# Patient Record
Sex: FEMALE | Race: WHITE | NOT HISPANIC OR LATINO | Employment: OTHER | URBAN - METROPOLITAN AREA
[De-identification: names, ages, dates, MRNs, and addresses within clinical notes are randomized per-mention and may not be internally consistent; named-entity substitution may affect disease eponyms.]

---

## 2017-02-23 ENCOUNTER — ALLSCRIPTS OFFICE VISIT (OUTPATIENT)
Dept: OTHER | Facility: OTHER | Age: 47
End: 2017-02-23

## 2017-02-23 ENCOUNTER — OFFICE VISIT (OUTPATIENT)
Dept: URGENT CARE | Age: 47
End: 2017-02-23
Payer: COMMERCIAL

## 2017-02-23 PROCEDURE — 99283 EMERGENCY DEPT VISIT LOW MDM: CPT

## 2017-02-23 PROCEDURE — G0382 LEV 3 HOSP TYPE B ED VISIT: HCPCS

## 2017-05-17 ENCOUNTER — GENERIC CONVERSION - ENCOUNTER (OUTPATIENT)
Dept: OTHER | Facility: OTHER | Age: 47
End: 2017-05-17

## 2017-05-17 ENCOUNTER — ALLSCRIPTS OFFICE VISIT (OUTPATIENT)
Dept: OTHER | Facility: OTHER | Age: 47
End: 2017-05-17

## 2017-06-14 ENCOUNTER — ALLSCRIPTS OFFICE VISIT (OUTPATIENT)
Dept: OTHER | Facility: OTHER | Age: 47
End: 2017-06-14

## 2017-07-23 ENCOUNTER — HOSPITAL ENCOUNTER (EMERGENCY)
Facility: HOSPITAL | Age: 47
Discharge: HOME | End: 2017-07-23
Attending: EMERGENCY MEDICINE | Admitting: EMERGENCY MEDICINE
Payer: COMMERCIAL

## 2017-07-23 ENCOUNTER — OFFICE VISIT (OUTPATIENT)
Dept: URGENT CARE | Facility: MEDICAL CENTER | Age: 47
End: 2017-07-23
Payer: COMMERCIAL

## 2017-07-23 ENCOUNTER — APPOINTMENT (EMERGENCY)
Dept: CT IMAGING | Facility: HOSPITAL | Age: 47
End: 2017-07-23
Payer: COMMERCIAL

## 2017-07-23 VITALS
HEIGHT: 69 IN | WEIGHT: 175 LBS | HEART RATE: 82 BPM | DIASTOLIC BLOOD PRESSURE: 74 MMHG | BODY MASS INDEX: 25.92 KG/M2 | RESPIRATION RATE: 20 BRPM | TEMPERATURE: 98 F | SYSTOLIC BLOOD PRESSURE: 160 MMHG | OXYGEN SATURATION: 100 %

## 2017-07-23 DIAGNOSIS — R10.9 ABDOMINAL PAIN: Primary | ICD-10-CM

## 2017-07-23 DIAGNOSIS — R11.0 NAUSEA: ICD-10-CM

## 2017-07-23 LAB
ALBUMIN SERPL BCP-MCNC: 3.6 G/DL (ref 3.5–5)
ALP SERPL-CCNC: 49 U/L (ref 46–116)
ALT SERPL W P-5'-P-CCNC: 31 U/L (ref 12–78)
ANION GAP SERPL CALCULATED.3IONS-SCNC: 9 MMOL/L (ref 4–13)
AST SERPL W P-5'-P-CCNC: 32 U/L (ref 5–45)
BASOPHILS # BLD AUTO: 0.02 THOUSANDS/ΜL (ref 0–0.1)
BASOPHILS NFR BLD AUTO: 0 % (ref 0–1)
BILIRUB SERPL-MCNC: 0.5 MG/DL (ref 0.2–1)
BUN SERPL-MCNC: 10 MG/DL (ref 5–25)
CALCIUM SERPL-MCNC: 8.7 MG/DL (ref 8.3–10.1)
CHLORIDE SERPL-SCNC: 106 MMOL/L (ref 100–108)
CO2 SERPL-SCNC: 27 MMOL/L (ref 21–32)
CREAT SERPL-MCNC: 0.73 MG/DL (ref 0.6–1.3)
EOSINOPHIL # BLD AUTO: 0.13 THOUSAND/ΜL (ref 0–0.61)
EOSINOPHIL NFR BLD AUTO: 2 % (ref 0–6)
ERYTHROCYTE [DISTWIDTH] IN BLOOD BY AUTOMATED COUNT: 13.5 % (ref 11.6–15.1)
EXT BLOOD URINE: NEGATIVE
EXT GLUCOSE, UA: NEGATIVE
EXT KETONES: NORMAL
EXT NITRITE, UA: NEGATIVE
EXT PH, UA: 6
EXT PROTEIN, UA: NEGATIVE
EXT SPECIFIC GRAVITY, UA: 1
EXT UROBILINOGEN: NEGATIVE
GFR SERPL CREATININE-BSD FRML MDRD: >60 ML/MIN/1.73SQ M
GLUCOSE SERPL-MCNC: 79 MG/DL (ref 65–140)
HCG UR QL: NEGATIVE
HCT VFR BLD AUTO: 36.9 % (ref 34.8–46.1)
HGB BLD-MCNC: 12.1 G/DL (ref 11.5–15.4)
LYMPHOCYTES # BLD AUTO: 1.66 THOUSANDS/ΜL (ref 0.6–4.47)
LYMPHOCYTES NFR BLD AUTO: 21 % (ref 14–44)
MCH RBC QN AUTO: 30.2 PG (ref 26.8–34.3)
MCHC RBC AUTO-ENTMCNC: 32.8 G/DL (ref 31.4–37.4)
MCV RBC AUTO: 92 FL (ref 82–98)
MONOCYTES # BLD AUTO: 0.39 THOUSAND/ΜL (ref 0.17–1.22)
MONOCYTES NFR BLD AUTO: 5 % (ref 4–12)
NEUTROPHILS # BLD AUTO: 5.85 THOUSANDS/ΜL (ref 1.85–7.62)
NEUTS SEG NFR BLD AUTO: 72 % (ref 43–75)
PLATELET # BLD AUTO: 220 THOUSANDS/UL (ref 149–390)
PMV BLD AUTO: 9.4 FL (ref 8.9–12.7)
POTASSIUM SERPL-SCNC: 3.9 MMOL/L (ref 3.5–5.3)
PROT SERPL-MCNC: 6.9 G/DL (ref 6.4–8.2)
RBC # BLD AUTO: 4.01 MILLION/UL (ref 3.81–5.12)
SODIUM SERPL-SCNC: 142 MMOL/L (ref 136–145)
WBC # BLD AUTO: 8.05 THOUSAND/UL (ref 4.31–10.16)
WBC # BLD EST: NEGATIVE 10*3/UL

## 2017-07-23 PROCEDURE — 81002 URINALYSIS NONAUTO W/O SCOPE: CPT | Performed by: PHYSICIAN ASSISTANT

## 2017-07-23 PROCEDURE — 96374 THER/PROPH/DIAG INJ IV PUSH: CPT

## 2017-07-23 PROCEDURE — 96361 HYDRATE IV INFUSION ADD-ON: CPT

## 2017-07-23 PROCEDURE — 36415 COLL VENOUS BLD VENIPUNCTURE: CPT | Performed by: PHYSICIAN ASSISTANT

## 2017-07-23 PROCEDURE — G0382 LEV 3 HOSP TYPE B ED VISIT: HCPCS

## 2017-07-23 PROCEDURE — 85025 COMPLETE CBC W/AUTO DIFF WBC: CPT | Performed by: PHYSICIAN ASSISTANT

## 2017-07-23 PROCEDURE — 99284 EMERGENCY DEPT VISIT MOD MDM: CPT

## 2017-07-23 PROCEDURE — 99283 EMERGENCY DEPT VISIT LOW MDM: CPT

## 2017-07-23 PROCEDURE — 80053 COMPREHEN METABOLIC PANEL: CPT | Performed by: PHYSICIAN ASSISTANT

## 2017-07-23 PROCEDURE — 81025 URINE PREGNANCY TEST: CPT | Performed by: PHYSICIAN ASSISTANT

## 2017-07-23 PROCEDURE — 74177 CT ABD & PELVIS W/CONTRAST: CPT

## 2017-07-23 RX ORDER — KETOROLAC TROMETHAMINE 30 MG/ML
15 INJECTION, SOLUTION INTRAMUSCULAR; INTRAVENOUS ONCE
Status: COMPLETED | OUTPATIENT
Start: 2017-07-23 | End: 2017-07-23

## 2017-07-23 RX ORDER — ONDANSETRON 2 MG/ML
4 INJECTION INTRAMUSCULAR; INTRAVENOUS ONCE
Status: COMPLETED | OUTPATIENT
Start: 2017-07-23 | End: 2017-07-23

## 2017-07-23 RX ADMIN — IOHEXOL 100 ML: 350 INJECTION, SOLUTION INTRAVENOUS at 15:52

## 2017-07-23 RX ADMIN — SODIUM CHLORIDE 1000 ML: 0.9 INJECTION, SOLUTION INTRAVENOUS at 15:23

## 2017-07-23 RX ADMIN — ONDANSETRON 4 MG: 2 INJECTION INTRAMUSCULAR; INTRAVENOUS at 15:22

## 2017-07-23 RX ADMIN — KETOROLAC TROMETHAMINE 15 MG: 30 INJECTION, SOLUTION INTRAMUSCULAR at 15:23

## 2017-07-24 ENCOUNTER — ALLSCRIPTS OFFICE VISIT (OUTPATIENT)
Dept: OTHER | Facility: OTHER | Age: 47
End: 2017-07-24

## 2017-07-24 DIAGNOSIS — N83.202 CYST OF LEFT OVARY: ICD-10-CM

## 2017-08-16 ENCOUNTER — ALLSCRIPTS OFFICE VISIT (OUTPATIENT)
Dept: OTHER | Facility: OTHER | Age: 47
End: 2017-08-16

## 2017-08-17 ENCOUNTER — HOSPITAL ENCOUNTER (OUTPATIENT)
Dept: RADIOLOGY | Age: 47
Discharge: HOME | End: 2017-08-17
Payer: COMMERCIAL

## 2017-08-17 DIAGNOSIS — N83.202 CYST OF LEFT OVARY: ICD-10-CM

## 2017-08-17 PROCEDURE — 76830 TRANSVAGINAL US NON-OB: CPT

## 2017-08-17 PROCEDURE — 76856 US EXAM PELVIC COMPLETE: CPT

## 2017-08-21 ENCOUNTER — GENERIC CONVERSION - ENCOUNTER (OUTPATIENT)
Dept: OTHER | Facility: OTHER | Age: 47
End: 2017-08-21

## 2017-09-22 ENCOUNTER — GENERIC CONVERSION - ENCOUNTER (OUTPATIENT)
Dept: OTHER | Facility: OTHER | Age: 47
End: 2017-09-22

## 2017-11-07 NOTE — PROGRESS NOTES
Assessment  1  Use of tamoxifen (Nolvadex) (V07 51) (Z79 810)   2  Malignant neoplasm of upper-inner quadrant of right breast in female, estrogen receptor   positive (174 2,V86 0) (C50 211,Z17 0)    Plan  Unlinked    · Follow-up visit in 6 months Evaluation and Treatment  Follow-up  Status: Complete   Done: [id number]   Ordered;Ordered By: Jose Luis Gonzalez Performed:  Due: 68Ros7704; Last Updated By: Andrey Warren; 8/16/2017 9:28:59 AM    Discussion/Summary  57 yo female s/p bilateral mastectomy and reconstruction for a right sided IDC  She continues on tamoxifen  ARABELLA based on exam today  I will see her again in six months or sooner should the need arise  Chief Complaint  Chief Complaint Free Text Note Form: Pt is here for her 6 month breast follow-up  new complaints at this time  recent imaging  Emily Lua and Dr Debbie Childs  History of Present Illness  Diagnosis and Staging: IDC right breast ER/IL +, HER2 -stage I; pathologic stage [id number]   Treatment History: 6/30/15 bilateral mastectomy, right sentinel node biopsy; expanders Steve  Current Therapy: marcial   Disease Status: arabella   Interval History: 10/23/15 implant exchange      Review of Systems  Complete Female ROS SurgOnc:   Constitutional: night sweats  Eyes: eyesight problems  ENT: no complaints of ear pain, no hoarseness, no difficulty swallowing,-- no tinnitus-- and-- no new masses in head, oral cavity, or neck  Cardiovascular: No complaints of chest pain, no palpitations, no ankle edema  Respiratory: No complaints of shortness of breath, no cough  Gastrointestinal: No complaints of jaundice, no bloody stools, no pale stools  Genitourinary: No complaints of dysuria, no hematuria, no nocturia, no frequent urination, no urethral discharge  Musculoskeletal: No complaints of weakness, paralysis, joint stiffness or arthralgias,  Integumentary: No complaints of rash, no new lesions     Neurological: No complaints of convulsions, no seizures, no dizziness  Hematologic/Lymphatic: No complaints of easy bruising  Active Problems   1  Abdominal pain (789 00) (R10 9)   2  Abdominal pain, RLQ (right lower quadrant) (789 03) (R10 31)   3  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)   4  Cyst of left ovary (620 2) (N83 202)   5  Fall, initial encounter (E888 9) [facility])   6  Insomnia (780 52) (G47 00)   7  Irritable bowel syndrome (IBS) (564 1) (K58 9)   8  Psoriasis (696 1) (L40 9)   9  Screening for skin condition (V82 0) (Z13 89)   10  Seasonal allergies (477 9) (J30 2)   11  Urinary frequency (788 41) (R35 0)   12  Use of tamoxifen (Nolvadex) (V07 51) (Z79 810)    Well female exam with routine gynecological exam (V72 31) (Z01 419)          Past Medical History  1  History of Abnormal finding on breast imaging (793 89) (R92 8)   2  History of Acute upper respiratory infection (465 9) (J06 9)   3  History of BRCA negative (V82 71) (Z13 71)   4  History of acute cystitis (V13 02) (Z87 440)   5  History of acute sinusitis (V12 69) (Z87 09)   6  History of dysuria (V13 00) (Z87 898)   7  History of endometriosis (V13 29) (Z87 42)   8  History of fibrocystic disease of breast (V13 89) (Z87 898)   9  History of ovulatory pain (V13 29) (Z87 42)   10  History of pregnancy (V13 29)   11  History of Inconclusive mammography due to dense breasts (793 82) (R92 2)   12  History of Menarche (V21 8)   13  History of Night sweats (780 8) (R61)   14  History of Wears glasses (V49 89) (Z97 3)    Surgical History  1  History of Biopsy Breast Percutaneous Needle Core   · 05/14/15 right   2  History of Breast Reconstruction With Implant Prosthesis Bilateral   · 10/23/15 expanders removed, implaced placed   3  History of Breast Reconstruction With Tissue Expander Bilateral   · 06/30/15   4  History of Breast Surgery Mastectomy   · 06/30/15   5  History of  Section   ·    6   History of Exploratory Laparoscopy   · ENDOMETRIOSIS -  7  History of [place] Lymph Node Biopsy   · 06/30/15  Surgical History Reviewed: The surgical history was reviewed and updated today  Family History  Mother    1  Family history of malignant neoplasm of thyroid (V16 8) (Z80 8)  Father    2  Denied: Family history of malignant neoplasm   3  Family history of psoriasis (V19 4) (Z84 0)  Maternal Great Grandmother    4  Family history of malignant neoplasm of breast (V16 3) (Z80 3)  Paternal Grandfather    5  Family history of lung cancer (V16 1) (Z80 1)  Uncle    6  Family history of heart disease (V17 49) (Z82 49)   7  Family history of malignant neoplasm of prostate (V16 42) (Z80 45)  Family History Reviewed: The family history was reviewed and updated today  Social History   · Currently working   · Drinks coffee   · Has 1 child   ·    · Never a smoker   · Social drinker  Social History Reviewed: The social history was reviewed and updated today  The social history was reviewed and is unchanged  Current Meds   1  Calcium TABS; Therapy: (Recorded:14Jun2017) to Recorded   2  Clobetasol Propionate 0 05 % External Ointment; APPLY SPARINGLY TO AFFECTED   AREA(S) TWICE DAILY  Requested for: [id number]; Last Rx:17May2017 Ordered   3  Hydrocortisone 2 5 % External Cream; APPLY SPARINGLY TO AFFECTED AREA(S) 2 TO   3 TIMES DAILY  face; Therapy: 11FTN4344 to (Last Rx:17May2017)  Requested for: 20OVB0778 Ordered   4  Krill Oil 300 MG Oral Capsule; Therapy: (Recorded:14Jun2017) to Recorded   5  Multiple Vitamin CAPS; Therapy: (Recorded:14May2015) to Recorded   6  Probiotic CAPS; Therapy: (Recorded:16Nov2016) to Recorded   7  Spirulina Powder; Therapy: (Recorded:14May2015) to Recorded   8  Tamoxifen Citrate 20 MG Oral Tablet; take 1 tablet by mouth every day; Therapy: 56Agi7366 to (Evaluate:06Apr2018)  Requested for: 06Fwu9453; Last   Rx:75Khh9166 Ordered   9  Vitamin C 1000 MG Oral Tablet; Therapy: (Recorded:14Jun2017) to Recorded   10  Vitamin D3 5000 UNIT Oral Capsule; Therapy: (Recorded:52Djg3614) to Recorded  Medication List Reviewed: The medication list was reviewed and updated today  Allergies  1  Kait-[place] Plus Cold & Cough CAPS    Vitals  Vital Signs    Recorded: 92Hep1454 09:09AM   Temperature 98 5 F   Heart Rate 97   Respiration 15   Systolic 484   Diastolic 80   Height 5 ft 7 in   Weight 185 lb    BMI Calculated 28 97   BSA Calculated 1 96   O2 Saturation 99     Physical Exam    Constitutional: General appearance: The Patient is well-developed, well-nourished female who appears her stated age in no acute distress  She is pleasant and talkative  Chest: The lungs are clear to auscultation  Cardiac: Heart is regular rate  Abdomen: There is no evidence of hepatosplenomegaly  Extremities: There is no edema  Neuro: Grossly nonfocal  -- Orientation to person, place and time: Normal  -- Mood and affect: Normal     Lymphatic: no evidence of cervical adenopathy bilaterally  -- no evidence of axillary adenopathy bilaterally  Skin: Examination of Breast: Abnormal   Breast: Examination of the right breast revealed a total mastectomy,-- a mastectomy scar-- and-- breast reconstruction, but-- no erythema,-- no swelling-- and-- no tenderness  Examination of the left breast revealed a total mastectomy,-- a mastectomy scar-- and-- breast reconstruction, but-- no erythema,-- no swelling-- and-- no tenderness  No breast masses were palpable  Axillary nodes: no enlarged nodes  Future Appointments    Date/Time Provider Specialty Site   02/22/2018 11:30 AM JUANI Renteria  Surgical Oncology [facility]   12/19/2017 10:20 AM JUANI Call  Hematology Oncology [facility] [place]   11/17/2017 10:50 AM JUANI Montiel  Dermatology [facility]     End of Encounter Meds  1  Multiple Vitamin CAPS; Therapy: (Recorded:68Hqu8163) to Recorded   2  Probiotic CAPS; Therapy: (Recorded:16Nov2016) to Recorded   3  Spirulina Powder; Therapy: (Recorded:14May2015) to Recorded  4  Clobetasol Propionate 0 05 % External Ointment; APPLY SPARINGLY TO AFFECTED   AREA(S) TWICE DAILY  Requested for: 92BAP9724; Last Rx:17May2017 Ordered   5  Hydrocortisone 2 5 % External Cream; APPLY SPARINGLY TO AFFECTED AREA(S) 2 TO   3 TIMES DAILY  face; Therapy: 45LMB2621 to (Last Rx:17May2017)  Requested for: 09OOR1296 Ordered  6  Calcium TABS; Therapy: (Recorded:14Jun2017) to Recorded   7  Krill Oil 300 MG Oral Capsule; Therapy: (Recorded:14Jun2017) to Recorded   8  Tamoxifen Citrate 20 MG Oral Tablet; take 1 tablet by mouth every day; Therapy: 02Icv8560 to (Evaluate:06Apr2018)  Requested for: 42Wkp0869; Last   Rx:58Syi3921 Ordered   9  Vitamin C 1000 MG Oral Tablet; Therapy: (Recorded:14Jun2017) to Recorded   10  Vitamin D3 5000 UNIT Oral Capsule;     Therapy: (Recorded:14Jun2017) to Recorded    Signatures   Electronically signed by : JUANI Dumont ; Nov 6 2017  9:54AM EST                       (Author)

## 2017-11-17 ENCOUNTER — ALLSCRIPTS OFFICE VISIT (OUTPATIENT)
Dept: OTHER | Facility: OTHER | Age: 47
End: 2017-11-17

## 2017-11-18 NOTE — PROGRESS NOTES
Assessment  1  Psoriasis (696 1) (L40 9)   2  Screening for skin condition (V82 0) (Z07 89)    Plan     · Betamethasone Dipropionate Aug 0 05 % External Lotion; APPLY SPARINGLY TOAFFECTED AREA(S) ONCE DAILY To scalp lesions   · Clobetasol Propionate 0 05 % External Ointment; APPLY SPARINGLY TOAFFECTED AREA(S) TWICE DAILY   · Follow Up if Not Better Evaluation and Treatment  Follow-up  Status: Complete  Done:17Nov2017    Discussion/Summary  Discussion Summary:   We reiterated the chronic nature of psoriasis the importance of consistent treatment to get this under control and keep it under control even suggested using the clobetasol on the weekends as well also would encourage her to use the home UVB unit as well  Will plan follow-up again a yearly if necessary  Patient also advised that because of insurance we have very limited formulary  Chief Complaint  Chief Complaint Free Text Note Form: 6 month psoriasis follow-up      History of Present Illness  HPI: 50-year-old female presents for follow-up for psoriasis patient has been intermittently using clobetasol ointment which seems to improve but it recurs as soon as she stops this  Patient notably appears to get worse in the summer and better in the winter  She also has some calcipotriene which he had she does not use regularly  Patient also has a home UVB unit which she does not use      Review of Systems  Complete Female Dermatology [facility] Patient:  Constitutional: Denies constitutional symptoms  Eyes: Denies eye symptoms  ENT:  denies ear symptoms, nasal symptoms, mouth or throat symptoms  Cardiovascular: Denies cardiovascular symptoms  Respiratory: Denies respiratory symptoms  Gastrointestinal: Denies gastrointestinal symptoms  Musculoskeletal: Denies musculoskeletal symptoms  Integumentary: Denies skin, hair and nail symptoms  Neurological: Denies neurologic symptoms  Psychiatric: Denies psychiatric symptoms    Endocrine: Denies endocrine symptoms  Hematologic/Lymphatic: Denies hematologic symptoms  Active Problems    1  Abdominal pain (789 00) (R10 9)   2  Abdominal pain, RLQ (right lower quadrant) (789 03) (R10 31)   3  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)   6  Insomnia (780 52) (G47 00)   7  Irritable bowel syndrome (IBS) (564 1) (K58 9)   8  Malignant neoplasm of upper-inner quadrant of right breast in female, estrogen receptor positive (174 2,V86 0) (C50 211,Z17 0)   9  Psoriasis (696 1) (L40 9)   10  Screening for skin condition (V82 0) (Z13 89)   11  Seasonal allergies (477 9) (J30 2)   12  Urinary frequency (788 41) (R35 0)   13  Use of tamoxifen (Nolvadex) (V07 51) (Z79 810)   14  Well female exam with routine gynecological exam (V72 31) (Z01 419)    Past Medical History  1  History of Abnormal finding on breast imaging (793 89) (R92 8)   2  History of Acute upper respiratory infection (465 9) (J06 9)   3  History of BRCA negative (V82 71) (Z13 71)   4  History of acute cystitis (V13 02) (Z87 440)   5  History of acute sinusitis (V12 69) (Z87 09)   6  History of dysuria (V13 00) (Z87 898)   7  History of endometriosis (V13 29) (Z87 42)   8  History of fibrocystic disease of breast (V13 89) (Z87 898)   9  History of ovulatory pain (V13 29) (Z87 42)   10  History of pregnancy (V13 29)   11  History of Inconclusive mammography due to dense breasts (793 82) (R92 2)   12  History of Menarche (V21 8)   13  History of Night sweats (780 8) (R61)   14  History of Wears glasses (V49 89) (Z97 3)  Past Medical History Reviewed- Derm:   The past medical history was reviewed  Surgical History    1  History of Biopsy Breast Percutaneous Needle Core   2  History of Breast Reconstruction With Implant Prosthesis Bilateral   3  History of Breast Reconstruction With Tissue Expander Bilateral   4  History of Breast Surgery Mastectomy   5  History of  Section   6  History of Exploratory Laparoscopy   7  History of [place] Lymph Node Biopsy  Surgical History Reviewed Sushma Herrera- Derm:   Surgical History reviewed      Family History  Mother    1  Family history of malignant neoplasm of thyroid (V16 8) (Z80 8)  Father    2  Denied: Family history of malignant neoplasm   3  Family history of psoriasis (V19 4) (Z84 0)  Maternal Great Grandmother    4  Family history of malignant neoplasm of breast (V16 3) (Z80 3)  Paternal Grandfather    5  Family history of lung cancer (V16 1) (Z80 1)  Uncle    6  Family history of heart disease (V17 49) (Z82 49)   7  Family history of malignant neoplasm of prostate (V16 42) (Z80 45)  Family History Reviewed- Derm:   Family History was reviewed      Social History     · Currently working   · Drinks coffee   · Has 1 child   ·    · Never a smoker   · Social drinker  Social History Reviewed  [place]- Derm: The social history was reviewed      Current Meds   1  Calcium TABS; Therapy: (Recorded:2017) to Recorded   2  Clobetasol Propionate 0 05 % External Ointment; APPLY SPARINGLY TO AFFECTED AREA(S) TWICE DAILY  Requested for: 98QVJ4921; Last Rx:2017 Ordered   3  Hydrocortisone 2 5 % External Cream; APPLY SPARINGLY TO AFFECTED AREA(S) 2 TO 3 TIMES DAILY  face; Therapy: 14GAD0255 to (Last Rx:2017)  Requested for: 06FMZ8256 Ordered   4  Krill Oil 300 MG Oral Capsule; Therapy: (Recorded:2017) to Recorded   5  Multiple Vitamin CAPS; Therapy: (Recorded:2015) to Recorded   6  Probiotic CAPS; Therapy: (Recorded:2016) to Recorded   7  Spirulina Powder; Therapy: (Recorded:2015) to Recorded   8  Tamoxifen Citrate 20 MG Oral Tablet; take 1 tablet by mouth every day; Therapy: 57Clr2438 to (Evaluate:2018)  Requested for: 29Cvx4775; Last Rx:2017 Ordered   9  Vitamin C 1000 MG Oral Tablet; Therapy: (Recorded:2017) to Recorded   10  Vitamin D3 5000 UNIT Oral Capsule;   Therapy: (Recorded:39Sig6694) to Recorded  Medication List Reviewed: The medication list was reviewed and updated today  Allergies    1  Kait-[place] Plus Cold & Cough CAPS    Physical Exam   Constitutional  General appearance: Appears healthy and well developed  Lymphatic  No visible disturbance  Musculoskeletal  Digits and nails: No clubbing, cyanosis or edema  Cutaneous and nail exam normal    Skin  Scalp skin texture and hair distribution: Normal skin texture on scalp, normal hair distribution  Head: Normal turgor, no rashes, no lesions  Neck: Normal turgor, no rashes, no lesions  Chest: Normal turgor, no rashes, no lesions  Abdomen: Normal turgor, no rashes, no lesions  Right upper extremity: Abnormal    Left upper extremity: Abnormal    Right lower extremity: Abnormal    Left lower extremity: Abnormal    Neuro/Psych  Alert and oriented x 3  Displays comfort and cooperation during encounterl  Affect is normal    Finding Erythema scaling patches noted widespread areas of the arms and legs and scalp nothing else of concern noted on complete exam       Future Appointments    Date/Time Provider Specialty Site   12/19/2017 10:20 AM JUANI Mclaughlin  Hematology Oncology [facility] [place]   02/22/2018 11:30 AM JUANI Silva Ala  Surgical Oncology [facility]   11/15/2018 09:20 AM JUANI Encarnacion   Dermatology [facility]       Signatures   Electronically signed by : JUANI Wise ; Nov 17 2017 11:28AM EST                       (Author)

## 2017-12-19 ENCOUNTER — ALLSCRIPTS OFFICE VISIT (OUTPATIENT)
Dept: OTHER | Facility: OTHER | Age: 47
End: 2017-12-19

## 2017-12-20 NOTE — PROGRESS NOTES
Assessment  1  Malignant neoplasm of upper-inner quadrant of right breast in female, estrogen receptor positive (174 2,V86 0) (C50 211,Z17 0)    Plan  Malignant neoplasm of upper-inner quadrant of right breast in female, estrogen receptorpositive    · Follow-up visit in 1 year Evaluation and Treatment  Follow-up  Status: Complete  Done:88Yzl2983 08:20AM   Ordered; For: Malignant neoplasm of upper-inner quadrant of right breast in female, estrogen receptor positive; Ordered By: Anel Dunn Performed:  Due: 46WOA7578; Last Updated By: Stella Rouse; 12/19/2017 11:23:35 AM    Discussion/Summary  Discussion Summary:   A 56year old premenopausal woman with stage IIA right breast cancer, grade 1, ER/ME positive, HER-2 negative disease  She has no evidence of BRCA gene mutation  She underwent right mastectomy with sentinel lymph node biopsy as well as left prophylactic mastectomy, resulting in GINI  Her tumor has Oncotype DX recurrence score of 14  Since August 2015, she has been on adjuvant hormonal therapy with tamoxifen with minimal side effects  Based on her symptomatology and physical examinations, she has no evidence recurrent disease  I recommended her to continue with tamoxifen 20 mg daily  She is in agreement with my recommendation  I will see her again in a year for routine follow-up  Chief Complaint  Chief Complaint: Chief Complaint:  The patient presents to the office today with 1  Right breast cancer, stage IIA (pT2, pN0,M0)  Grade 1, ER/ME positive, HER-2 negative disease  Oncotype DX recurrence score 14  Diagnosed in June 2015 2  BRCA gene mutation negative  Chief Complaint Free Text Note Form: Right breast cancer, stage IIA (pT2, pN0,M0)   Grade 1, ER/ME positive, HER-2 negative disease  Oncotype DX recurrence score 14  Diagnosed in June 2015        History of Present Illness  HPI: A 41year old premenopausal woman, who is found to have abnormality, based on the screening mammography in her right breast  Subsequently, she underwent biopsy from the right breast lesion, which showed invasive ductal carcinoma  She was seen and evaluated by Dr Ricardo Bhatia  She was found to have no evidence of BRCA gene mutation  Despite this, she elected to have mastectomy as well as prophylactic left mastectomy, which was done in June 30, 2015  She has no evidence of carcinoma in the left mastectomy specimen  Right mastectomy specimen showed 2 1x1 8x1 0 cm of invasive ductal carcinoma, grade 1  There was no evidence of lymphovascular invasion  2 sentinel lymph node were negative for metastatic disease  This was % positive, SD 95% positive, HER-2 negative disease  Dr Ricardo Bhatia  That her tumor tissue for Oncotype DX testing which came back 14 as of recurrence score  She had immediate reconstruction with tissue expander  She presents today to discuss adjuvant treatment options  She has some chest tightness after the expansion  Otherwise, she feels well  She has no complaint of bone pain  She denies respiratory symptoms  Her weight has been stable  She has regular menstrual cycle  Her performance status is normal    Current Therapy: Adjuvant hormonal therapy with tamoxifen 20 mg once a day since August 2015  Disease Status: GINI status post mastectomy and contralateral prophylactic mastectomy  Interval History: A 43-year-old premenopausal woman with stage IIA right breast cancer, grade 1, ER/SD positive, HER-2 negative disease  She underwent mastectomy as well as prophylactic left mastectomy, resulting in GINI  Her tumor had Oncotype DX recurrence score of 14  Therefore, adjuvant chemotherapy was not indicated  Since August 2015, she has been on adjuvant hormonal therapy with tamoxifen  She presented today for follow-up  She continued to feel well  She has very minimal hot flashes  She has regular menstrual cycle  She denied any bone pain  Her recent pelvic ultrasound was negative  She has no respiratory symptoms   She denied swelling in the lower extremities  Her performance status is normal       Review of Systems  Complete-Female:  Constitutional: as noted in HPI  Eyes: No complaints of eye pain, no red eyes, no eyesight problems, no discharge, no dry eyes, no itching of eyes  ENT: no complaints of earache, no loss of hearing, no nose bleeds, no nasal discharge, no sore throat, no hoarseness  Cardiovascular: No complaints of slow heart rate, no fast heart rate, no chest pain, no palpitations, no leg claudication, no lower extremity edema  Respiratory: No complaints of shortness of breath, no wheezing, no cough, no SOB on exertion, no orthopnea, no PND  Gastrointestinal: No complaints of abdominal pain, no constipation, no nausea or vomiting, no diarrhea, no bloody stools  Genitourinary: No complaints of dysuria, no incontinence, no pelvic pain, no dysmenorrhea, no vaginal discharge or bleeding  Musculoskeletal: No complaints of arthralgias, no myalgias, no joint swelling or stiffness, no limb pain or swelling  Integumentary: No complaints of skin rash or lesions, no itching, no skin wounds, no breast pain or lump  Neurological: No complaints of headache, no confusion, no convulsions, no numbness, no dizziness or fainting, no tingling, no limb weakness, no difficulty walking  Psychiatric: Not suicidal, no sleep disturbance, no anxiety or depression, no change in personality, no emotional problems  Endocrine: No complaints of proptosis, no hot flashes, no muscle weakness, no deepening of the voice, no feelings of weakness  Hematologic/Lymphatic: No complaints of swollen glands, no swollen glands in the neck, does not bleed easily, does not bruise easily  ROS Reviewed:   ROS reviewed  Active Problems  1  Abdominal pain (789 00) (R10 9)   2  Abdominal pain, RLQ (right lower quadrant) (789 03) (R10 31)   3  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)   4  Cyst of left ovary (620 2) (N83 202)   5   Fall, initial encounter (E888 9) (W19 XXXA)   6  Insomnia (780 52) (G47 00)   7  Irritable bowel syndrome (IBS) (564 1) (K58 9)   8  Malignant neoplasm of upper-inner quadrant of right breast in female, estrogen receptor positive (174 2,V86 0) (C50 211,Z17 0)   9  Psoriasis (696 1) (L40 9)   10  Screening for skin condition (V82 0) (Z13 89)   11  Seasonal allergies (477 9) (J30 2)   12  Urinary frequency (788 41) (R35 0)   13  Use of tamoxifen (Nolvadex) (V07 51) (Z79 810)   14  Well female exam with routine gynecological exam (V72 31) (Z01 419)    Past Medical History  1  History of Abnormal finding on breast imaging (793 89) (R92 8)   2  History of Acute upper respiratory infection (465 9) (J06 9)   3  History of BRCA negative (V82 71) (Z13 71)   4  History of acute cystitis (V13 02) (Z87 440)   5  History of acute sinusitis (V12 69) (Z87 09)   6  History of dysuria (V13 00) (Z87 898)   7  History of endometriosis (V13 29) (Z87 42)   8  History of fibrocystic disease of breast (V13 89) (Z87 898)   9  History of ovulatory pain (V13 29) (Z87 42)   10  History of pregnancy (V13 29)   11  History of Inconclusive mammography due to dense breasts (793 82) (R92 2)   12  History of Menarche (V21 8)   13  History of Night sweats (780 8) (R61)   14  History of Wears glasses (V49 89) (Z97 3)  Active Problems And Past Medical History Reviewed: The active problems and past medical history were reviewed and updated today  Surgical History  1  History of Biopsy Breast Percutaneous Needle Core   2  History of Breast Reconstruction With Implant Prosthesis Bilateral   3  History of Breast Reconstruction With Tissue Expander Bilateral   4  History of Breast Surgery Mastectomy   5  History of  Section   6  History of Exploratory Laparoscopy   7  History of [place] Lymph Node Biopsy  Surgical History Reviewed: The surgical history was reviewed and updated today  Family History  Mother    1   Family history of malignant neoplasm of thyroid (V16 8) (Z80 8)  Father    2  Denied: Family history of malignant neoplasm   3  Family history of psoriasis (V19 4) (Z84 0)  Maternal Great Grandmother    4  Family history of malignant neoplasm of breast (V16 3) (Z80 3)  Paternal Grandfather    5  Family history of lung cancer (V16 1) (Z80 1)  Uncle    6  Family history of heart disease (V17 49) (Z82 49)   7  Family history of malignant neoplasm of prostate (V16 42) (Z80 45)  Family History Reviewed: The family history was reviewed and updated today  Social History   · Currently working   · Drinks coffee   · Has 1 child   ·    · Never a smoker   · Social drinker  Social History Reviewed: The social history was reviewed and updated today  The social history was reviewed and is unchanged  Current Meds   1  Ashwagandha CAPS; Therapy: (Recorded:45Oqv4551) to Recorded   2  Betamethasone Dipropionate Aug 0 05 % External Lotion; APPLY SPARINGLY TO AFFECTED AREA(S) ONCE DAILY To scalp lesions; Therapy: 97SZU4173 to (Last Rx:17Nov2017)  Requested for: 64LJZ9483 Ordered   3  Calcium TABS; Therapy: (Recorded:14Jun2017) to Recorded   4  Clobetasol Propionate 0 05 % External Ointment; APPLY SPARINGLY TO AFFECTED AREA(S) TWICE DAILY  Requested for: 25OHN8256; Last Rx:17Nov2017 Ordered   5  Hydrocortisone 2 5 % External Cream; APPLY SPARINGLY TO AFFECTED AREA(S) 2 TO 3 TIMES DAILY  face; Therapy: 66KVZ8426 to (Last Rx:17May2017)  Requested for: 10DSP1988 Ordered   6  Krill Oil 300 MG Oral Capsule; Therapy: (Recorded:14Jun2017) to Recorded   7  Multiple Vitamin CAPS; Therapy: (Recorded:45Fze5191) to Recorded   8  Probiotic CAPS; Therapy: (Recorded:16Nov2016) to Recorded   9  Spirulina Powder; Therapy: (Recorded:14May2015) to Recorded   10  Tamoxifen Citrate 20 MG Oral Tablet; take 1 tablet by mouth every day; Therapy: 23Aye9565 to (Evaluate:06Apr2018)  Requested for: 71Mzc0960; Last  Rx:09Aug2017 Ordered   11   Vitamin C 1000 MG Oral Tablet; Therapy: (Recorded:36Evn6698) to Recorded   12  Vitamin D3 5000 UNIT Oral Capsule; Therapy: (Recorded:72Fnx0803) to Recorded  Medication List Reviewed: The medication list was reviewed and updated today  Medication List Reviewed: The medication list was reviewed and updated today  Allergies  1  Kait-[place] Plus Cold & Cough CAPS    Vitals  Vital Signs    Recorded: 24IPG0887 10:58AM   Temperature 98 1 F   Heart Rate 78   Respiration 14   Systolic 056   Diastolic 80   Height 5 ft 7 in   Weight 187 lb    BMI Calculated 29 29   BSA Calculated 1 97   O2 Saturation 99     Physical Exam   Constitutional  General appearance: No acute distress, well appearing and well nourished  Eyes  Conjunctiva and lids: No swelling, erythema or discharge  Pupils and irises: Equal, round and reactive to light  Ears, Nose, Mouth, and Throat  External inspection of ears and nose: Normal    Otoscopic examination: Tympanic membranes translucent with normal light reflex  Canals patent without erythema  Nasal mucosa, septum, and turbinates: Normal without edema or erythema  Oropharynx: Normal with no erythema, edema, exudate or lesions  Pulmonary  Respiratory effort: No increased work of breathing or signs of respiratory distress  Auscultation of lungs: Clear to auscultation  Cardiovascular  Palpation of heart: Normal PMI, no thrills  Auscultation of heart: Normal rate and rhythm, normal S1 and S2, without murmurs  Examination of extremities for edema and/or varicosities: Normal    Carotid pulses: Normal    Abdomen  Abdomen: Non-tender, no masses  Liver and spleen: No hepatomegaly or splenomegaly  Lymphatic  Palpation of lymph nodes in neck: No lymphadenopathy  Musculoskeletal  Gait and station: Normal    Digits and nails: Normal without clubbing or cyanosis  Inspection/palpation of joints, bones, and muscles: Normal    Skin  Skin and subcutaneous tissue: Normal without rashes or lesions  Neurologic  Cranial nerves: Cranial nerves 2-12 intact  Reflexes: 2+ and symmetric  Sensation: No sensory loss  Psychiatric  Orientation to person, place, and time: Normal    Mood and affect: Normal    Additional Exam:  Breast exam; status post bilateral mastectomy with reconstruction  No palpable abnormality in either reconstructed breast        ECOG 0       Results/Data  Results Form:   Results  Pathology 2 1, x1 8x1 0 cm of invasive ductal carcinoma, grade 1  No evidence of lymphovascular invasion  2 sentinel lymph nodes were negative for metastatic disease  % positive, ND 95% positive, HER-2 negative disease  Stage IIA (pT2, pN0,M0)  Oncotype DX recurrence score 14  Radiology Chest x-ray was negative for pulmonary disease  Pelvic ultrasound was unremarkable in August 2017  Lab Results CBC and CMP were within normal limit  In October 2016  Future Appointments    Date/Time Provider Specialty Site   02/22/2018 11:30 AM JUANI Sanchez  Surgical [facility]   11/15/2018 09:20 AM JUANI Roth   Dermatology [facility]     Signatures   Electronically signed by : JUANI Zavala ; Dec 19 2017 11:24AM EST                       (Author)

## 2018-01-09 NOTE — MISCELLANEOUS
Message  Called pt to review Survivorship Care Plan, she has an appt 12/14 with Dr Kelvin Alan, will p/u packet then  Active Problems    1  Acute cystitis (595 0) (N30 00)   2  Acute upper respiratory infection (465 9) (J06 9)   3  Anxiety disorder due to medical condition (293 84) (F41 8)   4  Breast cancer (174 9) (C50 919)   5  Dysuria (788 1) (R30 0)   6  Fall, initial encounter (E888 9) [facility])   7  Insomnia (780 52) (G47 00)   8  Irritable bowel syndrome (IBS) (564 1) (K58 9)   9  Psoriasis (696 1) (L40 9)   10  Screening for skin condition (V82 0) (Z13 89)   11  Seasonal allergies (477 9) (J30 2)   12  Urinary frequency (788 41) (R35 0)   13  Use of tamoxifen (Nolvadex) (V07 51) (Z79 810)   14  Well female exam with routine gynecological exam (V72 31) (Z01 419)    Current Meds   1  Acetylcysteine 500 MG TABS; Therapy: (Recorded:34Dgi2348) to Recorded   2  Ashwagandha-Sensoril 125 MG Oral Capsule; Therapy: 52Mad7125 to Recorded   3  Chromium 200 MCG Oral Tablet; Therapy: 10Ael0824 to Recorded   4  Clobetasol Propionate 0 05 % External Ointment; APPLY SPARINGLY TO AFFECTED   AREA(S) TWICE DAILY; Last Rx:72Mur2121 Ordered   5  Clobetasol Propionate 0 05 % External Solution; APPLY AND GENTLY MASSAGE INTO   AFFECTED AREA(S) TWICE DAILY; Last Rx:16Dav1682 Ordered   6  Ketotifen Fumarate 0 025 % Ophthalmic Solution; Therapy: 74XSQ6173 to (Evaluate:55Cyt3768) Recorded   7  Multiple Vitamin CAPS; Therapy: (Recorded:61Kll4671) to Recorded   8  Probiotic CAPS; Therapy: (Recorded:16Nov2016) to Recorded   9  Spirulina Powder; Therapy: (Recorded:21Jkh6935) to Recorded   10  Tamoxifen Citrate 20 MG Oral Tablet; TAKE 1 TABLET DAILY; Therapy: 95Ups1891 to (Evaluate:16Kfn0355)  Requested for: 49Olw3921; Last    Rx:49Fiw1235 Ordered   11  Turmeric CAPS; Therapy: (Recorded:02Lwv7151) to Recorded   12  Vectical 3 MCG/GM External Ointment Recorded   13  Vitamin C 100 MG Oral Tablet Chewable;     Therapy: 68SHF0291 to Recorded   14  Vitamin C 1000 MG Oral Tablet; TAKE 1 TABLET DAILY; Therapy: (Recorded:05Jan2016) to Recorded   15  Vitamin D 400 UNIT/ML Oral Liquid; Therapy: 19Sep2016 to Recorded    Allergies    1   Kait-[place] Plus Cold & Cough CAPS    Signatures   Electronically signed by : Heath Iglesias, ; Dec 13 2016 12:42PM EST                       (Author)

## 2018-01-10 NOTE — RESULT NOTES
Verified Results  * [facility] COMPLETE [facility]) 95AOX5594 08:32AM Luz Elena Oliveros Order Number: [id number]    - Patient Instructions: To schedule this appointment, please contact Central Scheduling at [phone]  Test Name Result Flag Reference   US PELVIS COMPLETE (TRANSABDOMINAL AND TRANSVAGINAL) (Report)     PELVIC ULTRASOUND, COMPLETE     INDICATION: Left ovarian cyst          COMPARISON: CT 7/23/2017 and priors     TECHNIQUE:  Transabdominal pelvic ultrasound was performed in sagittal and transverse planes with a curvilinear transducer  Additional transvaginal imaging was performed to better evaluate the endometrium and ovaries  Imaging included volumetric    sweeps as well as traditional still imaging technique  FINDINGS:     UTERUS:   The uterus is anteverted in position, measuring 9 7 x 4 3 x 5 8 cm  Contour and echotexture appear normal      The cervix shows no suspicious abnormality  ENDOMETRIUM:    Normal caliber of 9 mm  Homogenous and normal in appearance  OVARIES/ADNEXA:   Right ovary: 2 6 x 1 5 x 1 6 cm  No suspicious right ovarian abnormality  Doppler flow within normal limits  Left ovary: 2 2 x 3 x 2 2 cm  No suspicious left ovarian abnormality  Doppler flow within normal limits  No suspicious adnexal mass or loculated collections  There is no free fluid  IMPRESSION:     1  Unremarkable exam  No suspicious adnexal mass         Workstation performed: [id number]     Signed by:   Rosario Abraham MD   8/19/17

## 2018-01-11 NOTE — RESULT NOTES
Verified Results  (1) THIN PREP PAP WITH IMAGING 33Bqj6870 12:00AM Mayra Garcia     Test Name Result Flag Reference   LAB AP CASE REPORT (Report)     Gynecologic Cytology Report            Case: [id number]                  Authorizing Provider: Feli Lynch MD    Collected:      09/19/2016           First Screen:     JANELL Cannon    Received:      09/21/2016 1412        Specimen:  LIQUID-BASED PAP, SCREENING, Endocervical   HPV HIGH RISK RESULT (Report)     HPV, High Risk: HPV NEG, HPV16 NEG, HPV18 NEG      Other High Risk HPV Negative, HPV 16 Negative, HPV 18 Negative  HPV types: 16,18,31,33,35,39,45,51,52,56,58,59,66 and 68 DNA are undetectable or below the pre-set threshold  [employer]?s FDA approved Anthony 4800 is utilized with strict adherence to the ?s instruction  manual to test for the presence of High-Risk HPV DNA, as well as HPV 16 and HPV 18  This instrument  has been validated by our laboratory and/or by the   A negative result does not preclude the presence of HPV infection because results depend on adequate  specimen collection, absence of inhibitors and sufficient DNA to be detected  Additionally, HPV negative  results are not intended to prevent women from proceeding to colposcopy if clinically warranted  Positive HPV test results indicate the presence of any one or more of the high risk types, but since patients  are often co-infected with low-risk types it does not rule out the presence of low-risk types in patients  with mixed infections  LAB AP GYN PRIMARY INTERPRETATION      Negative for intraepithelial lesion or malignancy  Electronically signed by JANELL Cannon on 9/26/2016 at 1:00 PM   LAB AP GYN SPECIMEN ADEQUACY      Satisfactory for evaluation  Absence of endocervical/transformation zone component     LAB AP GYN ADDITIONAL INFORMATION (Report)     [employer]'s FDA approved ,  and ThinPrep Imaging System are   utilized with strict adherence to the 's instruction manual to   prepare gynecologic and non-gynecologic cytology specimens for the   production of ThinPrep slides as well as for gynecologic ThinPrep imaging  These processes have been validated by our laboratory and/or by the     The Pap test is not a diagnostic procedure and should not be used as the   sole means to detect cervical cancer  It is only a screening procedure to   aid in the detection of cervical cancer and its precursors  Both   false-negative and false-positive results have been experienced  Your   patient's test result should be interpreted in this context together with   the history and clinical findings         Signatures   Electronically signed by : JUANI Tijerina ; Sep 27 2016  1:09PM EST                       (Author)

## 2018-01-13 VITALS
OXYGEN SATURATION: 99 % | WEIGHT: 185 LBS | RESPIRATION RATE: 15 BRPM | DIASTOLIC BLOOD PRESSURE: 80 MMHG | HEIGHT: 67 IN | BODY MASS INDEX: 29.03 KG/M2 | TEMPERATURE: 98.5 F | SYSTOLIC BLOOD PRESSURE: 124 MMHG | HEART RATE: 97 BPM

## 2018-01-13 VITALS
TEMPERATURE: 98 F | RESPIRATION RATE: 18 BRPM | SYSTOLIC BLOOD PRESSURE: 132 MMHG | OXYGEN SATURATION: 98 % | WEIGHT: 187 LBS | HEIGHT: 67 IN | BODY MASS INDEX: 29.35 KG/M2 | DIASTOLIC BLOOD PRESSURE: 76 MMHG | HEART RATE: 86 BPM

## 2018-01-14 VITALS
BODY MASS INDEX: 29.35 KG/M2 | SYSTOLIC BLOOD PRESSURE: 128 MMHG | RESPIRATION RATE: 16 BRPM | WEIGHT: 187 LBS | HEART RATE: 97 BPM | HEIGHT: 67 IN | DIASTOLIC BLOOD PRESSURE: 82 MMHG

## 2018-01-14 VITALS
DIASTOLIC BLOOD PRESSURE: 88 MMHG | HEART RATE: 87 BPM | HEIGHT: 67 IN | WEIGHT: 182 LBS | RESPIRATION RATE: 14 BRPM | TEMPERATURE: 98 F | BODY MASS INDEX: 28.56 KG/M2 | OXYGEN SATURATION: 96 % | SYSTOLIC BLOOD PRESSURE: 128 MMHG

## 2018-01-17 NOTE — MISCELLANEOUS
Message  Marquis Fontana is seen in our office for the diagnosis of Breast Cancer  She will need to continue with follow up appointments and continue to take Tamoxifen for the next 5+ years  Active Problems    1  Acute cystitis (595 0) (N30 00)   2  Acute sinus infection (461 9) (J01 90)   3  Acute upper respiratory infection (465 9) (J06 9)   4  Anxiety disorder due to medical condition (293 84) (F41 8)   5  Breast cancer (174 9) (C50 919)   6  Dysuria (788 1) (R30 0)   7  Fall, initial encounter (E888 9) [facility])   8  Insomnia (780 52) (G47 00)   9  Irritable bowel syndrome (IBS) (564 1) (K58 9)   10  Psoriasis (696 1) (L40 9)   11  Screening for skin condition (V82 0) (Z13 89)   12  Seasonal allergies (477 9) (J30 2)   13  Urinary frequency (788 41) (R35 0)   14  Use of tamoxifen (Nolvadex) (V07 51) (Z79 810)   15  Well female exam with routine gynecological exam (V72 31) (Z01 419)    Current Meds   1  Amoxicillin-Pot Clavulanate 875-125 MG Oral Tablet (Augmentin); TAKE 1 TABLET   EVERY 12 HOURS DAILY; Therapy: 01BEN6791 to (Jhonny Hooper)  Requested for: 62Bze7374; Last   Rx:22Ocq1756 Ordered   2  Clobetasol Propionate 0 05 % External Ointment; APPLY SPARINGLY TO AFFECTED   AREA(S) TWICE DAILY  Requested for: 50RRI5569; Last Rx:29Mar2017 Ordered   3  Multiple Vitamin CAPS; Therapy: (Recorded:96Puq7538) to Recorded   4  Probiotic CAPS; Therapy: (Recorded:16Nov2016) to Recorded   5  Spirulina Powder; Therapy: (Recorded:45Phs6420) to Recorded   6  Tamoxifen Citrate 20 MG Oral Tablet; TAKE 1 TABLET DAILY; Therapy: 49Fyn3799 to (Evaluate:11Aug2017)  Requested for: 48VUH2164; Last   Rx:34Wvy5120 Ordered   7  Vectical 3 MCG/GM External Ointment; APPLY TO AFFECTED AREA SPARINGLY BID    Requested for: 49OWR4617; Last Rx:29Mar2017 Ordered   8  Xanax 0 25 MG Oral Tablet (ALPRAZolam); Therapy: (Recorded:16Fsr9995) to Recorded    Allergies    1   Kait-[employer] Plus Cold & Cough CAPS    Signatures Electronically signed by : Luis Kerns, ; May 17 2017 10:05AM EST                       (Author)

## 2018-01-22 VITALS
HEIGHT: 67 IN | SYSTOLIC BLOOD PRESSURE: 138 MMHG | BODY MASS INDEX: 29.03 KG/M2 | WEIGHT: 185 LBS | DIASTOLIC BLOOD PRESSURE: 100 MMHG

## 2018-01-22 VITALS
TEMPERATURE: 98.1 F | SYSTOLIC BLOOD PRESSURE: 120 MMHG | OXYGEN SATURATION: 99 % | WEIGHT: 187 LBS | HEART RATE: 78 BPM | HEIGHT: 67 IN | DIASTOLIC BLOOD PRESSURE: 80 MMHG | RESPIRATION RATE: 14 BRPM | BODY MASS INDEX: 29.35 KG/M2

## 2018-02-02 ENCOUNTER — OFFICE VISIT (OUTPATIENT)
Dept: INTERNAL MEDICINE CLINIC | Facility: CLINIC | Age: 48
End: 2018-02-02
Payer: COMMERCIAL

## 2018-02-02 VITALS
SYSTOLIC BLOOD PRESSURE: 130 MMHG | OXYGEN SATURATION: 98 % | HEIGHT: 69 IN | BODY MASS INDEX: 27.88 KG/M2 | RESPIRATION RATE: 18 BRPM | WEIGHT: 188.2 LBS | TEMPERATURE: 98.1 F | DIASTOLIC BLOOD PRESSURE: 82 MMHG | HEART RATE: 88 BPM

## 2018-02-02 DIAGNOSIS — J01.90 ACUTE NON-RECURRENT SINUSITIS, UNSPECIFIED LOCATION: Primary | ICD-10-CM

## 2018-02-02 DIAGNOSIS — Z71.3 WEIGHT LOSS COUNSELING, ENCOUNTER FOR: ICD-10-CM

## 2018-02-02 DIAGNOSIS — E66.3 OVERWEIGHT (BMI 25.0-29.9): ICD-10-CM

## 2018-02-02 PROBLEM — C50.211 MALIGNANT NEOPLASM OF UPPER-INNER QUADRANT OF RIGHT BREAST IN FEMALE, ESTROGEN RECEPTOR POSITIVE (HCC): Status: ACTIVE | Noted: 2017-11-06

## 2018-02-02 PROBLEM — N83.202 CYST OF LEFT OVARY: Status: ACTIVE | Noted: 2017-07-24

## 2018-02-02 PROBLEM — Z17.0 MALIGNANT NEOPLASM OF UPPER-INNER QUADRANT OF RIGHT BREAST IN FEMALE, ESTROGEN RECEPTOR POSITIVE (HCC): Status: ACTIVE | Noted: 2017-11-06

## 2018-02-02 PROCEDURE — 99214 OFFICE O/P EST MOD 30 MIN: CPT | Performed by: INTERNAL MEDICINE

## 2018-02-02 RX ORDER — MULTIVIT-MIN/FOLIC/VIT K/LYCOP 400-300MCG
TABLET ORAL
COMMUNITY

## 2018-02-02 RX ORDER — CALCIUM CARBONATE 500(1250)
TABLET ORAL
COMMUNITY

## 2018-02-02 RX ORDER — BLUE-GREEN ALGAE 500 MG
TABLET ORAL
COMMUNITY
End: 2020-11-11 | Stop reason: ALTCHOICE

## 2018-02-02 RX ORDER — CLOBETASOL PROPIONATE 0.5 MG/G
OINTMENT TOPICAL 2 TIMES DAILY
COMMUNITY
End: 2019-02-14 | Stop reason: HOSPADM

## 2018-02-02 RX ORDER — AMOXICILLIN AND CLAVULANATE POTASSIUM 875; 125 MG/1; MG/1
1 TABLET, FILM COATED ORAL EVERY 12 HOURS SCHEDULED
Qty: 14 TABLET | Refills: 0 | Status: SHIPPED | OUTPATIENT
Start: 2018-02-02 | End: 2018-02-09

## 2018-02-02 NOTE — PATIENT INSTRUCTIONS
1  Take antibiotic as prescribed  2  Nasal saline  3  Stay well hydrated  4  Return in June for physical    Sinusitis   AMBULATORY CARE:   Sinusitis  is inflammation or infection of your sinuses  It is most often caused by a virus  Acute sinusitis may last up to 12 weeks  Chronic sinusitis lasts longer than 12 weeks  Recurrent sinusitis means you have 4 or more times in 1 year  Common symptoms include the following:   · Fever    · Pain, pressure, redness, or swelling around the forehead, cheeks, or eyes    · Thick yellow or green discharge from your nose    · Tenderness when you touch your face over your sinuses    · Dry cough that happens mostly at night or when you lie down    · Headache and face pain that is worse when you lean forward    · Tooth pain, or pain when you chew  Seek care immediately if:   · Your eye and eyelid are red, swollen, and painful  · You cannot open your eye  · You have vision changes, such as double vision  · Your eyeball bulges out or you cannot move your eye  · You are more sleepy than normal, or you notice changes in your ability to think, move, or talk  · You have a stiff neck, a fever, or a bad headache  · You have swelling of your forehead or scalp  Contact your healthcare provider if:   · Your symptoms do not improve after 3 days  · Your symptoms do not go away after 10 days  · You have nausea and are vomiting  · Your nose is bleeding  · You have questions or concerns about your condition or care  Treatment for sinusitis:  Your symptoms may go away on their own  Your healthcare provider may recommend watchful waiting for up to 10 days before starting antibiotics  You may  need any of the following:  · Acetaminophen  decreases pain and fever  It is available without a doctor's order  Ask how much to take and how often to take it  Follow directions   Read the labels of all other medicines you are using to see if they also contain acetaminophen, or ask your doctor or pharmacist  Acetaminophen can cause liver damage if not taken correctly  Do not use more than 4 grams (4,000 milligrams) total of acetaminophen in one day  · NSAIDs , such as ibuprofen, help decrease swelling, pain, and fever  This medicine is available with or without a doctor's order  NSAIDs can cause stomach bleeding or kidney problems in certain people  If you take blood thinner medicine, always ask your healthcare provider if NSAIDs are safe for you  Always read the medicine label and follow directions  · Nasal steroid sprays  may help decrease inflammation in your nose and sinuses  · Decongestants  help reduce swelling and drain mucus in the nose and sinuses  They may help you breathe easier  · Antihistamines  help dry mucus in the nose and relieve sneezing  · Antibiotics  help treat or prevent a bacterial infection  · Take your medicine as directed  Contact your healthcare provider if you think your medicine is not helping or if you have side effects  Tell him or her if you are allergic to any medicine  Keep a list of the medicines, vitamins, and herbs you take  Include the amounts, and when and why you take them  Bring the list or the pill bottles to follow-up visits  Carry your medicine list with you in case of an emergency  Self-care:   · Rinse your sinuses  Use a sinus rinse device to rinse your nasal passages with a saline (salt water) solution or distilled water  Do not use tap water  This will help thin the mucus in your nose and rinse away pollen and dirt  It will also help reduce swelling so you can breathe normally  Ask your healthcare provider how often to do this  · Breathe in steam   Heat a bowl of water until you see steam  Lean over the bowl and make a tent over your head with a large towel  Breathe deeply for about 20 minutes  Be careful not to get too close to the steam or burn yourself  Do this 3 times a day   You can also breathe deeply when you take a hot shower  · Sleep with your head elevated  Place an extra pillow under your head before you go to sleep to help your sinuses drain  · Drink liquids as directed  Ask your healthcare provider how much liquid to drink each day and which liquids are best for you  Liquids will thin the mucus in your nose and help it drain  Avoid drinks that contain alcohol or caffeine  · Do not smoke, and avoid secondhand smoke  Nicotine and other chemicals in cigarettes and cigars can make your symptoms worse  Ask your healthcare provider for information if you currently smoke and need help to quit  E-cigarettes or smokeless tobacco still contain nicotine  Talk to your healthcare provider before you use these products  Prevent the spread of germs that cause sinusitis:  Wash your hands often with soap and water  Wash your hands after you use the bathroom, change a child's diaper, or sneeze  Wash your hands before you prepare or eat food  Follow up with your healthcare provider as directed: You may be referred to an ear, nose, and throat specialist  Write down your questions so you remember to ask them during your visits  © 2017 [facility] [facility] Information is for End User's use only and may not be sold, redistributed or otherwise used for commercial purposes  All illustrations and images included in CareNotes® are the copyrighted property of A D A M , Inc  or Tyron Mohamud  The above information is an  only  It is not intended as medical advice for individual conditions or treatments  Talk to your doctor, nurse or pharmacist before following any medical regimen to see if it is safe and effective for you

## 2018-02-02 NOTE — PROGRESS NOTES
Assessment/Plan:       Diagnoses and all orders for this visit:    Acute non-recurrent sinusitis, unspecified location  Comments:  sx worsening, suspect bacterial sinus infection, continue OTC remedies and abx rx ordered  Orders:  -     amoxicillin-clavulanate (AUGMENTIN) 875-125 mg per tablet; Take 1 tablet by mouth every 12 (twelve) hours for 7 days    Overweight (BMI 25 0-29  9)  Comments:  weight loss counseling provided    Weight loss counseling, encounter for          Subjective:      Patient ID: Gisella Olivera is a 53 y o  female  Sinusitis   This is a new problem  The current episode started in the past 7 days  The problem has been gradually worsening since onset  There has been no fever  Pain severity now: sinus pain  Associated symptoms include chills, congestion, coughing, headaches, sinus pressure and a sore throat  Pertinent negatives include no ear pain, shortness of breath or swollen glands  Treatments tried: nsaids and hydration  The treatment provided no relief  No sick contacts or known flu exposure but having upper teeth pain with current sx  Having green/yellow mucoid discharge from nose and coughing  Declines flu vaccine due to concerns regarding thimerosal/preservatives  No SOB/CP  No hx of recurrent sinus infections in past     Also c/o recent weight gain, trying to exercise and eat healthy and cut down on daily etoh intake(down to zero)  Injured her knee & now has sinus infection which has hampered her lifestyle changes/exercising  We discussed diet/weight loss counseling today and offered medical weight loss clinic with [facility] but Jacky Velasquez would like to discuss with Dr Danna stephens     No other complaints      Social History     Social History    Marital status: /Civil Union     Spouse name: N/A    Number of children: N/A    Years of education: N/A     Occupational History    Not on file       Social History Main Topics    Smoking status: Never Smoker    Smokeless tobacco: Never Used    Alcohol use No    Drug use: No    Sexual activity: Not on file     Other Topics Concern    Not on file     Social History Narrative    No narrative on file     Past Medical History:   Diagnosis Date    Breast cancer ([facility] [address] )     Psoriasis      Vitals:    02/02/18 1005   BP: 130/82   Pulse: 88   Resp: 18   Temp: 98 1 °F (36 7 °C)   SpO2: 98%   Weight: 85 4 kg (188 lb 3 2 oz)   Height: 5' 9" (1 753 m)       Current Outpatient Prescriptions:     Acetylcysteine (N-A-C SUSTAIN PO), Take 500 mg by mouth, Disp: , Rfl:     Ashwagandha 125 MG CAPS, Take by mouth, Disp: , Rfl:     Calcium 500 MG tablet, Take by mouth, Disp: , Rfl:     Calcium Carbonate-Vit D-Min (CALCIUM 1200 PO), Take 1 tablet by mouth daily  , Disp: , Rfl:     Cholecalciferol (VITAMIN D PO), Take 5,000 mg by mouth daily  , Disp: , Rfl:     clobetasol (TEMOVATE) 0 05 % ointment, Apply topically 2 (two) times a day, Disp: , Rfl:     Digestive Enzymes (DIGESTIVE ENZYME PO), Take 1 tablet by mouth daily as needed  , Disp: , Rfl:     hydrocortisone 2 5 % cream, Apply topically, Disp: , Rfl:     Krill Oil 300 MG CAPS, Take by mouth, Disp: , Rfl:     Magnesium 400 MG CAPS, Take 1 tablet by mouth daily  , Disp: , Rfl:     MILK THISTLE PO, Take 600 mg by mouth daily  , Disp: , Rfl:     Multiple Vitamin (MULTIVITAMIN) tablet, Take 1 tablet by mouth daily  , Disp: , Rfl:     Multiple Vitamins-Minerals (ONE DAILY MULTIVITAMIN ADULT) TABS, Take by mouth, Disp: , Rfl:     Probiotic Product (PROBIOTIC DAILY PO), Take 2 tablets by mouth daily  , Disp: , Rfl:     Spirulina 500 MG TABS, by Does not apply route, Disp: , Rfl:     tamoxifen (NOLVADEX) 20 mg tablet, Take 20 mg by mouth daily  , Disp: , Rfl:     amoxicillin-clavulanate (AUGMENTIN) 875-125 mg per tablet, Take 1 tablet by mouth every 12 (twelve) hours for 7 days, Disp: 14 tablet, Rfl: 0    UNABLE TO FIND, Take 320 mg by mouth daily   Tumeric, Disp: , Rfl:     VITAMIN C, CALCIUM ASCORBATE, PO, Take 1,000 mg by mouth daily  , Disp: , Rfl:   Allergies   Allergen Reactions    Other Hives     Kait selzer    Kait-[place] Plus Cold & Cough  [[id number]] Hives       Review of Systems   Constitutional: Positive for chills  HENT: Positive for congestion, sinus pressure and sore throat  Negative for ear pain  Eyes: Negative for visual disturbance  Respiratory: Positive for cough  Negative for shortness of breath  Cardiovascular: Negative for chest pain  Gastrointestinal: Negative for abdominal pain  Genitourinary: Negative for dysuria  Musculoskeletal: Positive for arthralgias and myalgias  Skin: Negative for rash  Neurological: Positive for headaches  Hematological: Negative for adenopathy  Psychiatric/Behavioral: Negative for behavioral problems and dysphoric mood  Objective:     Physical Exam   Constitutional: She is oriented to person, place, and time  She appears well-developed and well-nourished  HENT:   Head: Normocephalic and atraumatic  Mouth/Throat: No oropharyngeal exudate (but erythema present)  Eyes: Conjunctivae are normal  Pupils are equal, round, and reactive to light  Cardiovascular: Normal rate, regular rhythm and normal heart sounds  No murmur heard  Pulmonary/Chest: Effort normal and breath sounds normal  She has no wheezes  She has no rales  Abdominal: Soft  Bowel sounds are normal  There is no tenderness  Musculoskeletal: She exhibits no edema  Lymphadenopathy:     She has no cervical adenopathy  Neurological: She is alert and oriented to person, place, and time  Skin: She is diaphoretic (& c/o feeling unwell but in no acute distress)  Psychiatric: She has a normal mood and affect  Her behavior is normal    Vitals reviewed

## 2018-02-22 ENCOUNTER — OFFICE VISIT (OUTPATIENT)
Dept: SURGICAL ONCOLOGY | Facility: CLINIC | Age: 48
End: 2018-02-22
Payer: COMMERCIAL

## 2018-02-22 VITALS
SYSTOLIC BLOOD PRESSURE: 126 MMHG | WEIGHT: 187 LBS | HEIGHT: 69 IN | RESPIRATION RATE: 18 BRPM | DIASTOLIC BLOOD PRESSURE: 72 MMHG | BODY MASS INDEX: 27.7 KG/M2 | HEART RATE: 72 BPM

## 2018-02-22 DIAGNOSIS — C50.211 MALIGNANT NEOPLASM OF UPPER-INNER QUADRANT OF RIGHT BREAST IN FEMALE, ESTROGEN RECEPTOR POSITIVE (HCC): Primary | ICD-10-CM

## 2018-02-22 DIAGNOSIS — Z79.810 USE OF TAMOXIFEN (NOLVADEX): ICD-10-CM

## 2018-02-22 DIAGNOSIS — Z17.0 MALIGNANT NEOPLASM OF UPPER-INNER QUADRANT OF RIGHT BREAST IN FEMALE, ESTROGEN RECEPTOR POSITIVE (HCC): Primary | ICD-10-CM

## 2018-02-22 PROBLEM — Z13.71 BRCA NEGATIVE: Status: RESOLVED | Noted: 2018-02-22 | Resolved: 2018-02-22

## 2018-02-22 PROCEDURE — 99214 OFFICE O/P EST MOD 30 MIN: CPT | Performed by: SURGERY

## 2018-02-22 NOTE — LETTER
February 22, 2018     Suze Dejesus MD  [address] [place] [address]    Patient: Gisella Olivera   YOB: 1970   Date of Visit: 2/22/2018       Dear Dr Jerica Nina: Thank you for referring Gisella Olivera to me for evaluation  Below are my notes for this consultation  If you have questions, please do not hesitate to call me  I look forward to following your patient along with you  Sincerely,        Everett Xiao MD        CC: No Recipients  Everett Xiao MD  2/22/2018  1:40 PM  Sign at close encounter     Surgical Oncology Follow Up       [address] [place] [id number]    Gisella Olivera  1970  [id number]  [address] [facility],[address]  [facility] [address] [address]    Chief Complaint   Patient presents with    Follow-up       Assessment/Plan     Advance Care Planning/Advance Directives:  Did not discuss  with the patient  Malignant neoplasm of upper-inner quadrant of right breast in female, estrogen receptor positive ([facility] [address] )    5/14/2015 Surgery     Right US guided breast biopsy         6/30/2015 Surgery     Bilateral; mastectomies, right SLNB  Bilateral reconstruction with expanders  Dr Shakira Lyman         8/12/2015 -  Hormone Therapy     Tamoxifen  Dr Samm Cade  10/23/2015 Surgery     Expanders removed, implants placed  11/6/2017 Initial Diagnosis     Malignant neoplasm of upper-inner quadrant of right breast in female, estrogen receptor positive (HCC)        Genetic Testing     BRCA negative          Genomic Testing     Oncotype DX  14            History of Present Illness: breast cancer follow up  -Interval History:n/a    Review of Systems:  Review of Systems   Constitutional: Positive for unexpected weight change (weight gain)  Negative for appetite change and fever  Eyes: Negative      Respiratory: Negative for shortness of breath  Cardiovascular: Negative  Gastrointestinal: Negative  Endocrine: Negative  Genitourinary: Negative  Musculoskeletal: Negative  Negative for arthralgias and myalgias  Skin: Negative  Allergic/Immunologic: Negative  Neurological: Negative  Hematological: Negative  Negative for adenopathy  Does not bruise/bleed easily  Psychiatric/Behavioral: Negative  Patient Active Problem List   Diagnosis    Cyst of left ovary    Anxiety disorder due to medical condition    Insomnia    Irritable bowel syndrome (IBS)    Malignant neoplasm of upper-inner quadrant of right breast in female, estrogen receptor positive (HCC)    Psoriasis    Seasonal allergies    Acute non-recurrent sinusitis    Use of tamoxifen (Nolvadex)     Past Medical History:   Diagnosis Date    BRCA negative     IBS (irritable bowel syndrome)     Insomnia     Night sweats     Psoriasis     Wears glasses      Past Surgical History:   Procedure Laterality Date    BREAST BIOPSY Right     IDC     SECTION      LAPAROSCOPIC OOPHORECTOMY      LYMPHADENECTOMY Right     x2    MASTECTOMY      bilateral mastectomy    CO NIPPLE/AREOLA RECONSTRUCTION Bilateral 10/13/2016    Procedure: NIPPLE RECONSTRUCTION ;  Surgeon: Jacqueline Leija MD;  Location: [facility];  Service: Plastics    CO REVISE BREAST RECONSTRUCTION Bilateral 3/9/2016    Procedure: RECONSTRUCTION REVISION  BREAST MOUND ,FAT GRAFTS ;  Surgeon: Jacqueline Leija MD;  Location: [facility];  Service: Plastics    SENTINEL LYMPH NODE BIOPSY Right      Family History   Problem Relation Age of Onset    Lung cancer Paternal Grandfather 48    Breast cancer Other      maternal great grandmother age at dx unk     Social History     Social History    Marital status: /Civil Union     Spouse name: N/A    Number of children: N/A    Years of education: N/A     Occupational History    Not on file       Social History Main Topics    Smoking status: Never Smoker    Smokeless tobacco: Never Used    Alcohol use No    Drug use: No    Sexual activity: Not on file     Other Topics Concern    Not on file     Social History Narrative    No narrative on file       Current Outpatient Prescriptions:     Ashwagandha 125 MG CAPS, Take by mouth, Disp: , Rfl:     Calcium 500 MG tablet, Take by mouth, Disp: , Rfl:     Calcium Carbonate-Vit D-Min (CALCIUM 1200 PO), Take 1 tablet by mouth daily  , Disp: , Rfl:     Cholecalciferol (VITAMIN D PO), Take 5,000 mg by mouth daily  , Disp: , Rfl:     clobetasol (TEMOVATE) 0 05 % ointment, Apply topically 2 (two) times a day, Disp: , Rfl:     Digestive Enzymes (DIGESTIVE ENZYME PO), Take 1 tablet by mouth daily as needed  , Disp: , Rfl:     hydrocortisone 2 5 % cream, Apply topically, Disp: , Rfl:     Krill Oil 300 MG CAPS, Take by mouth, Disp: , Rfl:     Magnesium 400 MG CAPS, Take 1 tablet by mouth daily  , Disp: , Rfl:     MILK THISTLE PO, Take 600 mg by mouth daily  , Disp: , Rfl:     Multiple Vitamin (MULTIVITAMIN) tablet, Take 1 tablet by mouth daily  , Disp: , Rfl:     Multiple Vitamins-Minerals (ONE DAILY MULTIVITAMIN ADULT) TABS, Take by mouth, Disp: , Rfl:     Probiotic Product (PROBIOTIC DAILY PO), Take 2 tablets by mouth daily  , Disp: , Rfl:     Spirulina 500 MG TABS, by Does not apply route, Disp: , Rfl:     tamoxifen (NOLVADEX) 20 mg tablet, Take 20 mg by mouth daily  , Disp: , Rfl:     UNABLE TO FIND, Take 320 mg by mouth daily   Tumeric, Disp: , Rfl:     VITAMIN C, CALCIUM ASCORBATE, PO, Take 1,000 mg by mouth daily  , Disp: , Rfl:   Allergies   Allergen Reactions    Other Hives     Kait selzer    Kait-[place] Plus Cold & Cough  [[id number]] Hives       The following portions of the patient's history were reviewed and updated as appropriate: allergies, current medications, past family history, past medical history, past social history, past surgical history and problem list         Vitals: 02/22/18 1126   BP: 126/72   Pulse: 72   Resp: 18       Physical Exam   Constitutional: She is oriented to person, place, and time  She appears well-developed and well-nourished  HENT:   Head: Normocephalic and atraumatic  Cardiovascular: Normal heart sounds  Pulmonary/Chest: Breath sounds normal  Right breast exhibits skin change (mastectomy scar)  Right breast exhibits no mass and no tenderness  Left breast exhibits skin change (mastectomy scar)  Left breast exhibits no mass and no tenderness  Bilateral reconstruction   Abdominal: Soft  Lymphadenopathy:        Right axillary: No pectoral and no lateral adenopathy present  Left axillary: No pectoral and no lateral adenopathy present  Right: No supraclavicular adenopathy present  Left: No supraclavicular adenopathy present  Neurological: She is alert and oriented to person, place, and time  Psychiatric: She has a normal mood and affect  Results:    Imaging  n/a        Discussion/Summary:  18-year-old female status post bilateral mastectomy and reconstruction for stage IIA carcinoma of the right breast   She continues on tamoxifen with no concerns other than weight gain  We did talk about exercise and diet  There is no evidence of disease based on examination today  I will see her again in six months or sooner should the need arise

## 2018-02-22 NOTE — PROGRESS NOTES
Surgical Oncology Follow Up       [address]  [facility] [address] [place] [address]    Kiesha Lie  1970  [id number]  [id number] [facility]  [facility] [id number]    Chief Complaint   Patient presents with    Follow-up       Assessment/Plan     Advance Care Planning/Advance Directives:  Did not discuss  with the patient  Malignant neoplasm of upper-inner quadrant of right breast in female, estrogen receptor positive ([facility] [address] )    5/14/2015 Surgery     Right US guided breast biopsy         6/30/2015 Surgery     Bilateral; mastectomies, right SLNB  Bilateral reconstruction with expanders  Dr Kimberli Amaya         8/12/2015 -  Hormone Therapy     Tamoxifen  Dr Ross Bernheim  10/23/2015 Surgery     Expanders removed, implants placed  11/6/2017 Initial Diagnosis     Malignant neoplasm of upper-inner quadrant of right breast in female, estrogen receptor positive (HCC)        Genetic Testing     BRCA negative          Genomic Testing     Oncotype DX  14            History of Present Illness: breast cancer follow up  -Interval History:n/a    Review of Systems:  Review of Systems   Constitutional: Positive for unexpected weight change (weight gain)  Negative for appetite change and fever  Eyes: Negative  Respiratory: Negative for shortness of breath  Cardiovascular: Negative  Gastrointestinal: Negative  Endocrine: Negative  Genitourinary: Negative  Musculoskeletal: Negative  Negative for arthralgias and myalgias  Skin: Negative  Allergic/Immunologic: Negative  Neurological: Negative  Hematological: Negative  Negative for adenopathy  Does not bruise/bleed easily  Psychiatric/Behavioral: Negative          Patient Active Problem List   Diagnosis    Cyst of left ovary    Anxiety disorder due to medical condition    Insomnia    Irritable bowel syndrome (IBS)    Malignant neoplasm of upper-inner quadrant of right breast in female, estrogen receptor positive (HCC)    Psoriasis    Seasonal allergies    Acute non-recurrent sinusitis    Use of tamoxifen (Nolvadex)     Past Medical History:   Diagnosis Date    BRCA negative     IBS (irritable bowel syndrome)     Insomnia     Night sweats     Psoriasis     Wears glasses      Past Surgical History:   Procedure Laterality Date    BREAST BIOPSY Right [id number]    IDC     SECTION      LAPAROSCOPIC OOPHORECTOMY      LYMPHADENECTOMY Right     x2    MASTECTOMY      bilateral mastectomy    SC NIPPLE/AREOLA RECONSTRUCTION Bilateral 10/13/2016    Procedure: NIPPLE RECONSTRUCTION ;  Surgeon: Maria Luz Fabian MD;  [facility];  Service: Plastics    SC REVISE BREAST RECONSTRUCTION Bilateral 3/9/2016    Procedure: RECONSTRUCTION REVISION  BREAST MOUND ,FAT GRAFTS ;  Surgeon: Maria Luz Fabian MD;  [facility];  Service: Plastics    SENTINEL LYMPH NODE BIOPSY Right      Family History   Problem Relation Age of Onset    Lung cancer Paternal Grandfather 48    Breast cancer Other      maternal great grandmother age at dx unk     Social History     Social History    Marital status: /Civil Union     Spouse name: N/A    Number of children: N/A    Years of education: N/A     Occupational History    Not on file  Social History Main Topics    Smoking status: Never Smoker    Smokeless tobacco: Never Used    Alcohol use No    Drug use: No    Sexual activity: Not on file     Other Topics Concern    Not on file     Social History Narrative    No narrative on file       Current Outpatient Prescriptions:     Ashwagandha 125 MG CAPS, Take by mouth, Disp: , Rfl:     Calcium 500 MG tablet, Take by mouth, Disp: , Rfl:     Calcium Carbonate-Vit D-Min (CALCIUM 1200 PO), Take 1 tablet by mouth daily  , Disp: , Rfl:     Cholecalciferol (VITAMIN D PO), Take 5,000 mg by mouth daily  , Disp: , Rfl:     clobetasol (TEMOVATE) 0 05 % ointment, Apply topically 2 (two) times a day, Disp: , Rfl:     Digestive Enzymes (DIGESTIVE ENZYME PO), Take 1 tablet by mouth daily as needed  , Disp: , Rfl:     hydrocortisone 2 5 % cream, Apply topically, Disp: , Rfl:     Krill Oil 300 MG CAPS, Take by mouth, Disp: , Rfl:     Magnesium 400 MG CAPS, Take 1 tablet by mouth daily  , Disp: , Rfl:     MILK THISTLE PO, Take 600 mg by mouth daily  , Disp: , Rfl:     Multiple Vitamin (MULTIVITAMIN) tablet, Take 1 tablet by mouth daily  , Disp: , Rfl:     Multiple Vitamins-Minerals (ONE DAILY MULTIVITAMIN ADULT) TABS, Take by mouth, Disp: , Rfl:     Probiotic Product (PROBIOTIC DAILY PO), Take 2 tablets by mouth daily  , Disp: , Rfl:     Spirulina 500 MG TABS, by Does not apply route, Disp: , Rfl:     tamoxifen (NOLVADEX) 20 mg tablet, Take 20 mg by mouth daily  , Disp: , Rfl:     UNABLE TO FIND, Take 320 mg by mouth daily  Tumeric, Disp: , Rfl:     VITAMIN C, CALCIUM ASCORBATE, PO, Take 1,000 mg by mouth daily  , Disp: , Rfl:   Allergies   Allergen Reactions    Other Hives     Kait selzer    Kait-Montgomery Plus Cold & Cough  [[id number]] Hives       The following portions of the patient's history were reviewed and updated as appropriate: allergies, current medications, past family history, past medical history, past social history, past surgical history and problem list         Vitals:    02/22/18 1126   BP: 126/72   Pulse: 72   Resp: 18       Physical Exam   Constitutional: She is oriented to person, place, and time  She appears well-developed and well-nourished  HENT:   Head: Normocephalic and atraumatic  Cardiovascular: Normal heart sounds  Pulmonary/Chest: Breath sounds normal  Right breast exhibits skin change (mastectomy scar)  Right breast exhibits no mass and no tenderness  Left breast exhibits skin change (mastectomy scar)  Left breast exhibits no mass and no tenderness         Bilateral reconstruction   Abdominal: Soft  Lymphadenopathy:        Right axillary: No pectoral and no lateral adenopathy present  Left axillary: No pectoral and no lateral adenopathy present  Right: No supraclavicular adenopathy present  Left: No supraclavicular adenopathy present  Neurological: She is alert and oriented to person, place, and time  Psychiatric: She has a normal mood and affect  Results:    Imaging  n/a        Discussion/Summary:  81-year-old female status post bilateral mastectomy and reconstruction for stage IIA carcinoma of the right breast   She continues on tamoxifen with no concerns other than weight gain  We did talk about exercise and diet  There is no evidence of disease based on examination today  I will see her again in six months or sooner should the need arise

## 2018-04-06 DIAGNOSIS — C50.919 MALIGNANT NEOPLASM OF BREAST IN FEMALE, ESTROGEN RECEPTOR POSITIVE, UNSPECIFIED LATERALITY, UNSPECIFIED SITE OF BREAST (HCC): Primary | ICD-10-CM

## 2018-04-06 DIAGNOSIS — Z17.0 MALIGNANT NEOPLASM OF BREAST IN FEMALE, ESTROGEN RECEPTOR POSITIVE, UNSPECIFIED LATERALITY, UNSPECIFIED SITE OF BREAST (HCC): Primary | ICD-10-CM

## 2018-04-06 RX ORDER — TAMOXIFEN CITRATE 20 MG/1
TABLET ORAL
Qty: 30 TABLET | Refills: 7 | Status: SHIPPED | OUTPATIENT
Start: 2018-04-06 | End: 2018-12-14 | Stop reason: SDUPTHER

## 2018-07-09 ENCOUNTER — OFFICE VISIT (OUTPATIENT)
Dept: DERMATOLOGY | Facility: CLINIC | Age: 48
End: 2018-07-09
Payer: COMMERCIAL

## 2018-07-09 DIAGNOSIS — L82.1 SEBORRHEIC KERATOSIS: ICD-10-CM

## 2018-07-09 DIAGNOSIS — L40.9 PSORIASIS: Primary | ICD-10-CM

## 2018-07-09 DIAGNOSIS — Z13.89 SCREENING FOR SKIN CONDITION: ICD-10-CM

## 2018-07-09 PROCEDURE — 99213 OFFICE O/P EST LOW 20 MIN: CPT | Performed by: DERMATOLOGY

## 2018-07-09 NOTE — PROGRESS NOTES
[address] [facility] [address] [facility] DERMATOLOGY  [address] [facility] [address]     MRN: [id number] : 1970  Encounter: [id number]  Patient Information: Georges Benites  Chief complaint:Follow-up for psoriasis    History of present illness:  50-year-old female with history of psoriasis presents earlier than her regular appointment because of a growth she noted on the left chest wall patient with history of breast cancer recently also concerned regarding her psoriasis which flared on a recent trip to [place]  Patient has been using intermittently both clobetasol and vectical ointment  She has at home  Patient also has a home UVB box and wishes to go back to starting treatment again at home    Past Medical History:   Diagnosis Date    Abnormal finding on breast imaging     Acute cystitis     BRCA negative     Endometriosis     Fibrocystic disease of breast     IBS (irritable bowel syndrome)     Inconclusive mammogram due to dense breasts     Insomnia     Night sweats     Psoriasis     Wears glasses      Past Surgical History:   Procedure Laterality Date    BREAST BIOPSY Right     ID[facility]     SECTION      LAPAROSCOPIC OOPHORECTOMY      LAPAROSCOPY      exploratory, endometriosis    LYMPHADENECTOMY Right     x2    MASTECTOMY      bilateral mastectomy    MN NIPPLE/AREOLA RECONSTRUCTION Bilateral 10/13/2016    Procedure: NIPPLE RECONSTRUCTION ;  Surgeon: Sreedhar Johnson MD;  [facility];  Service: Plastics    MN REVISE BREAST RECONSTRUCTION Bilateral 3/9/2016    Procedure: RECONSTRUCTION REVISION  BREAST MOUND ,FAT GRAFTS ;  Surgeon: Sreedhar Johnson MD;  [facility];  Service: Plastics    SENTINEL LYMPH NODE BIOPSY Right      Social History   History   Alcohol Use    Yes     Comment: social     History   Drug Use No     History   Smoking Status    Never Smoker   Smokeless Tobacco    Never Used     Family History   Problem Relation Age of Onset    Lung cancer Paternal Grandfather 48    Breast cancer Other     Thyroid cancer Mother 36    Psoriasis Father     Heart disease Other     Prostate cancer Other      Meds/Allergies   Allergies   Allergen Reactions    Other Hives     Kait Becker Plus Cold & Cough  [[id number]] Hives       Meds:  Prior to Admission medications    Medication Sig Start Date End Date Taking? Authorizing Provider   Ashwagandha 125 MG CAPS Take by mouth    Historical Provider, MD   Calcium 500 MG tablet Take by mouth    Historical Provider, MD   Calcium Carbonate-Vit D-Min (CALCIUM 1200 PO) Take 1 tablet by mouth daily  Historical Provider, MD   Cholecalciferol (VITAMIN D PO) Take 5,000 mg by mouth daily  Historical Provider, MD   clobetasol (TEMOVATE) 0 05 % ointment Apply topically 2 (two) times a day    Historical Provider, MD   Digestive Enzymes (DIGESTIVE ENZYME PO) Take 1 tablet by mouth daily as needed      Historical Provider, MD   hydrocortisone 2 5 % cream Apply topically 5/17/17   Historical ProviderMD Etienne Chalet Oil 300 MG CAPS Take by mouth    Historical Provider, MD   Magnesium 400 MG CAPS Take 1 tablet by mouth daily  Historical Provider, MD   MILK THISTLE PO Take 600 mg by mouth daily  Historical Provider, MD   Multiple Vitamin (MULTIVITAMIN) tablet Take 1 tablet by mouth daily  Historical Provider, MD   Multiple Vitamins-Minerals (ONE DAILY MULTIVITAMIN ADULT) TABS Take by mouth    Historical Provider, MD   Probiotic Product (PROBIOTIC DAILY PO) Take 2 tablets by mouth daily  Historical Provider, MD   Spirulina 500 MG TABS by Does not apply route    Historical Provider, MD   tamoxifen (NOLVADEX) 20 mg tablet TAKE 1 TABLET BY MOUTH EVERY DAY 4/6/18   Jami Voss MD   UNABLE TO FIND Take 320 mg by mouth daily   Tumeric    Historical Provider, MD   VITAMIN C, CALCIUM ASCORBATE, PO Take 1,000 mg by mouth daily      Historical Provider, MD Subjective:     Review of Systems:    General: negative for - chills, fatigue, fever,  weight gain or weight loss  Psychological: negative for - anxiety, behavioral disorder, concentration difficulties, decreased libido, depression, irritability, memory difficulties, mood swings, sleep disturbances or suicidal ideation  ENT: negative for - hearing difficulties , nasal congestion, nasal discharge, oral lesions, sinus pain, sneezing, sore throat  Allergy and Immunology: negative for - hives, insect bite sensitivity,  Hematological and Lymphatic: negative for - bleeding problems, blood clots,bruising, swollen lymph nodes  Endocrine: negative for - hair pattern changes, hot flashes, malaise/lethargy, mood swings, palpitations, polydipsia/polyuria, skin changes, temperature intolerance or unexpected weight change  Respiratory: negative for - cough, hemoptysis, orthopnea, shortness of breath, or wheezing  Cardiovascular: negative for - chest pain, dyspnea on exertion, edema,  Gastrointestinal: negative for - abdominal pain, nausea/vomiting  Genito-Urinary: negative for - dysuria, incontinence, irregular/heavy menses or urinary frequency/urgency  Musculoskeletal: negative for - gait disturbance, joint pain, joint stiffness, joint swelling, muscle pain, muscular weakness  Dermatological:  As in HPI  Neurological: negative for confusion, dizziness, headaches, impaired coordination/balance, memory loss, numbness/tingling, seizures, speech problems, tremors or weakness       Objective: There were no vitals taken for this visit      Physical Exam:    General Appearance:    Alert, cooperative, no distress   Head:    Normocephalic, without obvious abnormality, atraumatic           Skin:   A full skin exam was performed including scalp, head scalp, eyes, ears, nose, lips, neck, chest, axilla, abdomen, back, buttocks, bilateral upper extremities, bilateral lower extremities, hands, feet, fingers, toes, fingernails, and toenails   Scaling erythematous well-demarcated patches noted on the arms and legs very minimal on the torso no other evidence of concerns on the left breast the areas of brown papule with greasy stuck on appearance nothing else markedly on exam     Assessment:     1  Psoriasis     2  Screening for skin condition     3  Seborrheic keratosis           Plan:    psoriasis would continue topical therapy as well as restart her back on phototherapy will start at 1 minute and increasing 10 seconds weekly and treat 3 times a week  Seborrheic Keratosis  Patient reasurred these are normal growths we acquire with age no treatment needed  Screening for Dermatologic Disorders: Nothing else of concern noted on complete exam follow up in 1 year       Gianfranco Noel MD  7/9/2018,2:38 PM    Portions of the record may have been created with voice recognition software   Occasional wrong word or "sound a like" substitutions may have occurred due to the inherent limitations of voice recognition software   Read the chart carefully and recognize, using context, where substitutions have occurred

## 2018-07-09 NOTE — PATIENT INSTRUCTIONS
psoriasis would continue topical therapy as well as restart her back on phototherapy will start at 1 minute and increasing 10 seconds weekly and treat 3 times a week  Seborrheic Keratosis  Patient reasurred these are normal growths we acquire with age no treatment needed    Screening for Dermatologic Disorders: Nothing else of concern noted on complete exam follow up in 1 year

## 2018-08-03 ENCOUNTER — OFFICE VISIT (OUTPATIENT)
Dept: INTERNAL MEDICINE CLINIC | Facility: CLINIC | Age: 48
End: 2018-08-03
Payer: COMMERCIAL

## 2018-08-03 VITALS
HEART RATE: 73 BPM | OXYGEN SATURATION: 99 % | HEIGHT: 69 IN | TEMPERATURE: 98.2 F | RESPIRATION RATE: 18 BRPM | DIASTOLIC BLOOD PRESSURE: 90 MMHG | SYSTOLIC BLOOD PRESSURE: 142 MMHG | WEIGHT: 188.6 LBS | BODY MASS INDEX: 27.93 KG/M2

## 2018-08-03 DIAGNOSIS — M79.671 CHRONIC PAIN OF RIGHT HEEL: ICD-10-CM

## 2018-08-03 DIAGNOSIS — Z13.220 ENCOUNTER FOR LIPID SCREENING FOR CARDIOVASCULAR DISEASE: ICD-10-CM

## 2018-08-03 DIAGNOSIS — E66.3 OVERWEIGHT (BMI 25.0-29.9): ICD-10-CM

## 2018-08-03 DIAGNOSIS — Z13.6 ENCOUNTER FOR LIPID SCREENING FOR CARDIOVASCULAR DISEASE: ICD-10-CM

## 2018-08-03 DIAGNOSIS — R53.83 OTHER FATIGUE: ICD-10-CM

## 2018-08-03 DIAGNOSIS — R79.89 LOW VITAMIN D LEVEL: ICD-10-CM

## 2018-08-03 DIAGNOSIS — Z00.00 WELLNESS EXAMINATION: Primary | ICD-10-CM

## 2018-08-03 DIAGNOSIS — G89.29 CHRONIC PAIN OF RIGHT HEEL: ICD-10-CM

## 2018-08-03 PROBLEM — J01.90 ACUTE NON-RECURRENT SINUSITIS: Status: RESOLVED | Noted: 2018-02-02 | Resolved: 2018-08-03

## 2018-08-03 PROCEDURE — 99396 PREV VISIT EST AGE 40-64: CPT | Performed by: INTERNAL MEDICINE

## 2018-08-03 RX ORDER — TAMOXIFEN CITRATE 20 MG/1
1 TABLET ORAL DAILY
COMMUNITY
Start: 2015-08-12 | End: 2018-08-03 | Stop reason: SDUPTHER

## 2018-08-03 RX ORDER — BETAMETHASONE DIPROPIONATE 0.5 MG/ML
LOTION, AUGMENTED TOPICAL
COMMUNITY
Start: 2017-11-17 | End: 2020-11-11 | Stop reason: ALTCHOICE

## 2018-08-03 NOTE — PATIENT INSTRUCTIONS
1  Heel x-ray  2  DASH diet  3  Return for BP re-check in 2 weeks, nurse visit  4  Fasting blood work  5  Physical therapy  6  See information below    DASH Eating Plan   AMBULATORY CARE:   The DASH (Dietary Approaches to Stop Hypertension) Eating Plan  is designed to help prevent or lower high blood pressure  It can also help to lower LDL (bad) cholesterol and decrease your risk for heart disease  The plan is low in sodium, sugar, unhealthy fats, and total fat  It is high in potassium, calcium, magnesium, and fiber  These nutrients are added when you eat more fruits, vegetables, and whole grains  Your sodium limit each day: Your dietitian will tell you how much sodium is safe for you to have each day  People with high blood pressure should have no more than 1,500 to 2,300 mg of sodium in a day  A teaspoon (tsp) of salt has 2,300 mg of sodium  This may seem like a difficult goal, but small changes to the foods you eat can make a big difference  Your healthcare provider or dietitian can help you create a meal plan that follows your sodium limit  How to limit sodium:   · Read food labels  Food labels can help you choose foods that are low in sodium  The amount of sodium is listed in milligrams (mg)  The % Daily Value (DV) column tells you how much of your daily needs are met by 1 serving of the food for each nutrient listed  Choose foods that have less than 5% of the DV of sodium  These foods are considered low in sodium  Foods that have 20% or more of the DV of sodium are considered high in sodium  Avoid foods that have more than 300 mg of sodium in each serving  Choose foods that say low-sodium, reduced-sodium, or no salt added on the food label  · Avoid salt  Do not salt food at the table, and add very little salt to foods during cooking  Use herbs and spices, such as onions, garlic, and salt-free seasonings to add flavor to foods  Try lemon or lime juice or vinegar to give foods a tart flavor   Use hot peppers or a small amount of hot pepper sauce to add a spicy flavor to foods  · Ask about salt substitutes  Ask your healthcare provider if you may use salt substitutes  Some salt substitutes have ingredients that can be harmful if you have certain health conditions  · Choose foods carefully at restaurants  Meals from restaurants, especially fast food restaurants, are often high in sodium  Some restaurants have nutrition information that tells you the amount of sodium in their foods  Ask to have your food prepared with less, or no salt  What you need to know about fats:   · Include healthy fats  Examples are unsaturated fats and omega-3 fatty acids  Unsaturated fats are found in soybean, canola, olive, or sunflower oil, and liquid and soft tub margarines  Omega-3 fatty acids are found in fatty fish, such as salmon, tuna, mackerel, and sardines  It is also found in flaxseed oil and ground flaxseed  · Avoid unhealthy fats  Do not eat unhealthy fats, such as saturated fats and trans fats  Saturated fats are found in foods that contain fat from animals  Examples are fatty meats, whole milk, butter, cream, and other dairy foods  It is also found in shortening, stick margarine, palm oil, and coconut oil  Trans fats are found in fried foods, crackers, chips, and baked goods made with margarine or shortening  Foods to include: With the DASH eating plan, you need to eat a certain number of servings from each food group  This will help you get enough of certain nutrients and limit others  The amount of servings you should eat depends on how many calories you need  Your dietitian can tell you how many calories you need  The number of servings listed next to the food groups below are for people who need about 2,000 calories each day    · Grains:  6 to 8 servings (3 of these servings should be whole-grain foods)    ¨ 1 slice of whole-grain bread     ¨ 1 ounce of dry cereal    ¨ ½ cup of cooked cereal, pasta, or brown rice    · Vegetables and fruits:  4 to 5 servings of fruits and 4 to 5 servings of vegetables    ¨ 1 medium fruit    ¨ ½ cup of frozen, canned (no added salt), or chopped fresh vegetables     ¨ ½ cup of fresh, frozen, dried, or canned fruit (canned in light syrup or fruit juice)    ¨ ½ cup of vegetable or fruit juice    · Dairy:  2 to 3 servings    ¨ 1 cup of nonfat (skim) or 1% milk    ¨ 1½ ounces of fat-free or low-fat cheese    ¨ 6 ounces of nonfat or low-fat yogurt    · Lean meat, poultry, and fish:  6 ounces or less    [employer] (chicken, turkey) with no skin    ¨ Fish (especially fatty fish, such as salmon, fresh tuna, or mackerel)    ¨ Lean beef and pork (loin, round, extra lean hamburger)    ¨ Egg whites and egg substitutes    · Nuts, seeds, and legumes:  4 to 5 servings each week    ¨ ½ cup of cooked beans and peas    ¨ 1½ ounces of unsalted nuts    ¨ 2 tablespoons of peanut butter or seeds    · Sweets and added sugars:  5 or less each week    ¨ 1 tablespoon of sugar, jelly, or jam    ¨ ½ cup of sorbet or gelatin    ¨ 1 cup of lemonade    · Fats:  2 to 3 servings each week    ¨ 1 teaspoon of soft margarine or vegetable oil    ¨ 1 tablespoon of mayonnaise    ¨ 2 tablespoons of salad dressing  Foods to avoid:   · Grains:      [employer], such as doughnuts, pastries, cookies, and biscuits (high in fat and sugar)    ¨ Mixes for cornbread and biscuits, packaged foods, such as bread stuffing, rice and pasta mixes, macaroni and cheese, and instant cereals (high in sodium)    · Fruits and vegetables:      ¨ Regular, canned vegetables (high in sodium)    ¨ Sauerkraut, pickled vegetables, and other foods prepared in brine (high in sodium)    ¨ Fried vegetables or vegetables in butter or high-fat sauces    ¨ Fruit in cream or butter sauce (high in fat)    · Dairy:      ¨ Whole milk, 2% milk, and cream (high in fat)    ¨ Regular cheese and processed cheese (high in fat and sodium)    · Meats and protein foods:      ¨ Smoked or cured meat, such as corned beef, groves, ham, hot dogs, and sausage (high in fat and sodium)    ¨ Canned beans and canned meats or spreads, such as potted meats, sardines, anchovies, and imitation seafood (high in sodium)    ¨ Deli or lunch meats, such as bologna, ham, turkey, and roast beef (high in sodium)    ¨ High-fat meat (T-bone steak, regular hamburger, and ribs)    ¨ Whole eggs and egg yolks (high in fat)    · Other:      ¨ Seasonings made with salt, such as garlic salt, celery salt, onion salt, seasoned salt, meat tenderizers, and monosodium glutamate (MSG)    ¨ Miso soup and canned or dried soup mixes (high in sodium)    ¨ Regular soy sauce, barbecue sauce, teriyaki sauce, steak sauce, Worcestershire sauce, and most flavored vinegars (high in sodium)    ¨ Regular condiments, such as mustard, ketchup, and salad dressings (high in sodium)    ¨ Gravy and sauces, such as Elpidio or cheese sauces (high in sodium and fat)    ¨ Drinks high in sugar, such as soda or fruit drinks    [employer] foods, such as salted chips, popcorn, pretzels, pork rinds, salted crackers, and salted nuts    ¨ Frozen foods, such as dinners, entrees, vegetables with sauces, and breaded meats (high in sodium)  Other guidelines to follow:   · Maintain a healthy weight  Your risk for heart disease is higher if you are overweight  Your healthcare provider may suggest that you lose weight if you are overweight  You can lose weight by eating fewer calories and foods that have added sugars and fat  The DASH meal plan can help you do this  Decrease calories by eating smaller portions at each meal and fewer snacks  Ask your healthcare provider for more information about how to lose weight  · Exercise regularly  Regular exercise can help you reach or maintain a healthy weight  Regular exercise can also help decrease your blood pressure and improve your cholesterol levels   Get 30 minutes or more of moderate exercise each day of the week  To lose weight, get at least 60 minutes of exercise  Talk to your healthcare provider about the best exercise program for you  · Limit alcohol  Women should limit alcohol to 1 drink a day  Men should limit alcohol to 2 drinks a day  A drink of alcohol is 12 ounces of beer, 5 ounces of wine, or 1½ ounces of liquor  © 2017 [address] [employer] Information is for End User's use only and may not be sold, redistributed or otherwise used for commercial purposes  All illustrations and images included in CareNotes® are the copyrighted property of A D A M [employer]  or Tyron Mohamud  The above information is an  only  It is not intended as medical advice for individual conditions or treatments  Talk to your doctor, nurse or pharmacist before following any medical regimen to see if it is safe and effective for you  Plantar Fasciitis   AMBULATORY CARE:   Plantar fasciitis  is swelling of the plantar fascia  The plantar fascia is a band of fibers that connect your heel bone to the front of your foot  It helps support the arch of your foot and absorbs shock  Plantar fasciitis is caused by small tears in the plantar fascia  Over time, the tears cause swelling and irritation  Signs and symptoms:   · Pain near your heel, especially first thing in the morning    · Pain with prolonged standing, sitting, or walking    · Redness, swelling, or warmth over the injured part of your foot  Contact your healthcare provider or podiatrist if:   · Your pain and swelling increase  · You develop knee, hip, or back pain  · You have questions or concerns about your condition or care  Treatment  may include any of the following:  · Medicines  may be given to decrease swelling and pain  Steroids may be injected into your heel to decrease swelling and pain  · Shoe inserts, splints, or tape  help support your foot and decrease stress on your plantar fascia   A night splint may help stretch your plantar fascia while you sleep  · Stretches and exercises  can help decrease pain and swelling  They can also help strengthen the muscles that support your heel and foot  · Extracorporeal shockwave therapy (ESWT)  uses sound waves to decrease swelling of the plantar fascia  ESWT may be done if other treatments do not work  · Surgery  is rarely needed to separate the plantar fascia from your heel  Self-care:   · Wear your splint or shoe inserts as directed  You may need to wear a splint at night to keep your foot stretched while you sleep  This will help prevent sharp pain first thing in the morning  Shoe inserts will help decrease stress on your plantar fascia when you walk or exercise  · Rest as directed  Rest as much as possible to decrease swelling and prevent more damage  Ask your healthcare provider when you can return to your normal activities  · Apply ice on your plantar fascia  Ice helps prevent tissue damage and decreases swelling and pain  Fill a water bottle with water and freeze it  Wrap a towel around the bottle or cover it with a pillow case  Roll the water bottle under your foot for 10 minutes in the morning and after work  · Massage your plantar fascia as directed  This may help decrease swelling and pain  Roll a golf ball under your foot for 10 minutes  Repeat 3 times each day  · Go to physical therapy as directed  A physical therapist teaches you exercises to help improve movement and strength, and to decrease pain  Prevent plantar fasciitis:   · Maintain a healthy weight  This will help decrease stress on your feet  Ask your healthcare provider how much you should weigh  Ask him to help you create a weight loss plan if you are overweight  · Do low-impact exercises  Low-impact exercises decrease stress on your plantar fascia  Examples include swimming or bicycling  · Start new activities slowly  Increase the intensity and time gradually  · Wear shoes that fit well and support your arch  Replace your shoes before the padding or shock absorption wears out  Do not walk or  bare feet or sandals for long periods of time  · Stretch before you exercise  Ask your healthcare provider how to stretch your plantar fascia and calf muscles  Follow up with your healthcare provider or podiatrist as directed:  Write down your questions so you remember to ask them during your visits  © 2017 [address] [facility] Information is for End User's use only and may not be sold, redistributed or otherwise used for commercial purposes  All illustrations and images included in CareNotes® are the copyrighted property of A D A M [employer] Inc  or Tyron Mohamud  The above information is an  only  It is not intended as medical advice for individual conditions or treatments  Talk to your doctor, nurse or pharmacist before following any medical regimen to see if it is safe and effective for you

## 2018-08-03 NOTE — PROGRESS NOTES
Assessment/Plan:     Diagnoses and all orders for this visit:    Wellness examination  Comments:  declines Tdap today(last in 2008)    Chronic pain of right heel  Comments:  suspect plantar fasciitis, c/w analgesics and exercises and PT eval  Orders:  -     Ambulatory referral to Physical Therapy; Future  -     XR heel / calcaneus 2+ vw right; Future    Overweight (BMI 25 0-29  9)  Comments:  weight loss counseling/exercise discussed  Orders:  -     Lipid Panel with Direct LDL reflex; Future  -     Comprehensive metabolic panel; Future    Low vitamin D level  Comments:  takes daily supplement, re-check Vit D BW  Orders:  -     Vitamin D 25 hydroxy    Encounter for lipid screening for cardiovascular disease  Comments:  FLP ordered  Orders:  -     Lipid Panel with Direct LDL reflex; Future    Other fatigue  Comments:  no sleep problems & we discussed limited yield of checking for lyme disease without other sx or findings  stress related? BW ordered  Orders:  -     CBC and differential  -     TSH, 3rd generation with Free T4 reflex    Other orders  -     betamethasone, augmented, (DIPROLENE) 0 05 % lotion; Apply topically  -     Discontinue: tamoxifen (NOLVADEX) 20 mg tablet; Take 1 tablet by mouth daily      if BW WNL and pt still feeling fatigued, would like to have lyme infection testing  DASH diet discussed and f/u BP check in 2 weeks  Subjective:      Patient ID: Tatyana Francis is a 53 y o  female  HPI     Here for physical and with other concerns/questions  Reviewed meds with patient, takes several vitamins and supplements including milk thistle, ashwaganda, spirulina  No hx of HTN in past   [facility]Dr Stephon Jeter for history of breast cancer, taking tamoxifen for last 3 years  No BW in years, has some recent stress -> mother visiting from [facility] and had large cup of coffee prior to today's appt  Also takes care of all household care as her  has chronic back pain issues      Not exercising, aiming to start again soon  Occ adds salt to her foods(himalayan salt)  Sleeps well, 7 hours per night but c/o fatigue  occ takes nap with minimal relief, worried fatigue could be a sign of lyme disease  No rashes, known tick bites or flu-like sx  Has been having weeks of right heel pain, tried some home exercises & motrin/orthotics for suspected plantar fasciitis with limited benefit  Declines podiatry maribeth, requests PT for this  No other complaints  Past Medical History:   Diagnosis Date    Abnormal finding on breast imaging     BRCA negative     Endometriosis     Fibrocystic disease of breast     IBS (irritable bowel syndrome)     Inconclusive mammogram due to dense breasts     Insomnia     Night sweats     Psoriasis     Wears glasses      Vitals:    08/03/18 0847 08/03/18 0920   BP: 120/90 142/90   Pulse: 73    Resp: 18    Temp: 98 2 °F (36 8 °C)    TempSrc: Oral    SpO2: 99%    Weight: 85 5 kg (188 lb 9 6 oz)    Height: 5' 9" (1 753 m)      Body mass index is 27 85 kg/m²  Current Outpatient Prescriptions:     Ashwagandha 125 MG CAPS, Take by mouth, Disp: , Rfl:     betamethasone, augmented, (DIPROLENE) 0 05 % lotion, Apply topically, Disp: , Rfl:     Calcium 500 MG tablet, Take by mouth, Disp: , Rfl:     Calcium Carbonate-Vit D-Min (CALCIUM 1200 PO), Take 1 tablet by mouth daily  , Disp: , Rfl:     Cholecalciferol (VITAMIN D PO), Take 5,000 mg by mouth daily  , Disp: , Rfl:     clobetasol (TEMOVATE) 0 05 % ointment, Apply topically 2 (two) times a day, Disp: , Rfl:     Digestive Enzymes (DIGESTIVE ENZYME PO), Take 1 tablet by mouth daily as needed  , Disp: , Rfl:     hydrocortisone 2 5 % cream, Apply topically, Disp: , Rfl:     Krill Oil 300 MG CAPS, Take by mouth, Disp: , Rfl:     Magnesium 400 MG CAPS, Take 1 tablet by mouth daily  , Disp: , Rfl:     MILK THISTLE PO, Take 600 mg by mouth daily  , Disp: , Rfl:     Multiple Vitamin (MULTIVITAMIN) tablet, Take 1 tablet by mouth daily , Disp: , Rfl:     Multiple Vitamins-Minerals (ONE DAILY MULTIVITAMIN ADULT) TABS, Take by mouth, Disp: , Rfl:     Probiotic Product (PROBIOTIC DAILY PO), Take 2 tablets by mouth daily  , Disp: , Rfl:     Spirulina 500 MG TABS, by Does not apply route, Disp: , Rfl:     tamoxifen (NOLVADEX) 20 mg tablet, TAKE 1 TABLET BY MOUTH EVERY DAY, Disp: 30 tablet, Rfl: 7    UNABLE TO FIND, Take 320 mg by mouth daily  Tumeric, Disp: , Rfl:     VITAMIN C, CALCIUM ASCORBATE, PO, Take 1,000 mg by mouth daily  , Disp: , Rfl:   Allergies   Allergen Reactions    Other Hives     Kait selzer    Kait-[place] Plus Cold & Cough  [[id number]] Hives         Review of Systems   Constitutional: Negative for fever  HENT: Negative for congestion  Eyes: Negative for visual disturbance  Respiratory: Negative for shortness of breath  Cardiovascular: Negative for chest pain  Gastrointestinal: Negative for abdominal pain  Genitourinary: Negative for difficulty urinating  Musculoskeletal: Positive for arthralgias  Skin: Positive for rash (hx of psoriasis)  Neurological: Negative for headaches  Psychiatric/Behavioral: Negative for dysphoric mood  Objective:      /90   Pulse 73   Temp 98 2 °F (36 8 °C) (Oral)   Resp 18   Ht 5' 9" (1 753 m)   Wt 85 5 kg (188 lb 9 6 oz)   SpO2 99%   BMI 27 85 kg/m²          Physical Exam   Constitutional: She appears well-developed and well-nourished  HENT:   Head: Normocephalic and atraumatic  Mouth/Throat: Oropharynx is clear and moist    Eyes: Conjunctivae are normal  Pupils are equal, round, and reactive to light  Cardiovascular: Normal rate, regular rhythm and normal heart sounds  No murmur heard  Pulmonary/Chest: Effort normal and breath sounds normal  She has no wheezes  She has no rales  Abdominal: Soft  Bowel sounds are normal  There is no tenderness  Musculoskeletal: She exhibits no edema     Right heel tenderness but no achilles tendon tenderness  Good passive ROM of right foot/ankle and no pain -> with and without resistance   Neurological: She is alert  Psychiatric: She has a normal mood and affect  Her behavior is normal    Vitals reviewed

## 2018-08-23 ENCOUNTER — OFFICE VISIT (OUTPATIENT)
Dept: SURGICAL ONCOLOGY | Facility: CLINIC | Age: 48
End: 2018-08-23
Payer: COMMERCIAL

## 2018-08-23 VITALS
BODY MASS INDEX: 27.4 KG/M2 | DIASTOLIC BLOOD PRESSURE: 80 MMHG | SYSTOLIC BLOOD PRESSURE: 120 MMHG | TEMPERATURE: 98.8 F | HEIGHT: 69 IN | HEART RATE: 85 BPM | WEIGHT: 185 LBS | RESPIRATION RATE: 16 BRPM

## 2018-08-23 DIAGNOSIS — C50.211 MALIGNANT NEOPLASM OF UPPER-INNER QUADRANT OF RIGHT BREAST IN FEMALE, ESTROGEN RECEPTOR POSITIVE (HCC): Primary | ICD-10-CM

## 2018-08-23 DIAGNOSIS — Z79.810 USE OF TAMOXIFEN (NOLVADEX): ICD-10-CM

## 2018-08-23 DIAGNOSIS — Z17.0 MALIGNANT NEOPLASM OF UPPER-INNER QUADRANT OF RIGHT BREAST IN FEMALE, ESTROGEN RECEPTOR POSITIVE (HCC): Primary | ICD-10-CM

## 2018-08-23 PROCEDURE — 99214 OFFICE O/P EST MOD 30 MIN: CPT | Performed by: SURGERY

## 2018-08-23 NOTE — PROGRESS NOTES
Surgical Oncology Follow Up       [address] [facility] [facility]  [facility] [place]  [address] [address]    [place]  1970  [id number]  [address] [facility] [facility]  [facility] [place]  [address] [facility] [address] [address]    Chief Complaint   Patient presents with    Breast Cancer     Pt is here for 6 month follow up       Assessment/Plan   Diagnoses and all orders for this visit:    Malignant neoplasm of upper-inner quadrant of right breast in female, estrogen receptor positive ([facility] [address] )    Use of tamoxifen (Nolvadex)        Advance Care Planning/Advance Directives:  Did not discuss  with the patient  Oncology History:       Malignant neoplasm of upper-inner quadrant of right breast in female, estrogen receptor positive ([facility] [address] )    5/14/2015 Surgery     Right US guided breast biopsy         6/30/2015 Surgery     Bilateral; mastectomies, right SLNB  Bilateral reconstruction with expanders  Dr Margie Reed         8/12/2015 -  Hormone Therapy     Tamoxifen  Dr Linnea Lane  10/23/2015 Surgery     Expanders removed, implants placed  11/6/2017 Initial Diagnosis     Malignant neoplasm of upper-inner quadrant of right breast in female, estrogen receptor positive (HCC)        Genetic Testing     BRCA negative          Genomic Testing     Oncotype DX  14            History of Present Illness: breast cancer follow up   -Interval History: none    Review of Systems:  Review of Systems   Constitutional: Negative for appetite change and fever  Hot flashes   Eyes: Negative  Respiratory: Negative for shortness of breath  Cardiovascular: Negative  Gastrointestinal: Negative  Endocrine: Negative  Genitourinary:        Irregular menses   Musculoskeletal: Negative  Negative for arthralgias and myalgias  Skin: Negative  Allergic/Immunologic: Negative  Neurological: Negative  Hematological: Negative    Negative for adenopathy  Does not bruise/bleed easily     Psychiatric/Behavioral:        Irritability and depression       Patient Active Problem List   Diagnosis    Cyst of left ovary    Anxiety disorder due to medical condition    Insomnia    Irritable bowel syndrome (IBS)    Malignant neoplasm of upper-inner quadrant of right breast in female, estrogen receptor positive (HCC)    Psoriasis    Seasonal allergies    Use of tamoxifen (Nolvadex)    Seborrheic keratosis    Screening for skin condition     Past Medical History:   Diagnosis Date    BRCA negative     Endometriosis     IBS (irritable bowel syndrome)     Insomnia     Night sweats     Psoriasis     Wears glasses      Past Surgical History:   Procedure Laterality Date    BREAST BIOPSY Right     IDC     SECTION      LAPAROSCOPIC OOPHORECTOMY      LAPAROSCOPY      exploratory, endometriosis    LYMPHADENECTOMY Right     x2    MASTECTOMY      bilateral mastectomy    [place] NIPPLE/AREOLA RECONSTRUCTION Bilateral 10/13/2016    Procedure: NIPPLE RECONSTRUCTION ;  Surgeon: Geraldo Cochran MD;  [facility];  Service: Plastics    [place] REVISE BREAST RECONSTRUCTION Bilateral 3/9/2016    Procedure: RECONSTRUCTION REVISION  BREAST MOUND ,FAT GRAFTS ;  Surgeon: Geraldo Cochran MD;  [facility];  Service: Plastics    SENTINEL LYMPH NODE BIOPSY Right      Family History   Problem Relation Age of Onset    Lung cancer Paternal Grandfather 48    Breast cancer Other     Thyroid cancer Mother 36    Psoriasis Father     Heart disease Other     Prostate cancer Other      Social History     Social History    Marital status: /Civil Union     Spouse name: N/A    Number of children: 1    Years of education: N/A     Occupational History    employed      Social History Main Topics    Smoking status: Never Smoker    Smokeless tobacco: Never Used    Alcohol use Yes      Comment: social    Drug use: No    Sexual activity: Not on file Other Topics Concern    Not on file     Social History Narrative    Caffeine use- coffee           Current Outpatient Prescriptions:     Ashwagandha 125 MG CAPS, Take by mouth, Disp: , Rfl:     betamethasone, augmented, (DIPROLENE) 0 05 % lotion, Apply topically, Disp: , Rfl:     Calcium 500 MG tablet, Take by mouth, Disp: , Rfl:     Calcium Carbonate-Vit D-Min (CALCIUM 1200 PO), Take 1 tablet by mouth daily  , Disp: , Rfl:     Cholecalciferol (VITAMIN D PO), Take 5,000 mg by mouth daily  , Disp: , Rfl:     clobetasol (TEMOVATE) 0 05 % ointment, Apply topically 2 (two) times a day, Disp: , Rfl:     Digestive Enzymes (DIGESTIVE ENZYME PO), Take 1 tablet by mouth daily as needed  , Disp: , Rfl:     hydrocortisone 2 5 % cream, Apply topically, Disp: , Rfl:     Krill Oil 300 MG CAPS, Take by mouth, Disp: , Rfl:     Magnesium 400 MG CAPS, Take 1 tablet by mouth daily  , Disp: , Rfl:     MILK THISTLE PO, Take 600 mg by mouth daily  , Disp: , Rfl:     Multiple Vitamin (MULTIVITAMIN) tablet, Take 1 tablet by mouth daily  , Disp: , Rfl:     Probiotic Product (PROBIOTIC DAILY PO), Take 2 tablets by mouth daily  , Disp: , Rfl:     Spirulina 500 MG TABS, by Does not apply route, Disp: , Rfl:     tamoxifen (NOLVADEX) 20 mg tablet, TAKE 1 TABLET BY MOUTH EVERY DAY, Disp: 30 tablet, Rfl: 7    VITAMIN C, CALCIUM ASCORBATE, PO, Take 1,000 mg by mouth daily  , Disp: , Rfl:     Multiple Vitamins-Minerals (ONE DAILY MULTIVITAMIN ADULT) TABS, Take by mouth, Disp: , Rfl:     UNABLE TO FIND, Take 320 mg by mouth daily   Tumeric, Disp: , Rfl:   Allergies   Allergen Reactions    Other Hives     Kait selzer    Kait-[place] Plus Cold & Cough  [Ilkabjpve-[id number]-Apap] Hives       The following portions of the patient's history were reviewed and updated as appropriate: allergies, current medications, past family history, past medical history, past social history, past surgical history and problem list         Vitals: 08/23/18 1108   BP: 120/80   Pulse: 85   Resp: 16   Temp: 98 8 °F (37 1 °C)       Physical Exam   Constitutional: She is oriented to person, place, and time  She appears well-developed and well-nourished  HENT:   Head: Normocephalic and atraumatic  Cardiovascular: Normal heart sounds  Pulmonary/Chest: Breath sounds normal  Right breast exhibits skin change (mastectomy scar and implant)  Right breast exhibits no mass and no tenderness  Left breast exhibits skin change (mastectomy scar and implant)  Left breast exhibits no mass and no tenderness  Abdominal: Soft  Lymphadenopathy:        Right axillary: No pectoral and no lateral adenopathy present  Left axillary: No pectoral and no lateral adenopathy present  Right: No supraclavicular adenopathy present  Left: No supraclavicular adenopathy present  Neurological: She is alert and oriented to person, place, and time  Psychiatric: She has a normal mood and affect  Discussion/Summary:  81-year-old female status post bilateral mastectomy and reconstruction for a stage IIA carcinoma of the right breast   She continues on tamoxifen  She has noticed hot flashes, irritability, depression and abnormal cycles recently  She has an upcoming appointment with her gynecologist next month  She is scheduled to see Dr Shirin Leavitt in December  I advised her to for see her gynecologist and then perhaps move her med/onc visit up  Some of this may be secondary to the tamoxifen  There are no concerns on my examination today  I will see her again in six months or sooner should the need arise

## 2018-09-28 ENCOUNTER — ANNUAL EXAM (OUTPATIENT)
Dept: OBGYN CLINIC | Facility: CLINIC | Age: 48
End: 2018-09-28
Payer: COMMERCIAL

## 2018-09-28 VITALS
HEIGHT: 67 IN | SYSTOLIC BLOOD PRESSURE: 118 MMHG | WEIGHT: 182.6 LBS | BODY MASS INDEX: 28.66 KG/M2 | DIASTOLIC BLOOD PRESSURE: 74 MMHG

## 2018-09-28 DIAGNOSIS — Z01.419 WELL FEMALE EXAM WITH ROUTINE GYNECOLOGICAL EXAM: Primary | ICD-10-CM

## 2018-09-28 PROBLEM — N83.202 CYST OF LEFT OVARY: Status: RESOLVED | Noted: 2017-07-24 | Resolved: 2018-09-28

## 2018-09-28 PROCEDURE — 99396 PREV VISIT EST AGE 40-64: CPT | Performed by: OBSTETRICS & GYNECOLOGY

## 2018-09-28 NOTE — PROGRESS NOTES
ASSESSMENT & PLAN: Marquis Fontana is a 53 y o  Sudeepie Jolly with normal gynecologic exam     1   Routine well woman exam done today  2    Pap and HPV:Pap with HPV was not done today  Current ASCCP Guidelines reviewed  Last Pap  2016 :  no abnormalities  3   Hx of ER/TN receptor pos breast Ca - s/p b/l mastectomy, now on tamoxifen - Continues to have menstrual cycles that at approx 45 days in length, 1 day of heavy bleeding and 4 days of light bleeding  No intermenstrual spotting  Reviewed with patient that if her cycle length becomes unpredictable, the bleeding becomes heavier, last longer or is intermenstrual spotting she would need a endometrial biopsy  She expressed understanding  Explained hot flashes and irritability are likely related to her decreased estrogen availability secondary to tamoxifen  Discussed potential use of low-dose Paxil to help with hot flashes and irritability  She will further discuss with Dr Chelsy Haro  4  Colonoscopy recommended  5  The patient is sexually active  6  The following were reviewed in today's visit: breast self exam, menopause, osteoporosis, adequate intake of calcium and vitamin D, exercise, healthy diet and colonoscopy discussed and ordered  7  Patient to return to office in 12 months for annual      All questions have been answered to her satisfaction  CC:  Annual Gynecologic Examination    HPI: Marquis Fontana is a 53 y o  Sudeepie Gabey who presents for annual gynecologic examination  She has the following concerns:  Hot flashes and irritability    Health Maintenance:    She exercises 2 days per week  She wears her seatbelt routinely  She does perform regular monthly self breast exams  She feels safe at home  Patients does follow a healthy diet          Past Medical History:   Diagnosis Date    BRCA negative     Endometriosis     IBS (irritable bowel syndrome)     Insomnia     Night sweats     Psoriasis     Wears glasses        Past Surgical History:   Procedure Laterality Date    BREAST BIOPSY Right [id number]    IDC     SECTION      LAPAROSCOPIC OOPHORECTOMY      LAPAROSCOPY      exploratory, endometriosis    LYMPHADENECTOMY Right     x2    MASTECTOMY      bilateral mastectomy    [place] NIPPLE/AREOLA RECONSTRUCTION Bilateral 10/13/2016    Procedure: NIPPLE RECONSTRUCTION ;  Surgeon: Tesfaye Luna MD;  [facility];  Service: Plastics    [place] REVISE BREAST RECONSTRUCTION Bilateral 3/9/2016    Procedure: RECONSTRUCTION REVISION  BREAST MOUND ,FAT GRAFTS ;  Surgeon: Tesfaye Luna MD;  [facility];  Service: Plastics    SENTINEL LYMPH NODE BIOPSY Right        Past OB/Gyn History:  Period Cycle (Days): 32  Period Duration (Days): 4  Period Pattern: (!) Irregular  Menstrual Flow: Light, Heavy  Menstrual Control: Maxi pad  Dysmenorrhea: (!) Mild  Dysmenorrhea Symptoms: Cramping, Nausea, Diarrhea, HeadacheNo LMP recorded  Menstrual History:  OB History      Para Term  AB Living    1 1 1          SAB TAB Ectopic Multiple Live Births                     Obstetric Comments    Age at menarche 16  First pregnancy age 46  Used birth control pills for 10 years  Menstrual history: Patient is not post menopausal    History of sexually transmitted infection No  Patient is currently sexually active  heterosexual   Last Pap 2016:  no abnormalities  Family History   Problem Relation Age of Onset    Lung cancer Paternal Grandfather 48    Breast cancer Other     Thyroid cancer Mother 36    Psoriasis Father     Heart disease Other     Prostate cancer Other        Social History:  Social History     Social History    Marital status: /Civil Union     Spouse name: N/A    Number of children: 1    Years of education: N/A     Occupational History    employed      Social History Main Topics    Smoking status: Never Smoker    Smokeless tobacco: Never Used    Alcohol use Yes      Comment: social    Drug use:  No  Sexual activity: Yes     Partners: Male     Other Topics Concern    Not on file     Social History Narrative    Caffeine use- coffee         Presently lives with her   Patient is   Patient is currently employed -   Allergies   Allergen Reactions    Other Hives     Kait [place] Plus Cold & Cough  [Kibsualub-[id number]-Apap] Hives       Current Outpatient Prescriptions:     Ashwagandha 125 MG CAPS, Take by mouth, Disp: , Rfl:     betamethasone, augmented, (DIPROLENE) 0 05 % lotion, Apply topically, Disp: , Rfl:     Calcium 500 MG tablet, Take by mouth, Disp: , Rfl:     Calcium Carbonate-Vit D-Min (CALCIUM 1200 PO), Take 1 tablet by mouth daily  , Disp: , Rfl:     Cholecalciferol (VITAMIN D PO), Take 5,000 mg by mouth daily  , Disp: , Rfl:     clobetasol (TEMOVATE) 0 05 % ointment, Apply topically 2 (two) times a day, Disp: , Rfl:     Digestive Enzymes (DIGESTIVE ENZYME PO), Take 1 tablet by mouth daily as needed  , Disp: , Rfl:     hydrocortisone 2 5 % cream, Apply topically, Disp: , Rfl:     Krill Oil 300 MG CAPS, Take by mouth, Disp: , Rfl:     Magnesium 400 MG CAPS, Take 1 tablet by mouth daily  , Disp: , Rfl:     MILK THISTLE PO, Take 600 mg by mouth daily  , Disp: , Rfl:     Multiple Vitamin (MULTIVITAMIN) tablet, Take 1 tablet by mouth daily  , Disp: , Rfl:     Multiple Vitamins-Minerals (ONE DAILY MULTIVITAMIN ADULT) TABS, Take by mouth, Disp: , Rfl:     Probiotic Product (PROBIOTIC DAILY PO), Take 2 tablets by mouth daily  , Disp: , Rfl:     Spirulina 500 MG TABS, by Does not apply route, Disp: , Rfl:     tamoxifen (NOLVADEX) 20 mg tablet, TAKE 1 TABLET BY MOUTH EVERY DAY, Disp: 30 tablet, Rfl: 7    UNABLE TO FIND, Take 320 mg by mouth daily  Tumeric, Disp: , Rfl:     VITAMIN C, CALCIUM ASCORBATE, PO, Take 1,000 mg by mouth daily  , Disp: , Rfl:     Review of Systems:  Review of Systems   Constitutional: Negative  HENT: Negative      Respiratory: Negative  Cardiovascular: Negative  Gastrointestinal: Negative  Genitourinary: Negative  Skin: Negative  Psychiatric/Behavioral: Negative  Physical Exam:  /74 (BP Location: Left arm, Patient Position: Sitting, Cuff Size: Standard)   Ht 5' 7 25" (1 708 m)   Wt 82 8 kg (182 lb 9 6 oz)   BMI 28 39 kg/m²    Physical Exam   Constitutional: She is oriented to person, place, and time  She appears well-developed and well-nourished  Genitourinary: Uterus normal  There is no tenderness, lesion or injury on the right labia  There is no tenderness, lesion or injury on the left labia  No tenderness or bleeding in the vagina  No vaginal discharge found  Right adnexum does not display mass, does not display tenderness and does not display fullness  Left adnexum does not display mass, does not display tenderness and does not display fullness  Cervix does not exhibit motion tenderness or discharge  HENT:   Head: Normocephalic and atraumatic  Cardiovascular: Normal rate, regular rhythm and normal heart sounds  Pulmonary/Chest: Effort normal and breath sounds normal  No respiratory distress  S/p b/l mastectomy and reconstruction   Abdominal: Soft  She exhibits no distension  There is no tenderness  Neurological: She is alert and oriented to person, place, and time  Nursing note and vitals reviewed

## 2018-10-10 ENCOUNTER — OFFICE VISIT (OUTPATIENT)
Dept: FAMILY MEDICINE CLINIC | Facility: CLINIC | Age: 48
End: 2018-10-10
Payer: COMMERCIAL

## 2018-10-10 VITALS
BODY MASS INDEX: 28.79 KG/M2 | HEIGHT: 68 IN | DIASTOLIC BLOOD PRESSURE: 80 MMHG | WEIGHT: 190 LBS | SYSTOLIC BLOOD PRESSURE: 122 MMHG | HEART RATE: 80 BPM | TEMPERATURE: 98.3 F

## 2018-10-10 DIAGNOSIS — E78.00 HYPERCHOLESTEROLEMIA: ICD-10-CM

## 2018-10-10 DIAGNOSIS — B35.1 PAIN DUE TO ONYCHOMYCOSIS OF TOENAIL: ICD-10-CM

## 2018-10-10 DIAGNOSIS — M79.676 PAIN DUE TO ONYCHOMYCOSIS OF TOENAIL: ICD-10-CM

## 2018-10-10 DIAGNOSIS — R53.83 OTHER FATIGUE: ICD-10-CM

## 2018-10-10 DIAGNOSIS — M79.671 HEEL PAIN, CHRONIC, RIGHT: Primary | ICD-10-CM

## 2018-10-10 DIAGNOSIS — G89.29 HEEL PAIN, CHRONIC, RIGHT: Primary | ICD-10-CM

## 2018-10-10 DIAGNOSIS — F32.A DEPRESSION, UNSPECIFIED DEPRESSION TYPE: Primary | ICD-10-CM

## 2018-10-10 DIAGNOSIS — Z23 NEED FOR VACCINATION: ICD-10-CM

## 2018-10-10 PROCEDURE — 3725F SCREEN DEPRESSION PERFORMED: CPT | Performed by: FAMILY MEDICINE

## 2018-10-10 PROCEDURE — 1036F TOBACCO NON-USER: CPT | Performed by: FAMILY MEDICINE

## 2018-10-10 PROCEDURE — 90471 IMMUNIZATION ADMIN: CPT

## 2018-10-10 PROCEDURE — 90715 TDAP VACCINE 7 YRS/> IM: CPT

## 2018-10-10 PROCEDURE — 99203 OFFICE O/P NEW LOW 30 MIN: CPT | Performed by: FAMILY MEDICINE

## 2018-10-10 RX ORDER — VENLAFAXINE HYDROCHLORIDE 75 MG/1
75 CAPSULE, EXTENDED RELEASE ORAL DAILY
Qty: 30 CAPSULE | Refills: 3 | Status: SHIPPED | OUTPATIENT
Start: 2018-10-10 | End: 2019-02-14

## 2018-10-10 RX ORDER — PREDNISONE 10 MG/1
TABLET ORAL
Qty: 26 TABLET | Refills: 0 | Status: SHIPPED | OUTPATIENT
Start: 2018-10-10 | End: 2018-12-18

## 2018-10-10 NOTE — PROGRESS NOTES
Patient ID: Soraida Camargo is a 53 y o  female  HPI: 53 y  o female presents to get established into the practice  She complains of right heel pain for the past 3 mos  She also has a toenail fungus on toes of her right foot as well  She complains of fatigue, hot flashes and mood swings due to medical menopause from treatment for breast cancer    WE discussed going on an SSRI and she will run this by her Oncologist      SUBJECTIVE    Family History   Problem Relation Age of Onset    Lung cancer Paternal Grandfather 48    Breast cancer Other     Thyroid cancer Mother 36    Psoriasis Father     Heart disease Other     Prostate cancer Other      Social History     Social History    Marital status: /Civil Union     Spouse name: N/A    Number of children: 1    Years of education: N/A     Occupational History    employed      Social History Main Topics    Smoking status: Never Smoker    Smokeless tobacco: Never Used    Alcohol use Yes      Comment: social    Drug use: No    Sexual activity: Yes     Partners: Male     Other Topics Concern    Not on file     Social History Narrative    Caffeine use- coffee         Past Medical History:   Diagnosis Date    BRCA negative     Endometriosis     IBS (irritable bowel syndrome)     Insomnia     Night sweats     Psoriasis     Wears glasses      Past Surgical History:   Procedure Laterality Date    BREAST BIOPSY Right     IDC     SECTION      LAPAROSCOPIC OOPHORECTOMY      LAPAROSCOPY      exploratory, endometriosis    LYMPHADENECTOMY Right     x2    MASTECTOMY      bilateral mastectomy    [place] NIPPLE/AREOLA RECONSTRUCTION Bilateral 10/13/2016    Procedure: NIPPLE RECONSTRUCTION ;  Surgeon: Ksenia Pruett MD;  [facility];  Service: Plastics    [place] REVISE BREAST RECONSTRUCTION Bilateral 3/9/2016    Procedure: RECONSTRUCTION REVISION  BREAST MOUND ,FAT GRAFTS ;  Surgeon: Ksenia Pruett MD;  [facility];  Service: Plastics    SENTINEL LYMPH NODE BIOPSY Right      Allergies   Allergen Reactions    Other Hives     Kait selzer    Kait-[place] Plus Cold & Cough  [Pkfwcuzzi-Xxn-Re-Apap] Hives       Current Outpatient Prescriptions:     Ashwagandha 125 MG CAPS, Take by mouth, Disp: , Rfl:     betamethasone, augmented, (DIPROLENE) 0 05 % lotion, Apply topically, Disp: , Rfl:     Calcium 500 MG tablet, Take by mouth, Disp: , Rfl:     Calcium Carbonate-Vit D-Min (CALCIUM 1200 PO), Take 1 tablet by mouth daily  , Disp: , Rfl:     Cholecalciferol (VITAMIN D PO), Take 5,000 mg by mouth daily  , Disp: , Rfl:     clobetasol (TEMOVATE) 0 05 % ointment, Apply topically 2 (two) times a day, Disp: , Rfl:     Digestive Enzymes (DIGESTIVE ENZYME PO), Take 1 tablet by mouth daily as needed  , Disp: , Rfl:     hydrocortisone 2 5 % cream, Apply topically, Disp: , Rfl:     Krill Oil 300 MG CAPS, Take by mouth, Disp: , Rfl:     Magnesium 400 MG CAPS, Take 1 tablet by mouth daily  , Disp: , Rfl:     MILK THISTLE PO, Take 600 mg by mouth daily  , Disp: , Rfl:     Multiple Vitamin (MULTIVITAMIN) tablet, Take 1 tablet by mouth daily  , Disp: , Rfl:     Multiple Vitamins-Minerals (ONE DAILY MULTIVITAMIN ADULT) TABS, Take by mouth, Disp: , Rfl:     Probiotic Product (PROBIOTIC DAILY PO), Take 2 tablets by mouth daily  , Disp: , Rfl:     Spirulina 500 MG TABS, by Does not apply route, Disp: , Rfl:     tamoxifen (NOLVADEX) 20 mg tablet, TAKE 1 TABLET BY MOUTH EVERY DAY, Disp: 30 tablet, Rfl: 7    UNABLE TO FIND, Take 320 mg by mouth daily   Tumeric, Disp: , Rfl:     VITAMIN C, CALCIUM ASCORBATE, PO, Take 1,000 mg by mouth daily  , Disp: , Rfl:     Review of Systems  Constitutional:     Denies fever, chills ,,weakness ,weight loss, weight gain  +fatigue   ENT: Denies earache ,loss of hearing ,nosebleed, nasal discharge,nasal congestion ,sore throat ,hoarseness  Pulmonary: Denies shortness of breath ,cough  ,dyspnea on exertion, orthopnea  ,PND Cardiovascular:  Denies bradycardia , tachycardia  ,palpations, lower extremity edema leg, claudication  Breast:  Denies new or changing breast lumps ,nipple discharge ,nipple changes  Abdomen:  Denies abdominal pain , anorexia , indigestion, nausea, vomiting, constipation, diarrhea  Musculoskeletal: Denies myalgias, arthralgias, joint swelling, joint stiffness , limb pain, limb swelling; right heel pain with weight bearing  Gu: denies dysuria, polyuria + hot flashes  Skin: Denies skin rash, skin lesion, skin wound, itching, dry skin; thickened toenails of toes of right foot with yellow discoloration  Neuro: Denies headache, numbness, tingling, confusion, loss of consciousness, dizziness, vertigo  Psychiatric: Denies feelings of depression, suicidal ideation, anxiety, sleep disturbances + mood swings     OBJECTIVE  /80   Pulse 80   Temp 98 3 °F (36 8 °C)   Ht 5' 7 5" (1 715 m)   Wt 86 2 kg (190 lb)   BMI 29 32 kg/m²   Constitutional:   NAD, well appearing and well nourished      ENT:   Conjunctiva and lids: no injection, edema, or discharge     Pupils and iris: JULIET bilaterally    External inspection of ears and nose: normal without deformities or discharge  Otoscopic exam: Canals patent without erythema  Nasal mucosa, septum and turbinates: Normal or edema or discharge         Oropharynx:  Moist mucosa, normal tongue and tonsils without lesions  No erythema        Pulmonary:Respiratory effort normal rate and rhythm, no increased work of breathing   Auscultation of lungs:  Clear bilaterally with no adventitious breath sounds       Cardiovascular: regular rate and rhythm, S1 and S2, no murmur, no edema and/or varicosities of LE      Abdomen: Soft and non-distended     Positive bowel sounds      No heptomegaly or splenomegaly      Gu: no suprapubic tenderness or CVA tenderness, no urethral discharge  Lymphatic:  No anterior or posterior cervical lymphadenopathy         Musculoskeletal:  Gait and station: Normal gait      Digits and nails normal without clubbing or cyanosis       Inspection/palpation of joints, bones, and muscles:  No joint tenderness, swelling, full active and passive range of motion ; + tendernss on palpation of right heel; no plantar fascia tenderness on right  Skin: Normal skin turgor and no rashes ; right toenails hyperkeratotic and discolored yellow     Neuro:     Normal reflexes      Psych:   alert and oriented to person, place and time     normal mood and affect       Assessment/Plan:Diagnoses and all orders for this visit:    Heel pain, chronic, right  -     XR foot 3+ vw right; Future  -     predniSONE 10 mg tablet; 3 tabs po bid x2 days, then 2 tabs po bid x2 days, then 1 tab bid x2 days, then 1 daily until done  Pain due to onychomycosis of toenail  -     ciclopirox (PENLAC) 8 % solution; Paint daily; remove day#7 and use x6 weeks    Other fatigue  -     TSH, 3rd generation; Future  -     CBC and differential; Future  -     Thyroglobulin Panel; Future  -     Lyme Ab/Western Blot Reflex; Future  -     Comprehensive metabolic panel; Future    Hypercholesterolemia  -     Lipid Panel with Direct LDL reflex;  Future    Need for vaccination  -     TDAP VACCINE GREATER THAN OR EQUAL TO 8YO IM        Reviewed with patient plan to treat with plan as above  Patient instructed to call in 72 hours if not feeling better or if symptoms worsen

## 2018-10-31 ENCOUNTER — APPOINTMENT (OUTPATIENT)
Dept: LAB | Age: 48
End: 2018-10-31
Payer: COMMERCIAL

## 2018-10-31 DIAGNOSIS — R53.83 OTHER FATIGUE: ICD-10-CM

## 2018-10-31 DIAGNOSIS — E78.00 HYPERCHOLESTEROLEMIA: ICD-10-CM

## 2018-10-31 LAB
ALBUMIN SERPL BCP-MCNC: 3.5 G/DL (ref 3.5–5)
ALP SERPL-CCNC: 48 U/L (ref 46–116)
ALT SERPL W P-5'-P-CCNC: 34 U/L (ref 12–78)
ANION GAP SERPL CALCULATED.3IONS-SCNC: 8 MMOL/L (ref 4–13)
AST SERPL W P-5'-P-CCNC: 28 U/L (ref 5–45)
BASOPHILS # BLD AUTO: 0.01 THOUSANDS/ΜL (ref 0–0.1)
BASOPHILS NFR BLD AUTO: 0 % (ref 0–1)
BILIRUB SERPL-MCNC: 1.05 MG/DL (ref 0.2–1)
BUN SERPL-MCNC: 11 MG/DL (ref 5–25)
CALCIUM SERPL-MCNC: 8.8 MG/DL (ref 8.3–10.1)
CHLORIDE SERPL-SCNC: 103 MMOL/L (ref 100–108)
CHOLEST SERPL-MCNC: 155 MG/DL (ref 50–200)
CO2 SERPL-SCNC: 23 MMOL/L (ref 21–32)
CREAT SERPL-MCNC: 0.86 MG/DL (ref 0.6–1.3)
EOSINOPHIL # BLD AUTO: 0.08 THOUSAND/ΜL (ref 0–0.61)
EOSINOPHIL NFR BLD AUTO: 1 % (ref 0–6)
ERYTHROCYTE [DISTWIDTH] IN BLOOD BY AUTOMATED COUNT: 13.2 % (ref 11.6–15.1)
GFR SERPL CREATININE-BSD FRML MDRD: 81 ML/MIN/1.73SQ M
GLUCOSE P FAST SERPL-MCNC: 68 MG/DL (ref 65–99)
HCT VFR BLD AUTO: 38.1 % (ref 34.8–46.1)
HDLC SERPL-MCNC: 51 MG/DL (ref 40–60)
HGB BLD-MCNC: 12.2 G/DL (ref 11.5–15.4)
IMM GRANULOCYTES # BLD AUTO: 0.01 THOUSAND/UL (ref 0–0.2)
IMM GRANULOCYTES NFR BLD AUTO: 0 % (ref 0–2)
LDLC SERPL CALC-MCNC: 86 MG/DL (ref 0–100)
LYMPHOCYTES # BLD AUTO: 1.03 THOUSANDS/ΜL (ref 0.6–4.47)
LYMPHOCYTES NFR BLD AUTO: 14 % (ref 14–44)
MCH RBC QN AUTO: 30.7 PG (ref 26.8–34.3)
MCHC RBC AUTO-ENTMCNC: 32 G/DL (ref 31.4–37.4)
MCV RBC AUTO: 96 FL (ref 82–98)
MONOCYTES # BLD AUTO: 0.29 THOUSAND/ΜL (ref 0.17–1.22)
MONOCYTES NFR BLD AUTO: 4 % (ref 4–12)
NEUTROPHILS # BLD AUTO: 5.87 THOUSANDS/ΜL (ref 1.85–7.62)
NEUTS SEG NFR BLD AUTO: 81 % (ref 43–75)
NRBC BLD AUTO-RTO: 0 /100 WBCS
PLATELET # BLD AUTO: 227 THOUSANDS/UL (ref 149–390)
PMV BLD AUTO: 10.2 FL (ref 8.9–12.7)
POTASSIUM SERPL-SCNC: 4.2 MMOL/L (ref 3.5–5.3)
PROT SERPL-MCNC: 7 G/DL (ref 6.4–8.2)
RBC # BLD AUTO: 3.98 MILLION/UL (ref 3.81–5.12)
SODIUM SERPL-SCNC: 134 MMOL/L (ref 136–145)
TRIGL SERPL-MCNC: 91 MG/DL
TSH SERPL DL<=0.05 MIU/L-ACNC: 1.75 UIU/ML (ref 0.36–3.74)
WBC # BLD AUTO: 7.29 THOUSAND/UL (ref 4.31–10.16)

## 2018-10-31 PROCEDURE — 85025 COMPLETE CBC W/AUTO DIFF WBC: CPT

## 2018-10-31 PROCEDURE — 36415 COLL VENOUS BLD VENIPUNCTURE: CPT

## 2018-10-31 PROCEDURE — 86800 THYROGLOBULIN ANTIBODY: CPT

## 2018-10-31 PROCEDURE — 86618 LYME DISEASE ANTIBODY: CPT

## 2018-10-31 PROCEDURE — 84432 ASSAY OF THYROGLOBULIN: CPT

## 2018-10-31 PROCEDURE — 80061 LIPID PANEL: CPT

## 2018-10-31 PROCEDURE — 80053 COMPREHEN METABOLIC PANEL: CPT

## 2018-10-31 PROCEDURE — 84443 ASSAY THYROID STIM HORMONE: CPT

## 2018-11-01 LAB
B BURGDOR IGG SER IA-ACNC: 0.13
B BURGDOR IGM SER IA-ACNC: 0.42
THYROGLOB AB SERPL-ACNC: <1 IU/ML (ref 0–0.9)
THYROGLOB SERPL-MCNC: 9.3 NG/ML (ref 1.5–38.5)

## 2018-11-02 ENCOUNTER — TELEPHONE (OUTPATIENT)
Dept: FAMILY MEDICINE CLINIC | Facility: CLINIC | Age: 48
End: 2018-11-02

## 2018-11-02 NOTE — TELEPHONE ENCOUNTER
----- Message from Nhan Handy sent at 11/2/2018 12:27 PM EDT -----  Regarding: FW: Test Results Question  Contact: [phone]      ----- Message -----  From: Renard Goldman  Sent: 11/2/2018  11:18 AM  To: [facility]  Subject: Test Results Question                            Hi Dr Annika Layton, i saw that the last blood test has   Neutrophils Relative, 81%, H    Could that be from me having a cold? I had one the morning I went for bloodwork and still have the cold  Just want to be sure it's not something bad    Thanks,  [facility]

## 2018-12-14 DIAGNOSIS — Z17.0 MALIGNANT NEOPLASM OF BREAST IN FEMALE, ESTROGEN RECEPTOR POSITIVE, UNSPECIFIED LATERALITY, UNSPECIFIED SITE OF BREAST (HCC): ICD-10-CM

## 2018-12-14 DIAGNOSIS — C50.919 MALIGNANT NEOPLASM OF BREAST IN FEMALE, ESTROGEN RECEPTOR POSITIVE, UNSPECIFIED LATERALITY, UNSPECIFIED SITE OF BREAST (HCC): ICD-10-CM

## 2018-12-14 RX ORDER — TAMOXIFEN CITRATE 20 MG/1
TABLET ORAL
Qty: 30 TABLET | Refills: 7 | Status: SHIPPED | OUTPATIENT
Start: 2018-12-14 | End: 2019-07-24 | Stop reason: SDUPTHER

## 2018-12-18 ENCOUNTER — OFFICE VISIT (OUTPATIENT)
Dept: HEMATOLOGY ONCOLOGY | Facility: CLINIC | Age: 48
End: 2018-12-18
Payer: COMMERCIAL

## 2018-12-18 VITALS
RESPIRATION RATE: 16 BRPM | OXYGEN SATURATION: 96 % | HEIGHT: 68 IN | TEMPERATURE: 97.7 F | WEIGHT: 179 LBS | DIASTOLIC BLOOD PRESSURE: 76 MMHG | SYSTOLIC BLOOD PRESSURE: 116 MMHG | BODY MASS INDEX: 27.13 KG/M2 | HEART RATE: 86 BPM

## 2018-12-18 DIAGNOSIS — C50.211 MALIGNANT NEOPLASM OF UPPER-INNER QUADRANT OF RIGHT BREAST IN FEMALE, ESTROGEN RECEPTOR POSITIVE (HCC): Primary | ICD-10-CM

## 2018-12-18 DIAGNOSIS — Z17.0 MALIGNANT NEOPLASM OF UPPER-INNER QUADRANT OF RIGHT BREAST IN FEMALE, ESTROGEN RECEPTOR POSITIVE (HCC): Primary | ICD-10-CM

## 2018-12-18 PROCEDURE — 99214 OFFICE O/P EST MOD 30 MIN: CPT | Performed by: INTERNAL MEDICINE

## 2018-12-18 NOTE — PROGRESS NOTES
Hematology / Oncology Outpatient Follow Up Note    Pritesh Romero 53 y o  female :1970 TYT:[id number]         Date:  2018    Assessment / Plan:  A 53year old premenopausal woman with stage IIA right breast cancer, grade 1, ER/CO positive, HER-2 negative disease  She has no evidence of BRCA gene mutation  She underwent right mastectomy with sentinel lymph node biopsy as well as left prophylactic mastectomy, resulting in GINI  Her tumor has Oncotype DX recurrence score of 14  Since 2015, she has been on adjuvant hormonal therapy with tamoxifen with minimal side effects  Clinically, she has no evidence recurrent disease  I recommended her to continue with tamoxifen 20 mg daily  She is in agreement with my recommendation  I will see her again in a year for routine follow-up  Subjective:     HPI:  A 41year old premenopausal woman, who is found to have abnormality, based on the screening mammography in her right breast  Subsequently, she underwent biopsy from the right breast lesion, which showed invasive ductal carcinoma  She was seen and evaluated by Dr Hima Krishnan  She was found to have no evidence of BRCA gene mutation  Despite this, she elected to have mastectomy as well as prophylactic left mastectomy, which was done in 2015  She has no evidence of carcinoma in the left mastectomy specimen  Right mastectomy specimen showed 2 1x1 8x1 0 cm of invasive ductal carcinoma, grade 1  There was no evidence of lymphovascular invasion  2 sentinel lymph node were negative for metastatic disease  This was % positive, CO 95% positive, HER-2 negative disease  Dr Rabago Gone  That her tumor tissue for Oncotype DX testing which came back 14 as of recurrence score  She had immediate reconstruction with tissue expander  She presents today to discuss adjuvant treatment options  She has some chest tightness after the expansion  Otherwise, she feels well  She has no complaint of bone pain   She denies respiratory symptoms  Her weight has been stable  She has regular menstrual cycle  Her performance status is normal          Interval History:  A 53year-old premenopausal woman with stage IIA right breast cancer, grade 1, ER/DE positive, HER-2 negative disease  She underwent mastectomy as well as prophylactic left mastectomy, resulting in GINI  Her tumor had Oncotype DX recurrence score of 14  Therefore, adjuvant chemotherapy was not indicated  Since August 2015, she has been on adjuvant hormonal therapy with tamoxifen  She presented today for follow-up  In summer of 2018, she felt more than usual hot flashes as well as mood swing  By switching the generic brand of tamoxifen, her symptom has subsided  Currently, she feels well  She has no complaint of pain  She denied any respiratory symptoms  She has no swelling in the lower extremities  Her weight is stable  She has normal performance status  She has somewhat longer interval of menstrual cycle  Objective:     Primary Diagnosis:    1  Right breast cancer, stage IIA (pT2, pN0,M0)  Grade 1, ER/DE positive, HER-2 negative disease  Oncotype DX recurrence score 14  Diagnosed in June 2015   2  BRCA gene mutation negative  Cancer Staging:  Cancer Staging  No matching staging information was found for the patient  Previous Hematologic/ Oncologic Treatment:         Current Hematologic/ Oncologic Treatment:      Adjuvant hormonal therapy with tamoxifen 20 mg once a day since August 2015  Disease Status:     GINI status post mastectomy and contralateral prophylactic mastectomy  Test Results:    Pathology:    2 1, x1 8x1 0 cm of invasive ductal carcinoma, grade 1  No evidence of lymphovascular invasion  2 sentinel lymph nodes were negative for metastatic disease  % positive, DE 95% positive, HER-2 negative disease  Stage IIA (pT2, pN0,M0)  Oncotype DX recurrence score 14        Radiology:        Laboratory:        Physical Exam:      General Appearance:    Alert, oriented        Eyes:    PERRL   Ears:    Normal external ear canals, both ears   Nose:   Nares normal, septum midline   Throat:   Mucosa moist  Pharynx without injection  Neck:   Supple       Lungs:     Clear to auscultation bilaterally   Chest Wall:    No tenderness or deformity    Heart:    Regular rate and rhythm       Abdomen:     Soft, non-tender, bowel sounds +, no organomegaly           Extremities:   Extremities no cyanosis or edema       Skin:   no rash or icterus  Lymph nodes:   Cervical, supraclavicular, and axillary nodes normal   Neurologic:   CNII-XII intact, normal strength, sensation and reflexes     Throughout          Breast exam:   status post bilateral mastectomy with reconstruction  No palpable abnormality in either reconstructed breast           ROS: Review of Systems   All other systems reviewed and are negative  Imaging: No results found  Labs:   Lab Results   Component Value Date    WBC 7 29 10/31/2018    HGB 12 2 10/31/2018    HCT 38 1 10/31/2018    MCV 96 10/31/2018     10/31/2018     Lab Results   Component Value Date     10/06/2015    K 4 2 10/31/2018     10/31/2018    CO2 23 10/31/2018    ANIONGAP 9 10/06/2015    BUN 11 10/31/2018    CREATININE 0 86 10/31/2018    GLUCOSE 81 10/06/2015    GLUF 68 10/31/2018    CALCIUM 8 8 10/31/2018    AST 28 10/31/2018    ALT 34 10/31/2018    ALKPHOS 48 10/31/2018    PROT 7 4 10/06/2015    BILITOT 0 51 10/06/2015    EGFR 81 10/31/2018           Current Medications: Reviewed  Allergies: Reviewed  PMH/FH/SH:  Reviewed      Vital Sign:    Body surface area is 1 94 meters squared      Wt Readings from Last 3 Encounters:   12/18/18 81 2 kg (179 lb)   10/10/18 86 2 kg (190 lb)   09/28/18 82 8 kg (182 lb 9 6 oz)        Temp Readings from Last 3 Encounters:   12/18/18 97 7 °F (36 5 °C) (Tympanic)   10/10/18 98 3 °F (36 8 °C)   08/23/18 98 8 °F (37 1 °C)        BP Readings from Last 3 Encounters:   12/18/18 116/76   10/10/18 122/80   09/28/18 118/74         Pulse Readings from Last 3 Encounters:   12/18/18 86   10/10/18 80   08/23/18 85     @LASTSAO2(3)@

## 2019-02-14 ENCOUNTER — OFFICE VISIT (OUTPATIENT)
Dept: SURGICAL ONCOLOGY | Facility: CLINIC | Age: 49
End: 2019-02-14
Payer: COMMERCIAL

## 2019-02-14 VITALS
SYSTOLIC BLOOD PRESSURE: 130 MMHG | HEART RATE: 74 BPM | HEIGHT: 68 IN | DIASTOLIC BLOOD PRESSURE: 80 MMHG | BODY MASS INDEX: 27.89 KG/M2 | RESPIRATION RATE: 14 BRPM | WEIGHT: 184 LBS | TEMPERATURE: 98.5 F

## 2019-02-14 DIAGNOSIS — C50.211 MALIGNANT NEOPLASM OF UPPER-INNER QUADRANT OF RIGHT BREAST IN FEMALE, ESTROGEN RECEPTOR POSITIVE (HCC): Primary | ICD-10-CM

## 2019-02-14 DIAGNOSIS — Z13.71 BRCA NEGATIVE: ICD-10-CM

## 2019-02-14 DIAGNOSIS — Z79.810 USE OF TAMOXIFEN (NOLVADEX): ICD-10-CM

## 2019-02-14 DIAGNOSIS — Z17.0 MALIGNANT NEOPLASM OF UPPER-INNER QUADRANT OF RIGHT BREAST IN FEMALE, ESTROGEN RECEPTOR POSITIVE (HCC): Primary | ICD-10-CM

## 2019-02-14 PROCEDURE — 99213 OFFICE O/P EST LOW 20 MIN: CPT | Performed by: SURGERY

## 2019-02-14 NOTE — PROGRESS NOTES
Surgical Oncology Follow Up       [address] [facility],[address]  [facility] [address] [address]    Velda Heimlich  1970  [id number]  [id number] [facility]  [facility] [address] [address]    Chief Complaint   Patient presents with    Follow-up     6 month        Assessment/Plan   Diagnoses and all orders for this visit:    Malignant neoplasm of upper-inner quadrant of right breast in female, estrogen receptor positive ([address] )    BRCA negative    Use of tamoxifen (Nolvadex)        Advance Care Planning/Advance Directives:  Discussed disease status, cancer treatment plans and/or cancer treatment goals with the patient  Oncology History:       Malignant neoplasm of upper-inner quadrant of right breast in female, estrogen receptor positive ([facility] [address] )    5/14/2015 Surgery     Right US guided breast biopsy         6/30/2015 Surgery     Bilateral; mastectomies, right SLNB  Bilateral reconstruction with expanders  Dr Joycie Nyhan         8/12/2015 -  Hormone Therapy     Tamoxifen  Dr Lora Charlton  10/23/2015 Surgery     Expanders removed, implants placed  11/6/2017 Initial Diagnosis     Malignant neoplasm of upper-inner quadrant of right breast in female, estrogen receptor positive (HCC)        Genetic Testing     BRCA negative          Genomic Testing     Oncotype DX  14            History of Present Illness:  Breast cancer follow-up  -Interval History:  None    Review of Systems:  Review of Systems   Constitutional: Negative  Negative for appetite change and fever  Eyes: Negative  Respiratory: Negative for shortness of breath  Cardiovascular: Negative  Gastrointestinal: Negative  Endocrine: Negative  Genitourinary: Negative  Musculoskeletal: Negative  Negative for arthralgias and myalgias  Skin: Negative  Allergic/Immunologic: Negative  Neurological: Negative  Hematological: Negative  Negative for adenopathy  Does not bruise/bleed easily  Psychiatric/Behavioral: Negative          Patient Active Problem List   Diagnosis    Anxiety disorder due to medical condition    Insomnia    Irritable bowel syndrome (IBS)    Malignant neoplasm of upper-inner quadrant of right breast in female, estrogen receptor positive (HCC)    Psoriasis    Seasonal allergies    Use of tamoxifen (Nolvadex)    Seborrheic keratosis    Screening for skin condition     Past Medical History:   Diagnosis Date    BRCA negative     Endometriosis     IBS (irritable bowel syndrome)     Insomnia     Night sweats     Psoriasis     Wears glasses      Past Surgical History:   Procedure Laterality Date    BREAST BIOPSY Right [id number]    IDC     SECTION      LAPAROSCOPY      exploratory, endometriosis    LYMPHADENECTOMY Right     x2    MASTECTOMY      bilateral mastectomy    FL NIPPLE/AREOLA RECONSTRUCTION Bilateral 10/13/2016    Procedure: NIPPLE RECONSTRUCTION ;  Surgeon: Denia Peña MD;  Location: [facility];  Service: Plastics    FL REVISE BREAST RECONSTRUCTION Bilateral 3/9/2016    Procedure: RECONSTRUCTION REVISION  BREAST MOUND ,FAT GRAFTS ;  Surgeon: Denia ePña MD;  Location: [facility];  Service: Plastics    SENTINEL LYMPH NODE BIOPSY Right      Family History   Problem Relation Age of Onset    Lung cancer Paternal Grandfather 48    Breast cancer Other     Thyroid cancer Mother 36    Psoriasis Father     Heart disease Other     Prostate cancer Other      Social History     Socioeconomic History    Marital status: /Civil Union     Spouse name: Not on file    Number of children: 1    Years of education: Not on file    Highest education level: Not on file   Occupational History    Occupation: employed   Social Needs    Financial resource strain: Not on file    Food insecurity:     Worry: Not on file     Inability: Not on file   [email or web address] needs: Medical: Not on file     Non-medical: Not on file   Tobacco Use    Smoking status: Never Smoker    Smokeless tobacco: Never Used   Substance and Sexual Activity    Alcohol use: Yes     Comment: social    Drug use: No    Sexual activity: Yes     Partners: Male   Lifestyle    Physical activity:     Days per week: Not on file     Minutes per session: Not on file    Stress: Not on file   Relationships    Social connections:     Talks on phone: Not on file     Gets together: Not on file     Attends Evangelical service: Not on file     Active member of club or organization: Not on file     Attends meetings of clubs or organizations: Not on file     Relationship status: Not on file    Intimate partner violence:     Fear of current or ex partner: Not on file     Emotionally abused: Not on file     Physically abused: Not on file     Forced sexual activity: Not on file   Other Topics Concern    Not on file   Social History Narrative    Caffeine use- coffee       Current Outpatient Medications:     Ashwagandha 125 MG CAPS, Take by mouth, Disp: , Rfl:     betamethasone, augmented, (DIPROLENE) 0 05 % lotion, Apply topically, Disp: , Rfl:     Calcium 500 MG tablet, Take by mouth, Disp: , Rfl:     Calcium Carbonate-Vit D-Min (CALCIUM 1200 PO), Take 1 tablet by mouth daily  , Disp: , Rfl:     Cholecalciferol (VITAMIN D PO), Take 5,000 mg by mouth daily  , Disp: , Rfl:     ciclopirox (PENLAC) 8 % solution, Paint daily; remove day#7 and use x6 weeks, Disp: 6 6 mL, Rfl: 1    Digestive Enzymes (DIGESTIVE ENZYME PO), Take 1 tablet by mouth daily as needed  , Disp: , Rfl:     hydrocortisone 2 5 % cream, Apply topically, Disp: , Rfl:     Krill Oil 300 MG CAPS, Take by mouth, Disp: , Rfl:     Magnesium 400 MG CAPS, Take 1 tablet by mouth daily  , Disp: , Rfl:     MILK THISTLE PO, Take 600 mg by mouth daily  , Disp: , Rfl:     Multiple Vitamins-Minerals (ONE DAILY MULTIVITAMIN ADULT) TABS, Take by mouth, Disp: , Rfl:    Probiotic Product (PROBIOTIC DAILY PO), Take 2 tablets by mouth daily  , Disp: , Rfl:     Spirulina 500 MG TABS, by Does not apply route, Disp: , Rfl:     tamoxifen (NOLVADEX) 20 mg tablet, TAKE 1 TABLET BY MOUTH EVERY DAY, Disp: 30 tablet, Rfl: 7    UNABLE TO FIND, Take 320 mg by mouth daily  Tumeric, Disp: , Rfl:   Allergies   Allergen Reactions    Other Hives     Kait selzer    Kait-Drake Plus Cold & Cough  [[id number]] Hives       The following portions of the patient's history were reviewed and updated as appropriate: allergies, current medications, past family history, past medical history, past social history, past surgical history and problem list         Vitals:    02/14/19 1021   BP: 130/80   Pulse: 74   Resp: 14   Temp: 98 5 °F (36 9 °C)       Physical Exam   Constitutional: She is oriented to person, place, and time  She appears well-developed and well-nourished  HENT:   Head: Normocephalic and atraumatic  Cardiovascular: Normal heart sounds  Pulmonary/Chest: Breath sounds normal  Right breast exhibits skin change (Mastectomy scar and implant)  Right breast exhibits no mass and no tenderness  Left breast exhibits skin change ( mastectomy scar and implant)  Left breast exhibits no mass and no tenderness  Abdominal: Soft  Lymphadenopathy:        Right axillary: No pectoral and no lateral adenopathy present  Left axillary: No pectoral and no lateral adenopathy present  Right: No supraclavicular adenopathy present  Left: No supraclavicular adenopathy present  Neurological: She is alert and oriented to person, place, and time  Psychiatric: She has a normal mood and affect  Discussion/Summary:  72-year-old female who had bilateral mastectomy and reconstruction for a right-sided invasive ductal carcinoma, stage IIA  She had genetic testing which was negative  At her last visit she was having multiple motion all concerns    She attributes this to a generic version of her Effexor  She is feeling much better and reports no issues related to the tamoxifen  There is no evidence of disease based on examination today  I will see her again in six months or sooner should the need arise

## 2019-04-19 ENCOUNTER — TELEPHONE (OUTPATIENT)
Dept: SURGICAL ONCOLOGY | Facility: CLINIC | Age: 49
End: 2019-04-19

## 2019-05-15 ENCOUNTER — APPOINTMENT (OUTPATIENT)
Dept: RADIOLOGY | Age: 49
End: 2019-05-15
Payer: COMMERCIAL

## 2019-05-15 ENCOUNTER — TELEPHONE (OUTPATIENT)
Dept: FAMILY MEDICINE CLINIC | Facility: CLINIC | Age: 49
End: 2019-05-15

## 2019-05-15 DIAGNOSIS — M79.89 PAIN AND SWELLING OF TOE OF RIGHT FOOT: ICD-10-CM

## 2019-05-15 DIAGNOSIS — M79.89 PAIN AND SWELLING OF TOE OF RIGHT FOOT: Primary | ICD-10-CM

## 2019-05-15 DIAGNOSIS — M79.674 PAIN AND SWELLING OF TOE OF RIGHT FOOT: Primary | ICD-10-CM

## 2019-05-15 DIAGNOSIS — S92.911S: Primary | ICD-10-CM

## 2019-05-15 DIAGNOSIS — M79.674 PAIN AND SWELLING OF TOE OF RIGHT FOOT: ICD-10-CM

## 2019-05-15 PROCEDURE — 73630 X-RAY EXAM OF FOOT: CPT

## 2019-05-20 ENCOUNTER — OFFICE VISIT (OUTPATIENT)
Dept: OBGYN CLINIC | Facility: HOSPITAL | Age: 49
End: 2019-05-20
Payer: COMMERCIAL

## 2019-05-20 VITALS
DIASTOLIC BLOOD PRESSURE: 86 MMHG | HEIGHT: 68 IN | BODY MASS INDEX: 25.76 KG/M2 | HEART RATE: 81 BPM | SYSTOLIC BLOOD PRESSURE: 136 MMHG | WEIGHT: 170 LBS

## 2019-05-20 DIAGNOSIS — S92.911S: ICD-10-CM

## 2019-05-20 PROCEDURE — 99203 OFFICE O/P NEW LOW 30 MIN: CPT | Performed by: PHYSICIAN ASSISTANT

## 2019-05-20 RX ORDER — ASPIRIN 325 MG
325 TABLET ORAL DAILY
Qty: 30 TABLET | Refills: 0 | Status: SHIPPED | OUTPATIENT
Start: 2019-05-20 | End: 2019-09-30

## 2019-06-11 ENCOUNTER — OFFICE VISIT (OUTPATIENT)
Dept: OBGYN CLINIC | Facility: CLINIC | Age: 49
End: 2019-06-11
Payer: COMMERCIAL

## 2019-06-11 DIAGNOSIS — S92.524A CLOSED NONDISPLACED FRACTURE OF MIDDLE PHALANX OF LESSER TOE OF RIGHT FOOT, INITIAL ENCOUNTER: ICD-10-CM

## 2019-06-11 DIAGNOSIS — S92.514A CLOSED NONDISPLACED FRACTURE OF PROXIMAL PHALANX OF LESSER TOE OF RIGHT FOOT, INITIAL ENCOUNTER: Primary | ICD-10-CM

## 2019-06-11 DIAGNOSIS — M72.2 PLANTAR FASCIITIS, BILATERAL: ICD-10-CM

## 2019-06-11 PROCEDURE — 99203 OFFICE O/P NEW LOW 30 MIN: CPT | Performed by: ORTHOPAEDIC SURGERY

## 2019-07-01 ENCOUNTER — EVALUATION (OUTPATIENT)
Dept: PHYSICAL THERAPY | Age: 49
End: 2019-07-01
Payer: COMMERCIAL

## 2019-07-01 DIAGNOSIS — M72.2 PLANTAR FASCIITIS, BILATERAL: Primary | ICD-10-CM

## 2019-07-01 PROCEDURE — 97161 PT EVAL LOW COMPLEX 20 MIN: CPT | Performed by: PHYSICAL THERAPIST

## 2019-07-01 NOTE — PROGRESS NOTES
PT Evaluation     Today's date: 2019  Patient name: Lane Walton  : 1970  MRN: [id number]  Referring provider: Martha Falcon PA-C  Dx:   Encounter Diagnosis     ICD-10-CM    1  Plantar fasciitis, bilateral M72 2 Ambulatory referral to Physical Therapy       Start Time: 7839  Stop Time: 8869  Total time in clinic (min): 55 minutes    Assessment  Assessment details: Lane Walton is a 51 y o  female who presents with complaints of Plantar fasciitis, bilateral   She is presenting with joint hypermobility with poor foot intrinsic, hip, and ankle strength leading to symptom reporting  Patient reported improvement in symptoms following taping intervention today  Educated on using insoles to help with symptoms  She will benefit from treatment to address functional limitations related to ambulation at this time  Prognosis is good given HEP compliance and PT 2-3x/wk  Positive prognostic indicators include positive attitude toward recovery  Please contact me if you have any questions or recommendations  Thank you for the opportunity to share in  [facility] care       Impairments: abnormal gait, abnormal muscle firing, abnormal muscle tone, abnormal or restricted ROM, abnormal movement, impaired balance, impaired physical strength, lacks appropriate home exercise program, pain with function and poor posture   Understanding of Dx/Px/POC: good   Prognosis: good    Plan  Patient would benefit from: skilled physical therapy  Planned therapy interventions: manual therapy, patient education, postural training, strengthening, stretching, therapeutic activities, therapeutic exercise, home exercise program, graded motor, graded activity, graded exercise, neuromuscular re-education, balance, behavior modification and Ruiz taping  Frequency: 2x week  Plan of Care beginning date: 2019  Plan of Care expiration date: 2019  Treatment plan discussed with: patient        Subjective Evaluation    History of Present Illness  Mechanism of injury: Patient reports to therapy with diagnosis of bilateral heel pain  She reports that it started about 1 year ago and went to PCP for it  Was given prednisone as she was diagnosed with bone spur by PCP  Felt great on medicine but pain reduced after going off medication  She reports that she was in walking boot for right foot for 4th and 5th toe fractures  She was given heel cups which has helped and also was given a night splint which she reports cannot sleep in but does wear it once in a while  She believes symptoms started while wearing flats  She reports tenderness and soreness in gastroc/achilles  Sharp/burning/aching pain reported in heel extending to 1st ray  Patient would like to return to walking for health  She reports slight improvement with Advil and symptoms can be persistent even when sitting  Only really improves when feet are elevated  Symptoms will worsen the longer she is on her feet  Occasional stiffness with stair navigation  She reports pain worse with first few steps and then eases slightly  Not a recurrent problem   Quality of life: good    Pain  Current pain ratin  At best pain ratin  At worst pain ratin  Quality: burning and dull ache  Progression: no change    Treatments  No previous or current treatments  Patient Goals  Patient goals for therapy: decreased pain, improved balance, increased motion, increased strength, independence with ADLs/IADLs and return to sport/leisure activities  Patient goal: walk for exercise/health        Objective  Flowsheet Rows      Most Recent Value   PT/OT G-Codes   Current Score  49   Projected Score  65         GAIT: bilateral pes planus with excessive great toe extension with ambulation  Squat assess: WNL   SLS: RLE: pain, pes planus, LLE: pain, pes planus           MMT    Hip       L       R   Flex  5 5   Extn  4 4   Abd  4- 4-   IR  4 4   ER  4- 4-        G  Max 4- 4-   G  Med  3- 3-   Iliop   4 4 MMT    Knee         L        R   Flex  5 5   Extn  5 5                     MMT          AROM    Ankle       L        R         L        R   PF 4 3 WNL WNL   DF  4- 4- WNL WNL   DF bent knee   WNL WNL   EHL 5 5     Inv  4 4     Ever  4+ deferred     Soleus 4 4     Gastro  4- 4-     Post Tib  4- 4-     Foot Int  2+ 2+       Unable to curl toes  Palpation: TTP medial calcaneal tuberosity B/L    Slump test: L= -    R=  -     Straight leg raise:   L= -   R= -       Transverse abdominis: Bent knee fall out= poor       Ankle:   anterior draw =  -     talar tilt=  -     Posterior draw=  -   inversion stress=  -   eversion stress=  -     joint findings= excessive ankle/foot mobility, + navicular drop B/L    Precautions: IBS, right 4th/5th toe fracture, hx of breast cancer (4 years)  Manual 7 1       B/L navicular sling tape FB                                                         Exercise Diary 7 1       B/L clam 2x10, 5"       B/L arch lift 2x10, 5"       B/L DF with toes curled 2x10, 5"       B/L PF 3x15, RTB       Recumbent bike        rockerboard        Stand hip abd         bridge                  Patient provided verbal consent to treatment plan and recommended interventions  Modalities                          Short Term:  1  Pt will report decreased levels of pain by at least 2 subjective ratings in 4 weeks  2  Pt will demonstrate improved strength by 1/2 grade in 4 weeks  3  Pt will be able to walk > 10 minutes without pain  in 4 weeks  Long Term:   1  Pt will be independent in their HEP in 8 weeks  2  Pt will be pain free with IADL's in 8 weeks  3  Pt will demonstrate improved FOTO, > 16 in 8 weeks    4  Pt will be able to walk > 20 minutes without pain in 8 weeks

## 2019-07-03 ENCOUNTER — OFFICE VISIT (OUTPATIENT)
Dept: PHYSICAL THERAPY | Age: 49
End: 2019-07-03
Payer: COMMERCIAL

## 2019-07-03 DIAGNOSIS — M72.2 PLANTAR FASCIITIS, BILATERAL: Primary | ICD-10-CM

## 2019-07-03 PROCEDURE — 97110 THERAPEUTIC EXERCISES: CPT | Performed by: PHYSICAL THERAPIST

## 2019-07-03 PROCEDURE — 97112 NEUROMUSCULAR REEDUCATION: CPT | Performed by: PHYSICAL THERAPIST

## 2019-07-03 PROCEDURE — 97140 MANUAL THERAPY 1/> REGIONS: CPT | Performed by: PHYSICAL THERAPIST

## 2019-07-03 NOTE — PROGRESS NOTES
Daily Note     Today's date: 7/3/2019  Patient name: Louis Cohen  : 1970  MRN: [id number]  Referring provider: Huey Montalvo PA-C  Dx:   Encounter Diagnosis     ICD-10-CM    1  Plantar fasciitis, bilateral M72 2        Start Time: 6136  Stop Time: 1100  Total time in clinic (min): 45 minutes    Subjective: Patient reports that she got new insoles that have been helping her  Felt great with taping intervention  She reports that her hips are really fatigued following working outside at home yesterday  Objective: See treatment diary below      Assessment: Tolerated treatment well  Patient continues to demonstrate decreased strength with plantarflexion  Plan: Continue per plan of care       Precautions: IBS, right 4th/5th toe fracture, hx of breast cancer (4 years)  Manual 7 1  7 3         B/L navicular sling tape FB  np          rhythmic stab   FB                                                                                  Exercise Diary 7 1  7 3         B/L clam 2x10, 5"  2x10, 5" HEP         B/L arch lift 2x10, 5"  2x10, 5"         B/L DF with toes curled 2x10, 5"  1x10, 5" HEP         B/L PF 3x15, RTB np         Recumbent bike    5'         rockerboard             Stand hip abd              bridge    2x10, 5"          seated calf raise   2x10, 5"

## 2019-07-08 ENCOUNTER — OFFICE VISIT (OUTPATIENT)
Dept: PHYSICAL THERAPY | Age: 49
End: 2019-07-08
Payer: COMMERCIAL

## 2019-07-08 DIAGNOSIS — M72.2 PLANTAR FASCIITIS, BILATERAL: Primary | ICD-10-CM

## 2019-07-08 PROCEDURE — 97110 THERAPEUTIC EXERCISES: CPT | Performed by: PHYSICAL THERAPIST

## 2019-07-08 PROCEDURE — 97112 NEUROMUSCULAR REEDUCATION: CPT | Performed by: PHYSICAL THERAPIST

## 2019-07-08 NOTE — PROGRESS NOTES
Daily Note     Today's date: 2019  Patient name: Wiley Riedel  : 1970  MRN: [id number]  Referring provider: Carroll Garcia PA-C  Dx:   Encounter Diagnosis     ICD-10-CM    1  Plantar fasciitis, bilateral M72 2        Start Time: 1100  Stop Time: 1145  Total time in clinic (min): 45 minutes    Subjective: Patient reports that her feet are hurting slightly after wearing terrible shoes  Reports big improvement overall  Objective: See treatment diary below      Assessment: Patient reported fatigue with treatment today particularly with DF strengthening  Hip fatigue reported as well with simple exercise progression  Plan: Continue per plan of care  Precautions: IBS, right 4th/5th toe fracture, hx of breast cancer (4 years)  Manual 7 1  7 3  7 8       B/L navicular sling tape FB  np          rhythmic stab   FB                                                                                  Exercise Diary 7 1  7 3  7 8       B/L clam 2x10, 5"  2x10, 5" HEP  1x10, 5" ea         B/L arch lift 2x10, 5"  2x10, 5"  HEP       B/L DF with toes curled 2x10, 5"  1x10, 5" HEP         Recumbent bike    5' np       rockerboard     Fwd/back 2x15       Stand hip abd      3x10 YTB ea        bridge    2x10, 5"  2x10, 5"        seated calf raise   2x10, 5"  np       PF knee bent/straight   3x15 GTB ea  DF with Toe Curl    3x10 GTB ea       Leg press   3x10, 45#

## 2019-07-11 ENCOUNTER — OFFICE VISIT (OUTPATIENT)
Dept: PHYSICAL THERAPY | Age: 49
End: 2019-07-11
Payer: COMMERCIAL

## 2019-07-11 DIAGNOSIS — M72.2 PLANTAR FASCIITIS, BILATERAL: Primary | ICD-10-CM

## 2019-07-11 PROCEDURE — 97110 THERAPEUTIC EXERCISES: CPT

## 2019-07-11 PROCEDURE — 97112 NEUROMUSCULAR REEDUCATION: CPT

## 2019-07-11 NOTE — PROGRESS NOTES
Daily Note     Today's date: 2019  Patient name: Sita Noel  : 1970  MRN: [id number]  Referring provider: Rogelio Donahue PA-C  Dx:   Encounter Diagnosis     ICD-10-CM    1  Plantar fasciitis, bilateral M72 2                   Subjective:  Pt reports she bought new inserts for shoes and has had relief since wearing them all the time now(no weaning schedule at this time)      Objective: See treatment diary below      Assessment:  Ex progressions linda well, no increased sx noted, pt able to perform standing ex with good control      Plan: Continue per plan of care  Progress treatment as tolerated             Precautions: IBS, right 4th/5th toe fracture, hx of breast cancer (4 years)  Manual 7 1  7 3  7 8      B/L navicular sling tape FB  np          rhythmic stab   FB                                                                                  Exercise Diary 7 1  7 3  7 8  19     B/L clam 2x10, 5"  2x10, 5" HEP  1x10, 5" ea    2x10x3"     B/L arch lift 2x10, 5"  2x10, 5"  HEP       B/L DF with toes curled 2x10, 5"  1x10, 5" HEP         Recumbent bike    5' np       rockerboard     Fwd/back 2x15  pf/df 3x10     Stand hip abd      3x10 YTB ea   ytb 3x10     bridge    2x10, 5"  2x10, 5"  3x10x5"      seated calf raise   2x10, 5"  np  np     PF knee bent/straight   3x15 GTB ea  gtb 3x10 ea    DF with Toe Curl    3x10 GTB ea  gtb 3x10    Leg press   3x10, 45# 45# 3x10    Standing slight HR    3x10

## 2019-07-16 ENCOUNTER — OFFICE VISIT (OUTPATIENT)
Dept: PHYSICAL THERAPY | Age: 49
End: 2019-07-16
Payer: COMMERCIAL

## 2019-07-16 DIAGNOSIS — M72.2 PLANTAR FASCIITIS, BILATERAL: Primary | ICD-10-CM

## 2019-07-16 PROCEDURE — 97140 MANUAL THERAPY 1/> REGIONS: CPT

## 2019-07-16 PROCEDURE — 97112 NEUROMUSCULAR REEDUCATION: CPT

## 2019-07-16 PROCEDURE — 97110 THERAPEUTIC EXERCISES: CPT

## 2019-07-16 NOTE — PROGRESS NOTES
Daily Note     Today's date: 2019  Patient name: Kamala Glynn  : 1970  MRN: [id number]  Referring provider: Kaushal Amin PA-C  Dx:   Encounter Diagnosis     ICD-10-CM    1  Plantar fasciitis, bilateral M72 2                   Subjective:  Pt reports she would like to get back to walking, pt reports her sx are decreasing since beginning therapy      Objective: See treatment diary below      Assessment:  Ex and gait progressions linda well with no increased sx  Gait on TM was good no significant deficits        Plan: Continue per plan of care    cont to progress gait/ex if no increased sx next visit         Precautions: IBS, right 4th/5th toe fracture, hx of breast cancer (4 years)  Manual 7 1  7 3  7 8 19   B/L navicular sling tape FB  np        rhythmic stab   FB    VK                                                                  Exercise Diary 7 1  7 3  7 8  19   B/L clam 2x10, 5"  2x10, 5" HEP  1x10, 5" ea    2x10x3"  3x10x3"   B/L arch lift 2x10, 5"  2x10, 5"  HEP       B/L DF with toes curled 2x10, 5"  1x10, 5" HEP         Recumbent bike    5' np       rockerboard     Fwd/back 2x15  pf/df 3x10  2x15 pf/df   Stand hip abd      3x10 YTB ea   ytb 3x10 ytb 3x10   bridge    2x10, 5"  2x10, 5"  3x10x5"      seated calf raise   2x10, 5"  np  np     PF knee bent/straight   3x15 GTB ea  gtb 3x10 ea gtb 3x15   DF with Toe Curl    3x10 GTB ea  gtb 3x10 3x10   Leg press   3x10, 45# 45# 3x10 55# 2x15   Standing slight HR    3x10 3x10   TM     5'

## 2019-07-18 ENCOUNTER — TELEPHONE (OUTPATIENT)
Dept: FAMILY MEDICINE CLINIC | Facility: CLINIC | Age: 49
End: 2019-07-18

## 2019-07-18 ENCOUNTER — OFFICE VISIT (OUTPATIENT)
Dept: PHYSICAL THERAPY | Age: 49
End: 2019-07-18
Payer: COMMERCIAL

## 2019-07-18 DIAGNOSIS — M72.2 PLANTAR FASCIITIS, BILATERAL: Primary | ICD-10-CM

## 2019-07-18 PROCEDURE — 97112 NEUROMUSCULAR REEDUCATION: CPT

## 2019-07-18 PROCEDURE — 97110 THERAPEUTIC EXERCISES: CPT

## 2019-07-18 PROCEDURE — 97140 MANUAL THERAPY 1/> REGIONS: CPT

## 2019-07-18 NOTE — TELEPHONE ENCOUNTER
----- Message from Serenity Alex [place] sent at 7/18/2019  3:02 PM EDT -----  Regarding: FW: Referral Request  Contact: [phone]      ----- Message -----  From: Isamar Garcia  Sent: 7/18/2019   2:52 PM EDT  To: [facility]  Subject: Referral Request                                 Hi Dr Manish De La Garza - I'm having a bad flare up of psoriasis this summer; I called Dr Hernan Lui' office at the Clinic and they have to have a referral to even schedule me  Would you be able to put in a referral to the [address] [facility] clinic for Dermatology for me? Thanks!

## 2019-07-18 NOTE — PROGRESS NOTES
Daily Note     Today's date: 2019  Patient name: Lane Walton  : 1970  MRN: [id number]  Referring provider: Martha Falcon PA-C  Dx:   Encounter Diagnosis     ICD-10-CM    1  Plantar fasciitis, bilateral M72 2                   Subjective: pt reports stiff and sore pre treatment and felt looser and less sore after treatment    Objective: See treatment diary below      Assessment:  B ankle strength gradually progressing, but pt had difficulty with rhythmic stab but able to resist gently through ROM      Plan: Continue per plan of care  Progress treatment as tolerated             Precautions: IBS, right 4th/5th toe fracture, hx of breast cancer (4 years)  Manual 7 1  7 3  7 8 19   B/L navicular sling tape FB  np         rhythmic stab   FB    VK  VK (gentle MRE)                                                                       Exercise Diary 7 1  7 3  7 8  19   B/L clam 2x10, 5"  2x10, 5" HEP  1x10, 5" ea    2x10x3"  3x10x3" 3x10x5"   B/L arch lift 2x10, 5"  2x10, 5"  HEP        B/L DF with toes curled 2x10, 5"  1x10, 5" HEP          Recumbent bike    5' np        rockerboard     Fwd/back 2x15  pf/df 3x10  2x15 pf/df Pf/df 2x15   Stand hip abd      3x10 YTB ea   ytb 3x10 ytb 3x10 ytb 3x10   bridge    2x10, 5"  2x10, 5"  3x10x5"       seated calf raise   2x10, 5"  np  np      PF knee bent/straight   3x15 GTB ea  gtb 3x10 ea gtb 3x15 gtb 3x15 ea   DF with Toe Curl    3x10 GTB ea  gtb 3x10 3x10 gtb 3x10   Leg press   3x10, 45# 45# 3x10 55# 2x15 55# 3x10   Standing slight HR    3x10 3x10 3x10   TM     5' 5'

## 2019-07-19 DIAGNOSIS — L40.9 PSORIASIS: Primary | ICD-10-CM

## 2019-07-22 ENCOUNTER — OFFICE VISIT (OUTPATIENT)
Dept: PHYSICAL THERAPY | Age: 49
End: 2019-07-22
Payer: COMMERCIAL

## 2019-07-22 DIAGNOSIS — M72.2 PLANTAR FASCIITIS, BILATERAL: Primary | ICD-10-CM

## 2019-07-22 PROCEDURE — 97110 THERAPEUTIC EXERCISES: CPT | Performed by: PHYSICAL THERAPIST

## 2019-07-22 PROCEDURE — 97140 MANUAL THERAPY 1/> REGIONS: CPT | Performed by: PHYSICAL THERAPIST

## 2019-07-22 PROCEDURE — 97112 NEUROMUSCULAR REEDUCATION: CPT | Performed by: PHYSICAL THERAPIST

## 2019-07-22 NOTE — PROGRESS NOTES
Daily Note     Today's date: 2019  Patient name: Derrell Burgess  : 1970  MRN: [id number]  Referring provider: Alfredo Malone PA-C  Dx:   Encounter Diagnosis     ICD-10-CM    1  Plantar fasciitis, bilateral M72 2      Start Time: 930  Stop Time: 1020  Total time in clinic (min): 50 minutes    Subjective: patient reports that her feet have been feeling better overall and less ache with walking for durations  Objective: See treatment diary below      Assessment:  Good tolerance to progression in therapeutic intervention  Improved foot intrinsic control noted with standing exercises  Plan: Continue per plan of care  Progress treatment as tolerated          Precautions: IBS, right 4th/5th toe fracture, hx of breast cancer (4 years)  Manual 7 1  7 3  7 8 19   B/L navicular sling tape FB  np          rhythmic stab   FB    VK  VK (gentle MRE) FB    s/L hip abd           2x8, 3"                                                              Exercise Diary 7 1  7 3  7 8  19   B/L clam, B/L arch lift, B/F DF with toes curled with GTB, PF 2 way GTB 2x10, 5"  2x10, 5" HEP  1x10, 5" ea    2x10x3"  3x10x3" 3x10x5" HEP   rockerboard     Fwd/back 2x15  pf/df 3x10  2x15 pf/df Pf/df 2x15 PF/DF 2x15   Stand hip abd      3x10 YTB ea   ytb 3x10 ytb 3x10 ytb 3x10 2x10 YTB   bridge    2x10, 5"  2x10, 5"  3x10x5"    np   Leg press   3x10, 45# 45# 3x10 55# 2x15 55# 3x10 55#, 3x10   Standing slight HR    3x10 3x10 3x10 3x10, 3"    TM     5' 5' 10'   Sneaky lunge       2x10, 3" ea

## 2019-07-24 DIAGNOSIS — Z17.0 MALIGNANT NEOPLASM OF BREAST IN FEMALE, ESTROGEN RECEPTOR POSITIVE, UNSPECIFIED LATERALITY, UNSPECIFIED SITE OF BREAST (HCC): ICD-10-CM

## 2019-07-24 DIAGNOSIS — C50.919 MALIGNANT NEOPLASM OF BREAST IN FEMALE, ESTROGEN RECEPTOR POSITIVE, UNSPECIFIED LATERALITY, UNSPECIFIED SITE OF BREAST (HCC): ICD-10-CM

## 2019-07-24 RX ORDER — TAMOXIFEN CITRATE 20 MG/1
TABLET ORAL
Qty: 30 TABLET | Refills: 7 | Status: SHIPPED | OUTPATIENT
Start: 2019-07-24 | End: 2020-03-19

## 2019-07-25 ENCOUNTER — OFFICE VISIT (OUTPATIENT)
Dept: PHYSICAL THERAPY | Age: 49
End: 2019-07-25
Payer: COMMERCIAL

## 2019-07-25 DIAGNOSIS — M72.2 PLANTAR FASCIITIS, BILATERAL: Primary | ICD-10-CM

## 2019-07-25 DIAGNOSIS — L40.9 PSORIASIS: Primary | ICD-10-CM

## 2019-07-25 PROCEDURE — 97140 MANUAL THERAPY 1/> REGIONS: CPT

## 2019-07-25 PROCEDURE — 97110 THERAPEUTIC EXERCISES: CPT

## 2019-07-25 PROCEDURE — 97112 NEUROMUSCULAR REEDUCATION: CPT

## 2019-07-25 RX ORDER — CLOBETASOL PROPIONATE 0.5 MG/G
OINTMENT TOPICAL
Qty: 30 G | Refills: 0 | Status: SHIPPED | OUTPATIENT
Start: 2019-07-25 | End: 2020-02-07 | Stop reason: ALTCHOICE

## 2019-07-25 NOTE — PROGRESS NOTES
Daily Note     Today's date: 2019  Patient name: Louis Cohen  : 1970  MRN: [id number]  Referring provider: Huey Montalvo PA-C  Dx:   Encounter Diagnosis     ICD-10-CM    1  Plantar fasciitis, bilateral M72 2                   Subjective:   Pt reports she's had  Pain relief B feet with fx activities since beginning therapy, pt reports HEP compliance      Objective: See treatment diary below      Assessment: pt doing well with present program, progressing with strengthening ex as linda, improving neuromuscular control during heel raises    Plan: Continue per plan of care  Progress treatment as tolerated             Precautions: IBS, right 4th/5th toe fracture, hx of breast cancer (4 years)  Manual 7 1  7 3  7 8 19   B/L navicular sling tape FB  np           rhythmic stab   FB    VK  VK (gentle MRE) FB VK    s/L hip abd           2x8, 3"                                                                   Exercise Diary 7 1  7 3  7 8  19   B/L clam, B/L arch lift, B/F DF with toes curled with GTB, PF 2 way GTB 2x10, 5"  2x10, 5" HEP  1x10, 5" ea    2x10x3"  3x10x3" 3x10x5" HEP    rockerboard     Fwd/back 2x15  pf/df 3x10  2x15 pf/df Pf/df 2x15 PF/DF 2x15 PF/DF 2x15   Stand hip abd      3x10 YTB ea   ytb 3x10 ytb 3x10 ytb 3x10 2x10 YTB ytb 3x10   bridge    2x10, 5"  2x10, 5"  3x10x5"    np    Leg press   3x10, 45# 45# 3x10 55# 2x15 55# 3x10 55#, 3x10 55# 3x10   Standing slight HR    3x10 3x10 3x10 3x10, 3"  3x10x3"   TM     5' 5' 10' 10'   Sneaky lunge       2x10, 3" ea 2x12x3" ea

## 2019-07-30 ENCOUNTER — OFFICE VISIT (OUTPATIENT)
Dept: PHYSICAL THERAPY | Age: 49
End: 2019-07-30
Payer: COMMERCIAL

## 2019-07-30 DIAGNOSIS — M72.2 PLANTAR FASCIITIS, BILATERAL: Primary | ICD-10-CM

## 2019-07-30 PROCEDURE — 97110 THERAPEUTIC EXERCISES: CPT

## 2019-07-30 PROCEDURE — 97112 NEUROMUSCULAR REEDUCATION: CPT

## 2019-07-30 NOTE — PROGRESS NOTES
Daily Note     Today's date: 2019  Patient name: Vernon Campuzano  : 1970  MRN: [id number]  Referring provider: Horace Yuan PA-C  Dx:   Encounter Diagnosis     ICD-10-CM    1  Plantar fasciitis, bilateral M72 2                   Subjective:  Pt reports about 40% improvement since beginning therapy, "I think I still need to work on my strength and decrease my  pain more " pt reports point tender spot L  gastroc medial origin which resolved with rest, a little bothersome today after HR    Objective: See treatment diary below  No increased pain with passive DF, slight discomfort with deep palpation at L gastroc medial origin, no increased warmth pain compared to R leg      Assessment:  Ex progressions of balance/propriocption challenging but linda well with min fatigue      Plan: Continue per plan of care  Progress treatment as tolerated  Pt to contact Dr if sx change/worsen         Precautions: IBS, right 4th/5th toe fracture, hx of breast cancer (4 years)  Manual 7 1  7 3  7 8 19   B/L navicular sling tape FB  np           rhythmic stab   FB    VK  VK (gentle MRE) FB VK    s/L hip abd           2x8, 3"                                                                   Exercise Diary  7 8  19   B/L clam, B/L arch lift, B/F DF with toes curled with GTB, PF 2 way GTB  1x10, 5" ea    2x10x3"  3x10x3" 3x10x5" HEP     rockerboard Fwd/back 2x15  pf/df 3x10  2x15 pf/df Pf/df 2x15 PF/DF 2x15 PF/DF 2x15 PF/DF 2x15   Stand hip abd   3x10 YTB ea   ytb 3x10 ytb 3x10 ytb 3x10 2x10 YTB ytb 3x10 ytb 2x15    bridge  2x10, 5"  3x10x5"    np     Leg press 3x10, 45# 45# 3x10 55# 2x15 55# 3x10 55#, 3x10 55# 3x10 65# BL 2x10; 25# SLS 10 ea   Standing slight HR  3x10 3x10 3x10 3x10, 3"  3x10x3"    TM   5' 5' 10' 10' 10'   Sneaky lunge     2x10, 3" ea 2x12x3" ea 2x12x3"   SLS                 B/l 4x20" vijaya   BAPS cw/ccw       B/l 20x ea (PWB)   Surya toss rebounder             Chest throw standing on foam 2x20      kickers       nv

## 2019-08-01 ENCOUNTER — OFFICE VISIT (OUTPATIENT)
Dept: PHYSICAL THERAPY | Age: 49
End: 2019-08-01
Payer: COMMERCIAL

## 2019-08-01 DIAGNOSIS — M72.2 PLANTAR FASCIITIS, BILATERAL: Primary | ICD-10-CM

## 2019-08-01 PROCEDURE — 97112 NEUROMUSCULAR REEDUCATION: CPT | Performed by: PHYSICAL THERAPIST

## 2019-08-01 PROCEDURE — 97110 THERAPEUTIC EXERCISES: CPT | Performed by: PHYSICAL THERAPIST

## 2019-08-01 NOTE — PROGRESS NOTES
Daily Note     Today's date: 2019  Patient name: Marilee Morton  : 1970  MRN: [id number]  Referring provider: Loly Renee PA-C  Dx:   Encounter Diagnosis     ICD-10-CM    1  Plantar fasciitis, bilateral M72 2        Start Time: 930  Stop Time:   Total time in clinic (min): 45 minutes    Subjective:  Pt reports that she is doing better but notices that she has good and bad days with her foot  She states that in the morning she may have an increase in pain but will get better as she moves it more  She is noticing pain symptoms into her left knee that requires resting if she is doing too much  Objective: See treatment diary below  No increased pain with passive DF, slight discomfort with deep palpation at L gastroc medial origin, no increased warmth pain compared to R leg      Assessment:  Pt tolerated additional balance/strengthening intervention well  Moderate fatigue at the end of treatment session particularly with gluteal strengthening  Plan: Continue progressing balance/strengthening intervention within tolerance            Precautions: IBS, right 4th/5th toe fracture, hx of breast cancer (4 years)  Manual  7 8 19 8   B/L navicular sling tape            rhythmic stab    VK  VK (gentle MRE) FB VK     s/L hip abd       2x8, 3"                                                        Exercise Diary  19 8   B/L clam, B/L arch lift, B/F DF with toes curled with GTB, PF 2 way GTB  3x10x3" 3x10x5" HEP      rockerboard  2x15 pf/df Pf/df 2x15 PF/DF 2x15 PF/DF 2x15 PF/DF 2x15 np   Stand hip abd  ytb 3x10 ytb 3x10 2x10 YTB ytb 3x10 ytb 2x15  RTB 2x15    bridge    np      Leg press 55# 2x15 55# 3x10 55#, 3x10 55# 3x10 65# BL 2x10; 25# SLS 10 ea 65# BL 2x10; 25# SLS 10 ea   Standing slight HR 3x10 3x10 3x10, 3"  3x10x3"     TM 5' 5' 10' 10' 10' 10'   Sneaky lunge   2x10, 3" ea 2x12x3" ea 2x12x3" 2x12x3"   SLS               B/l 4x20" ea B/l 4x20" ea EC   BAPS cw/ccw     B/l 20x ea (PWB) B/l 20x ea (PWB)   Ball toss rebounder           Chest throw standing on foam 2x20    Chest throw standing on foam 2x20 (RB)   kickers     nv nv   Lat heel taps      2x10  RLE

## 2019-08-06 ENCOUNTER — OFFICE VISIT (OUTPATIENT)
Dept: PHYSICAL THERAPY | Age: 49
End: 2019-08-06
Payer: COMMERCIAL

## 2019-08-06 DIAGNOSIS — M72.2 PLANTAR FASCIITIS, BILATERAL: Primary | ICD-10-CM

## 2019-08-06 PROCEDURE — 97110 THERAPEUTIC EXERCISES: CPT | Performed by: PHYSICAL THERAPIST

## 2019-08-06 PROCEDURE — 97112 NEUROMUSCULAR REEDUCATION: CPT | Performed by: PHYSICAL THERAPIST

## 2019-08-06 PROCEDURE — 97530 THERAPEUTIC ACTIVITIES: CPT | Performed by: PHYSICAL THERAPIST

## 2019-08-06 NOTE — PROGRESS NOTES
Daily Note     Today's date: 2019  Patient name: Maria Del Carmen Pitts  : 1970  MRN: [id number]  Referring provider: Maria Dolores Whalen PA-C  Dx:   Encounter Diagnosis     ICD-10-CM    1  Plantar fasciitis, bilateral M72 2        Start Time: 930  Stop Time: 1020  Total time in clinic (min): 50 minutes    Subjective: Pt states that she is feeling much better over the past few weeks due to being able to pace herself  She states that over the weekend she was able to do more due to not overdoing it while she is performing ADLs  Objective: See treatment diary below      Assessment: Pt tolerated treatment well with continued progression of bilateral proximal hip strengthening  Will continue to focus on strength intervention as range of motion has significantly improved  Patient educated to continue progressing independent walking program       Plan: Continue per plan of care       Precautions: IBS, right 4th/5th toe fracture, hx of breast cancer (4 years)  Manual  7 8 19 8   B/L navicular sling tape                rhythmic stab    VK  VK (gentle MRE) FB VK      s/L hip abd        2x8, 3"                                                                          Exercise Diary 19 8 8/6   B/L clam, B/L arch lift, B/F DF with toes curled with GTB, PF 2 way GTB 3x10x5" HEP          rockerboard Pf/df 2x15 PF/DF 2x15 PF/DF 2x15 PF/DF 2x15 np D/C   Stand hip abd  ytb 3x10 2x10 YTB ytb 3x10 ytb 2x15  RTB 2x15  HEP   Leg press 55# 3x10 55#, 3x10 55# 3x10 65# BL 2x10; 25# SLS 10 ea 65# BL 2x10; 25# SLS 10 ea 65# BL 2x10; 25# SLS 10 ea   Standing slight HR 3x10 3x10, 3"  3x10x3"     nv   TM 5' 10' 10' 10' 10' 10'   Sneaky lunge   2x10, 3" ea 2x12x3" ea 2x12x3" 2x12x3" 2x10, 3"   SLS                   B/l 4x20" ea B/l 4x20" ea EC B/l 4x20" ea EC   BAPS cw/ccw       B/l 20x ea (PWB) B/l 20x ea (PWB) np   Ball toss rebounder               Chest throw standing on foam 2x20 Chest throw standing on foam 2x20 (RB) Chest throwing on foam 2x20 RB   kickers       nv nv 2x10, each   Lat heel taps         2x10  RLE 2x10   B/l 4x20" ea EC      2x10, 4" ea     bosu squats      2x10   Modified SLDL      2x10, ea

## 2019-08-08 ENCOUNTER — OFFICE VISIT (OUTPATIENT)
Dept: PHYSICAL THERAPY | Age: 49
End: 2019-08-08
Payer: COMMERCIAL

## 2019-08-08 DIAGNOSIS — M72.2 PLANTAR FASCIITIS, BILATERAL: Primary | ICD-10-CM

## 2019-08-08 PROCEDURE — 97530 THERAPEUTIC ACTIVITIES: CPT

## 2019-08-08 PROCEDURE — 97110 THERAPEUTIC EXERCISES: CPT

## 2019-08-08 PROCEDURE — 97112 NEUROMUSCULAR REEDUCATION: CPT

## 2019-08-08 NOTE — PROGRESS NOTES
Daily Note     Today's date: 2019  Patient name: Louis Cohen  : 1970  MRN: [id number]  Referring provider: Huey Montalvo PA-C  Dx:   Encounter Diagnosis     ICD-10-CM    1  Plantar fasciitis, bilateral M72 2                   Subjective:  Pt reports feeling good, sore back from mowing lawn,but feet  ankles doing ok    Objective: See treatment diary below      Assessment:  fx improvements noted, HEP compliance noted, reviewed HEP for pt while she's out of town, gradually improving neuromuscular control noted with balance ex      Plan: Continue per plan of care  Progress treatment as tolerated             Precautions: IBS, right 4th/5th toe fracture, hx of breast cancer (4 years)  Manual  19   B/L navicular sling tape                rhythmic stab    VK  VK (gentle MRE) FB VK      s/L hip abd        2x8, 3"                                                                          Exercise Diary 19   B/L clam, B/L arch lift, B/F DF with toes curled with GTB, PF 2 way GTB 3x10x5" HEP           rockerboard Pf/df 2x15 PF/DF 2x15 PF/DF 2x15 PF/DF 2x15 np D/C    Stand hip abd  ytb 3x10 2x10 YTB ytb 3x10 ytb 2x15  RTB 2x15  HEP    Leg press 55# 3x10 55#, 3x10 55# 3x10 65# BL 2x10; 25# SLS 10 ea 65# BL 2x10; 25# SLS 10 ea 65# BL 2x10; 25# SLS 10 ea SLS 25# 2x10 ea   Standing slight HR 3x10 3x10, 3"  3x10x3"     nv    TM 5' 10' 10' 10' 10' 10' 10'   Sneaky lunge   2x10, 3" ea 2x12x3" ea 2x12x3" 2x12x3" 2x10, 3" 2x10x3"   SLS                   B/l 4x20" ea B/l 4x20" ea EC B/l 4x20" ea EC 3x20" b/l EC   BAPS cw/ccw       B/l 20x ea (PWB) B/l 20x ea (PWB) np    Ball toss rebounder               Chest throw standing on foam 2x20    Chest throw standing on foam 2x20 (RB) Chest throwing on foam 2x20 RB 2x20 red ball chest throw on foam   kickers       nv nv 2x10, each 2x10 ea   Lat heel taps         2x10  RLE 2x10 2x10   B/l 4x20" ea EC 2x10, 4" ea  above   bosu squats      2x10 2x10   Modified SLDL      2x10, ea   2x10 ea   Sidestepping with TB       ytb 4x15'

## 2019-08-22 ENCOUNTER — OFFICE VISIT (OUTPATIENT)
Dept: SURGICAL ONCOLOGY | Facility: CLINIC | Age: 49
End: 2019-08-22
Payer: COMMERCIAL

## 2019-08-22 ENCOUNTER — APPOINTMENT (OUTPATIENT)
Dept: PHYSICAL THERAPY | Age: 49
End: 2019-08-22
Payer: COMMERCIAL

## 2019-08-22 VITALS
SYSTOLIC BLOOD PRESSURE: 120 MMHG | TEMPERATURE: 99.1 F | BODY MASS INDEX: 28.42 KG/M2 | DIASTOLIC BLOOD PRESSURE: 80 MMHG | WEIGHT: 187.5 LBS | HEIGHT: 68 IN | RESPIRATION RATE: 16 BRPM | HEART RATE: 90 BPM

## 2019-08-22 DIAGNOSIS — Z79.810 USE OF TAMOXIFEN (NOLVADEX): ICD-10-CM

## 2019-08-22 DIAGNOSIS — Z17.0 MALIGNANT NEOPLASM OF UPPER-INNER QUADRANT OF RIGHT BREAST IN FEMALE, ESTROGEN RECEPTOR POSITIVE (HCC): Primary | ICD-10-CM

## 2019-08-22 DIAGNOSIS — C50.211 MALIGNANT NEOPLASM OF UPPER-INNER QUADRANT OF RIGHT BREAST IN FEMALE, ESTROGEN RECEPTOR POSITIVE (HCC): Primary | ICD-10-CM

## 2019-08-22 PROCEDURE — 99214 OFFICE O/P EST MOD 30 MIN: CPT | Performed by: SURGERY

## 2019-08-22 NOTE — PROGRESS NOTES
Surgical Oncology Follow Up       [address] [facility] [place]  [address] [facility] [address]  [place] [address]    Vinclupillo Hem  1970  [id number]  [address] [facility] [place]  [address] [place] [address]    Chief Complaint   Patient presents with    Follow-up       Assessment/Plan   Diagnoses and all orders for this visit:    Malignant neoplasm of upper-inner quadrant of right breast in female, estrogen receptor positive ([address] )  -     MRI breast bilateral w and wo contrast w cad; Future    Use of tamoxifen (Nolvadex)    Other orders  -     NON FORMULARY        Advance Care Planning/Advance Directives:  Discussed disease status, cancer treatment plans and/or cancer treatment goals with the patient  Oncology History:       Malignant neoplasm of upper-inner quadrant of right breast in female, estrogen receptor positive ([address] )    5/14/2015 Surgery     Right US guided breast biopsy      6/30/2015 Surgery     Bilateral; mastectomies, right SLNB  Bilateral reconstruction with expanders  Dr Jonatan Rees      8/12/2015 -  Hormone Therapy     Tamoxifen  Dr Dave Galvan  10/23/2015 Surgery     Expanders removed, implants placed  11/6/2017 Initial Diagnosis     Malignant neoplasm of upper-inner quadrant of right breast in female, estrogen receptor positive (HCC)       Genetic Testing     BRCA negative       Genomic Testing     Oncotype DX  14         History of Present Illness:  Breast cancer follow-up  -Interval History:  None    Review of Systems:  Review of Systems   Constitutional: Negative  Negative for appetite change and fever  Eyes: Negative  Respiratory: Negative for shortness of breath  Cardiovascular: Negative  Gastrointestinal: Negative  Endocrine: Negative  Genitourinary: Negative  Musculoskeletal: Negative  Negative for arthralgias and myalgias  Skin: Negative      Allergic/Immunologic: Negative  Neurological: Negative  Hematological: Negative  Negative for adenopathy  Does not bruise/bleed easily     Psychiatric/Behavioral:        Anxiety       Patient Active Problem List   Diagnosis    Anxiety disorder due to medical condition    Insomnia    Irritable bowel syndrome (IBS)    Malignant neoplasm of upper-inner quadrant of right breast in female, estrogen receptor positive (HCC)    Psoriasis    Seasonal allergies    Use of tamoxifen (Nolvadex)    Seborrheic keratosis    Screening for skin condition    Bilateral plantar fasciitis     Past Medical History:   Diagnosis Date    BRCA negative     Endometriosis     IBS (irritable bowel syndrome)     Insomnia     Night sweats     Psoriasis     Wears glasses      Past Surgical History:   Procedure Laterality Date    BREAST BIOPSY Right     IDC     SECTION      LAPAROSCOPY      exploratory, endometriosis    LYMPHADENECTOMY Right     x2    MASTECTOMY      bilateral mastectomy    IL NIPPLE/AREOLA RECONSTRUCTION Bilateral 10/13/2016    Procedure: NIPPLE RECONSTRUCTION ;  Surgeon: Lilo Ojeda MD;  Location: [facility];  Service: Plastics    IL REVISE BREAST RECONSTRUCTION Bilateral 3/9/2016    Procedure: RECONSTRUCTION REVISION  BREAST MOUND ,FAT GRAFTS ;  Surgeon: Lilo Ojeda MD;  Location: [facility];  Service: Plastics    SENTINEL LYMPH NODE BIOPSY Right      Family History   Problem Relation Age of Onset    Lung cancer Paternal Grandfather 48    Breast cancer Other     Thyroid cancer Mother 36    Psoriasis Father     Heart disease Other     Prostate cancer Other      Social History     Socioeconomic History    Marital status: /Civil Union     Spouse name: Not on file    Number of children: 1    Years of education: Not on file    Highest education level: Not on file   Occupational History    Occupation: employed   Social Needs    Financial resource strain: Not on file    Food insecurity: Worry: Not on file     Inability: Not on file    Transportation needs:     Medical: Not on file     Non-medical: Not on file   Tobacco Use    Smoking status: Never Smoker    Smokeless tobacco: Never Used   Substance and Sexual Activity    Alcohol use: Yes     Comment: social    Drug use: No    Sexual activity: Yes     Partners: Male   Lifestyle    Physical activity:     Days per week: Not on file     Minutes per session: Not on file    Stress: Not on file   Relationships    Social connections:     Talks on phone: Not on file     Gets together: Not on file     Attends Confucianist service: Not on file     Active member of club or organization: Not on file     Attends meetings of clubs or organizations: Not on file     Relationship status: Not on file    Intimate partner violence:     Fear of current or ex partner: Not on file     Emotionally abused: Not on file     Physically abused: Not on file     Forced sexual activity: Not on file   Other Topics Concern    Not on file   Social History Narrative    Caffeine use- coffee       Current Outpatient Medications:     Ashwagandha 125 MG CAPS, Take by mouth, Disp: , Rfl:     Calcium 500 MG tablet, Take by mouth, Disp: , Rfl:     Cholecalciferol (VITAMIN D PO), Take 5,000 mg by mouth daily  , Disp: , Rfl:     ciclopirox (PENLAC) 8 % solution, Paint daily; remove day#7 and use x6 weeks, Disp: 6 6 mL, Rfl: 1    clobetasol (TEMOVATE) 0 05 % ointment, Apply sparingly to affected areas twice a day, Disp: 30 g, Rfl: 0    Digestive Enzymes (DIGESTIVE ENZYME PO), Take 1 tablet by mouth daily as needed  , Disp: , Rfl:     hydrocortisone 2 5 % cream, Apply topically, Disp: , Rfl:     Krill Oil 300 MG CAPS, Take by mouth, Disp: , Rfl:     Magnesium 400 MG CAPS, Take 1 tablet by mouth daily  , Disp: , Rfl:     MILK THISTLE PO, Take 600 mg by mouth daily  , Disp: , Rfl:     Multiple Vitamins-Minerals (ONE DAILY MULTIVITAMIN ADULT) TABS, Take by mouth, Disp: , Rfl:    NON FORMULARY, , Disp: , Rfl:     Probiotic Product (PROBIOTIC DAILY PO), Take 2 tablets by mouth daily  , Disp: , Rfl:     Spirulina 500 MG TABS, by Does not apply route, Disp: , Rfl:     tamoxifen (NOLVADEX) 20 mg tablet, TAKE 1 TABLET BY MOUTH EVERY DAY, Disp: 30 tablet, Rfl: 7    UNABLE TO FIND, Take 320 mg by mouth daily  Tumeric, Disp: , Rfl:     aspirin 325 mg tablet, Take 1 tablet (325 mg total) by mouth daily (Patient not taking: Reported on 8/22/2019), Disp: 30 tablet, Rfl: 0    betamethasone, augmented, (DIPROLENE) 0 05 % lotion, Apply topically, Disp: , Rfl:     Calcium Carbonate-Vit D-Min (CALCIUM 1200 PO), Take 1 tablet by mouth daily  , Disp: , Rfl:   Allergies   Allergen Reactions    Other Hives     Kait darien Becker Plus Cold & Cough  [[id number]] Hives       The following portions of the patient's history were reviewed and updated as appropriate: allergies, current medications, past family history, past medical history, past social history, past surgical history and problem list         Vitals:    08/22/19 1111   BP: 120/80   Pulse: 90   Resp: 16   Temp: 99 1 °F (37 3 °C)       Physical Exam   Constitutional: She is oriented to person, place, and time  She appears well-developed and well-nourished  HENT:   Head: Normocephalic and atraumatic  Cardiovascular: Normal heart sounds  Pulmonary/Chest: Breath sounds normal  Right breast exhibits skin change (Mastectomy scar and reconstruction)  Right breast exhibits no mass and no tenderness  Left breast exhibits skin change ( mastectomy scar and reconstruction)  Left breast exhibits no mass and no tenderness  Abdominal: Soft  Lymphadenopathy:        Right axillary: No pectoral and no lateral adenopathy present  Left axillary: No pectoral and no lateral adenopathy present  Right: No supraclavicular adenopathy present  Left: No supraclavicular adenopathy present     Neurological: She is alert and oriented to person, place, and time  Psychiatric: She has a normal mood and affect  Results:  Labs:      Imaging  None        Discussion/Summary:  71-year-old female status post bilateral mastectomy and reconstruction for a stage IIA invasive ductal carcinoma of the right breast   She continues on tamoxifen and reports no concerns  There is no evidence of disease based on examination today  She has not had an MRI since pre surgery  I will therefore make these arrangements for her  I will call her with the results  Provided there are no concerns, I will see her again in six months or sooner should the need arise

## 2019-08-27 ENCOUNTER — OFFICE VISIT (OUTPATIENT)
Dept: PHYSICAL THERAPY | Age: 49
End: 2019-08-27
Payer: COMMERCIAL

## 2019-08-27 DIAGNOSIS — M72.2 PLANTAR FASCIITIS, BILATERAL: Primary | ICD-10-CM

## 2019-08-27 PROCEDURE — 97112 NEUROMUSCULAR REEDUCATION: CPT

## 2019-08-27 PROCEDURE — 97110 THERAPEUTIC EXERCISES: CPT

## 2019-08-27 PROCEDURE — 97530 THERAPEUTIC ACTIVITIES: CPT

## 2019-08-27 NOTE — PROGRESS NOTES
Re-evaluation/ Plan of Care    Today's date: 2019  Patient name: Louis Cohen  : 1970  MRN: [id number]  Referring provider: Huey Montalvo PA-C  Dx:   Encounter Diagnosis     ICD-10-CM    1  Plantar fasciitis, bilateral M72 2                 Assessment:  Patient has made great progress at this time  She reports 85% overall improvement in foot symptoms and is very pleased with her progress  Occasional cueing needed for correct exercise performance and patient will benefit from 1-2 addition weeks of therapy to improve function and progress to HEP  Plan: 1-2 weeks, 2 visits per week  Short Term:  1  Pt will report decreased levels of pain by at least 2 subjective ratings in 4 weeks  met  2  Pt will demonstrate improved strength by 1/2 grade in 4 weeks  3  Pt will be able to walk > 10 minutes without pain  in 4 weeks  met  Long Term:   1  Pt will be independent in their HEP in 8 weeks  2  Pt will be pain free with IADL's in 8 weeks  3  Pt will demonstrate improved FOTO, > 16 in 8 weeks  4  Pt will be able to walk > 20 minutes without pain in 8 weeks  met     Subjective:  Patient reports that she was away on vacation for 2 weeks and irritated her back when sitting for a period of time without support  She reports that she was able to walk about [id number] when at an amusement park and only had slight ache once requiring her to sit down  Once she stood up she had no pain nor pain the next few days  She reports only being able to perform HEP 3 times total before irritating her back  She reports being able to walk about 40 minutes comfortably and has returned to riding a bicycle without symptoms       Objective: See treatment diary below    Pain  Current pain ratin  At best pain ratin  At worst pain rating: 3  Quality: burning and dull ache  Progression: improved     Treatments  No previous or current treatments  Patient Goals  Patient goals for therapy: decreased pain, improved balance, increased motion, increased strength, independence with ADLs/IADLs and return to sport/leisure activities  Patient goal: walk for exercise/health  85% met      Objective    GAIT: bilateral pes planus with excessive great toe extension with ambulation  Squat assess: WNL      SLS: WNL LLE: WNL               MMT     Hip       L       R   Flex  5 5   Extn  5 5   Abd  4 4   IR  4 4   ER  4 4           G  Max 4- 4-   G  Med  4- 4-   Iliop  4+ 4+                  MMT     Knee         L        R   Flex  5 5   Extn  5 5                           MMT           AROM     Ankle       L        R         L        R   PF 4+ 16 reps 5 (20) WNL WNL   DF  4+ 4+ WNL WNL   DF bent knee     WNL WNL   EHL 5 5       Inv  4+ 4+       Ever  4+ 4+       Soleus 5 5       Gastro  4 4       Post Tib   4+ 4+       Foot Int  4- 4-          Unable to curl toes  Palpation: TTP medial calcaneal tuberosity B/L  Slump test: L= -    R=  -     Straight leg raise:   L= -   R= -    Ankle:   anterior draw =  -     talar tilt=  -     Posterior draw=  -   inversion stress=  -   eversion stress=  -     joint findings= excessive ankle/foot mobility, + navicular drop B/L    Precautions: IBS, right 4th/5th toe fracture, hx of breast cancer (4 years)  Manual 7/16/19 7/18/19 7/22 7/25/19 8/1   B/L navicular sling tape              rhythmic stab  VK  VK (gentle MRE) FB VK      s/L hip abd      2x8, 3"                                      Exercise Diary 7/25/19 7/30/19 8/1 8/6 8/8/19 8/27   B/L clam, B/L arch lift, B/F DF with toes curled with GTB, PF 2 way GTB            Leg press 55# 3x10 65# BL 2x10; 25# SLS 10 ea 65# BL 2x10; 25# SLS 10 ea 65# BL 2x10; 25# SLS 10 ea SLS 25# 2x10 ea hold   TM 10' 10' 10' 10' 10' np   Sneaky lunge 2x12x3" ea 2x12x3" 2x12x3" 2x10, 3" 2x10x3" 2x10x3"   SLS               B/l 4x20" ea B/l 4x20" ea EC B/l 4x20" ea EC 3x20" b/l EC 3x20" b/l EC   Ball toss rebounder           Chest throw standing on foam 2x20    Chest throw standing on foam 2x20 (RB) Chest throwing on foam 2x20 RB 2x20 red ball chest throw on foam 2x20 red ball chest throw on red dynadisc   kickers   nv nv 2x10, each 2x10 ea 2x10 ea  3-way   Lat heel taps     2x10  RLE 2x10 2x10 2x10 ea  fwd   bosu squats    2x10 2x10 2x10   Modified SLDL    2x10, ea   2x10 ea np   Sidestepping with TB     ytb 4x15' ytb    frwd heel taps      2x10 BL

## 2019-08-29 ENCOUNTER — OFFICE VISIT (OUTPATIENT)
Dept: PHYSICAL THERAPY | Age: 49
End: 2019-08-29
Payer: COMMERCIAL

## 2019-08-29 DIAGNOSIS — M72.2 PLANTAR FASCIITIS, BILATERAL: Primary | ICD-10-CM

## 2019-08-29 PROCEDURE — 97530 THERAPEUTIC ACTIVITIES: CPT

## 2019-08-29 PROCEDURE — 97110 THERAPEUTIC EXERCISES: CPT

## 2019-08-29 PROCEDURE — 97112 NEUROMUSCULAR REEDUCATION: CPT

## 2019-08-29 NOTE — PROGRESS NOTES
Daily Note     Today's date: 2019  Patient name: Andre Ni  : 1970  MRN: [id number]  Referring provider: Shakira Ferrer PA-C  Dx:   Encounter Diagnosis     ICD-10-CM    1  Plantar fasciitis, bilateral M72 2                   Subjective:  Pt reports feeling better today vs last appt, pt reports walking 2 miles 3-4x/wk now      Objective: See treatment diary below  Pt performed some ex without shoes to work intrinsic    Assessment: ex progressions challenging but linda well, improving ex technique noted    Plan: Continue per plan of care  Progress treatment as tolerated  Precautions: IBS, right 4th/5th toe fracture, hx of breast cancer (4 years)  Manual 19   B/L navicular sling tape              rhythmic stab  VK  VK (gentle MRE) FB VK      s/L hip abd      2x8, 3"                                      Exercise Diary 19   B/L clam, B/L arch lift, B/F DF with toes curled with GTB, PF 2 way GTB           Leg press 65# BL 2x10; 25# SLS 10 ea 65# BL 2x10; 25# SLS 10 ea 65# BL 2x10; 25# SLS 10 ea SLS 25# 2x10 ea hold    TM 10' 10' 10' 10' np 10' 1  6mph   Sneaky lunge 2x12x3" 2x12x3" 2x10, 3" 2x10x3" 2x10x3" 2x10x3"   SLS  B/l 4x20" ea B/l 4x20" ea EC B/l 4x20" ea EC 3x20" b/l EC 3x20" b/l EC Foam EO 3x20" BL   Ball toss rebounder         Chest throw standing on foam 2x20    Chest throw standing on foam 2x20 (RB) Chest throwing on foam 2x20 RB 2x20 red ball chest throw on foam 2x20 red ball chest throw on red dynadisc 3x20 red ball chest throw on red disc   kickers nv nv 2x10, each 2x10 ea 2x10 ea  3-way 3x10 ea way   Lat heel taps   2x10  RLE 2x10 2x10 2x10 ea  fwd 4" 2x10   bosu squats   2x10 2x10 2x10 3x10   Modified SLDL   2x10, ea   2x10 ea np Modified 2x10 BL   Sidestepping with TB    ytb 4x15' ytb  rtb 2x20ft   frwd heel taps     2x10 BL BL 2x10

## 2019-09-03 ENCOUNTER — OFFICE VISIT (OUTPATIENT)
Dept: PHYSICAL THERAPY | Age: 49
End: 2019-09-03
Payer: COMMERCIAL

## 2019-09-03 DIAGNOSIS — M72.2 PLANTAR FASCIITIS, BILATERAL: Primary | ICD-10-CM

## 2019-09-03 PROCEDURE — 97112 NEUROMUSCULAR REEDUCATION: CPT | Performed by: PHYSICAL THERAPIST

## 2019-09-03 PROCEDURE — 97110 THERAPEUTIC EXERCISES: CPT | Performed by: PHYSICAL THERAPIST

## 2019-09-03 PROCEDURE — 97530 THERAPEUTIC ACTIVITIES: CPT | Performed by: PHYSICAL THERAPIST

## 2019-09-03 NOTE — PROGRESS NOTES
Daily Note     Today's date: 9/3/2019  Patient name: Marilee Morton  : 1970  MRN: [id number]  Referring provider: Loly Renee PA-C  Dx:   Encounter Diagnosis     ICD-10-CM    1  Plantar fasciitis, bilateral M72 2      Start Time: 46  Stop Time: 1230  Total time in clinic (min): 45 minutes    Subjective: Patient reports stiffness that's is worst in the morning but gets better as the day progresses  No major complains since last therapy session  Patient co-treated by Physical Therapy Student, Myrtle Carver, under my direct supervision  Also overseen by PT    Objective: See treatment diary below  Pt performed some ex without shoes to work intrinsic    Assessment: Patient reported feeling good with exercise progression  Overall patient is doing very well with planned D/C next visit  Plan: Discharge to HEP next visit  Precautions: IBS, right 4th/5th toe fracture, hx of breast cancer (4 years)  Manual 19   B/L navicular sling tape            rhythmic stab  VK  VK (gentle MRE) FB VK    s/L hip abd      2x8, 3"                                Exercise Diary 8/6 8/8/19 8/27 8/29/19 9/3/19   B/L clam, B/L arch lift, B/F DF with toes curled with GTB, PF 2 way GTB- HEP        TM 10' 10' np 10' 1  6mph 10'    Sneaky lunge 2x10, 3" 2x10x3" 2x10x3" 2x10x3" HEP   SLS  B/l 4x20" ea EC 3x20" b/l EC 3x20" b/l EC Foam EO 3x20" BL Foam EO  3x30' BL   Ball toss rebounder Chest throwing on foam 2x20 RB 2x20 red ball chest throw on foam 2x20 red ball chest throw on red dynadisc 3x20 red ball chest throw on red disc 3x20 red ball chest throw on red disc   kickers 2x10, each 2x10 ea 2x10 ea  3-way 3x10 ea way 3x10 ea  YTB   Lat heel taps 2x10 2x10 2x10 ea  fwd 4" 2x10 4" 2x10   bosu squats 2x10 2x10 2x10 3x10 3x10   Modified SLDL 2x10, ea   2x10 ea np Modified 2x10 BL Modified 2x10 B/L   Sidestepping with TB  ytb 4x15' ytb  rtb 2x20ft 4 laps 20', RTB   frwd heel taps   2x10 BL BL 2x10 np Calf raise     2x10, 3" shoes off

## 2019-09-05 ENCOUNTER — HOSPITAL ENCOUNTER (OUTPATIENT)
Dept: RADIOLOGY | Facility: HOSPITAL | Age: 49
Discharge: HOME | End: 2019-09-05
Attending: SURGERY
Payer: COMMERCIAL

## 2019-09-05 ENCOUNTER — OFFICE VISIT (OUTPATIENT)
Dept: PHYSICAL THERAPY | Age: 49
End: 2019-09-05
Payer: COMMERCIAL

## 2019-09-05 DIAGNOSIS — C50.211 MALIGNANT NEOPLASM OF UPPER-INNER QUADRANT OF RIGHT BREAST IN FEMALE, ESTROGEN RECEPTOR POSITIVE (HCC): ICD-10-CM

## 2019-09-05 DIAGNOSIS — Z17.0 MALIGNANT NEOPLASM OF UPPER-INNER QUADRANT OF RIGHT BREAST IN FEMALE, ESTROGEN RECEPTOR POSITIVE (HCC): ICD-10-CM

## 2019-09-05 DIAGNOSIS — M72.2 PLANTAR FASCIITIS, BILATERAL: Primary | ICD-10-CM

## 2019-09-05 PROCEDURE — A9585 GADOBUTROL INJECTION: HCPCS | Performed by: SURGERY

## 2019-09-05 PROCEDURE — C8908 MRI W/O FOL W/CONT, BREAST,: HCPCS

## 2019-09-05 PROCEDURE — 97112 NEUROMUSCULAR REEDUCATION: CPT | Performed by: PHYSICAL THERAPIST

## 2019-09-05 PROCEDURE — 97110 THERAPEUTIC EXERCISES: CPT | Performed by: PHYSICAL THERAPIST

## 2019-09-05 RX ADMIN — GADOBUTROL 9 ML: 604.72 INJECTION INTRAVENOUS at 19:06

## 2019-09-05 NOTE — PROGRESS NOTES
Discharge Summary    Today's date: 2019  Patient name: Derrell Burgess  : 1970  MRN: [id number]  Referring provider: Alfredo Malone PA-C  Dx:   Encounter Diagnosis     ICD-10-CM    1  Plantar fasciitis, bilateral M72 2      Start Time:   Stop Time:   Total time in clinic (min): 45 minutes    Subjective: patient reports she is feeling good today and woke up with out pain or stiffness  Reports doing much better overall and pleased with her progress  Good compliance with HEP reported  Patient co-treated by Physical Therapy Student, Shellee Leventhal, under my direct supervision  Objective: See treatment diary below  Pt performed some ex without shoes to work intrinsic    Assessment: Patient has met all goals at this time  She is compliant with HEP and reports no functional limitations at this time  She is being discharged from therapy  Plan: D/C to HEP only  Patient to contact clinic via phone PRN  Precautions: IBS, right 4th/5th toe fracture, hx of breast cancer (4 years)  Manual 19   B/L navicular sling tape            rhythmic stab  VK  VK (gentle MRE) FB VK    s/L hip abd      2x8, 3"                                Exercise Diary 8/6 8/8/19 8/27 8/29/19 9/3/19 9/5/19   B/L clam, B/L arch lift, B/F DF with toes curled with GTB, PF 2 way GTB- HEP         TM 10' 10' np 10' 1  6mph 10'  10'   Sneaky lunge 2x10, 3" 2x10x3" 2x10x3" 2x10x3" HEP    SLS  B/l 4x20" ea EC 3x20" b/l EC 3x20" b/l EC Foam EO 3x20" BL Foam EO  3x30' BL Foam EO  3x30"   Ball toss rebounder Chest throwing on foam 2x20 RB 2x20 red ball chest throw on foam 2x20 red ball chest throw on red dynadisc 3x20 red ball chest throw on red disc 3x20 red ball chest throw on red disc 3x20 red ball chest throw on red disc   kickers 2x10, each 2x10 ea 2x10 ea  3-way 3x10 ea way 3x10 ea  YTB 3x10 ea  YTB   Lat heel taps 2x10 2x10 2x10 ea   fwd 4" 2x10 4" 2x10 4" 2x10   bosu squats 2x10 2x10 2x10 3x10 3x10 2x10 Modified SLDL 2x10, ea   2x10 ea np Modified 2x10 BL Modified 2x10 B/L    Sidestepping with TB  ytb 4x15' ytb  rtb 2x20ft 4 laps 20', RTB 4 laps 20', RTB   frwd heel taps   2x10 BL BL 2x10 np    Calf raise     2x10, 3" shoes off 2x10, 3" shoes off

## 2019-09-06 ENCOUNTER — TELEPHONE (OUTPATIENT)
Dept: SURGICAL ONCOLOGY | Facility: CLINIC | Age: 49
End: 2019-09-06

## 2019-09-06 NOTE — TELEPHONE ENCOUNTER
Pt had called and requested results of her Breast MRI  She shared the exam was difficult for her  Dr Perez Man reviewed report and pt was informed of benign results  She verbalized understanding and was reassured

## 2019-09-13 ENCOUNTER — TRANSCRIBE ORDERS (OUTPATIENT)
Dept: LAB | Age: 49
End: 2019-09-13

## 2019-09-13 ENCOUNTER — APPOINTMENT (OUTPATIENT)
Dept: LAB | Age: 49
End: 2019-09-13
Payer: COMMERCIAL

## 2019-09-13 DIAGNOSIS — L40.9 PSORIASIS: ICD-10-CM

## 2019-09-13 DIAGNOSIS — L40.9 PSORIASIS: Primary | ICD-10-CM

## 2019-09-13 LAB
ALBUMIN SERPL BCP-MCNC: 3.6 G/DL (ref 3.5–5)
ALP SERPL-CCNC: 66 U/L (ref 46–116)
ALT SERPL W P-5'-P-CCNC: 32 U/L (ref 12–78)
ANION GAP SERPL CALCULATED.3IONS-SCNC: 3 MMOL/L (ref 4–13)
AST SERPL W P-5'-P-CCNC: 22 U/L (ref 5–45)
BASOPHILS # BLD AUTO: 0.03 THOUSANDS/ΜL (ref 0–0.1)
BASOPHILS NFR BLD AUTO: 1 % (ref 0–1)
BILIRUB SERPL-MCNC: 0.53 MG/DL (ref 0.2–1)
BUN SERPL-MCNC: 13 MG/DL (ref 5–25)
CALCIUM SERPL-MCNC: 9.2 MG/DL (ref 8.3–10.1)
CHLORIDE SERPL-SCNC: 104 MMOL/L (ref 100–108)
CO2 SERPL-SCNC: 29 MMOL/L (ref 21–32)
CREAT SERPL-MCNC: 0.86 MG/DL (ref 0.6–1.3)
EOSINOPHIL # BLD AUTO: 0.09 THOUSAND/ΜL (ref 0–0.61)
EOSINOPHIL NFR BLD AUTO: 2 % (ref 0–6)
ERYTHROCYTE [DISTWIDTH] IN BLOOD BY AUTOMATED COUNT: 13 % (ref 11.6–15.1)
GFR SERPL CREATININE-BSD FRML MDRD: 80 ML/MIN/1.73SQ M
GLUCOSE SERPL-MCNC: 79 MG/DL (ref 65–140)
HAV IGM SER QL: NORMAL
HBV CORE IGM SER QL: NORMAL
HBV SURFACE AG SER QL: NORMAL
HCT VFR BLD AUTO: 38 % (ref 34.8–46.1)
HCV AB SER QL: NORMAL
HGB BLD-MCNC: 12.3 G/DL (ref 11.5–15.4)
IMM GRANULOCYTES # BLD AUTO: 0.02 THOUSAND/UL (ref 0–0.2)
IMM GRANULOCYTES NFR BLD AUTO: 0 % (ref 0–2)
LYMPHOCYTES # BLD AUTO: 1.52 THOUSANDS/ΜL (ref 0.6–4.47)
LYMPHOCYTES NFR BLD AUTO: 26 % (ref 14–44)
MCH RBC QN AUTO: 30.8 PG (ref 26.8–34.3)
MCHC RBC AUTO-ENTMCNC: 32.4 G/DL (ref 31.4–37.4)
MCV RBC AUTO: 95 FL (ref 82–98)
MONOCYTES # BLD AUTO: 0.29 THOUSAND/ΜL (ref 0.17–1.22)
MONOCYTES NFR BLD AUTO: 5 % (ref 4–12)
NEUTROPHILS # BLD AUTO: 3.82 THOUSANDS/ΜL (ref 1.85–7.62)
NEUTS SEG NFR BLD AUTO: 66 % (ref 43–75)
NRBC BLD AUTO-RTO: 0 /100 WBCS
PLATELET # BLD AUTO: 229 THOUSANDS/UL (ref 149–390)
PMV BLD AUTO: 10 FL (ref 8.9–12.7)
POTASSIUM SERPL-SCNC: 3.6 MMOL/L (ref 3.5–5.3)
PROT SERPL-MCNC: 7.1 G/DL (ref 6.4–8.2)
RBC # BLD AUTO: 4 MILLION/UL (ref 3.81–5.12)
SODIUM SERPL-SCNC: 136 MMOL/L (ref 136–145)
WBC # BLD AUTO: 5.77 THOUSAND/UL (ref 4.31–10.16)

## 2019-09-13 PROCEDURE — 80074 ACUTE HEPATITIS PANEL: CPT

## 2019-09-13 PROCEDURE — 36415 COLL VENOUS BLD VENIPUNCTURE: CPT

## 2019-09-13 PROCEDURE — 85025 COMPLETE CBC W/AUTO DIFF WBC: CPT

## 2019-09-13 PROCEDURE — 80053 COMPREHEN METABOLIC PANEL: CPT

## 2019-09-13 PROCEDURE — 86480 TB TEST CELL IMMUN MEASURE: CPT

## 2019-09-16 ENCOUNTER — TELEPHONE (OUTPATIENT)
Dept: FAMILY MEDICINE CLINIC | Facility: CLINIC | Age: 49
End: 2019-09-16

## 2019-09-16 NOTE — TELEPHONE ENCOUNTER
Patient called she is concerned because she was exposed to someone with  the whooping cough on Thurs     Yesterday she started coughing a lot  She did have the vaccine as a child    Does she need to come in to see you or is there test you can do to see if she has it?      Please advise

## 2019-09-16 NOTE — TELEPHONE ENCOUNTER
Wants to add that she got a letter home from school, and her daughter was exposed to the children that were exposed  She has runny nose, achy, coughing and tired   Anxious about what she should do, she has a big job due and doesn't want to miss it, will take antibiotics  She will come in also

## 2019-09-17 ENCOUNTER — OFFICE VISIT (OUTPATIENT)
Dept: FAMILY MEDICINE CLINIC | Facility: CLINIC | Age: 49
End: 2019-09-17
Payer: COMMERCIAL

## 2019-09-17 VITALS — BODY MASS INDEX: 29.7 KG/M2 | HEIGHT: 67 IN | WEIGHT: 189.2 LBS | TEMPERATURE: 97.3 F

## 2019-09-17 DIAGNOSIS — A37.90 PERTUSSIS: Primary | ICD-10-CM

## 2019-09-17 LAB
GAMMA INTERFERON BACKGROUND BLD IA-ACNC: 0.06 IU/ML
M TB IFN-G BLD-IMP: NEGATIVE
M TB IFN-G CD4+ BCKGRND COR BLD-ACNC: -0.02 IU/ML
M TB IFN-G CD4+ BCKGRND COR BLD-ACNC: -0.03 IU/ML
MITOGEN IGNF BCKGRD COR BLD-ACNC: >10 IU/ML

## 2019-09-17 PROCEDURE — 87070 CULTURE OTHR SPECIMN AEROBIC: CPT | Performed by: NURSE PRACTITIONER

## 2019-09-17 PROCEDURE — 99213 OFFICE O/P EST LOW 20 MIN: CPT | Performed by: NURSE PRACTITIONER

## 2019-09-17 RX ORDER — AZITHROMYCIN 250 MG/1
TABLET, FILM COATED ORAL
Qty: 6 TABLET | Refills: 0 | Status: SHIPPED | OUTPATIENT
Start: 2019-09-17 | End: 2019-09-21

## 2019-09-17 NOTE — PROGRESS NOTES
Assessment/Plan:     Diagnoses and all orders for this visit:    Pertussis  -     azithromycin (ZITHROMAX) 250 mg tablet; Take 2 tabs on Day 1 than 1 tab Days 2-5  -     Throat culture; Future  -     Throat culture    Discussed with patient plan to perform the throat swab to evaluate for potential pertussis  Discussed with patient plan to treat her current upper respiratory infection symptoms with same treatment that would be used for pertussis infection as prophylaxis  Patient instructed to call if no improvement in 72 hours or symptoms worsen    Subjective:      Patient ID: Chris Jean-Baptiste is a 51 y o  female  51 y  o female presenting the concerns regarding health due to recent exposure to whooping cough over the past weekend  Patient's daughter was at a friend's house last Saturday to play and patient was informed that the five children in the house were all diagnosed with whooping cough two days later  The child that was exposed had just recently received her tdap booster, so the patient was not concerned about her daughter's exposure  The patient started to develop nasal drainage, watery eyes, sneezing and a cough, so is concerned that she may have contract the whooping cough  She reports that she as a big craft show coming up and does not want to be sick for that or expose others to the disease  She also mentions that her dermatologist wants to start her on Cosentyx in the near future for treatment of her psoriasis, so wants to be at her optimal health when starting the medication  Patient denies having a fever, chills, generalized body aches or forcful cough associated with her other symptoms but due to her future plan will treat in a prophylaxis manner  Patient did receive a tdap booster vaccine in 2018        Family History   Problem Relation Age of Onset    Lung cancer Paternal Grandfather 48    Breast cancer Other     Thyroid cancer Mother 36    Psoriasis Father     Heart disease Other     Prostate cancer Other      Social History     Socioeconomic History    Marital status: /Civil Union     Spouse name: Not on file    Number of children: 1    Years of education: Not on file    Highest education level: Not on file   Occupational History    Occupation: employed   Social Needs    Financial resource strain: Not on file    Food insecurity:     Worry: Not on file     Inability: Not on file   [employer] needs:     Medical: Not on file     Non-medical: Not on file   Tobacco Use    Smoking status: Never Smoker    Smokeless tobacco: Never Used   Substance and Sexual Activity    Alcohol use: Yes     Comment: social    Drug use: No    Sexual activity: Yes     Partners: Male   Lifestyle    Physical activity:     Days per week: Not on file     Minutes per session: Not on file    Stress: Not on file   Relationships    Social connections:     Talks on phone: Not on file     Gets together: Not on file     Attends Islam service: Not on file     Active member of club or organization: Not on file     Attends meetings of clubs or organizations: Not on file     Relationship status: Not on file    Intimate partner violence:     Fear of current or ex partner: Not on file     Emotionally abused: Not on file     Physically abused: Not on file     Forced sexual activity: Not on file   Other Topics Concern    Not on file   Social History Narrative    Caffeine use- coffee     Past Medical History:   Diagnosis Date    BRCA negative     Endometriosis     IBS (irritable bowel syndrome)     Insomnia     Night sweats     Psoriasis     Wears glasses      Past Surgical History:   Procedure Laterality Date    BREAST BIOPSY Right     IDC     SECTION      LAPAROSCOPY      exploratory, endometriosis    LYMPHADENECTOMY Right     x2    MASTECTOMY      bilateral mastectomy    VA NIPPLE/AREOLA RECONSTRUCTION Bilateral 10/13/2016    Procedure: NIPPLE RECONSTRUCTION ;  Surgeon: Shanique Hernandez MD;  Location: [facility];  Service: Plastics    CO REVISE BREAST RECONSTRUCTION Bilateral 3/9/2016    Procedure: RECONSTRUCTION REVISION  BREAST MOUND ,FAT GRAFTS ;  Surgeon: Shanique Hernandez MD;  Location: [facility];  Service: Plastics    SENTINEL LYMPH NODE BIOPSY Right      Allergies   Allergen Reactions    Other Hives     Kait selzer    Kait-Basile Plus Cold & Cough  [[id number]] Hives       Current Outpatient Medications:     Ashwagandha 125 MG CAPS, Take by mouth, Disp: , Rfl:     aspirin 325 mg tablet, Take 1 tablet (325 mg total) by mouth daily, Disp: 30 tablet, Rfl: 0    betamethasone, augmented, (DIPROLENE) 0 05 % lotion, Apply topically, Disp: , Rfl:     Calcium 500 MG tablet, Take by mouth, Disp: , Rfl:     Calcium Carbonate-Vit D-Min (CALCIUM 1200 PO), Take 1 tablet by mouth daily  , Disp: , Rfl:     Cholecalciferol (VITAMIN D PO), Take 5,000 mg by mouth daily  , Disp: , Rfl:     ciclopirox (PENLAC) 8 % solution, Paint daily; remove day#7 and use x6 weeks, Disp: 6 6 mL, Rfl: 1    clobetasol (TEMOVATE) 0 05 % ointment, Apply sparingly to affected areas twice a day, Disp: 30 g, Rfl: 0    Digestive Enzymes (DIGESTIVE ENZYME PO), Take 1 tablet by mouth daily as needed  , Disp: , Rfl:     hydrocortisone 2 5 % cream, Apply topically, Disp: , Rfl:     Krill Oil 300 MG CAPS, Take by mouth, Disp: , Rfl:     Magnesium 400 MG CAPS, Take 1 tablet by mouth daily  , Disp: , Rfl:     MILK THISTLE PO, Take 600 mg by mouth daily  , Disp: , Rfl:     Multiple Vitamins-Minerals (ONE DAILY MULTIVITAMIN ADULT) TABS, Take by mouth, Disp: , Rfl:     NON FORMULARY, , Disp: , Rfl:     Probiotic Product (PROBIOTIC DAILY PO), Take 2 tablets by mouth daily  , Disp: , Rfl:     Spirulina 500 MG TABS, by Does not apply route, Disp: , Rfl:     tamoxifen (NOLVADEX) 20 mg tablet, TAKE 1 TABLET BY MOUTH EVERY DAY, Disp: 30 tablet, Rfl: 7    UNABLE TO FIND, Take 320 mg by mouth daily   Tumeric, Disp: , Rfl:     azithromycin (ZITHROMAX) 250 mg tablet, Take 2 tabs on Day 1 than 1 tab Days 2-5, Disp: 6 tablet, Rfl: 0      Review of Systems   Constitutional: Negative  HENT: Positive for congestion, postnasal drip, rhinorrhea and sneezing  Eyes:        Watery eyes   Respiratory: Positive for cough  Negative for chest tightness and shortness of breath  Cardiovascular: Negative  Musculoskeletal: Negative  Neurological: Negative  Psychiatric/Behavioral: Negative  Objective:    Temp (!) 97 3 °F (36 3 °C)   Ht 5' 7" (1 702 m)   Wt 85 8 kg (189 lb 3 2 oz)   BMI 29 63 kg/m² (Reviewed)     Physical Exam   Constitutional: She is oriented to person, place, and time  Vital signs are normal  She appears well-developed and well-nourished  HENT:   Head: Normocephalic and atraumatic  Right Ear: Tympanic membrane, external ear and ear canal normal    Left Ear: Tympanic membrane, external ear and ear canal normal    Nose: Mucosal edema and rhinorrhea present  Mouth/Throat: Uvula is midline, oropharynx is clear and moist and mucous membranes are normal    Eyes: Pupils are equal, round, and reactive to light  Conjunctivae, EOM and lids are normal    Neck: Trachea normal and full passive range of motion without pain  Neck supple  Cardiovascular: Normal rate, regular rhythm, normal heart sounds and intact distal pulses  Pulmonary/Chest: Effort normal  She has no decreased breath sounds  She has no wheezes  She has rhonchi in the right upper field and the left upper field  She has no rales  Lymphadenopathy:     She has no cervical adenopathy  Neurological: She is alert and oriented to person, place, and time  Skin: Skin is warm and dry  Capillary refill takes less than 2 seconds  Psychiatric: She has a normal mood and affect  Her behavior is normal        BMI Counseling: Body mass index is 29 63 kg/m²   The BMI is above normal  Nutrition recommendations include reducing portion sizes, decreasing overall calorie intake, consuming healthier snacks, moderation in carbohydrate intake and increasing intake of lean protein  Exercise recommendations include exercising 3-5 times per week

## 2019-09-20 LAB — BACTERIA THROAT CULT: NORMAL

## 2019-09-26 ENCOUNTER — IMMUNIZATIONS (OUTPATIENT)
Dept: FAMILY MEDICINE CLINIC | Facility: CLINIC | Age: 49
End: 2019-09-26
Payer: COMMERCIAL

## 2019-09-26 ENCOUNTER — TELEPHONE (OUTPATIENT)
Dept: INTERNAL MEDICINE CLINIC | Facility: CLINIC | Age: 49
End: 2019-09-26

## 2019-09-26 DIAGNOSIS — Z23 ENCOUNTER FOR IMMUNIZATION: ICD-10-CM

## 2019-09-26 PROCEDURE — 90686 IIV4 VACC NO PRSV 0.5 ML IM: CPT | Performed by: FAMILY MEDICINE

## 2019-09-26 PROCEDURE — 90471 IMMUNIZATION ADMIN: CPT | Performed by: FAMILY MEDICINE

## 2019-09-27 NOTE — TELEPHONE ENCOUNTER
Cosentyx Autoinjector was Approved from 9/27/19-10/27/19  QTY of 2 Pens for 35 Days  Detailed message left for patient

## 2019-09-30 ENCOUNTER — ANNUAL EXAM (OUTPATIENT)
Dept: OBGYN CLINIC | Facility: CLINIC | Age: 49
End: 2019-09-30
Payer: COMMERCIAL

## 2019-09-30 VITALS
BODY MASS INDEX: 29.23 KG/M2 | WEIGHT: 186.6 LBS | DIASTOLIC BLOOD PRESSURE: 100 MMHG | SYSTOLIC BLOOD PRESSURE: 138 MMHG

## 2019-09-30 DIAGNOSIS — Z01.419 WOMEN'S ANNUAL ROUTINE GYNECOLOGICAL EXAMINATION: ICD-10-CM

## 2019-09-30 DIAGNOSIS — Z01.419 WELL WOMAN EXAM WITH ROUTINE GYNECOLOGICAL EXAM: Primary | ICD-10-CM

## 2019-09-30 DIAGNOSIS — Z12.11 ENCOUNTER FOR SCREENING COLONOSCOPY: ICD-10-CM

## 2019-09-30 PROCEDURE — 99396 PREV VISIT EST AGE 40-64: CPT | Performed by: OBSTETRICS & GYNECOLOGY

## 2019-09-30 PROCEDURE — 87624 HPV HI-RISK TYP POOLED RSLT: CPT | Performed by: OBSTETRICS & GYNECOLOGY

## 2019-09-30 PROCEDURE — G0145 SCR C/V CYTO,THINLAYER,RESCR: HCPCS | Performed by: OBSTETRICS & GYNECOLOGY

## 2019-09-30 NOTE — PROGRESS NOTES
ASSESSMENT & PLAN: Heike Pacheco is a 51 y o  Mahogany Pale with normal gynecologic exam     1   Routine well woman exam done today  2    Pap and HPV:Pap with HPV was done today  Current ASCCP Guidelines reviewed  Starting a biological medication for psoriasis - will do yearly paps    3  Tamoxifen use - menstrual cycles are extending in length, but bleeding is decreasing  No intermenstrual spotting  Reviewed precautions of irreg, heavy bleeding or intermenstrual spotting as a need for EMB   4  Colonoscopy recommended  5  The patient is sexually active  6  The following were reviewed in today's visit: breast self exam, menopause, osteoporosis, adequate intake of calcium and vitamin D, exercise, healthy diet and colonoscopy discussed and ordered  7  Patient to return to office in 12 months for annual      All questions have been answered to her satisfaction  CC:  Annual Gynecologic Examination    HPI: Heike Pacheco is a 51 y o  Mah[place] Pale who presents for annual gynecologic examination  She has the following concerns:  none    Health Maintenance:    She exercises 2 days per week  She wears her seatbelt routinely  She does perform regular monthly self breast exams  She feels safe at home  Patients does follow a regular diet          Past Medical History:   Diagnosis Date    BRCA negative     Endometriosis     IBS (irritable bowel syndrome)     Insomnia     Night sweats     Psoriasis     Wears glasses        Past Surgical History:   Procedure Laterality Date    BREAST BIOPSY Right [id number]    [facility]     SECTION      LAPAROSCOPY      exploratory, endometriosis    LYMPHADENECTOMY Right     x2    MASTECTOMY      bilateral mastectomy    SC NIPPLE/AREOLA RECONSTRUCTION Bilateral 10/13/2016    Procedure: NIPPLE RECONSTRUCTION ;  Surgeon: Hayden Linares MD;  [facility];  Service: Plastics    SC REVISE BREAST RECONSTRUCTION Bilateral 3/9/2016    Procedure: RECONSTRUCTION REVISION BREAST MOUND ,FAT GRAFTS ;  Surgeon: Duncan Thorpe MD;  [facility];  Service: Plastics    SENTINEL LYMPH NODE BIOPSY Right        Past OB/Gyn History:  Period Duration (Days): 4-5  Period Pattern: (!) Irregular  Menstrual Flow: Moderate  Dysmenorrhea: (!) MildPatient's last menstrual period was 2019 (exact date)  Menstrual History:  OB History        1    Para   1    Term   1            AB        Living           SAB        TAB        Ectopic        Multiple        Live Births               Obstetric Comments   Age at menarche 16  First pregnancy age 46  Used birth control pills for 10 years  Menstrual history: Patient is not post menopausal    History of sexually transmitted infection No  Patient is currently sexually active  heterosexual   Last Pap  2016 :  no abnormalities      Family History   Problem Relation Age of Onset    Lung cancer Paternal Grandfather 48    Breast cancer Other     Thyroid cancer Mother 36    Psoriasis Father     Heart disease Other     Prostate cancer Other        Social History:  Social History     Socioeconomic History    Marital status: /Civil Union     Spouse name: Not on file    Number of children: 1    Years of education: Not on file    Highest education level: Not on file   Occupational History    Occupation: employed   Social Needs    Financial resource strain: Not on file    Food insecurity:     Worry: Not on file     Inability: Not on file   P[employer] needs:     Medical: Not on file     Non-medical: Not on file   Tobacco Use    Smoking status: Never Smoker    Smokeless tobacco: Never Used   Substance and Sexual Activity    Alcohol use: Yes     Comment: social    Drug use: Never    Sexual activity: Not Currently     Partners: Male     Birth control/protection: None   Lifestyle    Physical activity:     Days per week: Not on file     Minutes per session: Not on file    Stress: Not on file   Relationships  Social connections:     Talks on phone: Not on file     Gets together: Not on file     Attends Sikh service: Not on file     Active member of club or organization: Not on file     Attends meetings of clubs or organizations: Not on file     Relationship status: Not on file    Intimate partner violence:     Fear of current or ex partner: Not on file     Emotionally abused: Not on file     Physically abused: Not on file     Forced sexual activity: Not on file   Other Topics Concern    Not on file   Social History Narrative    Caffeine use- coffee     Presently lives with her family  Patient is   Patient is currently employed   Allergies   Allergen Reactions    Other Hives     Kait Becker Plus Cold & Cough  [[id number]-[id number]-Apap] Hives       Current Outpatient Medications:     Ashwagandha 125 MG CAPS, Take by mouth, Disp: , Rfl:     betamethasone, augmented, (DIPROLENE) 0 05 % lotion, Apply topically, Disp: , Rfl:     Calcium 500 MG tablet, Take by mouth, Disp: , Rfl:     Cholecalciferol (VITAMIN D PO), Take 5,000 mg by mouth daily  , Disp: , Rfl:     ciclopirox (PENLAC) 8 % solution, Paint daily; remove day#7 and use x6 weeks, Disp: 6 6 mL, Rfl: 1    clobetasol (TEMOVATE) 0 05 % ointment, Apply sparingly to affected areas twice a day, Disp: 30 g, Rfl: 0    Digestive Enzymes (DIGESTIVE ENZYME PO), Take 1 tablet by mouth daily as needed  , Disp: , Rfl:     hydrocortisone 2 5 % cream, Apply topically, Disp: , Rfl:     Krill Oil 300 MG CAPS, Take by mouth, Disp: , Rfl:     Magnesium 400 MG CAPS, Take 1 tablet by mouth daily  , Disp: , Rfl:     MILK THISTLE PO, Take 600 mg by mouth daily  , Disp: , Rfl:     Multiple Vitamins-Minerals (ONE DAILY MULTIVITAMIN ADULT) TABS, Take by mouth, Disp: , Rfl:     NON FORMULARY, , Disp: , Rfl:     Probiotic Product (PROBIOTIC DAILY PO), Take 2 tablets by mouth daily  , Disp: , Rfl:     Spirulina 500 MG TABS, by Does not apply route, Disp: , Rfl:     tamoxifen (NOLVADEX) 20 mg tablet, TAKE 1 TABLET BY MOUTH EVERY DAY, Disp: 30 tablet, Rfl: 7    TURMERIC PO, Take by mouth, Disp: , Rfl:     Review of Systems:  Review of Systems   Constitutional: Negative  Respiratory: Negative  Cardiovascular: Negative  Gastrointestinal: Negative  Genitourinary: Negative  Neurological: Negative  Physical Exam:  /100 (BP Location: Left arm)   Wt 84 6 kg (186 lb 9 6 oz)   LMP 08/24/2019 (Exact Date)   BMI 29 23 kg/m²    Physical Exam   Constitutional: She is oriented to person, place, and time  She appears well-developed and well-nourished  No distress  Genitourinary: Vagina normal and uterus normal  There is no rash, tenderness, lesion, injury or Bartholin's cyst on the right labia  There is no rash, tenderness, lesion, injury or Bartholin's cyst on the left labia  Vagina exhibits rugosity  No tenderness or bleeding in the vagina  No vaginal discharge found  Right adnexum does not display mass, does not display tenderness and does not display fullness  Left adnexum does not display mass, does not display tenderness and does not display fullness  Cervix is parous  Cervix exhibits pinkness  Cervix does not exhibit motion tenderness, discharge or polyp  Uterus is mobile  Uterus is not enlarged or tender  Genitourinary Comments: Non tender bladder   HENT:   Head: Normocephalic and atraumatic  Cardiovascular: Normal rate, regular rhythm and normal heart sounds  No murmur heard  Pulmonary/Chest: Effort normal and breath sounds normal  No stridor  No respiratory distress  She has no wheezes  S/p b/l mastectomy and reconstruction - no palpable masses   Abdominal: She exhibits no distension  There is no tenderness  There is no guarding  Neurological: She is alert and oriented to person, place, and time  Skin: Skin is warm and dry  She is not diaphoretic  No erythema  No pallor  Nursing note and vitals reviewed

## 2019-10-01 NOTE — TELEPHONE ENCOUNTER
Patient called back because she didn't understand the message that was left on her voicemail  I read the message to the patient and she said that the person that left the message said something else but she didn't understand  Patient ask if this medication is something that need to be use it as soon she get it or not because she's going on a trip on 10/10/2019 and she would be back on 10/15/2019  Patient said she's only asking because she know that this medication can cause her weakness in her immune system and she already have the Influenza vaccine that is good but she wants to make sure if this is something that need to be use it right away  I ask Micheal Salomon and she advise to call Dr Frank Navarro office to make sure if is ok to wait until she's back of her trip or not

## 2019-10-02 ENCOUNTER — TELEPHONE (OUTPATIENT)
Dept: INTERNAL MEDICINE CLINIC | Facility: CLINIC | Age: 49
End: 2019-10-02

## 2019-10-02 LAB
HPV HR 12 DNA CVX QL NAA+PROBE: NEGATIVE
HPV16 DNA CVX QL NAA+PROBE: NEGATIVE
HPV18 DNA CVX QL NAA+PROBE: NEGATIVE

## 2019-10-07 LAB
LAB AP GYN PRIMARY INTERPRETATION: NORMAL
Lab: NORMAL

## 2019-10-10 NOTE — TELEPHONE ENCOUNTER
Per Dr Orion Lerner, It is ultimately up to the patient if she wants to start Cosentyx or not  We won't know how her body will react to the medication until she starts the medication  Patient was made aware and stated she will start the medication when she gets back

## 2019-10-17 ENCOUNTER — OFFICE VISIT (OUTPATIENT)
Dept: FAMILY MEDICINE CLINIC | Facility: CLINIC | Age: 49
End: 2019-10-17
Payer: COMMERCIAL

## 2019-10-17 VITALS
TEMPERATURE: 97.3 F | WEIGHT: 187.6 LBS | SYSTOLIC BLOOD PRESSURE: 128 MMHG | HEIGHT: 67 IN | HEART RATE: 84 BPM | DIASTOLIC BLOOD PRESSURE: 80 MMHG | BODY MASS INDEX: 29.44 KG/M2

## 2019-10-17 DIAGNOSIS — Z01.84 IMMUNITY STATUS TESTING: ICD-10-CM

## 2019-10-17 DIAGNOSIS — Z00.00 ANNUAL PHYSICAL EXAM: Primary | ICD-10-CM

## 2019-10-17 PROCEDURE — 99396 PREV VISIT EST AGE 40-64: CPT | Performed by: NURSE PRACTITIONER

## 2019-10-17 RX ORDER — CLOBETASOL PROPIONATE 0.46 MG/ML
SOLUTION TOPICAL
Refills: 2 | COMMUNITY
Start: 2019-09-27 | End: 2019-10-17 | Stop reason: SDUPTHER

## 2019-10-17 NOTE — PROGRESS NOTES
ADULT ANNUAL [address]    NAME: Hemalatha Raymundo  AGE: 51 y o  SEX: female  : 1970     DATE: 10/17/2019     Assessment and Plan:     Problem List Items Addressed This Visit     None      Visit Diagnoses     Annual physical exam    -  Primary    Immunity status testing        Relevant Orders    Mumps antibody, IgG    Rubella antibody, IgG    Rubeola antibody IgG        - Patient instructed to make a nurse visit with the office's LPN Pierre Jordan) when prepared for injection instructions  - Discussed with patient plan to check immunity status  - Discussed with patient the knee effusion is to small to drain so instructed patient to use ice application when she is on 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 to decrease inflammation   - Immunizations and preventive care screenings were discussed with patient today  Appropriate education was printed on patient's after visit summary  Counseling:  Alcohol/drug use: discussed moderation in alcohol intake, the recommendations for healthy alcohol use, and avoidance of illicit drug use  Dental Health: discussed importance of regular tooth brushing, flossing, and dental visits  Injury prevention: discussed safety/seat belts, safety helmets, smoke detectors, carbon dioxide detectors, and smoking near bedding or upholstery  Sexual health: discussed sexually transmitted diseases, partner selection, use of condoms, avoidance of unintended pregnancy, and contraceptive alternatives  · Exercise: the importance of regular exercise/physical activity was discussed  Recommend exercise 3-5 times per week for at least 30 minutes  Return in about 1 year (around 10/17/2020)       Chief Complaint:     Chief Complaint   Patient presents with    Physical Exam      History of Present Illness:     Adult Annual Physical   Patient here for a comprehensive physical exam  The patient reports problems - she is going to start on secukinumab (Cosentyx) and inquiring if she can come into the office to be taught how to inject herself with the medication  She is also concerned about her immunity against MMR because of her involvement in craft show and being around potentially unvaccinated individuals  She is also concerned with possible left knee fluid build-up       Diet and Physical Activity  · Diet/Nutrition: well balanced diet, limited junk food, low calorie diet, low fat diet, low carb diet and consuming 3-5 servings of fruits/vegetables daily  · Exercise: walking, 3-4 times a week on average and 30-60 minutes on average  Depression Screening  PHQ-9 Depression Screening    PHQ-9:    Frequency of the following problems over the past two weeks:            General Health  · Sleep: sleeps well and gets 7-8 hours of sleep on average  · Hearing: normal - bilateral   · Vision: no vision problems, goes for regular eye exams, most recent eye exam <1 year ago and wears glasses  · Dental: regular dental visits, brushes teeth twice daily and flosses teeth occasionally  /GYN Health  · Patient is: perimenopausal  · Last menstrual period: 08/24/19  · Contraceptive method: None  Review of Systems:     Review of Systems   Constitutional: Negative  HENT: Negative  Eyes: Negative  Respiratory: Negative  Cardiovascular: Negative  Endocrine: Negative  Genitourinary: Negative  Musculoskeletal: Positive for myalgias  Skin: Positive for rash  Allergic/Immunologic: Negative  Neurological: Negative  Hematological: Negative  Psychiatric/Behavioral: Negative         Past Medical History:     Past Medical History:   Diagnosis Date    BRCA negative     Endometriosis     IBS (irritable bowel syndrome)     Insomnia     Night sweats     Psoriasis     Wears glasses       Past Surgical History:     Past Surgical History:   Procedure Laterality Date    [facility] [id number]    [facility]     SECTION      LAPAROSCOPY      exploratory, endometriosis    LYMPHADENECTOMY Right     x2    MASTECTOMY      bilateral mastectomy    SD NIPPLE/AREOLA RECONSTRUCTION Bilateral 10/13/2016    Procedure: NIPPLE RECONSTRUCTION ;  Surgeon: Tommie Evans MD;  [facility];  Service: Plastics    SD REVISE BREAST RECONSTRUCTION Bilateral 3/9/2016    Procedure: RECONSTRUCTION REVISION  BREAST MOUND ,FAT GRAFTS ;  Surgeon: Tommie Evans MD;  [facility];  Service: Plastics    SENTINEL LYMPH NODE BIOPSY Right       Social History:     Social History     Socioeconomic History    Marital status: /Civil Union     Spouse name: None    Number of children: 1    Years of education: None    Highest education level: None   Occupational History    Occupation: employed   Social Needs    Financial resource strain: None    Food insecurity:     Worry: None     Inability: None    Transportation needs:     Medical: None     Non-medical: None   Tobacco Use    Smoking status: Never Smoker    Smokeless tobacco: Never Used   Substance and Sexual Activity    Alcohol use: Yes     Comment: social    Drug use: Never    Sexual activity: Not Currently     Partners: Male     Birth control/protection: None   Lifestyle    Physical activity:     Days per week: None     Minutes per session: None    Stress: None   Relationships    Social connections:     Talks on phone: None     Gets together: None     Attends Baptism service: None     Active member of club or organization: None     Attends meetings of clubs or organizations: None     Relationship status: None    Intimate partner violence:     Fear of current or ex partner: None     Emotionally abused: None     Physically abused: None     Forced sexual activity: None   Other Topics Concern    None   Social History Narrative    Caffeine use- coffee      Family History:     Family History   Problem Relation Age of Onset    Lung cancer Paternal Grandfather 48    Breast cancer Other     Thyroid cancer Mother 36    Psoriasis Father     Heart disease Other     Prostate cancer Other       Current Medications:     Current Outpatient Medications   Medication Sig Dispense Refill    Ashwagandha 125 MG CAPS Take by mouth      betamethasone, augmented, (DIPROLENE) 0 05 % lotion Apply topically      Calcium 500 MG tablet Take by mouth      Cholecalciferol (VITAMIN D PO) Take 5,000 mg by mouth daily   ciclopirox (PENLAC) 8 % solution Paint daily; remove day#7 and use x6 weeks 6 6 mL 1    clobetasol (TEMOVATE) 0 05 % ointment Apply sparingly to affected areas twice a day 30 g 0    Digestive Enzymes (DIGESTIVE ENZYME PO) Take 1 tablet by mouth daily as needed        hydrocortisone 2 5 % cream Apply topically      Krill Oil 300 MG CAPS Take by mouth      Magnesium 400 MG CAPS Take 1 tablet by mouth daily   MILK THISTLE PO Take 600 mg by mouth daily   Multiple Vitamins-Minerals (ONE DAILY MULTIVITAMIN ADULT) TABS Take by mouth      NON FORMULARY       Probiotic Product (PROBIOTIC DAILY PO) Take 2 tablets by mouth daily   Spirulina 500 MG TABS by Does not apply route      tamoxifen (NOLVADEX) 20 mg tablet TAKE 1 TABLET BY MOUTH EVERY DAY 30 tablet 7    TURMERIC PO Take by mouth       No current facility-administered medications for this visit  Allergies: Allergies   Allergen Reactions    Other Hives     Kait darien Carballo-Rajat Plus Cold & Cough  [[id number]] Hives      Physical Exam:     /80   Pulse 84   Temp (!) 97 3 °F (36 3 °C)   Ht 5' 7" (1 702 m)   Wt 85 1 kg (187 lb 9 6 oz)   BMI 29 38 kg/m²     Physical Exam   Constitutional: She is oriented to person, place, and time  Vital signs are normal  She appears well-developed and well-nourished  HENT:   Head: Normocephalic and atraumatic     Right Ear: Hearing, tympanic membrane, external ear and ear canal normal    Left Ear: Hearing, tympanic membrane, external ear and ear canal normal    Nose: Nose normal    Mouth/Throat: Uvula is midline, oropharynx is clear and moist and mucous membranes are normal    Eyes: Pupils are equal, round, and reactive to light  Conjunctivae, EOM and lids are normal    Neck: Trachea normal and full passive range of motion without pain  Neck supple  No thyroid mass and no thyromegaly present  Cardiovascular: Normal rate, regular rhythm, normal heart sounds and intact distal pulses  No peripheral edema noted in lower extermities   Pulmonary/Chest: Effort normal and breath sounds normal    Abdominal: Soft  Bowel sounds are normal  There is no hepatosplenomegaly  There is no tenderness  Musculoskeletal:        Left knee: She exhibits effusion  She exhibits normal range of motion, no erythema, no LCL laxity, normal patellar mobility and no MCL laxity  No tenderness found  Small effusion palpated above prepatellar bursa   Lymphadenopathy:     She has no cervical adenopathy  Neurological: She is alert and oriented to person, place, and time  She has normal strength and normal reflexes  No cranial nerve deficit or sensory deficit  Skin: Skin is warm and dry  Capillary refill takes less than 2 seconds  Psychiatric: She has a normal mood and affect   Her speech is normal and behavior is normal

## 2019-10-17 NOTE — PATIENT INSTRUCTIONS

## 2019-10-18 ENCOUNTER — APPOINTMENT (OUTPATIENT)
Dept: LAB | Age: 49
End: 2019-10-18
Payer: COMMERCIAL

## 2019-10-18 DIAGNOSIS — Z01.84 IMMUNITY STATUS TESTING: ICD-10-CM

## 2019-10-18 LAB — RUBV IGG SERPL IA-ACNC: 46.8 IU/ML

## 2019-10-18 PROCEDURE — 36415 COLL VENOUS BLD VENIPUNCTURE: CPT

## 2019-10-18 PROCEDURE — 86762 RUBELLA ANTIBODY: CPT

## 2019-10-18 PROCEDURE — 86735 MUMPS ANTIBODY: CPT

## 2019-10-18 PROCEDURE — 86765 RUBEOLA ANTIBODY: CPT

## 2019-10-22 LAB
MEV IGG SER QL: ABNORMAL
MUV IGG SER QL: NORMAL

## 2019-10-24 ENCOUNTER — TELEPHONE (OUTPATIENT)
Dept: INTERNAL MEDICINE CLINIC | Facility: CLINIC | Age: 49
End: 2019-10-24

## 2019-10-25 ENCOUNTER — CLINICAL SUPPORT (OUTPATIENT)
Dept: FAMILY MEDICINE CLINIC | Facility: CLINIC | Age: 49
End: 2019-10-25
Payer: COMMERCIAL

## 2019-10-25 DIAGNOSIS — Z23 NEED FOR VACCINATION: Primary | ICD-10-CM

## 2019-10-25 PROCEDURE — 90471 IMMUNIZATION ADMIN: CPT | Performed by: FAMILY MEDICINE

## 2019-10-25 PROCEDURE — 90707 MMR VACCINE SC: CPT | Performed by: FAMILY MEDICINE

## 2019-12-11 ENCOUNTER — OFFICE VISIT (OUTPATIENT)
Dept: HEMATOLOGY ONCOLOGY | Facility: CLINIC | Age: 49
End: 2019-12-11
Payer: COMMERCIAL

## 2019-12-11 VITALS
SYSTOLIC BLOOD PRESSURE: 128 MMHG | OXYGEN SATURATION: 98 % | HEIGHT: 67 IN | BODY MASS INDEX: 28.88 KG/M2 | WEIGHT: 184 LBS | HEART RATE: 83 BPM | DIASTOLIC BLOOD PRESSURE: 80 MMHG | RESPIRATION RATE: 18 BRPM | TEMPERATURE: 98.8 F

## 2019-12-11 DIAGNOSIS — Z17.0 MALIGNANT NEOPLASM OF UPPER-INNER QUADRANT OF RIGHT BREAST IN FEMALE, ESTROGEN RECEPTOR POSITIVE (HCC): Primary | ICD-10-CM

## 2019-12-11 DIAGNOSIS — C50.211 MALIGNANT NEOPLASM OF UPPER-INNER QUADRANT OF RIGHT BREAST IN FEMALE, ESTROGEN RECEPTOR POSITIVE (HCC): Primary | ICD-10-CM

## 2019-12-11 PROCEDURE — 99214 OFFICE O/P EST MOD 30 MIN: CPT | Performed by: INTERNAL MEDICINE

## 2019-12-11 NOTE — PROGRESS NOTES
Hematology / Oncology Outpatient Follow Up Note    Gautam Robbins 51 y o  female :1970 BEVERLY:[id number]         Date:  2019    Assessment / Plan:  A 51year old premenopausal woman with stage IIA right breast cancer, grade 1, ER/FL positive, HER-2 negative disease  She has no evidence of BRCA gene mutation  She underwent right mastectomy with sentinel lymph node biopsy as well as left prophylactic mastectomy, resulting in GINI  Her tumor has Oncotype DX recurrence score of 14  Since 2015, she has been on adjuvant hormonal therapy with tamoxifen with minimal side effects  She has no evidence recurrent disease, based on her symptomatology and physical examinations  I recommended her to continue with tamoxifen 20 mg daily  She is aware that she may stay on tamoxifen 10 years  I will see her again in a year for routine follow-up           Subjective:      HPI:  A 41year old premenopausal woman, who is found to have abnormality, based on the screening mammography in her right breast  Subsequently, she underwent biopsy from the right breast lesion, which showed invasive ductal carcinoma  She was seen and evaluated by Dr Gwen Rai  She was found to have no evidence of BRCA gene mutation  Despite this, she elected to have mastectomy as well as prophylactic left mastectomy, which was done in 2015  She has no evidence of carcinoma in the left mastectomy specimen  Right mastectomy specimen showed 2 1x1 8x1 0 cm of invasive ductal carcinoma, grade 1  There was no evidence of lymphovascular invasion  2 sentinel lymph node were negative for metastatic disease  This was % positive, FL 95% positive, HER-2 negative disease  Dr Gwen Rai  That her tumor tissue for Oncotype DX testing which came back 14 as of recurrence score  She had immediate reconstruction with tissue expander  She presents today to discuss adjuvant treatment options  She has some chest tightness after the expansion   Otherwise, she feels well  She has no complaint of bone pain  She denies respiratory symptoms  Her weight has been stable  She has regular menstrual cycle  Her performance status is normal             Interval History:  A 51year-old premenopausal woman with stage IIA right breast cancer, grade 1, ER/PA positive, HER-2 negative disease  She underwent mastectomy as well as prophylactic left mastectomy, resulting in GINI  Her tumor had Oncotype DX recurrence score of 14  Therefore, adjuvant chemotherapy was not indicated  Since August 2015, she has been on adjuvant hormonal therapy with tamoxifen  She presented today for follow-up  She continued to do well with tamoxifen with only minimal hot flashes  She denied any pain  She has no respiratory symptoms  Her psoriasis flared up  Therefore, she is starting biologic treatment next month  Her performance status is normal        Objective:      Primary Diagnosis:     1  Right breast cancer, stage IIA (pT2, pN0,M0)  Grade 1, ER/PA positive, HER-2 negative disease  Oncotype DX recurrence score 14  Diagnosed in June 2015   2  BRCA gene mutation negative       Cancer Staging:  Cancer Staging  No matching staging information was found for the patient         Previous Hematologic/ Oncologic Treatment:            Current Hematologic/ Oncologic Treatment:       Adjuvant hormonal therapy with tamoxifen 20 mg once a day since August 2015       Disease Status:      GINI status post mastectomy and contralateral prophylactic mastectomy       Test Results:     Pathology:     2 1, x1 8x1 0 cm of invasive ductal carcinoma, grade 1  No evidence of lymphovascular invasion  2 sentinel lymph nodes were negative for metastatic disease  % positive, PA 95% positive, HER-2 negative disease  Stage IIA (pT2, pN0,M0)   Oncotype DX recurrence score 14        Radiology:           Laboratory:           Physical Exam:        General Appearance:    Alert, oriented          Eyes:    PERRL   Ears:    Normal external ear canals, both ears   Nose:   Nares normal, septum midline   Throat:   Mucosa moist  Pharynx without injection  Neck:   Supple         Lungs:     Clear to auscultation bilaterally   Chest Wall:    No tenderness or deformity    Heart:    Regular rate and rhythm         Abdomen:     Soft, non-tender, bowel sounds +, no organomegaly               Extremities:   Extremities no cyanosis or edema         Skin:   no rash or icterus  Lymph nodes:   Cervical, supraclavicular, and axillary nodes normal   Neurologic:   CNII-XII intact, normal strength, sensation and reflexes     Throughout             Breast exam:   status post bilateral mastectomy with reconstruction  No palpable abnormality in either reconstructed breast             ROS: Review of Systems   Skin:        Psoriasis   All other systems reviewed and are negative  Imaging: No results found  Labs:   Lab Results   Component Value Date    WBC 5 77 09/13/2019    HGB 12 3 09/13/2019    HCT 38 0 09/13/2019    MCV 95 09/13/2019     09/13/2019     Lab Results   Component Value Date     10/06/2015    K 3 6 09/13/2019     09/13/2019    CO2 29 09/13/2019    ANIONGAP 9 10/06/2015    BUN 13 09/13/2019    CREATININE 0 86 09/13/2019    GLUCOSE 81 10/06/2015    GLUF 68 10/31/2018    CALCIUM 9 2 09/13/2019    AST 22 09/13/2019    ALT 32 09/13/2019    ALKPHOS 66 09/13/2019    PROT 7 4 10/06/2015    BILITOT 0 51 10/06/2015    EGFR 80 09/13/2019         Current Medications: Reviewed  Allergies: Reviewed  PMH/FH/SH:  Reviewed      Vital Sign:    Body surface area is 1 95 meters squared      Wt Readings from Last 3 Encounters:   12/11/19 83 5 kg (184 lb)   10/17/19 85 1 kg (187 lb 9 6 oz)   09/30/19 84 6 kg (186 lb 9 6 oz)        Temp Readings from Last 3 Encounters:   12/11/19 98 8 °F (37 1 °C) (Oral)   10/17/19 (!) 97 3 °F (36 3 °C)   09/17/19 (!) 97 3 °F (36 3 °C)        BP Readings from Last 3 Encounters:   12/11/19 128/80 10/17/19 128/80   09/30/19 138/100         Pulse Readings from Last 3 Encounters:   12/11/19 83   10/17/19 84   08/22/19 90     @LASTSAO2(3)@

## 2020-02-06 ENCOUNTER — OFFICE VISIT (OUTPATIENT)
Dept: SURGICAL ONCOLOGY | Facility: CLINIC | Age: 50
End: 2020-02-06
Payer: COMMERCIAL

## 2020-02-06 VITALS
BODY MASS INDEX: 29.11 KG/M2 | DIASTOLIC BLOOD PRESSURE: 80 MMHG | HEIGHT: 67 IN | HEART RATE: 96 BPM | RESPIRATION RATE: 18 BRPM | WEIGHT: 185.5 LBS | TEMPERATURE: 99.3 F | SYSTOLIC BLOOD PRESSURE: 122 MMHG

## 2020-02-06 DIAGNOSIS — Z79.810 USE OF TAMOXIFEN (NOLVADEX): ICD-10-CM

## 2020-02-06 DIAGNOSIS — Z17.0 MALIGNANT NEOPLASM OF UPPER-INNER QUADRANT OF RIGHT BREAST IN FEMALE, ESTROGEN RECEPTOR POSITIVE (HCC): Primary | ICD-10-CM

## 2020-02-06 DIAGNOSIS — C50.211 MALIGNANT NEOPLASM OF UPPER-INNER QUADRANT OF RIGHT BREAST IN FEMALE, ESTROGEN RECEPTOR POSITIVE (HCC): Primary | ICD-10-CM

## 2020-02-06 PROCEDURE — 1036F TOBACCO NON-USER: CPT | Performed by: SURGERY

## 2020-02-06 PROCEDURE — 3008F BODY MASS INDEX DOCD: CPT | Performed by: SURGERY

## 2020-02-06 PROCEDURE — 99214 OFFICE O/P EST MOD 30 MIN: CPT | Performed by: SURGERY

## 2020-02-06 NOTE — PROGRESS NOTES
Surgical Oncology Follow Up       [address] [facility]  [facility] SURGICAL ONCOLOGY [place]  [address] [facility] [place] [address]    Jaja Clark  1970  [id number]  [address] [facility]  CANCER [facility] SURGICAL ONCOLOGY [place]  [address] [facility] [place] [address]    Chief Complaint   Patient presents with    Follow-up       Assessment/Plan   Diagnoses and all orders for this visit:    Malignant neoplasm of upper-inner quadrant of right breast in female, estrogen receptor positive ([address] )    Use of tamoxifen (Nolvadex)        Advance Care Planning/Advance Directives:  Discussed disease status, cancer treatment plans and/or cancer treatment goals with the patient  Oncology History:       Malignant neoplasm of upper-inner quadrant of right breast in female, estrogen receptor positive ([address] )    5/14/2015 Surgery     Right US guided breast biopsy      6/30/2015 Surgery     Bilateral; mastectomies, right SLNB  Bilateral reconstruction with expanders  Dr Amber Hooks      8/12/2015 -  Hormone Therapy     Tamoxifen  Dr Karen Morales  10/23/2015 Surgery     Expanders removed, implants placed  11/6/2017 Initial Diagnosis     Malignant neoplasm of upper-inner quadrant of right breast in female, estrogen receptor positive (HCC)       Genetic Testing     BRCA negative       Genomic Testing     Oncotype DX  14         History of Present Illness:  Breast cancer follow-up, no concerns  -Interval History:  Recent MRI    Review of Systems:  Review of Systems   Constitutional: Negative  Negative for appetite change and fever  Eyes: Negative  Respiratory: Negative for shortness of breath  Cardiovascular: Negative  Gastrointestinal: Negative  Endocrine: Negative  Genitourinary: Negative  Musculoskeletal: Negative  Negative for arthralgias and myalgias  Skin: Negative  Allergic/Immunologic: Negative  Neurological: Negative  Hematological: Negative    Negative for adenopathy  Does not bruise/bleed easily  Psychiatric/Behavioral: Negative          Patient Active Problem List   Diagnosis    Irritable bowel syndrome (IBS)    Malignant neoplasm of upper-inner quadrant of right breast in female, estrogen receptor positive ([facility] )    Psoriasis    Seasonal allergies    Use of tamoxifen (Nolvadex)    Seborrheic keratosis    Screening for skin condition    Bilateral plantar fasciitis     Past Medical History:   Diagnosis Date    BRCA negative     Endometriosis     IBS (irritable bowel syndrome)     Insomnia     Night sweats     Psoriasis     Wears glasses      Past Surgical History:   Procedure Laterality Date    BREAST BIOPSY Right [id number]    IDC     SECTION      LAPAROSCOPY      exploratory, endometriosis    LYMPHADENECTOMY Right     x2    MASTECTOMY      bilateral mastectomy    CO NIPPLE/AREOLA RECONSTRUCTION Bilateral 10/13/2016    Procedure: NIPPLE RECONSTRUCTION ;  Surgeon: Caity Martinez MD;  [facility];  Service: Plastics    CO REVISE BREAST RECONSTRUCTION Bilateral 3/9/2016    Procedure: RECONSTRUCTION REVISION  BREAST MOUND ,FAT GRAFTS ;  Surgeon: Caity Martinez MD;  [facility];  Service: Plastics    SENTINEL LYMPH NODE BIOPSY Right      Family History   Problem Relation Age of Onset    Lung cancer Paternal Grandfather 48    Breast cancer Other     Thyroid cancer Mother 36    Psoriasis Father     Heart disease Other     Prostate cancer Other      Social History     Socioeconomic History    Marital status: /Civil Union     Spouse name: Not on file    Number of children: 1    Years of education: Not on file    Highest education level: Not on file   Occupational History    Occupation: employed   Social Needs    Financial resource strain: Not on file    Food insecurity:     Worry: Not on file     Inability: Not on file   J.G. ink needs:     Medical: Not on file     Non-medical: Not on file   Tobacco Use  Smoking status: Never Smoker    Smokeless tobacco: Never Used   Substance and Sexual Activity    Alcohol use: Yes     Comment: social    Drug use: Never    Sexual activity: Not Currently     Partners: Male     Birth control/protection: None   Lifestyle    Physical activity:     Days per week: Not on file     Minutes per session: Not on file    Stress: Not on file   Relationships    Social connections:     Talks on phone: Not on file     Gets together: Not on file     Attends Episcopal service: Not on file     Active member of club or organization: Not on file     Attends meetings of clubs or organizations: Not on file     Relationship status: Not on file    Intimate partner violence:     Fear of current or ex partner: Not on file     Emotionally abused: Not on file     Physically abused: Not on file     Forced sexual activity: Not on file   Other Topics Concern    Not on file   Social History Narrative    Caffeine use- coffee       Current Outpatient Medications:     Ashwagandha 125 MG CAPS, Take by mouth, Disp: , Rfl:     Calcium 500 MG tablet, Take by mouth, Disp: , Rfl:     Cholecalciferol (VITAMIN D PO), Take 5,000 mg by mouth daily  , Disp: , Rfl:     ciclopirox (PENLAC) 8 % solution, Paint daily; remove day#7 and use x6 weeks, Disp: 6 6 mL, Rfl: 1    Digestive Enzymes (DIGESTIVE ENZYME PO), Take 1 tablet by mouth daily as needed  , Disp: , Rfl:     Krill Oil 300 MG CAPS, Take by mouth, Disp: , Rfl:     Magnesium 400 MG CAPS, Take 1 tablet by mouth daily  , Disp: , Rfl:     MILK THISTLE PO, Take 600 mg by mouth daily  , Disp: , Rfl:     Multiple Vitamins-Minerals (ONE DAILY MULTIVITAMIN ADULT) TABS, Take by mouth, Disp: , Rfl:     NON FORMULARY, , Disp: , Rfl:     Probiotic Product (PROBIOTIC DAILY PO), Take 2 tablets by mouth daily  , Disp: , Rfl:     Spirulina 500 MG TABS, by Does not apply route, Disp: , Rfl:     tamoxifen (NOLVADEX) 20 mg tablet, TAKE 1 TABLET BY MOUTH EVERY DAY, Disp: 30 tablet, Rfl: 7    betamethasone, augmented, (DIPROLENE) 0 05 % lotion, Apply topically, Disp: , Rfl:     clobetasol (TEMOVATE) 0 05 % ointment, Apply sparingly to affected areas twice a day (Patient not taking: Reported on 2/6/2020), Disp: 30 g, Rfl: 0    hydrocortisone 2 5 % cream, Apply topically, Disp: , Rfl:     TURMERIC PO, Take by mouth, Disp: , Rfl:   Allergies   Allergen Reactions    Other Hives     Kait selzer    Kait-[place] Plus Cold & Cough  [[id number]-[id number]-Apap] Hives       The following portions of the patient's history were reviewed and updated as appropriate: allergies, current medications, past family history, past medical history, past social history, past surgical history and problem list         Vitals:    02/06/20 1023   BP: 122/80   Pulse: 96   Resp: 18   Temp: 99 3 °F (37 4 °C)       Physical Exam   Constitutional: She is oriented to person, place, and time  She appears well-developed and well-nourished  HENT:   Head: Normocephalic and atraumatic  Cardiovascular: Normal heart sounds  Pulmonary/Chest: Breath sounds normal  Right breast exhibits skin change (Mastectomy scar and implant)  Right breast exhibits no mass and no tenderness  Left breast exhibits skin change ( mastectomy scar and implant)  Left breast exhibits no mass and no tenderness  Abdominal: Soft  Lymphadenopathy:        Right axillary: No pectoral and no lateral adenopathy present  Left axillary: No pectoral and no lateral adenopathy present  Right: No supraclavicular adenopathy present  Left: No supraclavicular adenopathy present  Neurological: She is alert and oriented to person, place, and time  Psychiatric: She has a normal mood and affect  Results:  Labs:      Imaging  09/05/2019 breast MRI is benign    I reviewed the above imaging data      Discussion/Summary:  45-year-old female status post bilateral mastectomy and reconstruction for stage IIA invasive ductal carcinoma of the right breast   She is BRCA negative  She continues on tamoxifen with no concerns  There is no evidence of disease based on examination today or recent MRI  I will therefore see her again in six months or sooner should the need arise

## 2020-02-07 ENCOUNTER — CLINICAL SUPPORT (OUTPATIENT)
Dept: FAMILY MEDICINE CLINIC | Facility: CLINIC | Age: 50
End: 2020-02-07

## 2020-02-07 DIAGNOSIS — L40.9 PSORIASIS: Primary | ICD-10-CM

## 2020-02-07 PROCEDURE — RECHECK

## 2020-02-07 NOTE — PROGRESS NOTES
Pt is here today for teaching of cosentyx injection  Was prescribed by Dr Tiffany Flores but was told she could come to our office to show her how to use  Pt understand instructions and got both injections in office today, one in each arm  Pt tolerated shot well and will call Dr Tiffany Flores with any questions or concerns

## 2020-03-19 DIAGNOSIS — C50.919 MALIGNANT NEOPLASM OF BREAST IN FEMALE, ESTROGEN RECEPTOR POSITIVE, UNSPECIFIED LATERALITY, UNSPECIFIED SITE OF BREAST (HCC): ICD-10-CM

## 2020-03-19 DIAGNOSIS — Z17.0 MALIGNANT NEOPLASM OF BREAST IN FEMALE, ESTROGEN RECEPTOR POSITIVE, UNSPECIFIED LATERALITY, UNSPECIFIED SITE OF BREAST (HCC): ICD-10-CM

## 2020-03-19 RX ORDER — TAMOXIFEN CITRATE 20 MG/1
TABLET ORAL
Qty: 30 TABLET | Refills: 7 | Status: SHIPPED | OUTPATIENT
Start: 2020-03-19 | End: 2020-11-05

## 2020-04-15 ENCOUNTER — TELEPHONE (OUTPATIENT)
Dept: SURGICAL ONCOLOGY | Facility: CLINIC | Age: 50
End: 2020-04-15

## 2020-05-04 ENCOUNTER — TELEPHONE (OUTPATIENT)
Dept: INTERNAL MEDICINE CLINIC | Facility: CLINIC | Age: 50
End: 2020-05-04

## 2020-05-08 ENCOUNTER — TELEPHONE (OUTPATIENT)
Dept: GASTROENTEROLOGY | Facility: AMBULARY SURGERY CENTER | Age: 50
End: 2020-05-08

## 2020-05-20 ENCOUNTER — TELEMEDICINE (OUTPATIENT)
Dept: GASTROENTEROLOGY | Facility: AMBULARY SURGERY CENTER | Age: 50
End: 2020-05-20
Attending: OBSTETRICS & GYNECOLOGY
Payer: COMMERCIAL

## 2020-05-20 DIAGNOSIS — Z12.11 ENCOUNTER FOR SCREENING COLONOSCOPY: Primary | ICD-10-CM

## 2020-05-20 DIAGNOSIS — R10.13 DYSPEPSIA: ICD-10-CM

## 2020-05-20 PROCEDURE — 99204 OFFICE O/P NEW MOD 45 MIN: CPT | Performed by: INTERNAL MEDICINE

## 2020-06-22 ENCOUNTER — PREP FOR PROCEDURE (OUTPATIENT)
Dept: GASTROENTEROLOGY | Facility: AMBULARY SURGERY CENTER | Age: 50
End: 2020-06-22

## 2020-06-22 ENCOUNTER — TELEPHONE (OUTPATIENT)
Dept: GASTROENTEROLOGY | Facility: AMBULARY SURGERY CENTER | Age: 50
End: 2020-06-22

## 2020-06-22 DIAGNOSIS — R10.13 DYSPEPSIA: Primary | ICD-10-CM

## 2020-06-22 DIAGNOSIS — Z20.822 ENCOUNTER FOR LABORATORY TESTING FOR COVID-19 VIRUS: ICD-10-CM

## 2020-06-22 DIAGNOSIS — Z12.11 ENCOUNTER FOR SCREENING COLONOSCOPY: Primary | ICD-10-CM

## 2020-06-22 DIAGNOSIS — Z12.11 ENCOUNTER FOR SCREENING COLONOSCOPY: ICD-10-CM

## 2020-07-18 DIAGNOSIS — Z20.822 ENCOUNTER FOR LABORATORY TESTING FOR COVID-19 VIRUS: ICD-10-CM

## 2020-07-18 PROCEDURE — U0003 INFECTIOUS AGENT DETECTION BY NUCLEIC ACID (DNA OR RNA); SEVERE ACUTE RESPIRATORY SYNDROME CORONAVIRUS 2 (SARS-COV-2) (CORONAVIRUS DISEASE [COVID-19]), AMPLIFIED PROBE TECHNIQUE, MAKING USE OF HIGH THROUGHPUT TECHNOLOGIES AS DESCRIBED BY CMS-2020-01-R: HCPCS

## 2020-07-20 LAB — SARS-COV-2 RNA SPEC QL NAA+PROBE: NOT DETECTED

## 2020-07-22 ENCOUNTER — ANESTHESIA EVENT (OUTPATIENT)
Dept: GASTROENTEROLOGY | Facility: AMBULARY SURGERY CENTER | Age: 50
End: 2020-07-22

## 2020-07-23 ENCOUNTER — HOSPITAL ENCOUNTER (OUTPATIENT)
Dept: GASTROENTEROLOGY | Facility: AMBULARY SURGERY CENTER | Age: 50
Setting detail: OUTPATIENT SURGERY
Discharge: HOME | End: 2020-07-23
Attending: INTERNAL MEDICINE | Admitting: INTERNAL MEDICINE
Payer: COMMERCIAL

## 2020-07-23 ENCOUNTER — ANESTHESIA (OUTPATIENT)
Dept: GASTROENTEROLOGY | Facility: AMBULARY SURGERY CENTER | Age: 50
End: 2020-07-23

## 2020-07-23 VITALS
OXYGEN SATURATION: 98 % | RESPIRATION RATE: 26 BRPM | HEIGHT: 68 IN | SYSTOLIC BLOOD PRESSURE: 131 MMHG | TEMPERATURE: 97 F | DIASTOLIC BLOOD PRESSURE: 93 MMHG | BODY MASS INDEX: 28.79 KG/M2 | WEIGHT: 190 LBS | HEART RATE: 56 BPM

## 2020-07-23 DIAGNOSIS — R10.13 DYSPEPSIA: ICD-10-CM

## 2020-07-23 DIAGNOSIS — Z12.11 ENCOUNTER FOR SCREENING COLONOSCOPY: ICD-10-CM

## 2020-07-23 PROCEDURE — 88305 TISSUE EXAM BY PATHOLOGIST: CPT | Performed by: PATHOLOGY

## 2020-07-23 PROCEDURE — 45380 COLONOSCOPY AND BIOPSY: CPT | Performed by: INTERNAL MEDICINE

## 2020-07-23 PROCEDURE — NC001 PR NO CHARGE: Performed by: INTERNAL MEDICINE

## 2020-07-23 PROCEDURE — 43239 EGD BIOPSY SINGLE/MULTIPLE: CPT | Performed by: INTERNAL MEDICINE

## 2020-07-23 RX ORDER — SODIUM CHLORIDE 9 MG/ML
INJECTION, SOLUTION INTRAVENOUS CONTINUOUS PRN
Status: DISCONTINUED | OUTPATIENT
Start: 2020-07-23 | End: 2020-07-23 | Stop reason: SURG

## 2020-07-23 RX ORDER — PROPOFOL 10 MG/ML
INJECTION, EMULSION INTRAVENOUS AS NEEDED
Status: DISCONTINUED | OUTPATIENT
Start: 2020-07-23 | End: 2020-07-23 | Stop reason: SURG

## 2020-07-23 RX ORDER — GLYCOPYRROLATE 0.2 MG/ML
INJECTION INTRAMUSCULAR; INTRAVENOUS AS NEEDED
Status: DISCONTINUED | OUTPATIENT
Start: 2020-07-23 | End: 2020-07-23 | Stop reason: SURG

## 2020-07-23 RX ORDER — PANTOPRAZOLE SODIUM 40 MG/1
40 TABLET, DELAYED RELEASE ORAL DAILY
Qty: 30 TABLET | Refills: 2 | Status: SHIPPED | OUTPATIENT
Start: 2020-07-23 | End: 2020-10-06

## 2020-07-23 RX ORDER — SODIUM CHLORIDE, SODIUM LACTATE, POTASSIUM CHLORIDE, CALCIUM CHLORIDE 600; 310; 30; 20 MG/100ML; MG/100ML; MG/100ML; MG/100ML
125 INJECTION, SOLUTION INTRAVENOUS CONTINUOUS
Status: DISCONTINUED | OUTPATIENT
Start: 2020-07-23 | End: 2020-07-27 | Stop reason: HOSPADM

## 2020-07-23 RX ADMIN — PROPOFOL 20 MG: 10 INJECTION, EMULSION INTRAVENOUS at 09:03

## 2020-07-23 RX ADMIN — PROPOFOL 30 MG: 10 INJECTION, EMULSION INTRAVENOUS at 09:02

## 2020-07-23 RX ADMIN — GLYCOPYRROLATE 0.1 MG: 0.2 INJECTION, SOLUTION INTRAMUSCULAR; INTRAVENOUS at 08:52

## 2020-07-23 RX ADMIN — SODIUM CHLORIDE: 0.9 INJECTION, SOLUTION INTRAVENOUS at 08:48

## 2020-07-23 RX ADMIN — PROPOFOL 20 MG: 10 INJECTION, EMULSION INTRAVENOUS at 09:07

## 2020-07-23 RX ADMIN — PROPOFOL 20 MG: 10 INJECTION, EMULSION INTRAVENOUS at 09:04

## 2020-07-23 RX ADMIN — PROPOFOL 30 MG: 10 INJECTION, EMULSION INTRAVENOUS at 08:56

## 2020-07-23 RX ADMIN — PROPOFOL 20 MG: 10 INJECTION, EMULSION INTRAVENOUS at 09:16

## 2020-07-23 RX ADMIN — PROPOFOL 30 MG: 10 INJECTION, EMULSION INTRAVENOUS at 08:58

## 2020-07-23 RX ADMIN — LIDOCAINE HYDROCHLORIDE 50 MG: 20 INJECTION, SOLUTION INTRAVENOUS at 08:52

## 2020-07-23 RX ADMIN — PROPOFOL 100 MG: 10 INJECTION, EMULSION INTRAVENOUS at 08:55

## 2020-07-23 RX ADMIN — LIDOCAINE HYDROCHLORIDE 50 MG: 20 INJECTION, SOLUTION INTRAVENOUS at 08:55

## 2020-07-23 RX ADMIN — PROPOFOL 30 MG: 10 INJECTION, EMULSION INTRAVENOUS at 09:01

## 2020-07-23 RX ADMIN — PROPOFOL 20 MG: 10 INJECTION, EMULSION INTRAVENOUS at 09:13

## 2020-07-23 RX ADMIN — PROPOFOL 20 MG: 10 INJECTION, EMULSION INTRAVENOUS at 09:10

## 2020-07-23 RX ADMIN — PROPOFOL 30 MG: 10 INJECTION, EMULSION INTRAVENOUS at 09:00

## 2020-07-23 NOTE — ANESTHESIA PREPROCEDURE EVALUATION
Review of Systems/Medical History  Patient summary reviewed  Chart reviewed  No history of anesthetic complications     Cardiovascular  Exercise tolerance (METS): >4,     Pulmonary  Not a smoker ,        GI/Hepatic       Negative  ROS        Endo/Other     GYN    Breast cancer Right mastectomy       Hematology  Negative hematology ROS      Musculoskeletal  Negative musculoskeletal ROS        Neurology  Negative neurology ROS      Psychology           Physical Exam    Airway    Mallampati score: I  TM Distance: >3 FB  Neck ROM: full     Dental       Cardiovascular      Pulmonary      Other Findings        Anesthesia Plan  ASA Score- 2     Anesthesia Type- IV sedation with anesthesia with ASA Monitors  Additional Monitors:   Airway Plan:         Plan Factors-    Induction- intravenous  Postoperative Plan-     Informed Consent- Anesthetic plan and risks discussed with patient  I personally reviewed this patient with the CRNA  Discussed and agreed on the Anesthesia Plan with the CRNA  Sven Ag

## 2020-07-23 NOTE — H&P
History and Physical - [facility]  Dougiemarleen Ricky 53 y o  female MRN: [id number]        HPI:  26-year-old female with history of breast carcinoma reports having some dyspeptic symptoms especially when she eats wheat products  Regular bowel movements and denies any blood or mucus in the stool      Historical Information   Past Medical History:   Diagnosis Date    BRCA negative     Cancer ([facility] [address] )     breast     Endometriosis     IBS (irritable bowel syndrome)     Insomnia     Night sweats     Psoriasis     Wears glasses      Past Surgical History:   Procedure Laterality Date    BREAST BIOPSY Right [id number]    IDC     SECTION      LAPAROSCOPY      exploratory, endometriosis    LYMPHADENECTOMY Right     x2    MASTECTOMY      bilateral mastectomy    DE NIPPLE/AREOLA RECONSTRUCTION Bilateral 10/13/2016    Procedure: NIPPLE RECONSTRUCTION ;  Surgeon: Jacqueline Leija MD;  [facility];  Service: Plastics    DE REVISE BREAST RECONSTRUCTION Bilateral 3/9/2016    Procedure: RECONSTRUCTION REVISION  BREAST MOUND ,FAT GRAFTS ;  Surgeon: Jacqueline Leija MD;  [facility];  Service: Plastics    SENTINEL LYMPH NODE BIOPSY Right      Social History   Social History     Substance and Sexual Activity   Alcohol Use Yes    Frequency: Monthly or less    Comment: social     Social History     Substance and Sexual Activity   Drug Use Never     Social History     Tobacco Use   Smoking Status Never Smoker   Smokeless Tobacco Never Used     Family History   Problem Relation Age of Onset    Lung cancer Paternal Grandfather 48    Breast cancer Other     Thyroid cancer Mother 36    Psoriasis Father     Heart disease Other     Prostate cancer Other        Meds/Allergies       (Not in a hospital admission)    Allergies   Allergen Reactions    Other Hives     Kait selzer    Kait-Rajat Plus Cold & Cough  [Hgbxgdlnf-Lpq-Hp-Apap] Hives       Objective     Blood pressure 167/93, pulse 78, temperature 97 6 °F (36 4 °C), temperature source Temporal, resp  rate 18, height 5' 8" (1 727 m), weight 86 2 kg (190 lb), SpO2 100 %, not currently breastfeeding      PHYSICAL EXAM:    Gen: NAD  CV: S1 & S2 normal, RRR  CHEST: Clear to auscultate  ABD: soft, NT/ND, good bowel sounds  EXT: no edema    ASSESSMENT:     Dyspepsia, screening for colon cancer    PLAN:    EGD and colonoscopy

## 2020-07-29 ENCOUNTER — TELEPHONE (OUTPATIENT)
Dept: GASTROENTEROLOGY | Facility: AMBULARY SURGERY CENTER | Age: 50
End: 2020-07-29

## 2020-07-29 NOTE — TELEPHONE ENCOUNTER
----- Message from Guerita Barcenas MD sent at 7/28/2020  1:37 PM EDT -----  Gastric biopsies are benign and negative for H pylori   Colon polypoid lesion removed came back as benign      Repeat colonoscopy in 2 years because of polypoid lesion that was removed

## 2020-07-29 NOTE — TELEPHONE ENCOUNTER
Spoke with patient went over results , recall set,   Patient still has questioning regarding results, she understood getting labs done for celiac and food allergy panel , but thought it was getting checked during her EGD  She would like to speak with someone in regards to results and diet with lab questioning    Thanks

## 2020-07-30 NOTE — TELEPHONE ENCOUNTER
Spoke with patient  Discussed getting celiac and food allergy panel performed as she may not have changes to her mucosal lining on endoscopy if the celiac disease is not severe enough for has not been present for a long enough  Also reviewed anti-reflux diet and advised that she try the PPI for a few weeks in the future may be able to taper off on to Pepcid as needed  Answered all questions    Patient had no further questions

## 2020-07-31 ENCOUNTER — TELEPHONE (OUTPATIENT)
Dept: INTERNAL MEDICINE CLINIC | Facility: CLINIC | Age: 50
End: 2020-07-31

## 2020-07-31 NOTE — TELEPHONE ENCOUNTER
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

## 2020-08-10 ENCOUNTER — OFFICE VISIT (OUTPATIENT)
Dept: SURGICAL ONCOLOGY | Facility: CLINIC | Age: 50
End: 2020-08-10
Payer: COMMERCIAL

## 2020-08-10 VITALS
DIASTOLIC BLOOD PRESSURE: 88 MMHG | HEART RATE: 68 BPM | SYSTOLIC BLOOD PRESSURE: 160 MMHG | TEMPERATURE: 97.9 F | BODY MASS INDEX: 28.79 KG/M2 | HEIGHT: 68 IN | WEIGHT: 190 LBS

## 2020-08-10 DIAGNOSIS — Z17.0 MALIGNANT NEOPLASM OF UPPER-INNER QUADRANT OF RIGHT BREAST IN FEMALE, ESTROGEN RECEPTOR POSITIVE (HCC): Primary | ICD-10-CM

## 2020-08-10 DIAGNOSIS — C50.211 MALIGNANT NEOPLASM OF UPPER-INNER QUADRANT OF RIGHT BREAST IN FEMALE, ESTROGEN RECEPTOR POSITIVE (HCC): Primary | ICD-10-CM

## 2020-08-10 DIAGNOSIS — Z79.810 USE OF TAMOXIFEN (NOLVADEX): ICD-10-CM

## 2020-08-10 PROCEDURE — 3008F BODY MASS INDEX DOCD: CPT | Performed by: SPECIALIST

## 2020-08-10 PROCEDURE — 99214 OFFICE O/P EST MOD 30 MIN: CPT | Performed by: SURGERY

## 2020-08-10 PROCEDURE — 1036F TOBACCO NON-USER: CPT | Performed by: SURGERY

## 2020-08-10 PROCEDURE — 3008F BODY MASS INDEX DOCD: CPT | Performed by: SURGERY

## 2020-08-10 NOTE — PROGRESS NOTES
Surgical Oncology Follow Up       [address] [facility] SURGICAL ONCOLOGY [place]  [address]  [facility] [address]    Suella Mean  1970  [id number]  [address] [facility]  CANCER [facility] SURGICAL ONCOLOGY [place]  [place] [address]    Chief Complaint   Patient presents with    Follow-up       Assessment/Plan   Diagnoses and all orders for this visit:    Malignant neoplasm of upper-inner quadrant of right breast in female, estrogen receptor positive ([facility] [address] [facility] )    Use of tamoxifen (Nolvadex)        Advance Care Planning/Advance Directives:  Discussed disease status, cancer treatment plans and/or cancer treatment goals with the patient  Oncology History:    Oncology History   Malignant neoplasm of upper-inner quadrant of right breast in female, estrogen receptor positive ([facility] [address] )   5/14/2015 Surgery    Right US guided breast biopsy     6/30/2015 Surgery    Bilateral; mastectomies, right SLNB  Bilateral reconstruction with expanders  Dr Naveen Diaz     8/12/2015 -  Hormone Therapy    Tamoxifen  Dr Lambert Olmstead  10/23/2015 Surgery    Expanders removed, implants placed  11/6/2017 Initial Diagnosis    Malignant neoplasm of upper-inner quadrant of right breast in female, estrogen receptor positive (HCC)      Genetic Testing    BRCA negative      Genomic Testing    Oncotype DX  14         History of Present Illness:  Breast cancer follow-up, no concerns  -Interval History:  None    Review of Systems:  Review of Systems   Constitutional: Negative  Negative for appetite change and fever  Eyes: Negative  Respiratory: Negative for shortness of breath  Cardiovascular: Negative  Gastrointestinal: Negative  Endocrine: Negative  Genitourinary: Negative  Musculoskeletal: Negative  Negative for arthralgias and myalgias  Skin: Negative  Allergic/Immunologic: Negative  Neurological: Negative  Hematological: Negative    Negative for adenopathy  Does not bruise/bleed easily  Psychiatric/Behavioral: Negative          Patient Active Problem List   Diagnosis    Irritable bowel syndrome (IBS)    Malignant neoplasm of upper-inner quadrant of right breast in female, estrogen receptor positive (HCC)    Psoriasis    Seasonal allergies    Use of tamoxifen (Nolvadex)    Seborrheic keratosis    Screening for skin condition    Bilateral plantar fasciitis    Encounter for screening colonoscopy    Dyspepsia     Past Medical History:   Diagnosis Date    BRCA negative     Endometriosis     IBS (irritable bowel syndrome)     Insomnia     Night sweats     Psoriasis     Wears glasses      Past Surgical History:   Procedure Laterality Date    BREAST BIOPSY Right [id number]    IDC     SECTION      LAPAROSCOPY      exploratory, endometriosis    LYMPHADENECTOMY Right     x2    MASTECTOMY      bilateral mastectomy    AK NIPPLE/AREOLA RECONSTRUCTION Bilateral 10/13/2016    Procedure: NIPPLE RECONSTRUCTION ;  Surgeon: Ksenia Pruett MD;  [facility];  Service: Plastics    AK REVISE BREAST RECONSTRUCTION Bilateral 3/9/2016    Procedure: RECONSTRUCTION REVISION  BREAST MOUND ,FAT GRAFTS ;  Surgeon: Ksenia Pruett MD;  [facility];  Service: Plastics    SENTINEL LYMPH NODE BIOPSY Right      Family History   Problem Relation Age of Onset    Lung cancer Paternal Grandfather 48    Breast cancer Other     Thyroid cancer Mother 36    Psoriasis Father     Heart disease Other     Prostate cancer Other      Social History     Socioeconomic History    Marital status: /Civil Union     Spouse name: Not on file    Number of children: 1    Years of education: Not on file    Highest education level: Not on file   Occupational History    Occupation: employed   Social Needs    Financial resource strain: Not on file    Food insecurity     Worry: Not on file     Inability: Not on file   Serbian [facility] needs     Medical: Not on file     Non-medical: Not on file   Tobacco Use    Smoking status: Never Smoker    Smokeless tobacco: Never Used   Substance and Sexual Activity    Alcohol use: Yes     Frequency: Monthly or less     Comment: social    Drug use: Never    Sexual activity: Not Currently     Partners: Male     Birth control/protection: None   Lifestyle    Physical activity     Days per week: Not on file     Minutes per session: Not on file    Stress: Not on file   Relationships    Social connections     Talks on phone: Not on file     Gets together: Not on file     Attends Restorationism service: Not on file     Active member of club or organization: Not on file     Attends meetings of clubs or organizations: Not on file     Relationship status: Not on file    Intimate partner violence     Fear of current or ex partner: Not on file     Emotionally abused: Not on file     Physically abused: Not on file     Forced sexual activity: Not on file   Other Topics Concern    Not on file   Social History Narrative    Caffeine use- coffee       Current Outpatient Medications:     Ashwagandha 125 MG CAPS, Take by mouth, Disp: , Rfl:     Calcium 500 MG tablet, Take by mouth, Disp: , Rfl:     Cholecalciferol (VITAMIN D PO), Take 5,000 Units by mouth daily , Disp: , Rfl:     ciclopirox (PENLAC) 8 % solution, Paint daily; remove day#7 and use x6 weeks, Disp: 6 6 mL, Rfl: 1    Digestive Enzymes (DIGESTIVE ENZYME PO), Take 1 tablet by mouth daily as needed  , Disp: , Rfl:     Magnesium 400 MG CAPS, Take 1 tablet by mouth daily  , Disp: , Rfl:     MILK THISTLE PO, Take 600 mg by mouth daily  , Disp: , Rfl:     Multiple Vitamins-Minerals (ONE DAILY MULTIVITAMIN ADULT) TABS, Take by mouth, Disp: , Rfl:     NON FORMULARY, , Disp: , Rfl:     pantoprazole (PROTONIX) 40 mg tablet, Take 1 tablet (40 mg total) by mouth daily, Disp: 30 tablet, Rfl: 2    Probiotic Product (PROBIOTIC DAILY PO), Take 2 tablets by mouth daily  , Disp: , Rfl:    secukinumab (COSENTYX) 150 mg/mL SOAJ injection, Inject 150 mg under the skin every 30 (thirty) days, Disp: , Rfl:     tamoxifen (NOLVADEX) 20 mg tablet, TAKE 1 TABLET BY MOUTH EVERY DAY, Disp: 30 tablet, Rfl: 7    betamethasone, augmented, (DIPROLENE) 0 05 % lotion, Apply topically, Disp: , Rfl:     hydrocortisone 2 5 % cream, Apply topically, Disp: , Rfl:     Krill Oil 300 MG CAPS, Take by mouth, Disp: , Rfl:     Spirulina 500 MG TABS, by Does not apply route, Disp: , Rfl:   Allergies   Allergen Reactions    Other Hives     Kait selzer    Kait-[place] Plus Cold & Cough  [Nauhcqhdy-Hlj-Mv-Apap] Hives       The following portions of the patient's history were reviewed and updated as appropriate: allergies, current medications, past family history, past medical history, past social history, past surgical history and problem list         Vitals:    08/10/20 1356   BP: 160/88   Pulse: 68   Temp: 97 9 °F (36 6 °C)       Physical Exam  Constitutional:       Appearance: She is well-developed  HENT:      Head: Normocephalic and atraumatic  Cardiovascular:      Heart sounds: Normal heart sounds  Pulmonary:      Breath sounds: Normal breath sounds  Chest:      Breasts:         Right: Skin change (Mastectomy scar with implant) present  No mass or tenderness  Left: Skin change ( mastectomy scar with implant) present  No mass or tenderness  Abdominal:      Palpations: Abdomen is soft  Lymphadenopathy:      Upper Body:      Right upper body: No supraclavicular, axillary or pectoral adenopathy  Left upper body: No supraclavicular, axillary or pectoral adenopathy  Neurological:      Mental Status: She is alert and oriented to person, place, and time  Results:  Labs:      Imaging  09/05/2019 bilateral breast MRI is benign    I reviewed the above imaging data      Discussion/Summary:  52-year-old female status post bilateral mastectomy and reconstruction secondary to a stage IIA invasive ductal carcinoma of the right breast   Her genetic testing was negative  The left side was prophylactic in nature  She continues on tamoxifen  There is no evidence of disease based on examination today or her last MRI  I will continue to see her every six months; however, she does not need to have an annual MRI  I advised her to return sooner should the need arise

## 2020-08-12 NOTE — TELEPHONE ENCOUNTER
Dr Cherise Velasquez reviewed and stated she can have blood work completed early  Also, wrote a generalized letter on script paper stating patient is under his care for psoriasis/ psoriatic arthritis  I called and left patient a voicemail  Placed letter in brown Tylerian folder in front, informed patient on voicemail if she would like me to mail it to give us a call back and I will mail out for her

## 2020-08-17 ENCOUNTER — TRANSCRIBE ORDERS (OUTPATIENT)
Dept: LAB | Facility: CLINIC | Age: 50
End: 2020-08-17

## 2020-08-17 ENCOUNTER — APPOINTMENT (OUTPATIENT)
Dept: LAB | Facility: CLINIC | Age: 50
End: 2020-08-17
Payer: COMMERCIAL

## 2020-08-17 DIAGNOSIS — R10.13 DYSPEPSIA: ICD-10-CM

## 2020-08-17 DIAGNOSIS — R10.13 ABDOMINAL PAIN, EPIGASTRIC: Primary | ICD-10-CM

## 2020-08-17 DIAGNOSIS — R10.13 ABDOMINAL PAIN, EPIGASTRIC: ICD-10-CM

## 2020-08-17 LAB
ALBUMIN SERPL BCP-MCNC: 3.8 G/DL (ref 3.5–5)
ALP SERPL-CCNC: 55 U/L (ref 46–116)
ALT SERPL W P-5'-P-CCNC: 31 U/L (ref 12–78)
ANION GAP SERPL CALCULATED.3IONS-SCNC: 8 MMOL/L (ref 4–13)
AST SERPL W P-5'-P-CCNC: 24 U/L (ref 5–45)
BASOPHILS # BLD AUTO: 0.03 THOUSANDS/ΜL (ref 0–0.1)
BASOPHILS NFR BLD AUTO: 1 % (ref 0–1)
BILIRUB SERPL-MCNC: 0.53 MG/DL (ref 0.2–1)
BUN SERPL-MCNC: 11 MG/DL (ref 5–25)
CALCIUM SERPL-MCNC: 8.8 MG/DL (ref 8.3–10.1)
CHLORIDE SERPL-SCNC: 110 MMOL/L (ref 100–108)
CO2 SERPL-SCNC: 22 MMOL/L (ref 21–32)
CREAT SERPL-MCNC: 0.86 MG/DL (ref 0.6–1.3)
EOSINOPHIL # BLD AUTO: 0.09 THOUSAND/ΜL (ref 0–0.61)
EOSINOPHIL NFR BLD AUTO: 2 % (ref 0–6)
ERYTHROCYTE [DISTWIDTH] IN BLOOD BY AUTOMATED COUNT: 13.4 % (ref 11.6–15.1)
GFR SERPL CREATININE-BSD FRML MDRD: 80 ML/MIN/1.73SQ M
GLUCOSE SERPL-MCNC: 82 MG/DL (ref 65–140)
HCT VFR BLD AUTO: 38.4 % (ref 34.8–46.1)
HGB BLD-MCNC: 12.6 G/DL (ref 11.5–15.4)
IMM GRANULOCYTES # BLD AUTO: 0.02 THOUSAND/UL (ref 0–0.2)
IMM GRANULOCYTES NFR BLD AUTO: 0 % (ref 0–2)
LYMPHOCYTES # BLD AUTO: 1.53 THOUSANDS/ΜL (ref 0.6–4.47)
LYMPHOCYTES NFR BLD AUTO: 26 % (ref 14–44)
MCH RBC QN AUTO: 30.7 PG (ref 26.8–34.3)
MCHC RBC AUTO-ENTMCNC: 32.8 G/DL (ref 31.4–37.4)
MCV RBC AUTO: 94 FL (ref 82–98)
MONOCYTES # BLD AUTO: 0.34 THOUSAND/ΜL (ref 0.17–1.22)
MONOCYTES NFR BLD AUTO: 6 % (ref 4–12)
NEUTROPHILS # BLD AUTO: 3.86 THOUSANDS/ΜL (ref 1.85–7.62)
NEUTS SEG NFR BLD AUTO: 65 % (ref 43–75)
NRBC BLD AUTO-RTO: 0 /100 WBCS
PLATELET # BLD AUTO: 229 THOUSANDS/UL (ref 149–390)
PMV BLD AUTO: 10 FL (ref 8.9–12.7)
POTASSIUM SERPL-SCNC: 3.8 MMOL/L (ref 3.5–5.3)
PROT SERPL-MCNC: 7.2 G/DL (ref 6.4–8.2)
RBC # BLD AUTO: 4.1 MILLION/UL (ref 3.81–5.12)
SODIUM SERPL-SCNC: 140 MMOL/L (ref 136–145)
WBC # BLD AUTO: 5.87 THOUSAND/UL (ref 4.31–10.16)

## 2020-08-17 PROCEDURE — 85025 COMPLETE CBC W/AUTO DIFF WBC: CPT

## 2020-08-17 PROCEDURE — 80053 COMPREHEN METABOLIC PANEL: CPT

## 2020-08-17 PROCEDURE — 86003 ALLG SPEC IGE CRUDE XTRC EA: CPT

## 2020-08-17 PROCEDURE — 36415 COLL VENOUS BLD VENIPUNCTURE: CPT

## 2020-08-17 PROCEDURE — 82784 ASSAY IGA/IGD/IGG/IGM EACH: CPT

## 2020-08-17 PROCEDURE — 83516 IMMUNOASSAY NONANTIBODY: CPT

## 2020-08-17 PROCEDURE — 82785 ASSAY OF IGE: CPT

## 2020-08-17 PROCEDURE — 86480 TB TEST CELL IMMUN MEASURE: CPT

## 2020-08-17 PROCEDURE — 86255 FLUORESCENT ANTIBODY SCREEN: CPT

## 2020-08-18 LAB
ALLERGEN COMMENT: NORMAL
ALMOND IGE QN: <0.1 KUA/I
CASHEW NUT IGE QN: <0.1 KUA/I
CODFISH IGE QN: <0.1 KUA/I
EGG WHITE IGE QN: <0.1 KUA/I
GLUTEN IGE QN: <0.1 KUA/I
HAZELNUT IGE QN: <0.1 KUA/L
MILK IGE QN: <0.1 KUA/I
PEANUT IGE QN: <0.1 KUA/I
SALMON IGE QN: <0.1 KUA/I
SCALLOP IGE QN: <0.1 KUA/L
SESAME SEED IGE QN: <0.1 KUA/I
SHRIMP IGE QN: <0.1 KUA/L
SOYBEAN IGE QN: <0.1 KUA/I
TOTAL IGE SMQN RAST: 17.9 KU/L (ref 0–113)
TUNA IGE QN: <0.1 KUA/I
WALNUT IGE QN: <0.1 KUA/I
WHEAT IGE QN: <0.1 KUA/I

## 2020-08-19 LAB
ENDOMYSIUM IGA SER QL: NEGATIVE
GAMMA INTERFERON BACKGROUND BLD IA-ACNC: 0.06 IU/ML
GLIADIN PEPTIDE IGA SER-ACNC: 3 UNITS (ref 0–19)
GLIADIN PEPTIDE IGG SER-ACNC: 2 UNITS (ref 0–19)
IGA SERPL-MCNC: 181 MG/DL (ref 87–352)
M TB IFN-G BLD-IMP: NEGATIVE
M TB IFN-G CD4+ BCKGRND COR BLD-ACNC: -0.04 IU/ML
M TB IFN-G CD4+ BCKGRND COR BLD-ACNC: -0.06 IU/ML
MITOGEN IGNF BCKGRD COR BLD-ACNC: >10 IU/ML
TTG IGA SER-ACNC: <2 U/ML (ref 0–3)
TTG IGG SER-ACNC: <2 U/ML (ref 0–5)

## 2020-10-06 DIAGNOSIS — R10.13 DYSPEPSIA: ICD-10-CM

## 2020-10-06 RX ORDER — PANTOPRAZOLE SODIUM 40 MG/1
TABLET, DELAYED RELEASE ORAL
Qty: 30 TABLET | Refills: 2 | Status: SHIPPED | OUTPATIENT
Start: 2020-10-06 | End: 2020-10-08 | Stop reason: SDUPTHER

## 2020-10-07 ENCOUNTER — TELEPHONE (OUTPATIENT)
Dept: GASTROENTEROLOGY | Facility: AMBULARY SURGERY CENTER | Age: 50
End: 2020-10-07

## 2020-10-08 DIAGNOSIS — R10.13 DYSPEPSIA: ICD-10-CM

## 2020-10-08 RX ORDER — PANTOPRAZOLE SODIUM 40 MG/1
40 TABLET, DELAYED RELEASE ORAL DAILY
Qty: 30 TABLET | Refills: 0 | Status: SHIPPED | OUTPATIENT
Start: 2020-10-08 | End: 2021-11-17 | Stop reason: ALTCHOICE

## 2020-10-14 ENCOUNTER — TELEPHONE (OUTPATIENT)
Dept: INTERNAL MEDICINE CLINIC | Facility: CLINIC | Age: 50
End: 2020-10-14

## 2020-10-27 ENCOUNTER — IMMUNIZATIONS (OUTPATIENT)
Dept: FAMILY MEDICINE CLINIC | Facility: CLINIC | Age: 50
End: 2020-10-27
Payer: COMMERCIAL

## 2020-10-27 DIAGNOSIS — Z23 ENCOUNTER FOR IMMUNIZATION: ICD-10-CM

## 2020-10-27 PROCEDURE — 90682 RIV4 VACC RECOMBINANT DNA IM: CPT | Performed by: FAMILY MEDICINE

## 2020-10-27 PROCEDURE — 90471 IMMUNIZATION ADMIN: CPT | Performed by: FAMILY MEDICINE

## 2020-11-05 DIAGNOSIS — C50.919 MALIGNANT NEOPLASM OF BREAST IN FEMALE, ESTROGEN RECEPTOR POSITIVE, UNSPECIFIED LATERALITY, UNSPECIFIED SITE OF BREAST (HCC): ICD-10-CM

## 2020-11-05 DIAGNOSIS — Z17.0 MALIGNANT NEOPLASM OF BREAST IN FEMALE, ESTROGEN RECEPTOR POSITIVE, UNSPECIFIED LATERALITY, UNSPECIFIED SITE OF BREAST (HCC): ICD-10-CM

## 2020-11-05 RX ORDER — TAMOXIFEN CITRATE 20 MG/1
TABLET ORAL
Qty: 30 TABLET | Refills: 7 | Status: SHIPPED | OUTPATIENT
Start: 2020-11-05 | End: 2021-06-28

## 2020-11-11 ENCOUNTER — OFFICE VISIT (OUTPATIENT)
Dept: FAMILY MEDICINE CLINIC | Facility: CLINIC | Age: 50
End: 2020-11-11
Payer: COMMERCIAL

## 2020-11-11 VITALS
SYSTOLIC BLOOD PRESSURE: 142 MMHG | TEMPERATURE: 99.4 F | BODY MASS INDEX: 28.19 KG/M2 | HEIGHT: 68 IN | RESPIRATION RATE: 16 BRPM | WEIGHT: 186 LBS | HEART RATE: 74 BPM | DIASTOLIC BLOOD PRESSURE: 80 MMHG

## 2020-11-11 DIAGNOSIS — M25.552 LEFT HIP PAIN: ICD-10-CM

## 2020-11-11 DIAGNOSIS — Z00.00 ANNUAL PHYSICAL EXAM: Primary | ICD-10-CM

## 2020-11-11 DIAGNOSIS — B35.1 ONYCHOMYCOSIS OF TOENAIL: ICD-10-CM

## 2020-11-11 PROCEDURE — 99396 PREV VISIT EST AGE 40-64: CPT | Performed by: NURSE PRACTITIONER

## 2020-11-11 PROCEDURE — 3725F SCREEN DEPRESSION PERFORMED: CPT | Performed by: NURSE PRACTITIONER

## 2020-11-11 RX ORDER — EFINACONAZOLE 100 MG/ML
1 SOLUTION TOPICAL DAILY
Qty: 8 ML | Refills: 2 | Status: SHIPPED | OUTPATIENT
Start: 2020-11-11 | End: 2021-11-17 | Stop reason: ALTCHOICE

## 2020-11-17 ENCOUNTER — EVALUATION (OUTPATIENT)
Dept: PHYSICAL THERAPY | Age: 50
End: 2020-11-17
Payer: COMMERCIAL

## 2020-11-17 DIAGNOSIS — M25.552 LEFT HIP PAIN: Primary | ICD-10-CM

## 2020-11-17 DIAGNOSIS — M76.892 TENDINITIS OF LEFT HIP FLEXOR: ICD-10-CM

## 2020-11-17 PROCEDURE — 97161 PT EVAL LOW COMPLEX 20 MIN: CPT | Performed by: PHYSICAL THERAPIST

## 2020-11-17 PROCEDURE — 97110 THERAPEUTIC EXERCISES: CPT | Performed by: PHYSICAL THERAPIST

## 2020-11-18 ENCOUNTER — APPOINTMENT (OUTPATIENT)
Dept: PHYSICAL THERAPY | Age: 50
End: 2020-11-18
Payer: COMMERCIAL

## 2020-11-19 ENCOUNTER — OFFICE VISIT (OUTPATIENT)
Dept: PHYSICAL THERAPY | Age: 50
End: 2020-11-19
Payer: COMMERCIAL

## 2020-11-19 DIAGNOSIS — M76.892 TENDINITIS OF LEFT HIP FLEXOR: ICD-10-CM

## 2020-11-19 DIAGNOSIS — M25.552 LEFT HIP PAIN: Primary | ICD-10-CM

## 2020-11-19 PROCEDURE — 97140 MANUAL THERAPY 1/> REGIONS: CPT | Performed by: PHYSICAL THERAPIST

## 2020-11-19 PROCEDURE — 97112 NEUROMUSCULAR REEDUCATION: CPT | Performed by: PHYSICAL THERAPIST

## 2020-11-19 PROCEDURE — 97110 THERAPEUTIC EXERCISES: CPT | Performed by: PHYSICAL THERAPIST

## 2020-11-23 ENCOUNTER — OFFICE VISIT (OUTPATIENT)
Dept: PHYSICAL THERAPY | Age: 50
End: 2020-11-23
Payer: COMMERCIAL

## 2020-11-23 DIAGNOSIS — M25.552 LEFT HIP PAIN: Primary | ICD-10-CM

## 2020-11-23 DIAGNOSIS — M76.892 TENDINITIS OF LEFT HIP FLEXOR: ICD-10-CM

## 2020-11-23 PROCEDURE — 97112 NEUROMUSCULAR REEDUCATION: CPT | Performed by: PHYSICAL THERAPIST

## 2020-11-23 PROCEDURE — 97110 THERAPEUTIC EXERCISES: CPT | Performed by: PHYSICAL THERAPIST

## 2020-11-23 PROCEDURE — 97140 MANUAL THERAPY 1/> REGIONS: CPT | Performed by: PHYSICAL THERAPIST

## 2020-11-25 ENCOUNTER — OFFICE VISIT (OUTPATIENT)
Dept: PHYSICAL THERAPY | Age: 50
End: 2020-11-25
Payer: COMMERCIAL

## 2020-11-25 ENCOUNTER — ANNUAL EXAM (OUTPATIENT)
Dept: OBGYN CLINIC | Facility: CLINIC | Age: 50
End: 2020-11-25
Payer: COMMERCIAL

## 2020-11-25 VITALS
DIASTOLIC BLOOD PRESSURE: 82 MMHG | HEIGHT: 68 IN | WEIGHT: 190 LBS | BODY MASS INDEX: 28.79 KG/M2 | SYSTOLIC BLOOD PRESSURE: 140 MMHG

## 2020-11-25 DIAGNOSIS — Z01.419 WELL FEMALE EXAM WITH ROUTINE GYNECOLOGICAL EXAM: Primary | ICD-10-CM

## 2020-11-25 DIAGNOSIS — M76.892 TENDINITIS OF LEFT HIP FLEXOR: ICD-10-CM

## 2020-11-25 DIAGNOSIS — B37.3 VAGINA, CANDIDIASIS: ICD-10-CM

## 2020-11-25 DIAGNOSIS — Z12.31 ENCOUNTER FOR SCREENING MAMMOGRAM FOR MALIGNANT NEOPLASM OF BREAST: ICD-10-CM

## 2020-11-25 DIAGNOSIS — M25.552 LEFT HIP PAIN: Primary | ICD-10-CM

## 2020-11-25 DIAGNOSIS — Z12.4 ENCOUNTER FOR SCREENING FOR MALIGNANT NEOPLASM OF CERVIX: ICD-10-CM

## 2020-11-25 PROCEDURE — G0145 SCR C/V CYTO,THINLAYER,RESCR: HCPCS | Performed by: OBSTETRICS & GYNECOLOGY

## 2020-11-25 PROCEDURE — 87624 HPV HI-RISK TYP POOLED RSLT: CPT | Performed by: OBSTETRICS & GYNECOLOGY

## 2020-11-25 PROCEDURE — 1036F TOBACCO NON-USER: CPT | Performed by: OBSTETRICS & GYNECOLOGY

## 2020-11-25 PROCEDURE — 99396 PREV VISIT EST AGE 40-64: CPT | Performed by: OBSTETRICS & GYNECOLOGY

## 2020-11-25 PROCEDURE — 97112 NEUROMUSCULAR REEDUCATION: CPT | Performed by: PHYSICAL THERAPIST

## 2020-11-25 PROCEDURE — 3008F BODY MASS INDEX DOCD: CPT | Performed by: OBSTETRICS & GYNECOLOGY

## 2020-11-25 PROCEDURE — 97110 THERAPEUTIC EXERCISES: CPT | Performed by: PHYSICAL THERAPIST

## 2020-11-25 PROCEDURE — 97140 MANUAL THERAPY 1/> REGIONS: CPT | Performed by: PHYSICAL THERAPIST

## 2020-11-25 RX ORDER — FLUCONAZOLE 150 MG/1
150 TABLET ORAL ONCE
Qty: 15 TABLET | Refills: 0 | Status: SHIPPED | OUTPATIENT
Start: 2020-11-25 | End: 2020-11-25

## 2020-11-30 ENCOUNTER — OFFICE VISIT (OUTPATIENT)
Dept: PHYSICAL THERAPY | Age: 50
End: 2020-11-30
Payer: COMMERCIAL

## 2020-11-30 DIAGNOSIS — M25.552 LEFT HIP PAIN: Primary | ICD-10-CM

## 2020-11-30 DIAGNOSIS — M76.892 TENDINITIS OF LEFT HIP FLEXOR: ICD-10-CM

## 2020-11-30 PROCEDURE — 97140 MANUAL THERAPY 1/> REGIONS: CPT | Performed by: PHYSICAL THERAPIST

## 2020-11-30 PROCEDURE — 97112 NEUROMUSCULAR REEDUCATION: CPT | Performed by: PHYSICAL THERAPIST

## 2020-11-30 PROCEDURE — 97110 THERAPEUTIC EXERCISES: CPT | Performed by: PHYSICAL THERAPIST

## 2020-12-02 ENCOUNTER — OFFICE VISIT (OUTPATIENT)
Dept: PHYSICAL THERAPY | Age: 50
End: 2020-12-02
Payer: COMMERCIAL

## 2020-12-02 DIAGNOSIS — M76.892 TENDINITIS OF LEFT HIP FLEXOR: ICD-10-CM

## 2020-12-02 DIAGNOSIS — M25.552 LEFT HIP PAIN: Primary | ICD-10-CM

## 2020-12-02 PROCEDURE — 97112 NEUROMUSCULAR REEDUCATION: CPT | Performed by: PHYSICAL THERAPIST

## 2020-12-02 PROCEDURE — 97140 MANUAL THERAPY 1/> REGIONS: CPT | Performed by: PHYSICAL THERAPIST

## 2020-12-02 PROCEDURE — 97110 THERAPEUTIC EXERCISES: CPT | Performed by: PHYSICAL THERAPIST

## 2020-12-03 LAB
LAB AP GYN PRIMARY INTERPRETATION: NORMAL
Lab: NORMAL

## 2020-12-07 ENCOUNTER — OFFICE VISIT (OUTPATIENT)
Dept: PHYSICAL THERAPY | Age: 50
End: 2020-12-07
Payer: COMMERCIAL

## 2020-12-07 DIAGNOSIS — M25.552 LEFT HIP PAIN: Primary | ICD-10-CM

## 2020-12-07 DIAGNOSIS — M76.892 TENDINITIS OF LEFT HIP FLEXOR: ICD-10-CM

## 2020-12-07 PROCEDURE — 97140 MANUAL THERAPY 1/> REGIONS: CPT | Performed by: PHYSICAL THERAPIST

## 2020-12-07 PROCEDURE — 97112 NEUROMUSCULAR REEDUCATION: CPT | Performed by: PHYSICAL THERAPIST

## 2020-12-07 PROCEDURE — 97110 THERAPEUTIC EXERCISES: CPT | Performed by: PHYSICAL THERAPIST

## 2020-12-10 ENCOUNTER — OFFICE VISIT (OUTPATIENT)
Dept: PHYSICAL THERAPY | Age: 50
End: 2020-12-10
Payer: COMMERCIAL

## 2020-12-10 DIAGNOSIS — M76.892 TENDINITIS OF LEFT HIP FLEXOR: ICD-10-CM

## 2020-12-10 DIAGNOSIS — M25.552 LEFT HIP PAIN: Primary | ICD-10-CM

## 2020-12-10 PROCEDURE — 97110 THERAPEUTIC EXERCISES: CPT

## 2020-12-10 PROCEDURE — 97112 NEUROMUSCULAR REEDUCATION: CPT

## 2020-12-14 ENCOUNTER — OFFICE VISIT (OUTPATIENT)
Dept: PHYSICAL THERAPY | Age: 50
End: 2020-12-14
Payer: COMMERCIAL

## 2020-12-14 DIAGNOSIS — M76.892 TENDINITIS OF LEFT HIP FLEXOR: ICD-10-CM

## 2020-12-14 DIAGNOSIS — M25.552 LEFT HIP PAIN: Primary | ICD-10-CM

## 2020-12-14 PROCEDURE — 97110 THERAPEUTIC EXERCISES: CPT | Performed by: PHYSICAL THERAPIST

## 2020-12-14 PROCEDURE — 97140 MANUAL THERAPY 1/> REGIONS: CPT | Performed by: PHYSICAL THERAPIST

## 2020-12-14 PROCEDURE — 97112 NEUROMUSCULAR REEDUCATION: CPT | Performed by: PHYSICAL THERAPIST

## 2020-12-15 ENCOUNTER — OFFICE VISIT (OUTPATIENT)
Dept: HEMATOLOGY ONCOLOGY | Facility: CLINIC | Age: 50
End: 2020-12-15
Payer: COMMERCIAL

## 2020-12-15 VITALS
HEART RATE: 78 BPM | WEIGHT: 192 LBS | BODY MASS INDEX: 29.1 KG/M2 | OXYGEN SATURATION: 99 % | HEIGHT: 68 IN | RESPIRATION RATE: 18 BRPM | SYSTOLIC BLOOD PRESSURE: 124 MMHG | TEMPERATURE: 98 F | DIASTOLIC BLOOD PRESSURE: 78 MMHG

## 2020-12-15 DIAGNOSIS — Z17.0 MALIGNANT NEOPLASM OF UPPER-INNER QUADRANT OF RIGHT BREAST IN FEMALE, ESTROGEN RECEPTOR POSITIVE (HCC): Primary | ICD-10-CM

## 2020-12-15 DIAGNOSIS — C50.211 MALIGNANT NEOPLASM OF UPPER-INNER QUADRANT OF RIGHT BREAST IN FEMALE, ESTROGEN RECEPTOR POSITIVE (HCC): Primary | ICD-10-CM

## 2020-12-15 PROCEDURE — 99214 OFFICE O/P EST MOD 30 MIN: CPT | Performed by: INTERNAL MEDICINE

## 2020-12-15 PROCEDURE — 3008F BODY MASS INDEX DOCD: CPT | Performed by: INTERNAL MEDICINE

## 2020-12-16 ENCOUNTER — APPOINTMENT (OUTPATIENT)
Dept: PHYSICAL THERAPY | Age: 50
End: 2020-12-16
Payer: COMMERCIAL

## 2020-12-17 ENCOUNTER — OFFICE VISIT (OUTPATIENT)
Dept: PHYSICAL THERAPY | Age: 50
End: 2020-12-17
Payer: COMMERCIAL

## 2020-12-17 DIAGNOSIS — M76.892 TENDINITIS OF LEFT HIP FLEXOR: ICD-10-CM

## 2020-12-17 DIAGNOSIS — M25.552 LEFT HIP PAIN: Primary | ICD-10-CM

## 2020-12-17 PROCEDURE — 97110 THERAPEUTIC EXERCISES: CPT | Performed by: PHYSICAL THERAPIST

## 2020-12-17 PROCEDURE — 97112 NEUROMUSCULAR REEDUCATION: CPT | Performed by: PHYSICAL THERAPIST

## 2020-12-17 PROCEDURE — 97140 MANUAL THERAPY 1/> REGIONS: CPT | Performed by: PHYSICAL THERAPIST

## 2020-12-18 ENCOUNTER — TELEPHONE (OUTPATIENT)
Dept: INTERNAL MEDICINE CLINIC | Facility: CLINIC | Age: 50
End: 2020-12-18

## 2020-12-21 ENCOUNTER — OFFICE VISIT (OUTPATIENT)
Dept: PHYSICAL THERAPY | Age: 50
End: 2020-12-21
Payer: COMMERCIAL

## 2020-12-21 DIAGNOSIS — M25.552 LEFT HIP PAIN: Primary | ICD-10-CM

## 2020-12-21 DIAGNOSIS — M76.892 TENDINITIS OF LEFT HIP FLEXOR: ICD-10-CM

## 2020-12-21 PROCEDURE — 97112 NEUROMUSCULAR REEDUCATION: CPT | Performed by: PHYSICAL THERAPIST

## 2020-12-21 PROCEDURE — 97110 THERAPEUTIC EXERCISES: CPT | Performed by: PHYSICAL THERAPIST

## 2020-12-21 PROCEDURE — 97140 MANUAL THERAPY 1/> REGIONS: CPT | Performed by: PHYSICAL THERAPIST

## 2020-12-29 ENCOUNTER — OFFICE VISIT (OUTPATIENT)
Dept: PHYSICAL THERAPY | Age: 50
End: 2020-12-29
Payer: COMMERCIAL

## 2020-12-29 DIAGNOSIS — M25.552 LEFT HIP PAIN: Primary | ICD-10-CM

## 2020-12-29 DIAGNOSIS — M76.892 TENDINITIS OF LEFT HIP FLEXOR: ICD-10-CM

## 2020-12-29 PROCEDURE — 97140 MANUAL THERAPY 1/> REGIONS: CPT

## 2020-12-29 PROCEDURE — 97112 NEUROMUSCULAR REEDUCATION: CPT

## 2020-12-29 PROCEDURE — 97110 THERAPEUTIC EXERCISES: CPT

## 2020-12-31 ENCOUNTER — OFFICE VISIT (OUTPATIENT)
Dept: PHYSICAL THERAPY | Age: 50
End: 2020-12-31
Payer: COMMERCIAL

## 2020-12-31 DIAGNOSIS — M76.892 TENDINITIS OF LEFT HIP FLEXOR: ICD-10-CM

## 2020-12-31 DIAGNOSIS — M25.552 LEFT HIP PAIN: Primary | ICD-10-CM

## 2020-12-31 PROCEDURE — 97140 MANUAL THERAPY 1/> REGIONS: CPT | Performed by: PHYSICAL THERAPIST

## 2020-12-31 PROCEDURE — 97110 THERAPEUTIC EXERCISES: CPT | Performed by: PHYSICAL THERAPIST

## 2020-12-31 PROCEDURE — 97112 NEUROMUSCULAR REEDUCATION: CPT | Performed by: PHYSICAL THERAPIST

## 2021-01-04 ENCOUNTER — OFFICE VISIT (OUTPATIENT)
Dept: PHYSICAL THERAPY | Age: 51
End: 2021-01-04
Payer: COMMERCIAL

## 2021-01-04 ENCOUNTER — TELEPHONE (OUTPATIENT)
Dept: INTERNAL MEDICINE CLINIC | Facility: CLINIC | Age: 51
End: 2021-01-04

## 2021-01-04 DIAGNOSIS — M25.552 LEFT HIP PAIN: Primary | ICD-10-CM

## 2021-01-04 DIAGNOSIS — M76.892 TENDINITIS OF LEFT HIP FLEXOR: ICD-10-CM

## 2021-01-04 PROCEDURE — 97140 MANUAL THERAPY 1/> REGIONS: CPT

## 2021-01-04 PROCEDURE — 97110 THERAPEUTIC EXERCISES: CPT

## 2021-01-04 PROCEDURE — 97112 NEUROMUSCULAR REEDUCATION: CPT

## 2021-01-04 NOTE — PROGRESS NOTES
Daily Note     Today's date: 2021  Patient name: Tino Cornelius  : 1970  MRN: [id number]  Referring provider: AGUSTINA Diana  Dx:   Encounter Diagnosis     ICD-10-CM    1  Left hip pain  M25 552    2  Tendinitis of left hip flexor  M76 [id number]                   Subjective: pt reports quad soreness after squat progressions last visit      Objective: See treatment diary below      Assessment: Tolerated treatment well  Patient would benefit from continued PT      Plan: Continue per plan of care  Progress treatment as tolerated  Precautions: Precautions: IBS,  hx of breast cancer (4 years)    Current HEP: Quad rocks, piriformis stretch, Mini squats       Manuals 20   Caudal hip distraction supine     LAD LLE JF VK   S/L Quad stretch     HAKAN stretch JF VK   Neuro Re-Ed 20   STS 2*10 2x10   Standing hip abd tb 2*10 b/l gtb 2x10 ea   Lateral walks and monster walks  3 laps 3 laps   bridges 3x10x5" 3x10x5"   Mini squats  3x10 3x10    Squats at bar and ed on lifitng          Ther Ex 20   Hip flexor kneeling stretch          Upright bike 5' 5'   Vigor gym lvl 10 5' lvl 10 5'   Prone hip extension  3x10x5" 3x10x5"   Stand hip abd       Kneeling quad rock np    [facility]     Hip ER stretch 4x30" 4x30"   Ther Activity               Gait Training 20    TM 6' 1 2mph         Modalities

## 2021-01-05 NOTE — TELEPHONE ENCOUNTER
Patient called, [facility] never rec'd the ppwk  I spoke to the staff at Dr Bravo iBggs office  Patient's refill request was faxed on 12/29/20 and they rec'd confirmation of the fax  Dr Bravo Biggs staff agreed to have one of the Medical Assistants call in a verbal auth for this script  I called the patient back and made her aware

## 2021-01-05 NOTE — TELEPHONE ENCOUNTER
They called back from Dr Brandie Arriola office  They are unsure why the script is not going through  They tried to call it in verbally but it would be a 30 minute wait  They are asking we take care of it since it's our patient  She is faxing the script back to us  Received script and spoke to verbally to [facility] regarding this  She will verbally call in the script

## 2021-01-07 ENCOUNTER — OFFICE VISIT (OUTPATIENT)
Dept: PHYSICAL THERAPY | Age: 51
End: 2021-01-07
Payer: COMMERCIAL

## 2021-01-07 DIAGNOSIS — M25.552 LEFT HIP PAIN: Primary | ICD-10-CM

## 2021-01-07 DIAGNOSIS — M76.892 TENDINITIS OF LEFT HIP FLEXOR: ICD-10-CM

## 2021-01-07 PROCEDURE — 97140 MANUAL THERAPY 1/> REGIONS: CPT | Performed by: PHYSICAL THERAPIST

## 2021-01-07 PROCEDURE — 97110 THERAPEUTIC EXERCISES: CPT | Performed by: PHYSICAL THERAPIST

## 2021-01-07 PROCEDURE — 97112 NEUROMUSCULAR REEDUCATION: CPT | Performed by: PHYSICAL THERAPIST

## 2021-01-07 NOTE — PROGRESS NOTES
Daily Note     Today's date: 2021  Patient name: Kenn Salter  : 1970  MRN: [id number]  Referring provider: AGUSTINA Lopez  Dx:   Encounter Diagnosis     ICD-10-CM    1  Left hip pain  M25 552    2  Tendinitis of left hip flexor  M76 892                   Subjective: Pt continues to report improvements  Objective: See treatment diary below      Assessment: Tolerated treatment well  Pt will be discharged next visit if symptoms do not worsen  Patient demonstrated fatigue post treatment and would benefit from continued PT      Plan: Continue per plan of care  Precautions: Precautions: IBS,  hx of breast cancer (4 years)    Current HEP: Quad rocks, piriformis stretch, Mini squats       Manuals 20   Caudal hip distraction supine  JF   LAD LLE JF JF   S/L Quad stretch     HAKAN stretch JF    Neuro Re-Ed 20   STS 2*10 2x10   Standing hip abd tb 2*10 b/l gtb 2x10 ea   Lateral walks and monster walks  3 laps 3 laps   bridges 3x10x5" 3x10x5"   Mini squats  3x10 3x10    Squats at bar and ed on lifitng          Ther Ex 20   Hip flexor kneeling stretch          Upright bike 5' 5'   Vigor gym lvl 10 5' lvl 10 5'   Prone hip extension  3x10x5" 3x10x5"   Stand hip abd       Kneeling quad rock np    [facility]     Hip ER stretch 4x30" 4x30"   Ther Activity               Gait Training 20    TM 6' 1 2mph         Modalities

## 2021-01-08 ENCOUNTER — TELEMEDICINE (OUTPATIENT)
Dept: FAMILY MEDICINE CLINIC | Facility: CLINIC | Age: 51
End: 2021-01-08
Payer: COMMERCIAL

## 2021-01-08 DIAGNOSIS — Z20.822 SUSPECTED COVID-19 VIRUS INFECTION: Primary | ICD-10-CM

## 2021-01-08 DIAGNOSIS — Z20.822 SUSPECTED COVID-19 VIRUS INFECTION: ICD-10-CM

## 2021-01-08 PROCEDURE — 99213 OFFICE O/P EST LOW 20 MIN: CPT | Performed by: FAMILY MEDICINE

## 2021-01-08 PROCEDURE — U0003 INFECTIOUS AGENT DETECTION BY NUCLEIC ACID (DNA OR RNA); SEVERE ACUTE RESPIRATORY SYNDROME CORONAVIRUS 2 (SARS-COV-2) (CORONAVIRUS DISEASE [COVID-19]), AMPLIFIED PROBE TECHNIQUE, MAKING USE OF HIGH THROUGHPUT TECHNOLOGIES AS DESCRIBED BY CMS-2020-01-R: HCPCS | Performed by: FAMILY MEDICINE

## 2021-01-08 PROCEDURE — 1036F TOBACCO NON-USER: CPT | Performed by: FAMILY MEDICINE

## 2021-01-08 PROCEDURE — U0005 INFEC AGEN DETEC AMPLI PROBE: HCPCS | Performed by: FAMILY MEDICINE

## 2021-01-09 LAB — SARS-COV-2 RNA SPEC QL NAA+PROBE: NOT DETECTED

## 2021-01-11 ENCOUNTER — TELEPHONE (OUTPATIENT)
Dept: INTERNAL MEDICINE CLINIC | Facility: CLINIC | Age: 51
End: 2021-01-11

## 2021-01-11 ENCOUNTER — EVALUATION (OUTPATIENT)
Dept: PHYSICAL THERAPY | Age: 51
End: 2021-01-11
Payer: COMMERCIAL

## 2021-01-11 ENCOUNTER — TELEPHONE (OUTPATIENT)
Dept: FAMILY MEDICINE CLINIC | Facility: CLINIC | Age: 51
End: 2021-01-11

## 2021-01-11 DIAGNOSIS — M25.552 LEFT HIP PAIN: Primary | ICD-10-CM

## 2021-01-11 DIAGNOSIS — M76.892 TENDINITIS OF LEFT HIP FLEXOR: ICD-10-CM

## 2021-01-11 DIAGNOSIS — J01.90 ACUTE SINUSITIS, RECURRENCE NOT SPECIFIED, UNSPECIFIED LOCATION: Primary | ICD-10-CM

## 2021-01-11 PROCEDURE — 97140 MANUAL THERAPY 1/> REGIONS: CPT | Performed by: PHYSICAL THERAPIST

## 2021-01-11 PROCEDURE — 97112 NEUROMUSCULAR REEDUCATION: CPT | Performed by: PHYSICAL THERAPIST

## 2021-01-11 PROCEDURE — 97110 THERAPEUTIC EXERCISES: CPT | Performed by: PHYSICAL THERAPIST

## 2021-01-11 RX ORDER — AZITHROMYCIN 250 MG/1
TABLET, FILM COATED ORAL
Qty: 6 TABLET | Refills: 0 | Status: SHIPPED | OUTPATIENT
Start: 2021-01-11 | End: 2021-01-15

## 2021-01-11 RX ORDER — METHYLPREDNISOLONE 4 MG/1
TABLET ORAL
Qty: 21 EACH | Refills: 0 | Status: SHIPPED | OUTPATIENT
Start: 2021-01-11 | End: 2021-11-17 | Stop reason: ALTCHOICE

## 2021-01-11 NOTE — TELEPHONE ENCOUNTER
Patient was due for her Cosentyx on 1/8/21  She did not take it because she started not feeling well  Her COVID test was negative  Her PCP started her on Methylprednisolone 4 mg and Azithromycin 250 mg for 5 days  She just didn't want to mix to many medications  She was just wondering when she should take the Cosentyx? Message taken for when Dr Jorge Cherry is here on 1/13/21  Patient is aware and will wait for a call back with his response

## 2021-01-11 NOTE — TELEPHONE ENCOUNTER
Patient called  She received her negative covid results  She is feeling crappy again today and thinks she may have a sinus infection   Do you thinks she should have an antibiotic

## 2021-01-11 NOTE — PROGRESS NOTES
PT D/C NOTE    Today's date: 2021  Patient name: Guera Lopez  : 1970  MRN: [id number]  Referring provider: AGUSTINA Haley  Dx:   Encounter Diagnosis     ICD-10-CM    1  Left hip pain  M25 552 Ambulatory referral to Physical Therapy   2  Tendinitis of left hip flexor  M76 892        Start Time: 0930  Stop Time: 1015  Total time in clinic (min): 45 minutes    Assessment  Assessment details: Pt is a 52 y/o female who presents with left hip pain  Pt has met all functional goals  Impairments: abnormal or restricted ROM, impaired balance, impaired physical strength, lacks appropriate home exercise program, pain with function, poor posture  and poor body mechanics  Understanding of Dx/Px/POC: good   Prognosis: good    Goals  Short Term Goals: to be achieved by 4 weeks MET   1) Patient to be independent with basic HEP  2) Decrease pain to 2/10 at its worst   3) Increase LE strength by 1/2 MMT grade in all deficient planes  Long Term Goals: to be achieved by discharge MET  1) FOTO equal to or greater than 65   2) Ambulation to improve to maximal level of function  3) Stair negotiation will improve to reciprocal   4) Sit to stand transfers will improve to maximal level of function     Plan  Patient would benefit from: D/C PT with HEP        Subjective Evaluation    History of Present Illness  Mechanism of injury: Pt reports being diagnosed with psoratic arthritis 10 months ago  Pt was properly medicated and numerous aches and pains resolved but had no resolution of left hip pain  Pt has been to PT for 2 previous episodes and has done wonderfully  Pt has PMH of Breast CA     Quality of life: good    Pain  Current pain ratin  At best pain ratin  At worst pain ratin  Quality: sharp and dull ache  Aggravating factors: lifting, stair climbing, walking, standing and running    Hand dominance: right    Patient Goals  Patient goals for therapy: decreased pain, independence with ADLs/IADLs, return to sport/leisure activities, increased strength and increased motion          Objective     General Comments:      Hip Comments     GAIT:  Squat assess: min difficulty   SLS: RLE: 30 secs, LLE: 30 secs             MMT         AROM          PROM   Hip 5/5      Iliop  MMT         AROM         PROM   Knee      R          L         R          L           R         L  Flex  5 5     Extn  5 5                 Hip:  scour= -    nicci = -    Fadir= +   joint mobility= min restriction     long axis distraction=  +            hamstring dominance test=  +      Palpation: Pain at proximal insertion of hip flexors       Flowsheet Rows      Most Recent Value   PT/OT G-Codes   Current Score  77   Projected Score  67             Precautions: Precautions: IBS,  hx of breast cancer (4 years)    Current HEP: IE flowsheet      Manuals 12/31/20 1/11/21   Caudal hip distraction supine  JF   LAD LLE JF JF   S/L Quad stretch     NICCI stretch JF    Neuro Re-Ed 12/31/20 1/11/21   STS 2*10 2x10   Standing hip abd tb 2*10 b/l gtb 2x10 ea   Lateral walks and monster walks  3 laps 3 laps   bridges 3x10x5" 3x10x5"   Mini squats  3x10 3x10    Squats at bar and ed on lifitng          Ther Ex 12/31/20 1/4/21   Hip flexor kneeling stretch          Upright bike 5' 5'   Vigor gym lvl 10 5' lvl 10 5'   Prone hip extension  3x10x5" 3x10x5"   Stand hip abd       Kneeling quad rock np    [facility]     Hip ER stretch 4x30" 4x30"   Ther Activity               Gait Training 12/31/20    TM 6' 1 2mph         Modalities

## 2021-01-13 NOTE — PROGRESS NOTES
Virtual Regular Visit      Assessment/Plan:    Problem List Items Addressed This Visit     None      Visit Diagnoses     Suspected COVID-19 virus infection    -  Primary    Relevant Orders    Novel Coronavirus (COVID-19), PCR [facility] - Collected at   [facility] or [facility] (Completed)               Reason for visit is for symptoms of fever, body aches, nasal congestion and headache  She denies any cough or dyspnea, loss of smell or taste  Encounter provider Avelina Moeller DO    Provider located at [address] [place] [id number]      Recent Visits  Date Type Provider Dept   21 Telephone [facility] recent visits within past 7 days and meeting all other requirements     Future Appointments  No visits were found meeting these conditions  Showing future appointments within next 150 days and meeting all other requirements        The patient was identified by name and date of birth  Joanna Chawla was informed that this is a telemedicine visit and that the visit is being conducted through archify and patient was informed that this is a secure, HIPAA-compliant platform  She agrees to proceed     My office door was closed  No one else was in the room  She acknowledged consent and understanding of privacy and security of the video platform  The patient has agreed to participate and understands they can discontinue the visit at any time  Patient is aware this is a billable service  Subjective  Joanna Chawla is a 49 y o  female as above         HPI     Past Medical History:   Diagnosis Date    BRCA negative     Endometriosis     IBS (irritable bowel syndrome)     Insomnia     Night sweats     Psoriasis     Wears glasses        Past Surgical History:   Procedure Laterality Date    BREAST BIOPSY Right [id number]    IDC     SECTION      LAPAROSCOPY      exploratory, endometriosis    LYMPHADENECTOMY Right     x2    MASTECTOMY      bilateral mastectomy    NC NIPPLE/AREOLA RECONSTRUCTION Bilateral 10/13/2016    Procedure: NIPPLE RECONSTRUCTION ;  Surgeon: Melissa Ferrari MD;  [facility];  Service: Plastics    NC REVISE BREAST RECONSTRUCTION Bilateral 3/9/2016    Procedure: RECONSTRUCTION REVISION  BREAST MOUND ,FAT GRAFTS ;  Surgeon: Melissa Ferrari MD;  [facility];  Service: Plastics    SENTINEL LYMPH NODE BIOPSY Right        Current Outpatient Medications   Medication Sig Dispense Refill    Ashwagandha 125 MG CAPS Take by mouth      azithromycin (ZITHROMAX) 250 mg tablet Take 2 tablets today then 1 tablet daily x 4 days 6 tablet 0    Calcium 500 MG tablet Take by mouth      Cholecalciferol (VITAMIN D PO) Take 5,000 Units by mouth daily       Digestive Enzymes (DIGESTIVE ENZYME PO) Take 1 tablet by mouth daily as needed        Efinaconazole (Jublia) 10 % SOLN Apply 1 application topically daily 8 mL 2    Magnesium 400 MG CAPS Take 1 tablet by mouth daily   methylPREDNISolone 4 MG tablet therapy pack Use as directed on package 21 each 0    MILK THISTLE PO Take 600 mg by mouth daily   Multiple Vitamins-Minerals (ONE DAILY MULTIVITAMIN ADULT) TABS Take by mouth      NON FORMULARY       pantoprazole (PROTONIX) 40 mg tablet Take 1 tablet (40 mg total) by mouth daily 30 tablet 0    Probiotic Product (PROBIOTIC DAILY PO) Take 2 tablets by mouth daily   secukinumab (COSENTYX) 150 mg/mL SOAJ injection Inject 150 mg under the skin every 30 (thirty) days      tamoxifen (NOLVADEX) 20 mg tablet TAKE 1 TABLET BY MOUTH EVERY DAY 30 tablet 7     No current facility-administered medications for this visit  Allergies   Allergen Reactions    Other Hives     Kait darien Becker Plus Cold & Cough  [Teogtzyfc-Pvy-Cn-Apap] Hives       Review of Systems   Constitutional: Positive for fever  HENT: Positive for congestion and sinus pressure  Respiratory: Negative for cough, shortness of breath, wheezing and stridor  Gastrointestinal: Negative for nausea and vomiting  Musculoskeletal: Negative for arthralgias and myalgias  Neurological: Positive for headaches  All other systems reviewed and are negative  Video Exam    There were no vitals filed for this visit  Physical Exam  Vitals signs reviewed  Constitutional:       General: She is not in acute distress  Appearance: She is not ill-appearing  Eyes:      General:         Right eye: No discharge  Left eye: No discharge  Pulmonary:      Effort: No respiratory distress  Neurological:      Mental Status: She is oriented to person, place, and time  Mental status is at baseline  Psychiatric:         Mood and Affect: Mood normal          Behavior: Behavior normal          Thought Content: Thought content normal          Judgment: Judgment normal           I spent 15 minutes directly with the patient during this visit      VIRTUAL VISIT DISCLAIMER    Darrin Aguilar acknowledges that she has consented to an online visit or consultation  She understands that the online visit is based solely on information provided by her, and that, in the absence of a face-to-face physical evaluation by the physician, the diagnosis she receives is both limited and provisional in terms of accuracy and completeness  This is not intended to replace a full medical face-to-face evaluation by the physician  Darrin Aguilar understands and accepts these terms

## 2021-01-14 NOTE — TELEPHONE ENCOUNTER
Spoke to Dr Gregorio Yang and he stated patient is ok to restart medication as long as patient does not have any fevers  Called and spoke to patient and as per patient she does not have a fever  I informed her she is ok to restart the medication then  No further questions at this time

## 2021-03-04 ENCOUNTER — OFFICE VISIT (OUTPATIENT)
Dept: SURGICAL ONCOLOGY | Facility: CLINIC | Age: 51
End: 2021-03-04
Payer: COMMERCIAL

## 2021-03-04 VITALS
DIASTOLIC BLOOD PRESSURE: 80 MMHG | TEMPERATURE: 98.3 F | SYSTOLIC BLOOD PRESSURE: 122 MMHG | HEART RATE: 74 BPM | WEIGHT: 188 LBS | BODY MASS INDEX: 28.49 KG/M2 | HEIGHT: 68 IN

## 2021-03-04 DIAGNOSIS — Z17.0 MALIGNANT NEOPLASM OF UPPER-INNER QUADRANT OF RIGHT BREAST IN FEMALE, ESTROGEN RECEPTOR POSITIVE (HCC): Primary | ICD-10-CM

## 2021-03-04 DIAGNOSIS — Z13.71 BRCA NEGATIVE: ICD-10-CM

## 2021-03-04 DIAGNOSIS — Z79.810 USE OF TAMOXIFEN (NOLVADEX): ICD-10-CM

## 2021-03-04 DIAGNOSIS — C50.211 MALIGNANT NEOPLASM OF UPPER-INNER QUADRANT OF RIGHT BREAST IN FEMALE, ESTROGEN RECEPTOR POSITIVE (HCC): Primary | ICD-10-CM

## 2021-03-04 PROCEDURE — 99215 OFFICE O/P EST HI 40 MIN: CPT | Performed by: SURGERY

## 2021-03-04 NOTE — PROGRESS NOTES
Surgical Oncology Follow Up       [address] [facility]  [facility]  [address] [place] [address]  Yelena LYNCH [id number]    Cyndi Jcarlos  1970  [id number]  [id number] [facility]  [facility]  [address] [address]    Chief Complaint   Patient presents with    Follow-up       Assessment/Plan   Diagnoses and all orders for this visit:    Malignant neoplasm of upper-inner quadrant of right breast in female, estrogen receptor positive ([facility] [address] )  -     Ambulatory Referral to Oncology Genetics; Future  -     MRI breast bilateral w and wo contrast w cad; Future    BRCA negative    Use of tamoxifen (Nolvadex)        Advance Care Planning/Advance Directives:  Discussed disease status, cancer treatment plans and/or cancer treatment goals with the patient  Oncology History:    Oncology History   Malignant neoplasm of upper-inner quadrant of right breast in female, estrogen receptor positive ([facility] [address] )   5/14/2015 Surgery    Right US guided breast biopsy     6/30/2015 Surgery    Bilateral; mastectomies, right SLNB  Bilateral reconstruction with expanders  Dr Roe Leung     8/12/2015 -  Hormone Therapy    Tamoxifen  Dr Kayla Zepeda  10/23/2015 Surgery    Expanders removed, implants placed  11/6/2017 Initial Diagnosis    Malignant neoplasm of upper-inner quadrant of right breast in female, estrogen receptor positive (HCC)      Genetic Testing    BRCA negative      Genomic Testing    Oncotype DX  14         History of Present Illness:   Breast cancer follow-up, no breast referable concerns, continues on tamoxifen, thinks she has chayo menopausal  -Interval History: mother was diagnosed with HER2 positive breast cancer    Review of Systems:  Review of Systems   Constitutional: Negative for appetite change and fever  Hot flashes   Eyes: Negative  Respiratory: Negative for shortness of breath  Cardiovascular: Negative  Gastrointestinal: Negative  Endocrine: Negative  Genitourinary: Negative  Musculoskeletal: Negative  Negative for arthralgias and myalgias  Skin: Negative  Allergic/Immunologic: Negative  Neurological: Negative  Hematological: Negative  Negative for adenopathy  Does not bruise/bleed easily  Psychiatric/Behavioral: Negative          Patient Active Problem List   Diagnosis    Irritable bowel syndrome (IBS)    Malignant neoplasm of upper-inner quadrant of right breast in female, estrogen receptor positive (HCC)    Psoriasis    Seasonal allergies    BRCA negative    Use of tamoxifen (Nolvadex)    Seborrheic keratosis    Bilateral plantar fasciitis    Dyspepsia     Past Medical History:   Diagnosis Date    BRCA negative     Endometriosis     IBS (irritable bowel syndrome)     Insomnia     Night sweats     Psoriasis     Wears glasses      Past Surgical History:   Procedure Laterality Date    BREAST BIOPSY Right [id number]    IDC     SECTION      LAPAROSCOPY      exploratory, endometriosis    LYMPHADENECTOMY Right     x2    MASTECTOMY      bilateral mastectomy    MA NIPPLE/AREOLA RECONSTRUCTION Bilateral 10/13/2016    Procedure: NIPPLE RECONSTRUCTION ;  Surgeon: Reji Godinez MD;  [facility];  Service: Plastics    MA REVISION OF RECONSTRUCTED BREAST Bilateral 3/9/2016    Procedure: RECONSTRUCTION REVISION  BREAST MOUND ,FAT GRAFTS ;  Surgeon: Reji Godinez MD;  [facility];  Service: Plastics    SENTINEL LYMPH NODE BIOPSY Right      Family History   Problem Relation Age of Onset    Lung cancer Paternal Grandfather 48    Breast cancer Other     Thyroid cancer Mother 36    Breast cancer Mother     Psoriasis Father     Heart disease Other     Prostate cancer Other      Social History     Socioeconomic History    Marital status: /Civil Union     Spouse name: Not on file    Number of children: 1    Years of education: Not on file   Juan Miguel Isaac Highest education level: Not on file   Occupational History    Occupation: employed   Social Needs    Financial resource strain: Not on file    Food insecurity     Worry: Not on file     Inability: Not on file   [employer] needs     Medical: Not on file     Non-medical: Not on file   Tobacco Use    Smoking status: Never Smoker    Smokeless tobacco: Never Used   Substance and Sexual Activity    Alcohol use: Yes     Comment: social    Drug use: Never    Sexual activity: Not Currently     Partners: Male     Birth control/protection: None   Lifestyle    Physical activity     Days per week: Not on file     Minutes per session: Not on file    Stress: Not on file   Relationships    Social connections     Talks on phone: Not on file     Gets together: Not on file     Attends Yarsanism service: Not on file     Active member of club or organization: Not on file     Attends meetings of clubs or organizations: Not on file     Relationship status: Not on file    Intimate partner violence     Fear of current or ex partner: Not on file     Emotionally abused: Not on file     Physically abused: Not on file     Forced sexual activity: Not on file   Other Topics Concern    Not on file   Social History Narrative    Caffeine use- coffee       Current Outpatient Medications:     Ashwagandha 125 MG CAPS, Take by mouth, Disp: , Rfl:     Calcium 500 MG tablet, Take by mouth, Disp: , Rfl:     Cholecalciferol (VITAMIN D PO), Take 5,000 Units by mouth daily , Disp: , Rfl:     Digestive Enzymes (DIGESTIVE ENZYME PO), Take 1 tablet by mouth daily as needed  , Disp: , Rfl:     Magnesium 400 MG CAPS, Take 1 tablet by mouth daily  , Disp: , Rfl:     Multiple Vitamins-Minerals (ONE DAILY MULTIVITAMIN ADULT) TABS, Take by mouth, Disp: , Rfl:     NON FORMULARY, , Disp: , Rfl:     Probiotic Product (PROBIOTIC DAILY PO), Take 2 tablets by mouth daily  , Disp: , Rfl:     secukinumab (COSENTYX) 150 mg/mL SOAJ injection, Inject 150 mg under the skin every 30 (thirty) days, Disp: , Rfl:     tamoxifen (NOLVADEX) 20 mg tablet, TAKE 1 TABLET BY MOUTH EVERY DAY, Disp: 30 tablet, Rfl: 7    Efinaconazole (Jublia) 10 % SOLN, Apply 1 application topically daily (Patient not taking: Reported on 3/4/2021), Disp: 8 mL, Rfl: 2    methylPREDNISolone 4 MG tablet therapy pack, Use as directed on package (Patient not taking: Reported on 3/4/2021), Disp: 21 each, Rfl: 0    MILK THISTLE PO, Take 600 mg by mouth daily  , Disp: , Rfl:     pantoprazole (PROTONIX) 40 mg tablet, Take 1 tablet (40 mg total) by mouth daily (Patient not taking: Reported on 3/4/2021), Disp: 30 tablet, Rfl: 0  Allergies   Allergen Reactions    Other Hives     Kait selzer    Kait-Bernie Plus Cold & Cough  [Ztjiasute-Gkg-Qw-Apap] Hives       The following portions of the patient's history were reviewed and updated as appropriate: allergies, current medications, past family history, past medical history, past social history, past surgical history and problem list         Vitals:    03/04/21 1528   BP: 122/80   Pulse: 74   Temp: 98 3 °F (36 8 °C)       Physical Exam  Constitutional:       General: She is not in acute distress  Appearance: She is well-developed  HENT:      Head: Normocephalic and atraumatic  Cardiovascular:      Heart sounds: Normal heart sounds  Pulmonary:      Breath sounds: Normal breath sounds  Chest:      Breasts:         Right: Skin change (  Mastectomy scar with implant) present  No mass or tenderness  Left: Skin change ( mastectomy scar with implant) present  No mass or tenderness  Abdominal:      Palpations: Abdomen is soft  Lymphadenopathy:      Upper Body:      Right upper body: No supraclavicular, axillary or pectoral adenopathy  Left upper body: No supraclavicular, axillary or pectoral adenopathy  Neurological:      Mental Status: She is alert and oriented to person, place, and time     Psychiatric:         Mood and Affect: Mood normal            Results:  Labs:   prior genetic testing was reviewed the [facility] BRCA plus only was negative    Imaging   last MRI September of 2019 was benign    I reviewed the above laboratory and imaging data  Discussion/Summary: 52-year-old female status post bilateral mastectomy and reconstruction for stage IIA invasive ductal carcinoma of the right breast   She continues on tamoxifen  She thinks she may be chayo menopausal   She states that her mother was recently diagnosed with HER2 positive breast cancer  Sunday Velez had prior genetic testing with the brca plus only through [employer]  I am referring her back to our genetic counselor  I will make arrangements for her breast MRI for this coming September  I will see her again at that time or sooner should the need arise

## 2021-03-08 ENCOUNTER — OFFICE VISIT (OUTPATIENT)
Dept: DERMATOLOGY | Facility: CLINIC | Age: 51
End: 2021-03-08
Payer: COMMERCIAL

## 2021-03-08 VITALS — WEIGHT: 188 LBS | HEIGHT: 68 IN | BODY MASS INDEX: 28.49 KG/M2 | TEMPERATURE: 97.8 F

## 2021-03-08 DIAGNOSIS — D18.01 CHERRY ANGIOMA: ICD-10-CM

## 2021-03-08 DIAGNOSIS — L81.4 LENTIGO: ICD-10-CM

## 2021-03-08 DIAGNOSIS — D22.9 MULTIPLE MELANOCYTIC NEVI: ICD-10-CM

## 2021-03-08 DIAGNOSIS — L40.9 PSORIASIS: Primary | ICD-10-CM

## 2021-03-08 DIAGNOSIS — L85.3 XEROSIS OF SKIN: ICD-10-CM

## 2021-03-08 PROCEDURE — 3008F BODY MASS INDEX DOCD: CPT | Performed by: DERMATOLOGY

## 2021-03-08 PROCEDURE — 99213 OFFICE O/P EST LOW 20 MIN: CPT | Performed by: DERMATOLOGY

## 2021-03-08 RX ORDER — SECUKINUMAB 150 MG/ML
300 INJECTION SUBCUTANEOUS
Qty: 2 ML | Refills: 5 | Status: SHIPPED | OUTPATIENT
Start: 2021-03-08 | End: 2021-08-11

## 2021-03-08 NOTE — PATIENT INSTRUCTIONS
Assessment and Plan:  Based on a thorough discussion of this condition and the management approach to it (including a comprehensive discussion of the known risks, side effects and potential benefits of treatment), the patient (family) agrees to implement the following specific plan:   Continue Cosentyx 300mg once a month  Psoriasis is a chronic inflammatory condition that causes the body to make new skin cells in days rather than weeks  As these cells pile up on the surface of the skin, you may see thick, scaly patches of thickened skin  Psoriasis affects 2-4% of males and females  It can start at any age including childhood, with peaks of onset at 15-25 years and 50-60 years  It tends to persist lifelong, fluctuating in extent and severity  It is particularly common in Caucasians but may affect people of any race  About one-third of patients with psoriasis have family members with psoriasis  Psoriasis is multifactorial  It is classified as an immune-mediated inflammatory disease (IMID)  Genetic factors are important and influence the type of psoriasis and response to treatment  What are the signs and symptoms of psoriasis? There are many different types of psoriasis that each have present uniquely  The types of psoriasis include:    Plaque psoriasis: About 80% to 90% of people who have psoriasis develop this type  When plaque psoriasis appears, you may see:  Plaque psoriasis usually presents with symmetrically distributed, red, scaly plaques with well-defined edges  The scale is typically silvery white, except in skin folds where the plaques often appear shiny and they may have a moist peeling surface  The most common sites are scalp, elbows and knees, but any part of the skin can be involved  The plaques are usually very persistent without treatment    Itch is mostly mild but may be severe in some patients, leading to scratching and lichenification (thickened leathery skin with increased skin markings)  Painful skin cracks or fissures may occur  When psoriatic plaques clear up, they may leave brown or pale marks that can be expected to fade over several months  Guttate psoriasis: When someone gets this type of psoriasis, you often see tiny bumps appear on the skin quite suddenly  The bumps tend to cover much of the torso, legs, and arms  Sometimes, the bumps also develop on the face, scalp, and ears  No matter where they appear, the bumps tend to be:    Small and scaly   Bluejacket-colored to pink   Temporary, clearing in a few weeks or months without treatment  When guttate psoriasis clears, it may never return  Why this happens is still a bit of a mystery  Guttate psoriasis tends to develop in children and young adults who've had an infection, such as strep throat  It's possible that when the infection clears so does guttate psoriasis  It's also possible to have:   Guttate psoriasis for life   See the guttate psoriasis clear and plaque psoriasis develop later in life   Plaque psoriasis when you develop guttate psoriasis  There's no way to predict what will happen after the first flare-up of guttate psoriasis clears  Inverse psoriasis: This type of psoriasis develops in areas where skin touches skin, such as the armpits, genitals, and crease of the buttocks  Where the inverse psoriasis appears, you're likely to notice:   Smooth, red patches of skin that look raw   Little, if any, silvery-white coating   Sore or painful skin  Other names for this type of psoriasis are intertriginous psoriasis or flexural psoriasis  Pustular psoriasis: This type of psoriasis causes pus-filled bumps that usually appear only on the feet and hands  While the pus-filled bumps may look like an infection, the skin is not infected  The bumps don't contain bacteria or anything else that could cause an infection    Where pustular psoriasis appears, you tend to notice:   Red, swollen skin that is dotted with pus-filled bumps   Extremely sore or painful skin   Brown dots (and sometimes scale) appear as the pus-filled bumps dry  Pustular psoriasis can make just about any activity that requires your hands or feet, such as typing or walking, unbearably painful  Pustular psoriasis (generalized): Serious and life-threatening, this rare type of psoriasis causes pus-filled bumps to develop on much of the skin  Also called von Zumbusch psoriasis, a flare-up causes this sequence of events:  1  Skin on most of the body suddenly turns dry, red, and tender  2  Within hours, pus-filled bumps cover most of the skin  3  Often within a day, the pus-filled bumps break open and pools of pus leak onto the skin  4  As the pus dries (usually within 24 to 48 hours), the skin dries out and peels (as shown in this picture)  5  When the dried skin peels off, you see a smooth, glazed surface  6  In a few days or weeks, you may see a new crop of pus-filled bumps covering most of the skin, as the cycle repeats itself  Anyone with pustular psoriasis also feels very sick, and may develop a fever, headache, muscle weakness, and other symptoms  Medical care is often necessary to save the person's life  Erythrodermic psoriasis: Serious and life-threatening, this type of psoriasis requires immediate medical care  When someone develops erythrodermic psoriasis, you may notice:   Skin on most of the body looks burnt   Chills, fever, and the person looks extremely ill   Muscle weakness, a rapid pulse, and severe itch  The person may also be unable to keep warm, so hypothermia can set in quickly  Most people who develop this type of psoriasis already have another type of psoriasis  Before developing erythrodermic psoriasis, they often notice that their psoriasis is worsening or not improving with treatment   If you notice either of these happening, see a board-certified dermatologist     Nails    Nail psoriasis: With any type of psoriasis, you may see changes to your fingernails or toenails  About half of the people who have plaque psoriasis see signs of psoriasis on their fingernails at some point2  When psoriasis affects the nails, you may notice:   Tiny dents in your nails (called nail pits)   White, yellow, or brown discoloration under one or more nails   Crumbling, rough nails   A nail lifting up so that it's no longer attached   Buildup of skin cells beneath one or more nails, which lifts up the nail  Treatment and proper nail care can help you control nail psoriasis  Psoriatic arthritis: If you have psoriasis, it's important to pay attention to your joints  Some people who have psoriasis develop a type of arthritis called psoriatic arthritis  This is more likely to occur if you have severe psoriasis  Most people notice psoriasis on their skin years before they develop psoriatic arthritis  It's also possible to get psoriatic arthritis before psoriasis, but this is less common  When psoriatic arthritis develops, the signs can be subtle  At first, you may notice:   A swollen and tender joint, especially in a finger or toe   Heel pain   Swelling on the back of your leg, just above your heel   Stiffness in the morning that fades during the day  Like psoriasis, psoriatic arthritis cannot be cured  Treatment can prevent psoriatic arthritis from worsening, which is important  Allowed to progress, psoriatic arthritis can become disabling  Diagnosis and treatment of psoriasis   Psoriasis is usually diagnosed by clinical features, and skin biopsy if necessary  It is important to decrease factors that aggravate psoriasis  These include treating streptococcal infections, minimizing skin injuries, avoiding sun exposure if it exacerbates psoriasis, smoking, alcohol usage, decreasing stress, and maintaining an optimal body weight  Certain medications such as lithium, beta blockers, antimalarials, and NSAIDs have also been implicated  Suddenly stopping oral steroids or strong topical steroids can cause rebound disease  There are many categories of psoriasis treatments available  Topical therapy  Mild psoriasis is generally treated with topical agents alone  Which treatment is selected may depend on body site, extent and severity of psoriasis   Emollients   Coal tar preparations   Dithranol   Salicylic acid   Vitamin D analogue (calcipotriol)   Topical corticosteroids   Calcineurin inhibitor (tacrolimus, pimecrolimus)  Phototherapy  Most psoriasis centres offer phototherapy with ultraviolet (UV) radiation, often in combination with topical or systemic agents  Types of phototherapy include   Narrowband UVB   Broadband UVB   Photochemotherapy (PUVA)   Targeted phototherapy  Systemic therapy  Moderate to severe psoriasis warrants treatment with a systemic agent and/or phototherapy  The most common treatments are:   Methotrexate   Ciclosporin   Acitretin  Other medicines occasionally used for psoriasis include:   Mycophenolate   Apremilast   Hydroxyurea   Azathioprine   6-mercaptopurine  Systemic corticosteroids are best avoided due to a risk of severe withdrawal flare of psoriasis and adverse effects  Biologics or targeted therapies are reserved for conventional treatment-resistant severe psoriasis, mainly because of expense, as side effects compare favorably with other systemic agents  These include:   Anti-tumour necrosis factor-alpha antagonists (anti-TNF?) infliximab, adalimumab and etanercept   The interleukin (IL)-12/23 antagonist ustekinumab   IL-17 antagonists such as secukinumab  Many other monoclonal antibodies are under investigation in the treatment of psoriasis      Assessment and Plan:  Based on a thorough discussion of this condition and the management approach to it (including a comprehensive discussion of the known risks, side effects and potential benefits of treatment), the patient (family) agrees to implement the following specific plan:   When outside we recommend using a wide brim hat, sunglasses, long sleeve and pants, sunscreen with SPF 27+ with reapplication every 2 hours, or SPF specific clothing    Benign, reassured   Annual skin check     Melanocytic Nevi  Melanocytic nevi ("moles") are tan or brown, raised or flat areas of the skin which have an increased number of melanocytes  Melanocytes are the cells in our body which make pigment and account for skin color  Some moles are present at birth (I e , "congenital nevi"), while others come up later in life (i e , "acquired nevi")  The sun can stimulate the body to make more moles  Sunburns are not the only thing that triggers more moles  Chronic sun exposure can do it too  Clinically distinguishing a healthy mole from melanoma may be difficult, even for experienced dermatologists  The "ABCDE's" of moles have been suggested as a means of helping to alert a person to a suspicious mole and the possible increased risk of melanoma  The suggestions for raising alert are as follows:    Asymmetry: Healthy moles tend to be symmetric, while melanomas are often asymmetric  Asymmetry means if you draw a line through the mole, the two halves do not match in color, size, shape, or surface texture  Asymmetry can be a result of rapid enlargement of a mole, the development of a raised area on a previously flat lesion, scaling, ulceration, bleeding or scabbing within the mole  Any mole that starts to demonstrate "asymmetry" should be examined promptly by a board certified dermatologist      Border: Healthy moles tend to have discrete, even borders  The border of a melanoma often blends into the normal skin and does not sharply delineate the mole from normal skin  Any mole that starts to demonstrate "uneven borders" should be examined promptly by a board certified dermatologist      Color: Healthy moles tend to be one color throughout    Melanomas tend to be made up of different colors ranging from dark black, blue, white, or red  Any mole that demonstrates a color change should be examined promptly by a board certified dermatologist      Diameter: Healthy moles tend to be smaller than 0 6 cm in size; an exception are "congenital nevi" that can be larger  Melanomas tend to grow and can often be greater than 0 6 cm (1/4 of an inch, or the size of a pencil eraser)  This is only a guideline, and many normal moles may be larger than 0 6 cm without being unhealthy  Any mole that starts to change in size (small to bigger or bigger to smaller) should be examined promptly by a board certified dermatologist      Evolving: Healthy moles tend to "stay the same "  Melanomas may often show signs of change or evolution such as a change in size, shape, color, or elevation  Any mole that starts to itch, bleed, crust, burn, hurt, or ulcerate or demonstrate a change or evolution should be examined promptly by a board certified dermatologist       Dysplastic Nevi  Dysplastic moles are moles that fit the ABCDE rules of melanoma but are not identified as melanomas when examined under the microscope  They may indicate an increased risk of melanoma in that person  If there is a family history of melanoma, most experts agree that the person may be at an increased risk for developing a melanoma  Experts still do not agree on what dysplastic moles mean in patients without a personal or family history of melanoma  Dysplastic moles are usually larger than common moles and have different colors within it with irregular borders  The appearance can be very similar to a melanoma  Biopsies of dysplastic moles may show abnormalities which are different from a regular mole  Melanoma  Malignant melanoma is a type of skin cancer that can be deadly if it spreads throughout the body  The incidence of melanoma in the [place] is growing faster than any other cancer   Melanoma usually grows near the surface of the skin for a period of time, and then begins to grow deeper into the skin  Once it grows deeper into the skin, the risk of spread to other organs greatly increases  Therefore, early detection and removal of a malignant melanoma may result in a better chance at a complete cure; removal after the tumor has spread may not be as effective, leading to worse clinical outcomes such as death  The true rate of nevus transformation into a melanoma is unknown  It has been estimated that the lifetime risk for any acquired melanocytic nevus on any 21year-old individual transforming into melanoma by age [de-identified] is 0 03% (1 in 3,164) for men and 0 009% (1 in 10,800) for women  The appearance of a "new mole" remains one of the most reliable methods for identifying a malignant melanoma  Occasionally, melanomas appear as rapidly growing, blue-black, dome-shaped bumps within a previous mole or previous area of normal skin  Other times, melanomas are suspected when a mole suddenly appears or changes  Itching, burning, or pain in a pigmented lesion should increase suspicion, but most patients with early melanoma have no skin discomfort whatsoever  Melanoma can occur anywhere on the skin, including areas that are difficult for self-examination  Many melanomas are first noticed by other family members  Suspicious-looking moles may be removed for microscopic examination  You may be able to prevent death from melanoma by doing two simple things:    1  Try to avoid unnecessary sun exposure and protect your skin when it is exposed to the sun  People who live near the equator, people who have intermittent exposures to large amounts of sun, and people who have had sunburns in childhood or adolescence have an increased risk for melanoma  Sun sense and vigilant sun protection may be keys to helping to prevent melanoma    We recommend wearing UPF-rated sun protective clothing and sunglasses whenever possible and applying a moisturizer-sunscreen combination product (SPF 50+) such as Neutrogena Daily Defense to sun exposed areas of skin at least three times a day  2  Have your moles regularly examined by a board certified dermatologist AND by yourself or a family member/friend at home  We recommend that you have your moles examined at least once a year by a board certified dermatologist   Use your birthday as an annual reminder to have your "Birthday Suit" (I e , your skin) examined; it is a nice birthday gift to yourself to know that your skin is healthy appearing! Additionally, at-home self examinations may be helpful for detecting a possible melanoma  Use the ABCDEs we discussed and check your moles once a month at home  Assessment and Plan:  Based on a thorough discussion of this condition and the management approach to it (including a comprehensive discussion of the known risks, side effects and potential benefits of treatment), the patient (family) agrees to implement the following specific plan:   When outside we recommend using a wide brim hat, sunglasses, long sleeve and pants, sunscreen with SPF 50+ with reapplication every 2 hours, or SPF specific clothing       What is a lentigo? A lentigo is a pigmented flat or slightly raised lesion with a clearly defined edge  Unlike an ephelis (freckle), it does not fade in the winter months  There are several kinds of lentigo  The name lentigo originally referred to its appearance resembling a small lentil  The plural of lentigo is lentigines, although lentigos is also in common use  Who gets lentigines? Lentigines can affect males and females of all ages and races  Solar lentigines are especially prevalent in fair skinned adults  Lentigines associated with syndromes are present at birth or arise during childhood  What causes lentigines?   Common forms of lentigo are due to exposure to ultraviolet radiation:   Sun damage including sunburn    Indoor tanning    Phototherapy, especially photochemotherapy (PUVA)    Ionizing radiation, eg radiation therapy, can also cause lentigines  Several familial syndromes associated with widespread lentigines originate from mutations in Panchito-MAP kinase, mTOR signaling and PTEN pathways  What are the clinical features of lentigines? Lentigines have been classified into several different types depending on what they look like, where they appear on the body, causative factors, and whether they are associated to other diseases or conditions  Lentigines may be solitary or more often, multiple  Most lentigines are smaller than 5 mm in diameter      Lentigo simplex   A precursor to junctional naevus    Arises during childhood and early adult life    Found on trunk and limbs    Small brown round or oval macule or thin plaque    Jagged or smooth edge    May have a dry surface    May disappear in time  Solar lentigo   A precursor to seborrhoeic keratosis    Found on chronically sun exposed sites such as hands, face, lower legs    May also follow sunburn to shoulders    Yellow, light or dark brown regular or irregular macule or thin plaque    May have a dry surface    Often has moth-eaten outline    Can slowly enlarge to several centimeters in diameter    May disappear, often through the process known as lichenoid keratosis    When atypical in appearance, may be difficult to distinguish from melanoma in situ  Ink spot lentigo   Also known as reticulated lentigo    Few in number compared to solar lentigines    Follows sunburn in very fair skinned individuals    Dark brown to black irregular ink spot-like macule  PUVA lentigo   Similar to ink spot lentigo but follows photochemotherapy (PUVA)    Location anywhere exposed to PUVA  Tanning bed lentigo   Similar to ink spot lentigo but follows indoor tanning    Location anywhere exposed to tanning bed  Radiation lentigo   Occurs in site of irradiation (accidental or therapeutic)    Associated with late-stage radiation dermatitis: epidermal atrophy, subcutaneous fibrosis, keratosis, telangiectasias  Melanotic macule   Mucosal surfaces or adjacent glabrous skin eg lip, vulva, penis, anus    Light to dark brown    Also called mucosal melanosis  Generalised lentigines   Found on any exposed or covered site from early childhood    Small macules may merge to form larger patches    Not associated with a syndrome    Also called lentigines profusa, multiple lentigines  Agminated lentigines   Naevoid eruption of lentigos confined to a single segmental area    Sharp demarcation in midline    May have associated neurological and developmental abnormalities  Patterned lentigines   Inherited tendency to lentigines on face, lips, buttocks, palms, soles    Recognised mainly in people of  ethnicity  Centrofacial neurodysraphic lentiginosis  Associated with mental retardation  Lentiginosis syndromes   Syndromes include LEOPARD/Bancroft, Peutz-Jeghers, Laugier-Hunziker, Moynahan, Xeroderma pigmentosum, myxoma syndromes (VILA, NAME, Cruz), Ruvalcaba-Myhre-, Bannayan-Zonnana syndrome, Cowden disease (multiple hamartoma syndrome )    Inheritance is autosomal dominant; sporadic cases common    Widespread lentigines present at birth or arise in early childhood    Associated with neural, endocrine, and mesenchymal tumors    How is the diagnosis made? Lentigines are usually diagnosed clinically by their typical appearance   Concern regarding possibility of melanoma may lead to:   Dermatoscopy    Diagnostic excision biopsy    Histopathology of a lentigo shows:   Thickened epidermis    An increased number of melanocytes along the basal layer of epidermis    Unlike junctional melanocytic naevus, there are no nests of melanocytes    Increased melanin pigment within the keratinocytes    Additional features depending on type of lentigo    In contrast, an ephelis (freckle) shows sun-induced increased melanin within the keratinocytes, without an increase in number of cells  What is the treatment for lentigines? Most lentigines are left alone  Attempts to lighten them may not be successful  The following approaches are used:   SPF 50+ broad-spectrum sunscreen    Hydroquinone bleaching cream    Alpha hydroxy acids    Vitamin C    Retinoids    Azelaic acid    Chemical peels  Individual lesions can be permanently removed using:   Cryotherapy    Intense pulsed light    Pigment lasers    How can lentigines be prevented? Lentigines associated with exposure ultraviolet radiation can be prevented by very careful sun protection  Clothing is more successful at preventing new lentigines than are sunscreens  What is the outlook for lentigines? Lentigines usually persist  They may increase in number with age and sun exposure  Some in sun-protected sites may fade and disappear  Assessment and Plan:  Based on a thorough discussion of this condition and the management approach to it (including a comprehensive discussion of the known risks, side effects and potential benefits of treatment), the patient (family) agrees to implement the following specific plan:   Monitor for changes   Benign, reassured       Assessment and Plan:    Cherry angioma, also known as [employer] spots, are benign vascular skin lesions  A "cherry angioma" is a firm red, blue or purple papule, 0 1-1 cm in diameter  When thrombosed, they can appear black in colour until evaluated with a dermatoscope when the red or purple colour is more easily seen  Cherry angioma may develop on any part of the body but most often appear on the scalp, face, lips and trunk  An angioma is due to proliferating endothelial cells; these are the cells that line the inside of a blood vessel  Angiomas can arise in early life or later in life; the most common type of angioma is a cherry angioma    Cherry angiomas are very common in males and females of any age or race  They are more noticeable in white skin than in skin of colour  They markedly increase in number from about the age of 36  There may be a family history of similar lesions  Eruptive cherry angiomas have been rarely reported to be associated with internal malignancy  The cause of angiomas is unknown  Genetic analysis of cherry angiomas has shown that they frequently carry specific somatic missense mutations in the GNAQ and GNA11 (Q209H) genes, which are involved in other vascular and melanocytic proliferations  Cherry angioma is usually diagnosed clinically and no investigations are necessary for the majority of lesions  It has a characteristic red-clod or lobular pattern on dermatoscopy (called lacunar pattern using conventional pattern analysis)  When there is uncertainty about the diagnosis, a biopsy may be performed  The angioma is composed of venules in a thickened papillary dermis  Collagen bundles may be prominent between the lobules  Cherry angiomas are harmless, so they do not usually have to be treated  Occasionally, they are removed to exclude a malignant skin lesion such as a nodular melanoma or because they are irritated or bleeding (and a subsequent risk for infection)  To decrease friction over the lesions, we recommend Neutrogena Daily Defense SPF 50+ at least 3 times a day  Assessment and Plan:  Based on a thorough discussion of this condition and the management approach to it (including a comprehensive discussion of the known risks, side effects and potential benefits of treatment), the patient (family) agrees to implement the following specific plan:   Use moisturizer like Eucerin,Cerave or Aveeno Cream 3 times a day for the dry skin               Dry skin refers to skin that feels dry to touch  Dry skin has a dull surface with a rough, scaly quality  The skin is less pliable and cracked   When dryness is severe, the skin may become inflamed and fissured  Although any body site can be dry, dry skin tends to affect the shins more than any other site  Dry skin is lacking moisture in the outer horny cell layer (stratum corneum) and this results in cracks in the skin surface  Dry skin is also called xerosis, xeroderma or asteatosis (lack of fat)  It can affect males and females of all ages  There is some racial variability in water and lipid content of the skin   Dry skin that starts in early childhood may be one of about 20 types of ichthyosis (fish-scale skin)  There is often a family history of dry skin   Dry skin is commonly seen in people with atopic dermatitis   Nearly everyone > 60 years has dry skin  Dry skin that begins later may be seen in people with certain diseases and conditions   Postmenopausal women   Hypothyroidism   Chronic renal disease    Malnutrition and weight loss    Subclinical dermatitis    Treatment with certain drugs such as oral retinoids, diuretics and epidermal growth factor receptor inhibitors    People exposed to a dry environment may experience dry skin   Low humidity: in desert climates or cool, windy conditions    Excessive air conditioning    Direct heat from a fire or fan heater    Excessive bathing    Contact with soap, detergents and solvents    Inappropriate topical agents such as alcohol    Frictional irritation from rough clothing or abrasives    Dry skin is due to abnormalities in the integrity of the barrier function of the stratum corneum, which is made up of corneocytes   There is an overall reduction in the lipids in the stratum corneum   Ratio of ceramides, cholesterol and free fatty acids may be normal or altered   There may be a reduction in the proliferation of keratinocytes      Keratinocyte subtypes change in dry skin with a decrease in keratins K1, K10 and increase in K5, K14     Involucrin (a protein) may be expressed early, increasing cell stiffness   The result is retention of corneocytes and reduced water-holding capacity  The inherited forms of ichthyosis are due to loss of function mutations in various genes (listed in parentheses below)  The clinical features of ichthyosis depend on the specific type of ichthyosis:   Ichthyosis vulgaris (FLG)   Recessive X-linked ichthyosis (STS)    Autosomal recessive congenital ichthyosis (ABCA12, TGM1, ALOXE3)    Keratinopathic ichthyoses (KRT1, KRT10, KRT2)    Acquired ichthyosis may be due to:   Metabolic factors: thyroid deficiency    Illness: lymphoma, internal malignancy, sarcoidosis, HIV infection    Drugs: nicotinic acid, kava, protein kinase inhibitors (eg EGFR inhibitors), hydroxyurea  Complications of dry skin:  Dry areas of skin may become itchy, indicating a form of eczema/dermatitis has developed   Atopic eczema -- especially in people with ichthyosis vulgaris    Eczema craquelé -- especially in elderly people  Also called asteatotic eczema    A dry form of nummular dermatitis/discoid eczema -- especially in people that wash their skin excessively  When the dry skin of an elderly person is itchy without a visible rash, it is sometimes called winter itch, 7th age itch, senile pruritus or chronic pruritus of the elderly  Other complications of dry skin may include:   Skin infection when bacteria or viruses penetrate a break in the skin surface    Overheating, especially in some forms of ichthyosis    Food allergy, eg, to peanuts, has been associated with filaggrin mutations    Contact allergy, eg, to nickel, has also been correlated with barrier function defects  How is the type of dry skin diagnosed? The type of dry skin is diagnosed by careful history and examination      In children:   Family history    Age of onset    Appearance at birth, if known    Distribution of dry skin    Other features, eg eczema, abnormal nails, hair, dentition, sight, hearing  In adults:   Medical history    Medications and topical preparations    Bathing frequency and use of soap    Evaluation of environmental factors that may contribute to dry skin  What is the treatment for dry skin? The mainstay of treatment of dry skin and ichthyosis is moisturisers/emollients  They should be applied liberally and often enough to:   Reduce itch    Improve the barrier function    Prevent entry of irritants, bacteria    Reduce transepidermal water loss  When considering which emollient is most suitable, consider:   Severity of the dryness    Tolerance    Personal preference    Cost and availability  Emollients generally work best if applied to damp skin, if pH is below 7 (acidic), and if containing humectants such as urea or propylene glycol  Additional treatments include:   Topical steroid if itchy or there is dermatitis -- choose an emollient base    Topical calcineurin inhibitors if topical steroids are unsuitable  How can dry skin be prevented? Eliminate aggravating factors:   Reduce the frequency of bathing   A humidifier in winter and air conditioner in summer    Compare having a short shower with a prolonged soak in a bath   Use lukewarm, not hot, water   Replace standard soap with a substitute such as a synthetic detergent cleanser, water-miscible emollient, bath oil, anti-pruritic tar oil, colloidal oatmeal etc     Apply an emollient liberally and often, particularly shortly after bathing, and when itchy  The drier the skin, the thicker this should be, especially on the hands  What is the outlook for dry skin? A tendency to dry skin may persist life-long, or it may improve once contributing factors are controlled

## 2021-03-08 NOTE — PROGRESS NOTES
[facility] Follow Up Note    Patient Name: Rhea Orta  Encounter Date: 03/08/2021    Today's Chief Concerns:   Concern #1:  Full Body check    Concern #2:  Psoriasis       Current Medications:    Current Outpatient Medications:     Ashwagandha 125 MG CAPS, Take by mouth, Disp: , Rfl:     Calcium 500 MG tablet, Take by mouth, Disp: , Rfl:     Cholecalciferol (VITAMIN D PO), Take 5,000 Units by mouth daily , Disp: , Rfl:     Digestive Enzymes (DIGESTIVE ENZYME PO), Take 1 tablet by mouth daily as needed  , Disp: , Rfl:     Magnesium 400 MG CAPS, Take 1 tablet by mouth daily  , Disp: , Rfl:     MILK THISTLE PO, Take 600 mg by mouth daily  , Disp: , Rfl:     Multiple Vitamins-Minerals (ONE DAILY MULTIVITAMIN ADULT) TABS, Take by mouth, Disp: , Rfl:     NON FORMULARY, , Disp: , Rfl:     Probiotic Product (PROBIOTIC DAILY PO), Take 2 tablets by mouth daily  , Disp: , Rfl:     secukinumab (COSENTYX) 150 mg/mL SOAJ injection, Inject 150 mg under the skin every 30 (thirty) days, Disp: , Rfl:     tamoxifen (NOLVADEX) 20 mg tablet, TAKE 1 TABLET BY MOUTH EVERY DAY, Disp: 30 tablet, Rfl: 7    Efinaconazole (Jublia) 10 % SOLN, Apply 1 application topically daily (Patient not taking: Reported on 3/4/2021), Disp: 8 mL, Rfl: 2    methylPREDNISolone 4 MG tablet therapy pack, Use as directed on package (Patient not taking: Reported on 3/4/2021), Disp: 21 each, Rfl: 0    pantoprazole (PROTONIX) 40 mg tablet, Take 1 tablet (40 mg total) by mouth daily (Patient not taking: Reported on 3/4/2021), Disp: 30 tablet, Rfl: 0    CONSTITUTIONAL:   Vitals:    03/08/21 1300   Temp: 97 8 °F (36 6 °C)   Weight: 85 3 kg (188 lb)   Height: 5' 8" (1 727 m)           Specific Alerts:    Have you been seen by a [facility]atologist in the last 3 years? YES    Are you pregnant or planning to become pregnant? No    Are you currently or planning to be nursing or breast feeding?  No    Allergies   Allergen Reactions  Other Hives     Kait darien Becker Plus Cold & Cough  [[id number]] Hives       May we call your Preferred Phone number to discuss your specific medical information? YES    May we leave a detailed message that includes your specific medical information? YES    Have you traveled outside of the [facility] in the past 3 months? No    Do you currently have a pacemaker or defibrillator? No    Do you have any artificial heart valves, joints, plates, screws, rods, stents, pins, etc? No   - If Yes, were any placed within the last 2 years? Do you require any medications prior to a surgical procedure? No   - If Yes, for which procedure? n/a   - If Yes, what medications to you require? n/a    Are you taking any medications that cause you to bleed more easily ("blood thinners") No    Have you ever experienced a rapid heartbeat with epinephrine? No    Have you ever been treated with "gold" (gold sodium thiomalate) therapy? No    [facility] can help with wrinkles, "laugh lines," facial volume loss, "double chin," "love handles," age spots, and more  Are you interested in learning today about some of the skin enhancement procedures that we offer? (If Yes, please provide more detail) No    Review of Systems:  Have you recently had or currently have any of the following?     · Fever or chills: No  · Night Sweats: YES  · Headaches: No  · Weight Gain: No  · Weight Loss: No  · Blurry Vision: No  · Nausea: No  · Vomiting: No  · Diarrhea: No  · Blood in Stool: No  · Abdominal Pain: No  · Itchy Skin: YES  · Painful Joints: YES  · Swollen Joints: YES  · Muscle Pain: No  · Irregular Mole: No  · Sun Burn: No  · Dry Skin: YES  · Skin Color Changes: No  · Scar or Keloid: YES  · Cold Sores/Fever Blisters: No  · Bacterial Infections/MRSA: No  · Anxiety: No  · Depression: YES  · Suicidal or Homicidal Thoughts: No      PSYCH: Normal mood and affect  EYES: Normal conjunctiva  ENT: Normal lips and oral mucosa  CARDIOVASCULAR: No edema  RESPIRATORY: Normal respirations  HEME/LYMPH/IMMUNO:  No regional lymphadenopathy except as noted below in ASSESSMENT AND PLAN BY DIAGNOSIS    FULL ORGAN SYSTEM SKIN EXAM (SKIN)  Hair, Scalp, Ears, Face Normal except as noted below in Assessment   Neck, Cervical Chain Nodes Normal except as noted below in Assessment   Right Arm/Hand/Fingers Normal except as noted below in Assessment   Left Arm/Hand/Fingers Normal except as noted below in Assessment   Chest/Breasts/Axillae Viewed areas Normal except as noted below in Assessment   Abdomen, Umbilicus Normal except as noted below in Assessment   Back/Spine Normal except as noted below in Assessment   Groin/Genitalia/Buttocks Viewed areas Normal except as noted below in Assessment   Right Leg, Foot, Toes Normal except as noted below in Assessment   Left Leg, Foot, Toes Normal except as noted below in Assessment       1  PSORIASIS    Physical Exam:   Anatomic Location Affected:  Currently clear, right 4th toe nail with yellowing and thickening         Pertinent Positives:   Pertinent Negatives: Additional History of Present Condition:  Patient states that she has been clear from psoriasis since starting Consentyx, does have some joint pain but not as bad since being on the Cosentyx  Assessment and Plan:  Based on a thorough discussion of this condition and the management approach to it (including a comprehensive discussion of the known risks, side effects and potential benefits of treatment), the patient (family) agrees to implement the following specific plan:   Continue Cosentyx 300mg once a month   noam due August      Psoriasis is a chronic inflammatory condition that causes the body to make new skin cells in days rather than weeks  As these cells pile up on the surface of the skin, you may see thick, scaly patches of thickened skin  Psoriasis affects 2-4% of males and females   It can start at any age including childhood, with peaks of onset at 15-25 years and 50-60 years  It tends to persist lifelong, fluctuating in extent and severity  It is particularly common in Caucasians but may affect people of any race  About one-third of patients with psoriasis have family members with psoriasis  Psoriasis is multifactorial  It is classified as an immune-mediated inflammatory disease (IMID)  Genetic factors are important and influence the type of psoriasis and response to treatment  What are the signs and symptoms of psoriasis? There are many different types of psoriasis that each have present uniquely  The types of psoriasis include:    Plaque psoriasis: About 80% to 90% of people who have psoriasis develop this type  When plaque psoriasis appears, you may see:  Plaque psoriasis usually presents with symmetrically distributed, red, scaly plaques with well-defined edges  The scale is typically silvery white, except in skin folds where the plaques often appear shiny and they may have a moist peeling surface  The most common sites are scalp, elbows and knees, but any part of the skin can be involved  The plaques are usually very persistent without treatment  Itch is mostly mild but may be severe in some patients, leading to scratching and lichenification (thickened leathery skin with increased skin markings)  Painful skin cracks or fissures may occur  When psoriatic plaques clear up, they may leave brown or pale marks that can be expected to fade over several months  Guttate psoriasis: When someone gets this type of psoriasis, you often see tiny bumps appear on the skin quite suddenly  The bumps tend to cover much of the torso, legs, and arms  Sometimes, the bumps also develop on the face, scalp, and ears   No matter where they appear, the bumps tend to be:    Small and scaly   Moran-colored to pink   Temporary, clearing in a few weeks or months without treatment  When guttate psoriasis clears, it may never return  Why this happens is still a bit of a mystery  Guttate psoriasis tends to develop in children and young adults who've had an infection, such as strep throat  It's possible that when the infection clears so does guttate psoriasis  It's also possible to have:   Guttate psoriasis for life   See the guttate psoriasis clear and plaque psoriasis develop later in life   Plaque psoriasis when you develop guttate psoriasis  There's no way to predict what will happen after the first flare-up of guttate psoriasis clears  Inverse psoriasis: This type of psoriasis develops in areas where skin touches skin, such as the armpits, genitals, and crease of the buttocks  Where the inverse psoriasis appears, you're likely to notice:   Smooth, red patches of skin that look raw   Little, if any, silvery-white coating   Sore or painful skin  Other names for this type of psoriasis are intertriginous psoriasis or flexural psoriasis  Pustular psoriasis: This type of psoriasis causes pus-filled bumps that usually appear only on the feet and hands  While the pus-filled bumps may look like an infection, the skin is not infected  The bumps don't contain bacteria or anything else that could cause an infection  Where pustular psoriasis appears, you tend to notice:   Red, swollen skin that is dotted with pus-filled bumps   Extremely sore or painful skin   Brown dots (and sometimes scale) appear as the pus-filled bumps dry  Pustular psoriasis can make just about any activity that requires your hands or feet, such as typing or walking, unbearably painful  Pustular psoriasis (generalized): Serious and life-threatening, this rare type of psoriasis causes pus-filled bumps to develop on much of the skin  Also called von Zumbusch psoriasis, a flare-up causes this sequence of events:  1  Skin on most of the body suddenly turns dry, red, and tender  2  Within hours, pus-filled bumps cover most of the skin    3  Often within a day, the pus-filled bumps break open and pools of pus leak onto the skin  4  As the pus dries (usually within 24 to 48 hours), the skin dries out and peels (as shown in this picture)  5  When the dried skin peels off, you see a smooth, glazed surface  6  In a few days or weeks, you may see a new crop of pus-filled bumps covering most of the skin, as the cycle repeats itself  Anyone with pustular psoriasis also feels very sick, and may develop a fever, headache, muscle weakness, and other symptoms  Medical care is often necessary to save the person's life  Erythrodermic psoriasis: Serious and life-threatening, this type of psoriasis requires immediate medical care  When someone develops erythrodermic psoriasis, you may notice:   Skin on most of the body looks burnt   Chills, fever, and the person looks extremely ill   Muscle weakness, a rapid pulse, and severe itch  The person may also be unable to keep warm, so hypothermia can set in quickly  Most people who develop this type of psoriasis already have another type of psoriasis  Before developing erythrodermic psoriasis, they often notice that their psoriasis is worsening or not improving with treatment  If you notice either of these happening, see a board-certified dermatologist     Nails    Nail psoriasis: With any type of psoriasis, you may see changes to your fingernails or toenails  About half of the people who have plaque psoriasis see signs of psoriasis on their fingernails at some point2  When psoriasis affects the nails, you may notice:   Tiny dents in your nails (called nail pits)   White, yellow, or brown discoloration under one or more nails   Crumbling, rough nails   A nail lifting up so that it's no longer attached   Buildup of skin cells beneath one or more nails, which lifts up the nail  Treatment and proper nail care can help you control nail psoriasis      Psoriatic arthritis: If you have psoriasis, it's important to pay attention to your joints  Some people who have psoriasis develop a type of arthritis called psoriatic arthritis  This is more likely to occur if you have severe psoriasis  Most people notice psoriasis on their skin years before they develop psoriatic arthritis  It's also possible to get psoriatic arthritis before psoriasis, but this is less common  When psoriatic arthritis develops, the signs can be subtle  At first, you may notice:   A swollen and tender joint, especially in a finger or toe   Heel pain   Swelling on the back of your leg, just above your heel   Stiffness in the morning that fades during the day  Like psoriasis, psoriatic arthritis cannot be cured  Treatment can prevent psoriatic arthritis from worsening, which is important  Allowed to progress, psoriatic arthritis can become disabling  Diagnosis and treatment of psoriasis   Psoriasis is usually diagnosed by clinical features, and skin biopsy if necessary  It is important to decrease factors that aggravate psoriasis  These include treating streptococcal infections, minimizing skin injuries, avoiding sun exposure if it exacerbates psoriasis, smoking, alcohol usage, decreasing stress, and maintaining an optimal body weight  Certain medications such as lithium, beta blockers, antimalarials, and NSAIDs have also been implicated  Suddenly stopping oral steroids or strong topical steroids can cause rebound disease  There are many categories of psoriasis treatments available  Topical therapy  Mild psoriasis is generally treated with topical agents alone  Which treatment is selected may depend on body site, extent and severity of psoriasis     Emollients   Coal tar preparations   Dithranol   Salicylic acid   Vitamin D analogue (calcipotriol)   Topical corticosteroids   Calcineurin inhibitor (tacrolimus, pimecrolimus)  Phototherapy  Most psoriasis centres offer phototherapy with ultraviolet (UV) radiation, often in combination with topical or systemic agents  Types of phototherapy include   Narrowband UVB   Broadband UVB   Photochemotherapy (PUVA)   Targeted phototherapy  Systemic therapy  Moderate to severe psoriasis warrants treatment with a systemic agent and/or phototherapy  The most common treatments are:   Methotrexate   Ciclosporin   Acitretin  Other medicines occasionally used for psoriasis include:   Mycophenolate   Apremilast   Hydroxyurea   Azathioprine   6-mercaptopurine  Systemic corticosteroids are best avoided due to a risk of severe withdrawal flare of psoriasis and adverse effects  Biologics or targeted therapies are reserved for conventional treatment-resistant severe psoriasis, mainly because of expense, as side effects compare favorably with other systemic agents  These include:   Anti-tumour necrosis factor-alpha antagonists (anti-TNF?) infliximab, adalimumab and etanercept   The interleukin (IL)-12/23 antagonist ustekinumab   IL-17 antagonists such as secukinumab  Many other monoclonal antibodies are under investigation in the treatment of psoriasis  2  MELANOCYTIC NEVI ("Moles")    Physical Exam:   Anatomic Location Affected:   Mostly on sun-exposed areas of the trunk and extremities   Morphological Description:  Scattered, 1-4mm round to ovoid, symmetrical-appearing, even bordered, skin colored to dark brown macules/papules, mostly in sun-exposed areas   Pertinent Positives:   Pertinent Negatives:     Additional History of Present Condition:      Assessment and Plan:  Based on a thorough discussion of this condition and the management approach to it (including a comprehensive discussion of the known risks, side effects and potential benefits of treatment), the patient (family) agrees to implement the following specific plan:   When outside we recommend using a wide brim hat, sunglasses, long sleeve and pants, sunscreen with SPF 17+ with reapplication every 2 hours, or SPF specific clothing    Benign, reassured   Annual skin check     Melanocytic Nevi  Melanocytic nevi ("moles") are tan or brown, raised or flat areas of the skin which have an increased number of melanocytes  Melanocytes are the cells in our body which make pigment and account for skin color  Some moles are present at birth (I e , "congenital nevi"), while others come up later in life (i e , "acquired nevi")  The sun can stimulate the body to make more moles  Sunburns are not the only thing that triggers more moles  Chronic sun exposure can do it too  Clinically distinguishing a healthy mole from melanoma may be difficult, even for experienced dermatologists  The "ABCDE's" of moles have been suggested as a means of helping to alert a person to a suspicious mole and the possible increased risk of melanoma  The suggestions for raising alert are as follows:    Asymmetry: Healthy moles tend to be symmetric, while melanomas are often asymmetric  Asymmetry means if you draw a line through the mole, the two halves do not match in color, size, shape, or surface texture  Asymmetry can be a result of rapid enlargement of a mole, the development of a raised area on a previously flat lesion, scaling, ulceration, bleeding or scabbing within the mole  Any mole that starts to demonstrate "asymmetry" should be examined promptly by a board certified dermatologist      Border: Healthy moles tend to have discrete, even borders  The border of a melanoma often blends into the normal skin and does not sharply delineate the mole from normal skin  Any mole that starts to demonstrate "uneven borders" should be examined promptly by a board certified dermatologist      Color: Healthy moles tend to be one color throughout  Melanomas tend to be made up of different colors ranging from dark black, blue, white, or red    Any mole that demonstrates a color change should be examined promptly by a board certified dermatologist      Diameter: Healthy moles tend to be smaller than 0 6 cm in size; an exception are "congenital nevi" that can be larger  Melanomas tend to grow and can often be greater than 0 6 cm (1/4 of an inch, or the size of a pencil eraser)  This is only a guideline, and many normal moles may be larger than 0 6 cm without being unhealthy  Any mole that starts to change in size (small to bigger or bigger to smaller) should be examined promptly by a board certified dermatologist      Evolving: Healthy moles tend to "stay the same "  Melanomas may often show signs of change or evolution such as a change in size, shape, color, or elevation  Any mole that starts to itch, bleed, crust, burn, hurt, or ulcerate or demonstrate a change or evolution should be examined promptly by a board certified dermatologist       Dysplastic Nevi  Dysplastic moles are moles that fit the ABCDE rules of melanoma but are not identified as melanomas when examined under the microscope  They may indicate an increased risk of melanoma in that person  If there is a family history of melanoma, most experts agree that the person may be at an increased risk for developing a melanoma  Experts still do not agree on what dysplastic moles mean in patients without a personal or family history of melanoma  Dysplastic moles are usually larger than common moles and have different colors within it with irregular borders  The appearance can be very similar to a melanoma  Biopsies of dysplastic moles may show abnormalities which are different from a regular mole  Melanoma  Malignant melanoma is a type of skin cancer that can be deadly if it spreads throughout the body  The incidence of melanoma in the [place] is growing faster than any other cancer  Melanoma usually grows near the surface of the skin for a period of time, and then begins to grow deeper into the skin  Once it grows deeper into the skin, the risk of spread to other organs greatly increases   Therefore, early detection and removal of a malignant melanoma may result in a better chance at a complete cure; removal after the tumor has spread may not be as effective, leading to worse clinical outcomes such as death  The true rate of nevus transformation into a melanoma is unknown  It has been estimated that the lifetime risk for any acquired melanocytic nevus on any 21year-old individual transforming into melanoma by age [de-identified] is 0 03% (1 in 3,164) for men and 0 009% (1 in 10,800) for women  The appearance of a "new mole" remains one of the most reliable methods for identifying a malignant melanoma  Occasionally, melanomas appear as rapidly growing, blue-black, dome-shaped bumps within a previous mole or previous area of normal skin  Other times, melanomas are suspected when a mole suddenly appears or changes  Itching, burning, or pain in a pigmented lesion should increase suspicion, but most patients with early melanoma have no skin discomfort whatsoever  Melanoma can occur anywhere on the skin, including areas that are difficult for self-examination  Many melanomas are first noticed by other family members  Suspicious-looking moles may be removed for microscopic examination  You may be able to prevent death from melanoma by doing two simple things:    1  Try to avoid unnecessary sun exposure and protect your skin when it is exposed to the sun  People who live near the equator, people who have intermittent exposures to large amounts of sun, and people who have had sunburns in childhood or adolescence have an increased risk for melanoma  Sun sense and vigilant sun protection may be keys to helping to prevent melanoma  We recommend wearing UPF-rated sun protective clothing and sunglasses whenever possible and applying a moisturizer-sunscreen combination product (SPF 50+) such as Neutrogena Daily Defense to sun exposed areas of skin at least three times a day      2  Have your moles regularly examined by a board certified dermatologist AND by yourself or a family member/friend at home  We recommend that you have your moles examined at least once a year by a board certified dermatologist   Use your birthday as an annual reminder to have your "Birthday Suit" (I e , your skin) examined; it is a nice birthday gift to yourself to know that your skin is healthy appearing! Additionally, at-home self examinations may be helpful for detecting a possible melanoma  Use the ABCDEs we discussed and check your moles once a month at home  3  LENTIGO    Physical Exam:   Anatomic Location Affected:  Arms and trunk    Morphological Description:  Light brown macules   Pertinent Positives:   Pertinent Negatives: Additional History of Present Condition:      Assessment and Plan:  Based on a thorough discussion of this condition and the management approach to it (including a comprehensive discussion of the known risks, side effects and potential benefits of treatment), the patient (family) agrees to implement the following specific plan:   When outside we recommend using a wide brim hat, sunglasses, long sleeve and pants, sunscreen with SPF 32+ with reapplication every 2 hours, or SPF specific clothing       What is a lentigo? A lentigo is a pigmented flat or slightly raised lesion with a clearly defined edge  Unlike an ephelis (freckle), it does not fade in the winter months  There are several kinds of lentigo  The name lentigo originally referred to its appearance resembling a small lentil  The plural of lentigo is lentigines, although lentigos is also in common use  Who gets lentigines? Lentigines can affect males and females of all ages and races  Solar lentigines are especially prevalent in fair skinned adults  Lentigines associated with syndromes are present at birth or arise during childhood  What causes lentigines?   Common forms of lentigo are due to exposure to ultraviolet radiation:   Sun damage including sunburn  Indoor tanning    Phototherapy, especially photochemotherapy (PUVA)    Ionizing radiation, eg radiation therapy, can also cause lentigines  Several familial syndromes associated with widespread lentigines originate from mutations in Panchito-MAP kinase, mTOR signaling and PTEN pathways  What are the clinical features of lentigines? Lentigines have been classified into several different types depending on what they look like, where they appear on the body, causative factors, and whether they are associated to other diseases or conditions  Lentigines may be solitary or more often, multiple  Most lentigines are smaller than 5 mm in diameter      Lentigo simplex   A precursor to junctional naevus    Arises during childhood and early adult life    Found on trunk and limbs    Small brown round or oval macule or thin plaque    Jagged or smooth edge    May have a dry surface    May disappear in time  Solar lentigo   A precursor to seborrhoeic keratosis    Found on chronically sun exposed sites such as hands, face, lower legs    May also follow sunburn to shoulders    Yellow, light or dark brown regular or irregular macule or thin plaque    May have a dry surface    Often has moth-eaten outline    Can slowly enlarge to several centimeters in diameter    May disappear, often through the process known as lichenoid keratosis    When atypical in appearance, may be difficult to distinguish from melanoma in situ  Ink spot lentigo   Also known as reticulated lentigo    Few in number compared to solar lentigines    Follows sunburn in very fair skinned individuals    Dark brown to black irregular ink spot-like macule  PUVA lentigo   Similar to ink spot lentigo but follows photochemotherapy (PUVA)    Location anywhere exposed to PUVA  Tanning bed lentigo   Similar to ink spot lentigo but follows indoor tanning    Location anywhere exposed to tanning bed  Radiation lentigo   Occurs in site of irradiation (accidental or therapeutic)    Associated with late-stage radiation dermatitis: epidermal atrophy, subcutaneous fibrosis, keratosis, telangiectasias  Melanotic macule   Mucosal surfaces or adjacent glabrous skin eg lip, vulva, penis, anus    Light to dark brown    Also called mucosal melanosis  Generalised lentigines   Found on any exposed or covered site from early childhood    Small macules may merge to form larger patches    Not associated with a syndrome    Also called lentigines profusa, multiple lentigines  Agminated lentigines   Naevoid eruption of lentigos confined to a single segmental area    Sharp demarcation in midline    May have associated neurological and developmental abnormalities  Patterned lentigines   Inherited tendency to lentigines on face, lips, buttocks, palms, soles    Recognised mainly in people of  ethnicity  Centrofacial neurodysraphic lentiginosis  Associated with mental retardation  Lentiginosis syndromes   Syndromes include LEOPARD/[place], Peutz-Jeghers, Laugier-Hunziker, Moynahan, Xeroderma pigmentosum, myxoma syndromes (VILA, NAME, Cruz), Ruvalcaba-Myhre-, Bannayan-Zonnana syndrome, Cowden disease (multiple hamartoma syndrome )    Inheritance is autosomal dominant; sporadic cases common    Widespread lentigines present at birth or arise in early childhood    Associated with neural, endocrine, and mesenchymal tumors    How is the diagnosis made? Lentigines are usually diagnosed clinically by their typical appearance   Concern regarding possibility of melanoma may lead to:   Dermatoscopy    Diagnostic excision biopsy    Histopathology of a lentigo shows:   Thickened epidermis    An increased number of melanocytes along the basal layer of epidermis    Unlike junctional melanocytic naevus, there are no nests of melanocytes    Increased melanin pigment within the keratinocytes    Additional features depending on type of lentigo    In contrast, an ephelis (freckle) shows sun-induced increased melanin within the keratinocytes, without an increase in number of cells  What is the treatment for lentigines? Most lentigines are left alone  Attempts to lighten them may not be successful  The following approaches are used:   SPF 50+ broad-spectrum sunscreen    Hydroquinone bleaching cream    Alpha hydroxy acids    Vitamin C    Retinoids    Azelaic acid    Chemical peels  Individual lesions can be permanently removed using:   Cryotherapy    Intense pulsed light    Pigment lasers    How can lentigines be prevented? Lentigines associated with exposure ultraviolet radiation can be prevented by very careful sun protection  Clothing is more successful at preventing new lentigines than are sunscreens  What is the outlook for lentigines? Lentigines usually persist  They may increase in number with age and sun exposure  Some in sun-protected sites may fade and disappear  4  SCHNEIDER ANGIOMAS    Physical Exam:   Anatomic Location Affected:  trunk   Morphological Description:  Scattered cherry red, 1-4 mm papules   Pertinent Positives:   Pertinent Negatives: Additional History of Present Condition:      Assessment and Plan:  Based on a thorough discussion of this condition and the management approach to it (including a comprehensive discussion of the known risks, side effects and potential benefits of treatment), the patient (family) agrees to implement the following specific plan:   Monitor for changes   Benign, reassured       Assessment and Plan:    Cherry angioma, also known as [employer] spots, are benign vascular skin lesions  A "cherry angioma" is a firm red, blue or purple papule, 0 1-1 cm in diameter  When thrombosed, they can appear black in colour until evaluated with a dermatoscope when the red or purple colour is more easily seen   Cherry angioma may develop on any part of the body but most often appear on the scalp, face, lips and trunk  An angioma is due to proliferating endothelial cells; these are the cells that line the inside of a blood vessel  Angiomas can arise in early life or later in life; the most common type of angioma is a cherry angioma  Cherry angiomas are very common in males and females of any age or race  They are more noticeable in white skin than in skin of colour  They markedly increase in number from about the age of 36  There may be a family history of similar lesions  Eruptive cherry angiomas have been rarely reported to be associated with internal malignancy  The cause of angiomas is unknown  Genetic analysis of cherry angiomas has shown that they frequently carry specific somatic missense mutations in the GNAQ and GNA11 (Q209H) genes, which are involved in other vascular and melanocytic proliferations  Cherry angioma is usually diagnosed clinically and no investigations are necessary for the majority of lesions  It has a characteristic red-clod or lobular pattern on dermatoscopy (called lacunar pattern using conventional pattern analysis)  When there is uncertainty about the diagnosis, a biopsy may be performed  The angioma is composed of venules in a thickened papillary dermis  Collagen bundles may be prominent between the lobules  Cherry angiomas are harmless, so they do not usually have to be treated  Occasionally, they are removed to exclude a malignant skin lesion such as a nodular melanoma or because they are irritated or bleeding (and a subsequent risk for infection)  To decrease friction over the lesions, we recommend Neutrogena Daily Defense SPF 50+ at least 3 times a day  5  XEROSIS ("DRY SKIN")    Physical Exam:   Anatomic Location Affected:  diffuse   Morphological Description:  xerosis   Pertinent Positives:   Pertinent Negatives:     Additional History of Present Condition:      Assessment and Plan:  Based on a thorough discussion of this condition and the management approach to it (including a comprehensive discussion of the known risks, side effects and potential benefits of treatment), the patient (family) agrees to implement the following specific plan:   Use moisturizer like Eucerin,Cerave or Aveeno Cream 3 times a day for the dry skin               Dry skin refers to skin that feels dry to touch  Dry skin has a dull surface with a rough, scaly quality  The skin is less pliable and cracked  When dryness is severe, the skin may become inflamed and fissured  Although any body site can be dry, dry skin tends to affect the shins more than any other site  Dry skin is lacking moisture in the outer horny cell layer (stratum corneum) and this results in cracks in the skin surface  Dry skin is also called xerosis, xeroderma or asteatosis (lack of fat)  It can affect males and females of all ages  There is some racial variability in water and lipid content of the skin   Dry skin that starts in early childhood may be one of about 20 types of ichthyosis (fish-scale skin)  There is often a family history of dry skin   Dry skin is commonly seen in people with atopic dermatitis   Nearly everyone > 60 years has dry skin  Dry skin that begins later may be seen in people with certain diseases and conditions   Postmenopausal women   Hypothyroidism   Chronic renal disease    Malnutrition and weight loss    Subclinical dermatitis    Treatment with certain drugs such as oral retinoids, diuretics and epidermal growth factor receptor inhibitors    People exposed to a dry environment may experience dry skin     Low humidity: in desert climates or cool, windy conditions    Excessive air conditioning    Direct heat from a fire or fan heater    Excessive bathing    Contact with soap, detergents and solvents    Inappropriate topical agents such as alcohol    Frictional irritation from rough clothing or abrasives    Dry skin is due to abnormalities in the integrity of the barrier function of the stratum corneum, which is made up of corneocytes   There is an overall reduction in the lipids in the stratum corneum   Ratio of ceramides, cholesterol and free fatty acids may be normal or altered   There may be a reduction in the proliferation of keratinocytes   Keratinocyte subtypes change in dry skin with a decrease in keratins K1, K10 and increase in K5, K14     Involucrin (a protein) may be expressed early, increasing cell stiffness   The result is retention of corneocytes and reduced water-holding capacity  The inherited forms of ichthyosis are due to loss of function mutations in various genes (listed in parentheses below)  The clinical features of ichthyosis depend on the specific type of ichthyosis:   Ichthyosis vulgaris (FLG)   Recessive X-linked ichthyosis (STS)    Autosomal recessive congenital ichthyosis (ABCA12, TGM1, ALOXE3)    Keratinopathic ichthyoses (KRT1, KRT10, KRT2)    Acquired ichthyosis may be due to:   Metabolic factors: thyroid deficiency    Illness: lymphoma, internal malignancy, sarcoidosis, HIV infection    Drugs: nicotinic acid, kava, protein kinase inhibitors (eg EGFR inhibitors), hydroxyurea  Complications of dry skin:  Dry areas of skin may become itchy, indicating a form of eczema/dermatitis has developed   Atopic eczema -- especially in people with ichthyosis vulgaris    Eczema craquelé -- especially in elderly people  Also called asteatotic eczema    A dry form of nummular dermatitis/discoid eczema -- especially in people that wash their skin excessively  When the dry skin of an elderly person is itchy without a visible rash, it is sometimes called winter itch, 7th age itch, senile pruritus or chronic pruritus of the elderly      Other complications of dry skin may include:   Skin infection when bacteria or viruses penetrate a break in the skin surface    Overheating, especially in some forms of ichthyosis    Food allergy, eg, to peanuts, has been associated with filaggrin mutations    Contact allergy, eg, to nickel, has also been correlated with barrier function defects  How is the type of dry skin diagnosed? The type of dry skin is diagnosed by careful history and examination  In children:   Family history    Age of onset    Appearance at birth, if known    Distribution of dry skin    Other features, eg eczema, abnormal nails, hair, dentition, sight, hearing  In adults:   Medical history    Medications and topical preparations    Bathing frequency and use of soap    Evaluation of environmental factors that may contribute to dry skin  What is the treatment for dry skin? The mainstay of treatment of dry skin and ichthyosis is moisturisers/emollients  They should be applied liberally and often enough to:   Reduce itch    Improve the barrier function    Prevent entry of irritants, bacteria    Reduce transepidermal water loss  When considering which emollient is most suitable, consider:   Severity of the dryness    Tolerance    Personal preference    Cost and availability  Emollients generally work best if applied to damp skin, if pH is below 7 (acidic), and if containing humectants such as urea or propylene glycol  Additional treatments include:   Topical steroid if itchy or there is dermatitis -- choose an emollient base    Topical calcineurin inhibitors if topical steroids are unsuitable  How can dry skin be prevented? Eliminate aggravating factors:   Reduce the frequency of bathing   A humidifier in winter and air conditioner in summer    Compare having a short shower with a prolonged soak in a bath   Use lukewarm, not hot, water      Replace standard soap with a substitute such as a synthetic detergent cleanser, water-miscible emollient, bath oil, anti-pruritic tar oil, colloidal oatmeal etc     Apply an emollient liberally and often, particularly shortly after bathing, and when itchy  The drier the skin, the thicker this should be, especially on the hands  What is the outlook for dry skin? A tendency to dry skin may persist life-long, or it may improve once contributing factors are controlled    Scribe Attestation    I,:  Aileen Ahuja, MA am acting as a scribe while in the presence of the attending physician :       I,:  Maggie Arias MD personally performed the services described in this documentation    as scribed in my presence :

## 2021-03-10 ENCOUNTER — TELEPHONE (OUTPATIENT)
Dept: HEMATOLOGY ONCOLOGY | Facility: CLINIC | Age: 51
End: 2021-03-10

## 2021-03-16 ENCOUNTER — TELEPHONE (OUTPATIENT)
Dept: HEMATOLOGY ONCOLOGY | Facility: CLINIC | Age: 51
End: 2021-03-16

## 2021-03-16 NOTE — TELEPHONE ENCOUNTER
Genetics New Patient Intake Form    Patient Details:      Lebron Akins     1970     [id number]     Background Information:         Who is calling to schedule?                                            self    If not self, relationship to patient? Referring Provider Juanis Hinojosa    Is the referral marked STAT No    Is patient newly diagnosed with cancer, have metastatic disease, or pending surgery? No    If yes, which is it? If the patient is pending surgery Needs to be scheduled within 48 hours  If none available, schedule patient then email Stat cancer genetics    If the patient is metastatic or newly diagnosed Needs to be scheduled within 2 weeks  If none available, schedule patient then email Stat cancer genetics    Have you had genetic testing that showed a positive genetic mutation? (If yes, schedule within 2 weeks or email STAT cancer genetics) No    Has your family member had genetic testing that resulted in a positive genetic mutation? (If yes in the last 6 months, schedule within 3 weeks )  (If yes but over 6 months, schedule as usual) No    Is this a personal or family history? personal and family    What is the type of tumor? Personal - breast 2015  Family - breast, thyroid, prostate, ovarian, lung    Scheduling Information:    Preferred [place]:  [place]         Are there any dates/time the patient cannot be seen? No    Did the patient schedule an appointment? Yes    If yes, list appointment date and provider name 6/1 Pedro Alvarez    If no, briefly state why     Miscellaneous: pt had negative testing at original dx w/ Wali Smithy  Mother recently dx w/ HER2+ breast ca    eval for repeat testing    After completing the above information, please route to Oncology Genetics

## 2021-04-19 ENCOUNTER — PATIENT OUTREACH (OUTPATIENT)
Dept: SURGICAL ONCOLOGY | Facility: CLINIC | Age: 51
End: 2021-04-19

## 2021-04-19 NOTE — PROGRESS NOTES
Breast Oncology Nurse Navigator    Received call from patient who is requesting assistance with yearly BCCT program application  Provided patient with my e-mail address to send forms to have them signed by Dr Latisha Hinojosa and informed that I will call her back when forms have been signed and faxed  Patient verbalized understanding

## 2021-04-20 ENCOUNTER — PATIENT OUTREACH (OUTPATIENT)
Dept: SURGICAL ONCOLOGY | Facility: CLINIC | Age: 51
End: 2021-04-20

## 2021-04-20 NOTE — PROGRESS NOTES
Returned call to patient and informed that BCCT program renewal application received and filled out, signed by Dr Sally Rubalcava and fax to program as per form instructions  Patient thankful for assistance and with no additional needs at this time  Encouraged to call as needed

## 2021-06-01 ENCOUNTER — CLINICAL SUPPORT (OUTPATIENT)
Dept: GENETICS | Facility: CLINIC | Age: 51
End: 2021-06-01

## 2021-06-01 ENCOUNTER — TELEPHONE (OUTPATIENT)
Dept: SURGICAL ONCOLOGY | Facility: CLINIC | Age: 51
End: 2021-06-01

## 2021-06-01 DIAGNOSIS — C50.211 MALIGNANT NEOPLASM OF UPPER-INNER QUADRANT OF RIGHT BREAST IN FEMALE, ESTROGEN RECEPTOR POSITIVE (HCC): Primary | ICD-10-CM

## 2021-06-01 DIAGNOSIS — Z80.3 FAMILY HISTORY OF BREAST CANCER: ICD-10-CM

## 2021-06-01 DIAGNOSIS — Z80.0 FAMILY HISTORY OF COLON CANCER: ICD-10-CM

## 2021-06-01 DIAGNOSIS — Z17.0 MALIGNANT NEOPLASM OF UPPER-INNER QUADRANT OF RIGHT BREAST IN FEMALE, ESTROGEN RECEPTOR POSITIVE (HCC): Primary | ICD-10-CM

## 2021-06-01 DIAGNOSIS — Z80.41 FAMILY HISTORY OF OVARIAN CANCER: ICD-10-CM

## 2021-06-01 PROCEDURE — NC001 PR NO CHARGE: Performed by: GENETIC COUNSELOR, MS

## 2021-06-01 NOTE — PROGRESS NOTES
Pre-Test Genetic Counseling Consult Note    Patient Name: Bradford SAMUEL/Age: 1970/51 y o  Referring Provider: Zari Gregorio MD, FACS    Date of Service: 2021  Genetic Counselor: Wallace House MS, [facility]   Genetic Counseling Student: Eric Reyes, genetic counseling student reviewed family history and types of results with the patient  Interpretation Services: None  Location: In-person consult at [facility] of Visit: 61 minutes      Cullen Wilkinson was referred to the [address] [place] [address] and Genetic Assessment Program due to her personal history of breast cancer and family history of cancer  she presents today to discuss the possibility of a hereditary cancer syndrome, options for genetic testing, and implications for her and her family  Cancer History and Treatment:   Oncology History   Malignant neoplasm of upper-inner quadrant of right breast in female, estrogen receptor positive ([facility] [address] )   2015 Surgery     Right US guided breast biopsy      2015 Surgery     Bilateral; mastectomies, right SLNB  Bilateral reconstruction with expanders  Dr Dinora Thornton      2015 -  Hormone Therapy     Tamoxifen  Dr Cassandria Lombard       10/23/2015 Surgery     Expanders removed, implants placed       2017 Initial Diagnosis     Malignant neoplasm of upper-inner quadrant of right breast in female, estrogen receptor positive ([facility] [address] )        Genetic Testing     BRCA negative        Genomic Testing     Oncotype DX  14        Genetic Testing History:   6/3/15: Glo Ramos (5 genes): BRCA1, BRCA2, CDH1, PTEN, TP53     Screening Hx:   Breast:  Currently follows with Dr Bin Wang, on Tamoxifen    Colon:  Colonoscopy: 20  IMPRESSION:  ·  The cecum appeared normal  · Proximal ascending colon-on the 1st fold across from the ileocecal valve there appear to be a less than 1 cm flat hyperplastic appearing polypoid/lipomatous lesion was noted and it became flat went tried to push with the snare    Multiple biopsies were obtained and the abnormal mucosa was completely removed  · All observed locations appeared normal, including the hepatic flexure, transverse colon, splenic flexure, descending colon, sigmoid colon, rectosigmoid and rectum    Gynecologic:  Pelvic/Pap exam on 20, normal  Ovaries/Uterus intact    Skin:  Skin cancer screening na    Other screening: none    Medical and Surgical History  Pertinent surgical history:   Past Surgical History:   Procedure Laterality Date    BREAST BIOPSY Right     IDC     SECTION      LAPAROSCOPY      exploratory, endometriosis    LYMPHADENECTOMY Right     x2    MASTECTOMY      bilateral mastectomy    MN NIPPLE/AREOLA RECONSTRUCTION Bilateral 10/13/2016    Procedure: NIPPLE RECONSTRUCTION ;  Surgeon: Dalila Escamilla MD;  [facility]: [facility];  Service: Plastics    MN REVISION OF RECONSTRUCTED BREAST Bilateral 3/9/2016    Procedure: RECONSTRUCTION REVISION  BREAST MOUND ,FAT GRAFTS ;  Surgeon: Dalila Escamilla MD;  Location: [facility];  Service: Plastics    SENTINEL LYMPH NODE BIOPSY Right       Pertinent medical history:  Past Medical History:   Diagnosis Date    BRCA negative     Endometriosis     IBS (irritable bowel syndrome)     Insomnia     Night sweats     Psoriasis     Wears glasses          Other History:  Height:   Ht Readings from Last 1 Encounters:   21 5' 8" (1 727 m)     Weight:   Wt Readings from Last 1 Encounters:   21 85 3 kg (188 lb)       Relevant Family History     Reported Ancestry: [place] has 1 daughter, she is 15  Ana Mishra has 1 sister, she is 52 and has no history of cancer   She has a son and daughter:    Millie's maternal family history includes:   Mother: 67, history of thyroid (36) and breast (67) cancer   Uncle(s): 76, history of colon cancer twice (second diagnosis in 62s)   Cousin(s): female first cousin, 46s, history of ovarian cancer   Grandmother: , history of possible melanoma  o Great-grandmother:  (66s), history of breast cancer   Grandfather:  (no information)    Millie's paternal family history includes:   Father: 76s, history of basal cell carcinoma diagnosed at 79   Uncle(s): 76s, no cancer history   Cousin(s): no cancer history   Grandmother:  (76s), no cancer history   Grandfather:  (55s-56s), history of lung cancer diagnosed at 48       Please refer to the scanned pedigree in the Media Tab for a complete family history     *All history is reported as provided by the patient; records are not available for review, except where indicated  Assessment:  We discussed sporadic, familial and hereditary cancer  We also discussed the many factors that influence our risk for cancer such as age, environmental exposures, lifestyle choices and family history  We reviewed the indications suggestive of a hereditary predisposition to cancer  Genetic testing is indicated for Amy Reyes based on the following criteria: Meets NCCN V2 202 Testing Criteria for High-Penetrance Breast and/or Ovarian Cancer Susceptibility Genes: personal history of breast cancer diagnosed under 39 with previously limited genetic testing    The risks, benefits, and limitations of genetic testing were reviewed with the patient, as well as genetic discrimination laws, and possible test results (positive, negative, variants of uncertain significance) and their clinical implications  If positive for a mutation, options for managing cancer risk including increased surveillance, chemoprevention, and in some cases prophylactic surgery were discussed  Amy Reyes was informed that if a hereditary cancer syndrome was identified in her, first degree relatives (parents, siblings, and children) have a chance of also inheriting the condition   Genetic testing would allow for predictive genetic testing in other relatives, who may also be at risk depending on their degree of relation  Plan: Patient decided to proceed with testing and provided consent  Summary:     Sample Collection:  Saliva was collected in the office on 6/1    Genetic Testing Preformed: [facility] (36 genes): APC, DADA, AXIN2 BARD1, BRCA1, BRCA2, BRIP1, BMPR1A, CDH1, CDK4, CDKN2A, CHEK2, DICER1, EPCAM, GREM1, HOXB13, MLH1, MSH2, MSH3, MSH6, MUTYH, NBN, NF1, NTHL1, PALB2, PMS2, POLD1, POLE, PTEN, RAD51C, RAD51D, RECQL SMAD4, SMARCA4, STK11, TP53      Results take approximately 2-3 weeks to complete once test is started  We will contact Chaya Leon once results are available  Additional recommendations for surveillance/medical management will be made pending genetic test results

## 2021-06-01 NOTE — LETTER
2021     Francisco Navarro MD  [address] [facility]  [address] [facility]    Patient: Velda Heimlich  YOB: 1970  Date of Visit: 2021      Dear Dr Tata Bower: Thank you for referring Velda Heimlich to me for evaluation  Below are my notes for this consultation  If you have questions, please do not hesitate to call me  I look forward to following your patient along with you  Sincerely,        Manny Gilbert        CC: Damir Mckeon DO        Pre-Test Genetic Counseling Consult Note    Patient Name: Velda Heimlich   /Age: 1970/51 y o  Referring Provider: Francisco Navarro MD, FACS    Date of Service: 2021  Genetic Counselor: Manny Gilbert MS, [address] [facility]   Genetic Counseling Student: Kamar Lopez, genetic counseling student reviewed family history and types of results with the patient  Interpretation Services: None  Location: In-person consult at [facility] of Visit: 61 minutes      Rasheeda Garcia was referred to the [address] [place], [place] [address]e and Genetic Assessment Program due to her personal history of breast cancer and family history of cancer  she presents today to discuss the possibility of a hereditary cancer syndrome, options for genetic testing, and implications for her and her family  Cancer History and Treatment:   Oncology History   Malignant neoplasm of upper-inner quadrant of right breast in female, estrogen receptor positive ([facility] [address] )   2015 Surgery     Right US guided breast biopsy      2015 Surgery     Bilateral; mastectomies, right SLNB  Bilateral reconstruction with expanders  Dr Joycie Nyhan      2015 -  Hormone Therapy     Tamoxifen     Dr Lora Charlton       10/23/2015 Surgery     Expanders removed, implants placed       2017 Initial Diagnosis     Malignant neoplasm of upper-inner quadrant of right breast in female, estrogen receptor positive (HCC)        Genetic Testing     BRCA negative        Genomic Testing     Oncotype DX  14    Genetic Testing History:   6/3/15: Ibeth Blevins (5 genes): BRCA1, BRCA2, CDH1, PTEN, TP53     Screening Hx:   Breast:  Currently follows with Dr Eva Barbour, on Tamoxifen    Colon:  Colonoscopy: 20  IMPRESSION:  ·  The cecum appeared normal  · Proximal ascending colon-on the 1st fold across from the ileocecal valve there appear to be a less than 1 cm flat hyperplastic appearing polypoid/lipomatous lesion was noted and it became flat went tried to push with the snare    Multiple biopsies were  obtained and the abnormal mucosa was completely removed  · All observed locations appeared normal, including the hepatic flexure, transverse colon, splenic flexure, descending colon, sigmoid colon, rectosigmoid and rectum    Gynecologic:  Pelvic/Pap exam on 20, normal  Ovaries/Uterus intact    Skin:  Skin cancer screening na    Other screening: none    Medical and Surgical History  Pertinent surgical history:   Past Surgical History:   Procedure Laterality Date    BREAST BIOPSY Right     IDC     SECTION      LAPAROSCOPY      exploratory, endometriosis    LYMPHADENECTOMY Right     x2    MASTECTOMY      bilateral mastectomy    [place] NIPPLE/AREOLA RECONSTRUCTION Bilateral 10/13/2016    Procedure: NIPPLE RECONSTRUCTION ;  Surgeon: Aurora Trevino MD;  Location: [facility];  Service: Plastics    [place] REVISION OF RECONSTRUCTED BREAST Bilateral 3/9/2016    Procedure: RECONSTRUCTION REVISION  BREAST MOUND ,FAT GRAFTS ;  Surgeon: Aurora Trevino MD;  Location: [facility];  Service: Plastics    SENTINEL LYMPH NODE BIOPSY Right       Pertinent medical history:  Past Medical History:   Diagnosis Date    BRCA negative     Endometriosis     IBS (irritable bowel syndrome)     Insomnia     Night sweats     Psoriasis     Wears glasses          Other History:  Height:   Ht Readings from Last 1 Encounters:   21 5' 8" (1 727 m)     Weight:   Wt Readings from Last 1 Encounters:   21 85 3 kg (188 lb) Relevant Family History     Reported Ancestry: Michel Rachel has 1 daughter, she is 15  Mel Bates has 1 sister, she is 52 and has no history of cancer  She has a son and daughter:    Avril maternal family history includes:   Mother: 67, history of thyroid (36) and breast (67) cancer   Uncle(s): 76, history of colon cancer twice (second diagnosis in 62s)   Cousin(s): female first cousin, 46s, history of ovarian cancer   Grandmother: , history of possible melanoma  o Great-grandmother:  (76s), history of breast cancer   Grandfather:  (no information)    Avril paternal family history includes:   Father: 76s, history of basal cell carcinoma diagnosed at 79   Uncle(s): 76s, no cancer history   Cousin(s): no cancer history   Grandmother:  (76s), no cancer history   Grandfather:  (55s-56s), history of lung cancer diagnosed at 48       Please refer to the scanned pedigree in the Media Tab for a complete family history     *All history is reported as provided by the patient; records are not available for review, except where indicated  Assessment:  We discussed sporadic, familial and hereditary cancer  We also discussed the many factors that influence our risk for cancer such as age, environmental exposures, lifestyle choices and family history  We reviewed the indications suggestive of a hereditary predisposition to cancer  Genetic testing is indicated for Mel Bates based on the following criteria: Meets NCCN V2 202 Testing Criteria for High-Penetrance Breast and/or Ovarian Cancer Susceptibility Genes: personal history of breast cancer diagnosed under 39 with previously limited genetic testing    The risks, benefits, and limitations of genetic testing were reviewed with the patient, as well as genetic discrimination laws, and possible test results (positive, negative, variants of uncertain significance) and their clinical implications   If positive for a mutation, options for managing cancer risk including increased surveillance, chemoprevention, and in some cases prophylactic surgery were discussed  Satinder Jason was informed that if a hereditary cancer syndrome was identified in her, first degree relatives (parents, siblings, and children) have a chance of also inheriting the condition  Genetic testing would allow for predictive genetic testing in other relatives, who may also be at risk depending on their degree of relation  Plan: Patient decided to proceed with testing and provided consent  Summary:     Sample Collection:  Saliva was collected in the office on 6/1    Genetic Testing Preformed: [employer] (36 genes): APC, DADA, AXIN2 BARD1, BRCA1, BRCA2, BRIP1, BMPR1A, CDH1, CDK4, CDKN2A, CHEK2, DICER1, EPCAM, GREM1, HOXB13, MLH1, MSH2, MSH3, MSH6, MUTYH, NBN, NF1, NTHL1, PALB2, PMS2, POLD1, POLE, PTEN, RAD51C, RAD51D, RECQL SMAD4, SMARCA4, STK11, TP53      Results take approximately 2-3 weeks to complete once test is started  We will contact Satinder Jason once results are available  Additional recommendations for surveillance/medical management will be made pending genetic test results

## 2021-06-08 ENCOUNTER — OFFICE VISIT (OUTPATIENT)
Dept: DERMATOLOGY | Facility: CLINIC | Age: 51
End: 2021-06-08
Payer: COMMERCIAL

## 2021-06-08 VITALS — HEIGHT: 68 IN | WEIGHT: 180 LBS | TEMPERATURE: 96.8 F | BODY MASS INDEX: 27.28 KG/M2

## 2021-06-08 DIAGNOSIS — L40.9 PSORIASIS: Primary | ICD-10-CM

## 2021-06-08 PROCEDURE — 3008F BODY MASS INDEX DOCD: CPT | Performed by: DERMATOLOGY

## 2021-06-08 PROCEDURE — 99212 OFFICE O/P EST SF 10 MIN: CPT | Performed by: DERMATOLOGY

## 2021-06-08 PROCEDURE — 1036F TOBACCO NON-USER: CPT | Performed by: DERMATOLOGY

## 2021-06-08 NOTE — PATIENT INSTRUCTIONS
Assessment and Plan:  Based on a thorough discussion of this condition and the management approach to it (including a comprehensive discussion of the known risks, side effects and potential benefits of treatment), the patient (family) agrees to implement the following specific plan:   Get  QuantiferonTB Gold lab done in August      Psoriasis is a chronic inflammatory condition that causes the body to make new skin cells in days rather than weeks  As these cells pile up on the surface of the skin, you may see thick, scaly patches of thickened skin  Psoriasis affects 2-4% of males and females  It can start at any age including childhood, with peaks of onset at 15-25 years and 50-60 years  It tends to persist lifelong, fluctuating in extent and severity  It is particularly common in Caucasians but may affect people of any race  About one-third of patients with psoriasis have family members with psoriasis  Psoriasis is multifactorial  It is classified as an immune-mediated inflammatory disease (IMID)  Genetic factors are important and influence the type of psoriasis and response to treatment  What are the signs and symptoms of psoriasis? There are many different types of psoriasis that each have present uniquely  The types of psoriasis include:    Plaque psoriasis: About 80% to 90% of people who have psoriasis develop this type  When plaque psoriasis appears, you may see:  Plaque psoriasis usually presents with symmetrically distributed, red, scaly plaques with well-defined edges  The scale is typically silvery white, except in skin folds where the plaques often appear shiny and they may have a moist peeling surface  The most common sites are scalp, elbows and knees, but any part of the skin can be involved  The plaques are usually very persistent without treatment    Itch is mostly mild but may be severe in some patients, leading to scratching and lichenification (thickened leathery skin with increased skin markings)  Painful skin cracks or fissures may occur  When psoriatic plaques clear up, they may leave brown or pale marks that can be expected to fade over several months  Guttate psoriasis: When someone gets this type of psoriasis, you often see tiny bumps appear on the skin quite suddenly  The bumps tend to cover much of the torso, legs, and arms  Sometimes, the bumps also develop on the face, scalp, and ears  No matter where they appear, the bumps tend to be:    Small and scaly   [place]-colored to pink   Temporary, clearing in a few weeks or months without treatment  When guttate psoriasis clears, it may never return  Why this happens is still a bit of a mystery  Guttate psoriasis tends to develop in children and young adults who've had an infection, such as strep throat  It's possible that when the infection clears so does guttate psoriasis  It's also possible to have:   Guttate psoriasis for life   See the guttate psoriasis clear and plaque psoriasis develop later in life   Plaque psoriasis when you develop guttate psoriasis  There's no way to predict what will happen after the first flare-up of guttate psoriasis clears  Inverse psoriasis: This type of psoriasis develops in areas where skin touches skin, such as the armpits, genitals, and crease of the buttocks  Where the inverse psoriasis appears, you're likely to notice:   Smooth, red patches of skin that look raw   Little, if any, silvery-white coating   Sore or painful skin  Other names for this type of psoriasis are intertriginous psoriasis or flexural psoriasis  Pustular psoriasis: This type of psoriasis causes pus-filled bumps that usually appear only on the feet and hands  While the pus-filled bumps may look like an infection, the skin is not infected  The bumps don't contain bacteria or anything else that could cause an infection    Where pustular psoriasis appears, you tend to notice:   Red, swollen skin that is dotted with pus-filled bumps   Extremely sore or painful skin   Brown dots (and sometimes scale) appear as the pus-filled bumps dry  Pustular psoriasis can make just about any activity that requires your hands or feet, such as typing or walking, unbearably painful  Pustular psoriasis (generalized): Serious and life-threatening, this rare type of psoriasis causes pus-filled bumps to develop on much of the skin  Also called von Zumbusch psoriasis, a flare-up causes this sequence of events:  1  Skin on most of the body suddenly turns dry, red, and tender  2  Within hours, pus-filled bumps cover most of the skin  3  Often within a day, the pus-filled bumps break open and pools of pus leak onto the skin  4  As the pus dries (usually within 24 to 48 hours), the skin dries out and peels (as shown in this picture)  5  When the dried skin peels off, you see a smooth, glazed surface  6  In a few days or weeks, you may see a new crop of pus-filled bumps covering most of the skin, as the cycle repeats itself  Anyone with pustular psoriasis also feels very sick, and may develop a fever, headache, muscle weakness, and other symptoms  Medical care is often necessary to save the person's life  Erythrodermic psoriasis: Serious and life-threatening, this type of psoriasis requires immediate medical care  When someone develops erythrodermic psoriasis, you may notice:   Skin on most of the body looks burnt   Chills, fever, and the person looks extremely ill   Muscle weakness, a rapid pulse, and severe itch  The person may also be unable to keep warm, so hypothermia can set in quickly  Most people who develop this type of psoriasis already have another type of psoriasis  Before developing erythrodermic psoriasis, they often notice that their psoriasis is worsening or not improving with treatment   If you notice either of these happening, see a board-certified dermatologist     Nails    Nail psoriasis: With any type of psoriasis, you may see changes to your fingernails or toenails  About half of the people who have plaque psoriasis see signs of psoriasis on their fingernails at some point2  When psoriasis affects the nails, you may notice:   Tiny dents in your nails (called nail pits)   White, yellow, or brown discoloration under one or more nails   Crumbling, rough nails   A nail lifting up so that it's no longer attached   Buildup of skin cells beneath one or more nails, which lifts up the nail  Treatment and proper nail care can help you control nail psoriasis  Psoriatic arthritis: If you have psoriasis, it's important to pay attention to your joints  Some people who have psoriasis develop a type of arthritis called psoriatic arthritis  This is more likely to occur if you have severe psoriasis  Most people notice psoriasis on their skin years before they develop psoriatic arthritis  It's also possible to get psoriatic arthritis before psoriasis, but this is less common  When psoriatic arthritis develops, the signs can be subtle  At first, you may notice:   A swollen and tender joint, especially in a finger or toe   Heel pain   Swelling on the back of your leg, just above your heel   Stiffness in the morning that fades during the day  Like psoriasis, psoriatic arthritis cannot be cured  Treatment can prevent psoriatic arthritis from worsening, which is important  Allowed to progress, psoriatic arthritis can become disabling  Diagnosis and treatment of psoriasis   Psoriasis is usually diagnosed by clinical features, and skin biopsy if necessary  It is important to decrease factors that aggravate psoriasis  These include treating streptococcal infections, minimizing skin injuries, avoiding sun exposure if it exacerbates psoriasis, smoking, alcohol usage, decreasing stress, and maintaining an optimal body weight  Certain medications such as lithium, beta blockers, antimalarials, and NSAIDs have also been implicated  Suddenly stopping oral steroids or strong topical steroids can cause rebound disease  There are many categories of psoriasis treatments available  Topical therapy  Mild psoriasis is generally treated with topical agents alone  Which treatment is selected may depend on body site, extent and severity of psoriasis   Emollients   Coal tar preparations   Dithranol   Salicylic acid   Vitamin D analogue (calcipotriol)   Topical corticosteroids   Calcineurin inhibitor (tacrolimus, pimecrolimus)  Phototherapy  Most psoriasis centres offer phototherapy with ultraviolet (UV) radiation, often in combination with topical or systemic agents  Types of phototherapy include   Narrowband UVB   Broadband UVB   Photochemotherapy (PUVA)   Targeted phototherapy  Systemic therapy  Moderate to severe psoriasis warrants treatment with a systemic agent and/or phototherapy  The most common treatments are:   Methotrexate   Ciclosporin   Acitretin  Other medicines occasionally used for psoriasis include:   Mycophenolate   Apremilast   Hydroxyurea   Azathioprine   6-mercaptopurine  Systemic corticosteroids are best avoided due to a risk of severe withdrawal flare of psoriasis and adverse effects  Biologics or targeted therapies are reserved for conventional treatment-resistant severe psoriasis, mainly because of expense, as side effects compare favorably with other systemic agents  These include:   Anti-tumour necrosis factor-alpha antagonists (anti-TNF?) infliximab, adalimumab and etanercept   The interleukin (IL)-12/23 antagonist ustekinumab   IL-17 antagonists such as secukinumab  Many other monoclonal antibodies are under investigation in the treatment of psoriasis

## 2021-06-08 NOTE — PROGRESS NOTES
[facility] Follow Up Note    Patient Name: Renard Goldman  Encounter Date: 06/08/2021    Today's Chief Concerns:  Sharon Sanderson Concern #1:  Follow up psoriasis      Current Medications:    Current Outpatient Medications:     Ashwagandha 125 MG CAPS, Take by mouth, Disp: , Rfl:     Calcium 500 MG tablet, Take by mouth, Disp: , Rfl:     Cholecalciferol (VITAMIN D PO), Take 5,000 Units by mouth daily , Disp: , Rfl:     Digestive Enzymes (DIGESTIVE ENZYME PO), Take 1 tablet by mouth daily as needed  , Disp: , Rfl:     Magnesium 400 MG CAPS, Take 1 tablet by mouth daily  , Disp: , Rfl:     MILK THISTLE PO, Take 600 mg by mouth daily  , Disp: , Rfl:     Multiple Vitamins-Minerals (ONE DAILY MULTIVITAMIN ADULT) TABS, Take by mouth, Disp: , Rfl:     NON FORMULARY, , Disp: , Rfl:     Probiotic Product (PROBIOTIC DAILY PO), Take 2 tablets by mouth daily  , Disp: , Rfl:     secukinumab (Cosentyx Sensoready Pen) 150 mg/mL SOAJ injection, Inject 2 mL (300 mg total) under the skin every 30 (thirty) days, Disp: 2 mL, Rfl: 5    tamoxifen (NOLVADEX) 20 mg tablet, TAKE 1 TABLET BY MOUTH EVERY DAY, Disp: 30 tablet, Rfl: 7    Efinaconazole (Jublia) 10 % SOLN, Apply 1 application topically daily (Patient not taking: Reported on 3/4/2021), Disp: 8 mL, Rfl: 2    methylPREDNISolone 4 MG tablet therapy pack, Use as directed on package (Patient not taking: Reported on 3/4/2021), Disp: 21 each, Rfl: 0    pantoprazole (PROTONIX) 40 mg tablet, Take 1 tablet (40 mg total) by mouth daily (Patient not taking: Reported on 3/4/2021), Disp: 30 tablet, Rfl: 0    CONSTITUTIONAL:   Vitals:    06/08/21 0948   Temp: (!) 96 8 °F (36 °C)   TempSrc: Tympanic   Weight: 81 6 kg (180 lb)   Height: 5' 8" (1 727 m)         Specific Alerts:    Have you been seen by a [facility] Dermatologist in the last 3 years? YES    Are you pregnant or planning to become pregnant? No    Are you currently or planning to be nursing or breast feeding? No    Allergies   Allergen Reactions    Other Hives     Kait selzer    Kait-Rajat Plus Cold & Cough  [Laxmdnlam-Hvl-Nr-Apap] Hives       May we call your Preferred Phone number to discuss your specific medical information? YES    May we leave a detailed message that includes your specific medical information? YES    Have you traveled outside of the [facility] in the past 3 months? No    Do you currently have a pacemaker or defibrillator? No    Do you have any artificial heart valves, joints, plates, screws, rods, stents, pins, etc? No   - If Yes, were any placed within the last 2 years? Do you require any medications prior to a surgical procedure? No   - If Yes, for which procedure? n a   - If Yes, what medications to you require? n a    Are you taking any medications that cause you to bleed more easily ("blood thinners") No    Have you ever experienced a rapid heartbeat with epinephrine? No    Have you ever been treated with "gold" (gold sodium thiomalate) therapy? No    [facility] can help with wrinkles, "laugh lines," facial volume loss, "double chin," "love handles," age spots, and more  Are you interested in learning today about some of the skin enhancement procedures that we offer? (If Yes, please provide more detail) No    Review of Systems:  Have you recently had or currently have any of the following?     · Fever or chills: No  · Night Sweats: No  · Headaches: No  · Weight Gain: No  · Weight Loss: No  · Blurry Vision: No  · Nausea: No  · Vomiting: No  · Diarrhea: No  · Blood in Stool: No  · Abdominal Pain: No  · Itchy Skin: YES  · Painful Joints: No  · Swollen Joints: No  · Muscle Pain: No  · Irregular Mole: No  · Sun Burn: No  · Dry Skin: No  · Skin Color Changes: No  · Scar or Keloid: No  · Cold Sores/Fever Blisters: No  · Bacterial Infections/MRSA: No  · Anxiety: No  · Depression: No  · Suicidal or Homicidal Thoughts: No      PSYCH: Normal mood and affect  EYES: Normal conjunctiva  ENT: Normal lips and oral mucosa  CARDIOVASCULAR: No edema  RESPIRATORY: Normal respirations  HEME/LYMPH/IMMUNO:  No regional lymphadenopathy except as noted below in ASSESSMENT AND PLAN BY DIAGNOSIS    FULL ORGAN SYSTEM SKIN EXAM (SKIN)   Hair, Scalp, Ears, Face Normal except as noted below in Assessment   Neck, Cervical Chain Nodes Normal except as noted below in Assessment   Right Arm/Hand/Fingers Normal except as noted below in Assessment   Left Arm/Hand/Fingers Normal except as noted below in Assessment   Chest/Breasts/Axillae    Abdomen, Umbilicus    Back/Spine    Groin/Genitalia/Buttocks    Right Leg, Foot, Toes    Left Leg, Foot, Toes      1  PSORIASIS    Physical Exam:   Anatomic Location Affected:  Currently clear    Morphological Description:  Currently clear    Severity: clear   Body Percent Affected: 0%   Pertinent Positives:   Pertinent Negatives:    Assessment and Plan:  Based on a thorough discussion of this condition and the management approach to it (including a comprehensive discussion of the known risks, side effects and potential benefits of treatment), the patient (family) agrees to implement the following specific plan:   Get  QuantiferonTB Gold lab done in August    cosentyx     Psoriasis is a chronic inflammatory condition that causes the body to make new skin cells in days rather than weeks  As these cells pile up on the surface of the skin, you may see thick, scaly patches of thickened skin  Psoriasis affects 2-4% of males and females  It can start at any age including childhood, with peaks of onset at 15-25 years and 50-60 years  It tends to persist lifelong, fluctuating in extent and severity  It is particularly common in Caucasians but may affect people of any race  About one-third of patients with psoriasis have family members with psoriasis  Psoriasis is multifactorial  It is classified as an immune-mediated inflammatory disease (IMID)   Genetic factors are important and influence the type of psoriasis and response to treatment  What are the signs and symptoms of psoriasis? There are many different types of psoriasis that each have present uniquely  The types of psoriasis include:    Plaque psoriasis: About 80% to 90% of people who have psoriasis develop this type  When plaque psoriasis appears, you may see:  Plaque psoriasis usually presents with symmetrically distributed, red, scaly plaques with well-defined edges  The scale is typically silvery white, except in skin folds where the plaques often appear shiny and they may have a moist peeling surface  The most common sites are scalp, elbows and knees, but any part of the skin can be involved  The plaques are usually very persistent without treatment  Itch is mostly mild but may be severe in some patients, leading to scratching and lichenification (thickened leathery skin with increased skin markings)  Painful skin cracks or fissures may occur  When psoriatic plaques clear up, they may leave brown or pale marks that can be expected to fade over several months  Guttate psoriasis: When someone gets this type of psoriasis, you often see tiny bumps appear on the skin quite suddenly  The bumps tend to cover much of the torso, legs, and arms  Sometimes, the bumps also develop on the face, scalp, and ears  No matter where they appear, the bumps tend to be:    Small and scaly   [place]-colored to pink   Temporary, clearing in a few weeks or months without treatment  When guttate psoriasis clears, it may never return  Why this happens is still a bit of a mystery  Guttate psoriasis tends to develop in children and young adults who've had an infection, such as strep throat  It's possible that when the infection clears so does guttate psoriasis    It's also possible to have:   Guttate psoriasis for life   See the guttate psoriasis clear and plaque psoriasis develop later in life   Plaque psoriasis when you develop guttate psoriasis  There's no way to predict what will happen after the first flare-up of guttate psoriasis clears  Inverse psoriasis: This type of psoriasis develops in areas where skin touches skin, such as the armpits, genitals, and crease of the buttocks  Where the inverse psoriasis appears, you're likely to notice:   Smooth, red patches of skin that look raw   Little, if any, silvery-white coating   Sore or painful skin  Other names for this type of psoriasis are intertriginous psoriasis or flexural psoriasis  Pustular psoriasis: This type of psoriasis causes pus-filled bumps that usually appear only on the feet and hands  While the pus-filled bumps may look like an infection, the skin is not infected  The bumps don't contain bacteria or anything else that could cause an infection  Where pustular psoriasis appears, you tend to notice:   Red, swollen skin that is dotted with pus-filled bumps   Extremely sore or painful skin   Brown dots (and sometimes scale) appear as the pus-filled bumps dry  Pustular psoriasis can make just about any activity that requires your hands or feet, such as typing or walking, unbearably painful  Pustular psoriasis (generalized): Serious and life-threatening, this rare type of psoriasis causes pus-filled bumps to develop on much of the skin  Also called von Zumbusch psoriasis, a flare-up causes this sequence of events:  1  Skin on most of the body suddenly turns dry, red, and tender  2  Within hours, pus-filled bumps cover most of the skin  3  Often within a day, the pus-filled bumps break open and pools of pus leak onto the skin  4  As the pus dries (usually within 24 to 48 hours), the skin dries out and peels (as shown in this picture)  5  When the dried skin peels off, you see a smooth, glazed surface  6  In a few days or weeks, you may see a new crop of pus-filled bumps covering most of the skin, as the cycle repeats itself    Anyone with pustular psoriasis also feels very sick, and may develop a fever, headache, muscle weakness, and other symptoms  Medical care is often necessary to save the person's life  Erythrodermic psoriasis: Serious and life-threatening, this type of psoriasis requires immediate medical care  When someone develops erythrodermic psoriasis, you may notice:   Skin on most of the body looks burnt   Chills, fever, and the person looks extremely ill   Muscle weakness, a rapid pulse, and severe itch  The person may also be unable to keep warm, so hypothermia can set in quickly  Most people who develop this type of psoriasis already have another type of psoriasis  Before developing erythrodermic psoriasis, they often notice that their psoriasis is worsening or not improving with treatment  If you notice either of these happening, see a board-certified dermatologist     Nails    Nail psoriasis: With any type of psoriasis, you may see changes to your fingernails or toenails  About half of the people who have plaque psoriasis see signs of psoriasis on their fingernails at some point2  When psoriasis affects the nails, you may notice:   Tiny dents in your nails (called nail pits)   White, yellow, or brown discoloration under one or more nails   Crumbling, rough nails   A nail lifting up so that it's no longer attached   Buildup of skin cells beneath one or more nails, which lifts up the nail  Treatment and proper nail care can help you control nail psoriasis  Psoriatic arthritis: If you have psoriasis, it's important to pay attention to your joints  Some people who have psoriasis develop a type of arthritis called psoriatic arthritis  This is more likely to occur if you have severe psoriasis  Most people notice psoriasis on their skin years before they develop psoriatic arthritis  It's also possible to get psoriatic arthritis before psoriasis, but this is less common  When psoriatic arthritis develops, the signs can be subtle   At first, you may notice:   A swollen and tender joint, especially in a finger or toe   Heel pain   Swelling on the back of your leg, just above your heel   Stiffness in the morning that fades during the day  Like psoriasis, psoriatic arthritis cannot be cured  Treatment can prevent psoriatic arthritis from worsening, which is important  Allowed to progress, psoriatic arthritis can become disabling  Diagnosis and treatment of psoriasis   Psoriasis is usually diagnosed by clinical features, and skin biopsy if necessary  It is important to decrease factors that aggravate psoriasis  These include treating streptococcal infections, minimizing skin injuries, avoiding sun exposure if it exacerbates psoriasis, smoking, alcohol usage, decreasing stress, and maintaining an optimal body weight  Certain medications such as lithium, beta blockers, antimalarials, and NSAIDs have also been implicated  Suddenly stopping oral steroids or strong topical steroids can cause rebound disease  There are many categories of psoriasis treatments available  Topical therapy  Mild psoriasis is generally treated with topical agents alone  Which treatment is selected may depend on body site, extent and severity of psoriasis   Emollients   Coal tar preparations   Dithranol   Salicylic acid   Vitamin D analogue (calcipotriol)   Topical corticosteroids   Calcineurin inhibitor (tacrolimus, pimecrolimus)  Phototherapy  Most psoriasis centres offer phototherapy with ultraviolet (UV) radiation, often in combination with topical or systemic agents  Types of phototherapy include   Narrowband UVB   Broadband UVB   Photochemotherapy (PUVA)   Targeted phototherapy  Systemic therapy  Moderate to severe psoriasis warrants treatment with a systemic agent and/or phototherapy   The most common treatments are:   Methotrexate   Ciclosporin   Acitretin  Other medicines occasionally used for psoriasis include:   Mycophenolate   Apremilast   Hydroxyurea   Azathioprine   6-mercaptopurine  Systemic corticosteroids are best avoided due to a risk of severe withdrawal flare of psoriasis and adverse effects  Biologics or targeted therapies are reserved for conventional treatment-resistant severe psoriasis, mainly because of expense, as side effects compare favorably with other systemic agents  These include:   Anti-tumour necrosis factor-alpha antagonists (anti-TNF?) infliximab, adalimumab and etanercept   The interleukin (IL)-12/23 antagonist ustekinumab   IL-17 antagonists such as secukinumab  Many other monoclonal antibodies are under investigation in the treatment of psoriasis      Scribe Attestation    I,:  Tank Mcpherson MA am acting as a scribe while in the presence of the attending physician :       I,:  Sarina Crystal MD personally performed the services described in this documentation    as scribed in my presence :

## 2021-06-21 ENCOUNTER — TELEPHONE (OUTPATIENT)
Dept: HEMATOLOGY ONCOLOGY | Facility: CLINIC | Age: 51
End: 2021-06-21

## 2021-06-24 ENCOUNTER — TELEPHONE (OUTPATIENT)
Dept: GENETICS | Facility: CLINIC | Age: 51
End: 2021-06-24

## 2021-06-24 ENCOUNTER — TELEPHONE (OUTPATIENT)
Dept: HEMATOLOGY ONCOLOGY | Facility: CLINIC | Age: 51
End: 2021-06-24

## 2021-06-24 NOTE — TELEPHONE ENCOUNTER
----- Message from James Bunch sent at 6/24/2021 12:30 PM EDT -----  Regarding: mail result (letter has been uploaded)

## 2021-06-24 NOTE — TELEPHONE ENCOUNTER
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), Heterozygous, Pathogenic     Assessment:   Kaleb Altman carries one pathogenic variant in the MUTYH gene, specifically c 1187G>A (p [id number]) and has an increased risk for MUTYH-related cancers  The MUTYH gene is associated with autosomal dominant predisposition colon cancer and autosomal recessive MUTYH-associated Polyposis (MAP) (Jinn UID: [id number] )    Heterozygous MUTYH Mutation Carrier    Pathogenic and likely pathogenic variants in one copy of the MUTYH gene are associated with a moderate increased risk for cancer as compared to the general population  Studies have shown conflicting results for the lifetime colon cancer risk (by age 79)  In large population-based studies the risk was marginally increased with an OR of 1 07-1 2 whereas family-based studies found a higher risk compared to the 5% general population with an OR of 2-3  There is preliminary evidence supporting a correlation between a single MUTYH variant and a predisposition to breast and possibly other cancer types however the evidence is preliminary and insufficient to make a determination regarding these relationships  Reproductive Risks:  Being a carrier of a MUTYH mutation is common (1-2%) among individuals of  ancestry  When an individual carries two pathogenic variants in the MUTYH gene they have a condition called MUTYH-associated polyposis (MAP)   MAP is characterized by between ten and 100 polyps seen on average at 50 years and an 80% lifetime risk for colorectal cancer (PMID: 17890616)  If both parents are MUTYH mutation carriers, then they will have a 25% chance (1 in 4) to have an affected child with MAP  For patients of reproductive age, there are options for prenatal diagnosis and assisted reproduction including pre-implantation genetic diagnosis  If Reyes Ina is interested in learning more about her reproductive risks we recommend a consult with our prenatal genetic counselors to discuss the reproductive risks and benefits/limitations of available technologies  Risk for Family Members: This test result may help clarify the risk for other family members to develop cancer  All first-degree relatives (parents, siblings and children) have up to a 50% chance of having the pathogenic variant  Other relatives such as aunts, uncles and cousins may also be at risk  Since it is not known with one-hundred percent certainty which side of the family this MUTYH pathogenic variant came from, we recommend that Reyes Ina share this test results with extended family members from both sides of her family  We reviewed that there may not be much clinical actionability should relatives test positive, but relatives could consider testing to know if they are carriers should risk information change/they have interest in reproductive testing  We encouraged Reyes Ina  to discuss this information with her relatives  If Reyes Ina family members have any questions or are interested in testing they can reach out to the [facility] number at [phone] for additional information  Testing for Other Family Members:  When a person is found to have a single MUTYH pathogenic variant, it's recommended that their partner is tested for mutations in this gene as well, or that their children (if they are of age) are tested to see if they carry 2 pathogenic mutations to assess their cancer risk      We reviewed that this result does not explain Millie's cousin's diagnosis of ovarian cancer  Her cousin can still consider genetic testing, despite this result  Management:  Management guidelines for individuals with a single MUTYH pathogenic or likely pathogenic variant have been developed by the [facility]  Please refer to the current NCCN guidelines for the most up to date recommendations  The recommendations listed below are specific to Jacky Velasquez and are based on the V1 2020 Genetic/Familial High-Risk Assessment: Colorectal Guideline  These recommendations are subject to change over time and the newest guidelines should be referenced for the most up to date screening and management recommendations  Plan:      Colon Cancer Screening:   Given conflicting evidence regarding colon cancer risk associated with carrying a heterozygous MUTYH pathogenic variant, NCCN recommends specialized screening for colon cancer mainly based on family history  For a  individuals with a MUTYH heterozygous pathogenic variant and a second-degree relative with CRC: there are no specific data available to determine screening recommendations    Other Screening: There are no additional recommendations based on Millie's negative result  she should continue cancer screening and medical management as clinically indicated and as determined appropriate by her healthcare providers      Positive Result: Jacky Velasquez was strongly encouraged to follow up on with our office on an annual basis to review the most up to date guidelines as recommendations are subject to change over time

## 2021-06-27 DIAGNOSIS — Z17.0 MALIGNANT NEOPLASM OF BREAST IN FEMALE, ESTROGEN RECEPTOR POSITIVE, UNSPECIFIED LATERALITY, UNSPECIFIED SITE OF BREAST (HCC): ICD-10-CM

## 2021-06-27 DIAGNOSIS — C50.919 MALIGNANT NEOPLASM OF BREAST IN FEMALE, ESTROGEN RECEPTOR POSITIVE, UNSPECIFIED LATERALITY, UNSPECIFIED SITE OF BREAST (HCC): ICD-10-CM

## 2021-06-28 RX ORDER — TAMOXIFEN CITRATE 20 MG/1
TABLET ORAL
Qty: 30 TABLET | Refills: 7 | Status: SHIPPED | OUTPATIENT
Start: 2021-06-28 | End: 2022-03-02

## 2021-08-11 DIAGNOSIS — L40.9 PSORIASIS: ICD-10-CM

## 2021-08-11 RX ORDER — SECUKINUMAB 150 MG/ML
300 INJECTION SUBCUTANEOUS
Qty: 2 ML | Refills: 0 | Status: SHIPPED | OUTPATIENT
Start: 2021-08-11 | End: 2021-08-13 | Stop reason: SDUPTHER

## 2021-08-12 ENCOUNTER — LAB (OUTPATIENT)
Dept: LAB | Age: 51
End: 2021-08-12
Payer: COMMERCIAL

## 2021-08-12 DIAGNOSIS — L40.9 PSORIASIS: ICD-10-CM

## 2021-08-12 PROCEDURE — 36415 COLL VENOUS BLD VENIPUNCTURE: CPT

## 2021-08-12 PROCEDURE — 86480 TB TEST CELL IMMUN MEASURE: CPT

## 2021-08-13 LAB
GAMMA INTERFERON BACKGROUND BLD IA-ACNC: 0.04 IU/ML
M TB IFN-G BLD-IMP: NEGATIVE
M TB IFN-G CD4+ BCKGRND COR BLD-ACNC: 0 IU/ML
M TB IFN-G CD4+ BCKGRND COR BLD-ACNC: 0 IU/ML
MITOGEN IGNF BCKGRD COR BLD-ACNC: >10 IU/ML

## 2021-08-13 RX ORDER — SECUKINUMAB 150 MG/ML
300 INJECTION SUBCUTANEOUS
Qty: 2 ML | Refills: 11 | Status: SHIPPED | OUTPATIENT
Start: 2021-08-13 | End: 2022-06-27

## 2021-09-08 ENCOUNTER — HOSPITAL ENCOUNTER (OUTPATIENT)
Dept: RADIOLOGY | Facility: HOSPITAL | Age: 51
Discharge: HOME | End: 2021-09-08
Attending: SURGERY
Payer: COMMERCIAL

## 2021-09-08 VITALS — HEIGHT: 68 IN | WEIGHT: 180 LBS | BODY MASS INDEX: 27.28 KG/M2

## 2021-09-08 DIAGNOSIS — C50.211 MALIGNANT NEOPLASM OF UPPER-INNER QUADRANT OF RIGHT BREAST IN FEMALE, ESTROGEN RECEPTOR POSITIVE (HCC): ICD-10-CM

## 2021-09-08 DIAGNOSIS — Z17.0 MALIGNANT NEOPLASM OF UPPER-INNER QUADRANT OF RIGHT BREAST IN FEMALE, ESTROGEN RECEPTOR POSITIVE (HCC): ICD-10-CM

## 2021-09-08 PROCEDURE — G1004 CDSM NDSC: HCPCS

## 2021-09-08 PROCEDURE — A9585 GADOBUTROL INJECTION: HCPCS | Performed by: SURGERY

## 2021-09-08 PROCEDURE — C8908 MRI W/O FOL W/CONT, BREAST,: HCPCS

## 2021-09-08 RX ADMIN — GADOBUTROL 8 ML: 604.72 INJECTION INTRAVENOUS at 17:28

## 2021-09-14 ENCOUNTER — OFFICE VISIT (OUTPATIENT)
Dept: SURGICAL ONCOLOGY | Facility: CLINIC | Age: 51
End: 2021-09-14
Payer: COMMERCIAL

## 2021-09-14 VITALS
SYSTOLIC BLOOD PRESSURE: 120 MMHG | WEIGHT: 190 LBS | RESPIRATION RATE: 16 BRPM | HEIGHT: 68 IN | TEMPERATURE: 98.5 F | DIASTOLIC BLOOD PRESSURE: 70 MMHG | BODY MASS INDEX: 28.79 KG/M2 | OXYGEN SATURATION: 99 % | HEART RATE: 70 BPM

## 2021-09-14 DIAGNOSIS — Z13.71 BRCA NEGATIVE: ICD-10-CM

## 2021-09-14 DIAGNOSIS — Z79.810 USE OF TAMOXIFEN (NOLVADEX): ICD-10-CM

## 2021-09-14 DIAGNOSIS — Z15.89 MONOALLELIC MUTATION OF MUTYH GENE: ICD-10-CM

## 2021-09-14 DIAGNOSIS — Z17.0 MALIGNANT NEOPLASM OF UPPER-INNER QUADRANT OF RIGHT BREAST IN FEMALE, ESTROGEN RECEPTOR POSITIVE (HCC): Primary | ICD-10-CM

## 2021-09-14 DIAGNOSIS — C50.211 MALIGNANT NEOPLASM OF UPPER-INNER QUADRANT OF RIGHT BREAST IN FEMALE, ESTROGEN RECEPTOR POSITIVE (HCC): Primary | ICD-10-CM

## 2021-09-14 PROCEDURE — 99214 OFFICE O/P EST MOD 30 MIN: CPT | Performed by: SURGERY

## 2021-09-14 NOTE — PROGRESS NOTES
Surgical Oncology Follow Up       [facility]  CANCER CARE [facility] SURGICAL ONCOLOGY [facility] [address]    [facility]  1970  [id number]  [address]  CANCER [facility] SURGICAL ONCOLOGY [place]  [place] Sou[place] [address]    Chief Complaint   Patient presents with    Follow-up       Assessment/Plan   Diagnoses and all orders for this visit:    Malignant neoplasm of upper-inner quadrant of right breast in female, estrogen receptor positive ([address] )    Use of tamoxifen (Nolvadex)    BRCA negative    Monoallelic mutation of MUTYH gene        Advance Care Planning/Advance Directives:  Discussed disease status, cancer treatment plans and/or cancer treatment goals with the patient  Oncology History:    Oncology History   Malignant neoplasm of upper-inner quadrant of right breast in female, estrogen receptor positive ([facility] [address] )   5/14/2015 Surgery    Right US guided breast biopsy     6/30/2015 Surgery    Bilateral; mastectomies, right SLNB  Bilateral reconstruction with expanders  Dr Deanna Paul     8/12/2015 -  Hormone Therapy    Tamoxifen  Dr Shirin Leavitt  10/23/2015 Surgery    Expanders removed, implants placed  11/6/2017 Initial Diagnosis    Malignant neoplasm of upper-inner quadrant of right breast in female, estrogen receptor positive (HCC)      Genetic Testing    BRCA negative      Genomic Testing    Oncotype DX  14         History of Present Illness:  Breast cancer follow-up, continues on tamoxifen, had updated genetic testing and does carry a mutation in MUTYH  -Interval History: breast MRI    Review of Systems:  Review of Systems   Constitutional: Negative  Negative for appetite change and fever  Eyes: Negative  Respiratory: Negative for shortness of breath  Cardiovascular: Negative  Gastrointestinal: Negative  Endocrine: Negative  Genitourinary: Negative  Musculoskeletal: Negative    Negative for arthralgias and myalgias  Skin: Negative  Allergic/Immunologic: Negative  Neurological: Negative  Hematological: Negative  Negative for adenopathy  Does not bruise/bleed easily  Psychiatric/Behavioral: Negative          Patient Active Problem List   Diagnosis    Irritable bowel syndrome (IBS)    Malignant neoplasm of upper-inner quadrant of right breast in female, estrogen receptor positive (HCC)    Psoriasis    Seasonal allergies    BRCA negative    Use of tamoxifen (Nolvadex)    Seborrheic keratosis    Bilateral plantar fasciitis    Dyspepsia    Monoallelic mutation of MUTYH gene     Past Medical History:   Diagnosis Date    BRCA negative     Endometriosis     IBS (irritable bowel syndrome)     Insomnia     Night sweats     Psoriasis     Wears glasses      Past Surgical History:   Procedure Laterality Date    BREAST BIOPSY Right [id number]    IDC     SECTION      LAPAROSCOPY      exploratory, endometriosis    LYMPHADENECTOMY Right     x2    MASTECTOMY      bilateral mastectomy    CO NIPPLE/AREOLA RECONSTRUCTION Bilateral 10/13/2016    Procedure: NIPPLE RECONSTRUCTION ;  Surgeon: Susannah Montes De Oca MD;  [facility];  Service: Plastics    CO REVISION OF RECONSTRUCTED BREAST Bilateral 3/9/2016    Procedure: RECONSTRUCTION REVISION  BREAST MOUND ,FAT GRAFTS ;  Surgeon: Susannah Montes De Oca MD;  [facility];  Service: Plastics    SENTINEL LYMPH NODE BIOPSY Right      Family History   Problem Relation Age of Onset    Lung cancer Paternal Grandfather 48    Breast cancer Other     Thyroid cancer Mother 36    Breast cancer Mother 76    Psoriasis Father     Basal cell carcinoma Father     Heart disease Other     Prostate cancer Other      Social History     Socioeconomic History    Marital status: /Civil Union     Spouse name: Not on file    Number of children: 1    Years of education: Not on file    Highest education level: Not on file   Occupational History    Occupation: employed   Tobacco Use    Smoking status: Never Smoker    Smokeless tobacco: Never Used   Vaping Use    Vaping Use: Never used   Substance and Sexual Activity    Alcohol use: Yes     Comment: social    Drug use: Never    Sexual activity: Not Currently     Partners: Male     Birth control/protection: None   Other Topics Concern    Not on file   Social History Narrative    Caffeine use- coffee     Social Determinants of Health     Financial Resource Strain:     Difficulty of Paying Living Expenses:    Food Insecurity:     Worried About Running Out of Food in the Last Year:     [address] in the Last Year:    Transportation Needs:     Lack of Transportation (Medical):  Lack of Transportation (Non-Medical):    Physical Activity:     Days of Exercise per Week:     Minutes of Exercise per Session:    Stress:     Feeling of Stress :    Social Connections:     Frequency of Communication with Friends and Family:     Frequency of Social Gatherings with Friends and Family:     Attends Baptism Services:     Active Member of Clubs or Organizations:     Attends Club or Organization Meetings:     Marital Status:    Intimate Partner Violence:     Fear of Current or Ex-Partner:     Emotionally Abused:     Physically Abused:     Sexually Abused:        Current Outpatient Medications:     Ashwagandha 125 MG CAPS, Take by mouth, Disp: , Rfl:     Calcium 500 MG tablet, Take by mouth, Disp: , Rfl:     Cholecalciferol (VITAMIN D PO), Take 5,000 Units by mouth daily , Disp: , Rfl:     Digestive Enzymes (DIGESTIVE ENZYME PO), Take 1 tablet by mouth daily as needed  , Disp: , Rfl:     Magnesium 400 MG CAPS, Take 1 tablet by mouth daily  , Disp: , Rfl:     MILK THISTLE PO, Take 600 mg by mouth daily  , Disp: , Rfl:     Multiple Vitamins-Minerals (ONE DAILY MULTIVITAMIN ADULT) TABS, Take by mouth, Disp: , Rfl:     NON FORMULARY, , Disp: , Rfl:     Probiotic Product (PROBIOTIC DAILY PO), Take 2 tablets by mouth daily  , Disp: , Rfl:     secukinumab (Cosentyx Sensoready Pen) 150 mg/mL SOAJ injection, Inject 2 mL (300 mg total) under the skin every 30 (thirty) days, Disp: 2 mL, Rfl: 11    tamoxifen (NOLVADEX) 20 mg tablet, TAKE 1 TABLET BY MOUTH EVERY DAY, Disp: 30 tablet, Rfl: 7    Efinaconazole (Jublia) 10 % SOLN, Apply 1 application topically daily (Patient not taking: Reported on 3/4/2021), Disp: 8 mL, Rfl: 2    methylPREDNISolone 4 MG tablet therapy pack, Use as directed on package (Patient not taking: Reported on 3/4/2021), Disp: 21 each, Rfl: 0    pantoprazole (PROTONIX) 40 mg tablet, Take 1 tablet (40 mg total) by mouth daily (Patient not taking: Reported on 3/4/2021), Disp: 30 tablet, Rfl: 0  Allergies   Allergen Reactions    Other Hives     Kait selzer    Kait-Rajat Plus Cold & Cough  [[id number]] Hives       The following portions of the patient's history were reviewed and updated as appropriate: allergies, current medications, past family history, past medical history, past social history, past surgical history and problem list         Vitals:    09/14/21 0912   BP: 120/70   Pulse: 70   Resp: 16   Temp: 98 5 °F (36 9 °C)   SpO2: 99%       Physical Exam  Constitutional:       General: She is not in acute distress  Appearance: She is well-developed  HENT:      Head: Normocephalic and atraumatic  Cardiovascular:      Heart sounds: Normal heart sounds  Pulmonary:      Breath sounds: Normal breath sounds  Chest:      Breasts:         Right: Skin change (  Mastectomy scar with implant) present  No mass or tenderness  Left: Skin change ( mastectomy scar with implant) present  No mass or tenderness  Abdominal:      Palpations: Abdomen is soft  Lymphadenopathy:      Upper Body:      Right upper body: No supraclavicular, axillary or pectoral adenopathy  Left upper body: No supraclavicular, axillary or pectoral adenopathy     Neurological:      Mental Status: She is alert and oriented to person, place, and time  Psychiatric:         Mood and Affect: Mood normal            Results:  Labs:      Imaging   09/08/2021 breast MRI was benign    I reviewed the above imaging data  Discussion/Summary: 67-year-old female status post bilateral mastectomy and reconstruction for a right-sided invasive ductal carcinoma stage IIA  She continues on tamoxifen  She initially had limited genetic testing which was negative  She more recently had updated testing and has a mutation in MUTYH  She reports having a negative colonoscopy  There are no concerns on examination today  Her recent breast MRI was benign  I will therefore see her again in six months or sooner should the need arise

## 2021-11-10 ENCOUNTER — PATIENT MESSAGE (OUTPATIENT)
Dept: DERMATOLOGY | Facility: CLINIC | Age: 51
End: 2021-11-10

## 2021-11-12 ENCOUNTER — TELEPHONE (OUTPATIENT)
Dept: DERMATOLOGY | Facility: CLINIC | Age: 51
End: 2021-11-12

## 2021-11-17 ENCOUNTER — OFFICE VISIT (OUTPATIENT)
Dept: FAMILY MEDICINE CLINIC | Facility: CLINIC | Age: 51
End: 2021-11-17
Payer: COMMERCIAL

## 2021-11-17 VITALS
HEART RATE: 66 BPM | DIASTOLIC BLOOD PRESSURE: 78 MMHG | RESPIRATION RATE: 16 BRPM | SYSTOLIC BLOOD PRESSURE: 132 MMHG | BODY MASS INDEX: 28.79 KG/M2 | WEIGHT: 190 LBS | TEMPERATURE: 97.9 F | HEIGHT: 68 IN

## 2021-11-17 DIAGNOSIS — E78.00 HYPERCHOLESTEROLEMIA: ICD-10-CM

## 2021-11-17 DIAGNOSIS — Z00.00 WELL ADULT EXAM: Primary | ICD-10-CM

## 2021-11-17 DIAGNOSIS — Z00.00 ANNUAL PHYSICAL EXAM: ICD-10-CM

## 2021-11-17 DIAGNOSIS — R53.83 OTHER FATIGUE: ICD-10-CM

## 2021-11-17 PROCEDURE — 99396 PREV VISIT EST AGE 40-64: CPT | Performed by: FAMILY MEDICINE

## 2021-11-29 ENCOUNTER — ANNUAL EXAM (OUTPATIENT)
Dept: OBGYN CLINIC | Facility: CLINIC | Age: 51
End: 2021-11-29
Payer: COMMERCIAL

## 2021-11-29 VITALS
DIASTOLIC BLOOD PRESSURE: 78 MMHG | BODY MASS INDEX: 29.1 KG/M2 | SYSTOLIC BLOOD PRESSURE: 134 MMHG | HEIGHT: 68 IN | WEIGHT: 192 LBS

## 2021-11-29 DIAGNOSIS — Z01.419 WELL FEMALE EXAM WITH ROUTINE GYNECOLOGICAL EXAM: Primary | ICD-10-CM

## 2021-11-29 DIAGNOSIS — Z12.4 ENCOUNTER FOR SCREENING FOR MALIGNANT NEOPLASM OF CERVIX: ICD-10-CM

## 2021-11-29 DIAGNOSIS — Z11.51 SCREENING FOR HPV (HUMAN PAPILLOMAVIRUS): ICD-10-CM

## 2021-11-29 PROCEDURE — 0503F POSTPARTUM CARE VISIT: CPT | Performed by: PHYSICIAN ASSISTANT

## 2021-11-29 PROCEDURE — G0476 HPV COMBO ASSAY CA SCREEN: HCPCS | Performed by: PHYSICIAN ASSISTANT

## 2021-11-29 PROCEDURE — G0145 SCR C/V CYTO,THINLAYER,RESCR: HCPCS | Performed by: PHYSICIAN ASSISTANT

## 2021-11-29 PROCEDURE — 99396 PREV VISIT EST AGE 40-64: CPT | Performed by: PHYSICIAN ASSISTANT

## 2021-12-06 LAB
LAB AP GYN PRIMARY INTERPRETATION: NORMAL
Lab: NORMAL

## 2021-12-15 ENCOUNTER — OFFICE VISIT (OUTPATIENT)
Dept: HEMATOLOGY ONCOLOGY | Facility: CLINIC | Age: 51
End: 2021-12-15
Payer: COMMERCIAL

## 2021-12-15 VITALS
DIASTOLIC BLOOD PRESSURE: 68 MMHG | TEMPERATURE: 98.3 F | OXYGEN SATURATION: 98 % | WEIGHT: 190 LBS | SYSTOLIC BLOOD PRESSURE: 128 MMHG | HEIGHT: 68 IN | BODY MASS INDEX: 28.79 KG/M2 | RESPIRATION RATE: 18 BRPM | HEART RATE: 87 BPM

## 2021-12-15 DIAGNOSIS — Z17.0 MALIGNANT NEOPLASM OF UPPER-INNER QUADRANT OF RIGHT BREAST IN FEMALE, ESTROGEN RECEPTOR POSITIVE (HCC): Primary | ICD-10-CM

## 2021-12-15 DIAGNOSIS — C50.211 MALIGNANT NEOPLASM OF UPPER-INNER QUADRANT OF RIGHT BREAST IN FEMALE, ESTROGEN RECEPTOR POSITIVE (HCC): Primary | ICD-10-CM

## 2021-12-15 PROCEDURE — 99214 OFFICE O/P EST MOD 30 MIN: CPT | Performed by: INTERNAL MEDICINE

## 2022-01-10 ENCOUNTER — TELEPHONE (OUTPATIENT)
Dept: FAMILY MEDICINE CLINIC | Facility: CLINIC | Age: 52
End: 2022-01-10

## 2022-01-10 DIAGNOSIS — Z20.822 SUSPECTED COVID-19 VIRUS INFECTION: Primary | ICD-10-CM

## 2022-01-10 PROCEDURE — U0003 INFECTIOUS AGENT DETECTION BY NUCLEIC ACID (DNA OR RNA); SEVERE ACUTE RESPIRATORY SYNDROME CORONAVIRUS 2 (SARS-COV-2) (CORONAVIRUS DISEASE [COVID-19]), AMPLIFIED PROBE TECHNIQUE, MAKING USE OF HIGH THROUGHPUT TECHNOLOGIES AS DESCRIBED BY CMS-2020-01-R: HCPCS | Performed by: FAMILY MEDICINE

## 2022-01-10 PROCEDURE — U0005 INFEC AGEN DETEC AMPLI PROBE: HCPCS | Performed by: FAMILY MEDICINE

## 2022-01-10 NOTE — TELEPHONE ENCOUNTER
Patient started Sat with coughing congestion body aches and HA      She is not vaccinated     She is requesting a covid swab

## 2022-01-17 ENCOUNTER — TELEPHONE (OUTPATIENT)
Dept: FAMILY MEDICINE CLINIC | Facility: CLINIC | Age: 52
End: 2022-01-17

## 2022-01-17 DIAGNOSIS — R05.9 COUGH: Primary | ICD-10-CM

## 2022-01-17 RX ORDER — BENZONATATE 200 MG/1
200 CAPSULE ORAL 3 TIMES DAILY PRN
Qty: 30 CAPSULE | Refills: 0 | Status: SHIPPED | OUTPATIENT
Start: 2022-01-17 | End: 2022-02-15

## 2022-01-17 NOTE — TELEPHONE ENCOUNTER
Patient called stating she tested neg Covid, but she has headache, fatigue, nasal congestion and cough  The entire family has tested positive for Covid       Patient would like something called into the pharmacy for Cough

## 2022-01-17 NOTE — TELEPHONE ENCOUNTER
She tested on the 10th  She had just started with symptoms  She has been quarantined  She is not feeling well at all  She would prefer to not go get another test  They are staying hydrated

## 2022-01-17 NOTE — TELEPHONE ENCOUNTER
She may have tested prematurely  What day did she test on (compared to when her family tested +)  I'll send in the cough med for her

## 2022-01-17 NOTE — TELEPHONE ENCOUNTER
AS long as she is quarantining , that is fine  Tell her if she develops chest congestion, starts with discolored phlegm or shortness of breath to call

## 2022-02-15 ENCOUNTER — OFFICE VISIT (OUTPATIENT)
Dept: OBGYN CLINIC | Facility: CLINIC | Age: 52
End: 2022-02-15
Payer: COMMERCIAL

## 2022-02-15 VITALS — WEIGHT: 190 LBS | SYSTOLIC BLOOD PRESSURE: 144 MMHG | DIASTOLIC BLOOD PRESSURE: 88 MMHG | BODY MASS INDEX: 28.89 KG/M2

## 2022-02-15 DIAGNOSIS — N76.0 BACTERIAL VAGINOSIS: Primary | ICD-10-CM

## 2022-02-15 DIAGNOSIS — B96.89 BACTERIAL VAGINOSIS: Primary | ICD-10-CM

## 2022-02-15 DIAGNOSIS — L30.9 VULVAR DERMATITIS: ICD-10-CM

## 2022-02-15 PROCEDURE — 99214 OFFICE O/P EST MOD 30 MIN: CPT | Performed by: OBSTETRICS & GYNECOLOGY

## 2022-02-15 RX ORDER — METRONIDAZOLE 500 MG/1
500 TABLET ORAL EVERY 12 HOURS SCHEDULED
Qty: 14 TABLET | Refills: 0 | Status: SHIPPED | OUTPATIENT
Start: 2022-02-15 | End: 2022-02-22

## 2022-02-15 NOTE — PROGRESS NOTES
Assessment/Plan    Diagnoses and all orders for this visit:    Bacterial vaginosis  -     metroNIDAZOLE (FLAGYL) 500 mg tablet; Take 1 tablet (500 mg total) by mouth every 12 (twelve) hours for 7 days    Vulvar dermatitis  -     triamcinolone (KENALOG) 0 1 % ointment; Apply topically 3 (three) times a day for 10 days    Advised to discontinue boric acid use  Reviewed vulvar hygiene  Can use barrier ointment on vulva  Advised her to call/ message if her condition worsens or does not improve by one week  Subjective     Velda Heimlich is a 47 y o  female here for a problem visit  Patient is complaining of severe vulvar itching/ irritation as well as malodorous vaginal discharge  States she had Covid a few weeks ago followed by diarrhea  Started to have vaginal discharge and irritation following illness  Denies any vaginal bleeding  Has tried 2 doses of diflucan, otc monistat, and currently using vaginal boric acid suppositories  Not sexually active  Patient Active Problem List   Diagnosis    Irritable bowel syndrome (IBS)    Malignant neoplasm of upper-inner quadrant of right breast in female, estrogen receptor positive (HCC)    Psoriasis    Seasonal allergies    BRCA negative    Use of tamoxifen (Nolvadex)    Seborrheic keratosis    Bilateral plantar fasciitis    Dyspepsia    Monoallelic mutation of MUTYH gene         Gynecologic History  No LMP recorded (lmp unknown)  Contraception: abstinence  Last Pap: 2021- NILM    Obstetric History  OB History    Para Term  AB Living   1 1 1     1   SAB IAB Ectopic Multiple Live Births           1      # Outcome Date GA Lbr Dionicio/2nd Weight Sex Delivery Anes PTL Lv   1 Term 08    F CS-LTranv   HARMEET      Obstetric Comments   Age at menarche 16  First pregnancy age 46  Used birth control pills for 10 years           Past Medical History:   Diagnosis Date    BRCA negative     Endometriosis     IBS (irritable bowel syndrome)  Insomnia     Night sweats     Psoriasis     Wears glasses        Past Surgical History:   Procedure Laterality Date    BREAST BIOPSY Right [id number]    IDC     SECTION      LAPAROSCOPY      exploratory, endometriosis    LYMPHADENECTOMY Right     x2    MASTECTOMY      bilateral mastectomy    AL NIPPLE/AREOLA RECONSTRUCTION Bilateral 10/13/2016    Procedure: NIPPLE RECONSTRUCTION ;  Surgeon: Kavin Odell MD;  [facility];  Service: Plastics    AL REVISION OF RECONSTRUCTED BREAST Bilateral 3/9/2016    Procedure: RECONSTRUCTION REVISION  BREAST MOUND ,FAT GRAFTS ;  Surgeon: Kavin Odell MD;  [facility];  Service: Plastics    SENTINEL LYMPH NODE BIOPSY Right          Family History   Problem Relation Age of Onset    Lung cancer Paternal Grandfather 48    Breast cancer Other     Thyroid cancer Mother 36    Breast cancer Mother 76    Psoriasis Father     Basal cell carcinoma Father     Heart disease Other     Prostate cancer Other        Social History     Socioeconomic History    Marital status: /Civil Union     Spouse name: Not on file    Number of children: 1    Years of education: Not on file    Highest education level: Not on file   Occupational History    Occupation: employed   Tobacco Use    Smoking status: Never Smoker    Smokeless tobacco: Never Used   Vaping Use    Vaping Use: Never used   Substance and Sexual Activity    Alcohol use: Yes     Comment: social    Drug use: Never    Sexual activity: Yes     Partners: Male     Birth control/protection: None   Other Topics Concern    Not on file   Social History Narrative    Caffeine use- coffee     Social Determinants of Health     Financial Resource Strain: Not on file   Food Insecurity: Not on file   Transportation Needs: Not on file   Physical Activity: Not on file   Stress: Not on file   Social Connections: Not on file   Intimate Partner Violence: Not on file   Housing Stability: Not on file Allergies  Other and Kait-seltzer plus cold & cough  [[id number]]    Medications    Current Outpatient Medications:     Ashwagandha 125 MG CAPS, Take by mouth, Disp: , Rfl:     Calcium 500 MG tablet, Take by mouth, Disp: , Rfl:     Cholecalciferol (VITAMIN D PO), Take 5,000 Units by mouth daily , Disp: , Rfl:     Digestive Enzymes (DIGESTIVE ENZYME PO), Take 1 tablet by mouth daily as needed  , Disp: , Rfl:     Magnesium 400 MG CAPS, Take 1 tablet by mouth daily  , Disp: , Rfl:     MILK THISTLE PO, Take 600 mg by mouth daily  , Disp: , Rfl:     Multiple Vitamins-Minerals (ONE DAILY MULTIVITAMIN ADULT) TABS, Take by mouth, Disp: , Rfl:     NON FORMULARY, , Disp: , Rfl:     Probiotic Product (PROBIOTIC DAILY PO), Take 2 tablets by mouth daily  , Disp: , Rfl:     secukinumab (Cosentyx Sensoready Pen) 150 mg/mL SOAJ injection, Inject 2 mL (300 mg total) under the skin every 30 (thirty) days, Disp: 2 mL, Rfl: 11    tamoxifen (NOLVADEX) 20 mg tablet, TAKE 1 TABLET BY MOUTH EVERY DAY, Disp: 30 tablet, Rfl: 7    metroNIDAZOLE (FLAGYL) 500 mg tablet, Take 1 tablet (500 mg total) by mouth every 12 (twelve) hours for 7 days, Disp: 14 tablet, Rfl: 0    triamcinolone (KENALOG) 0 1 % ointment, Apply topically 3 (three) times a day for 10 days, Disp: 30 g, Rfl: 0      Review of Systems  Review of Systems   Constitutional: Negative for activity change, chills, fever and unexpected weight change  HENT: Negative for congestion, ear pain, hearing loss and sore throat  Respiratory: Negative for cough, chest tightness and shortness of breath  Cardiovascular: Negative for chest pain and leg swelling  Gastrointestinal: Negative for abdominal pain, constipation, diarrhea, nausea and vomiting  Genitourinary: Positive for vaginal discharge  Negative for difficulty urinating, dysuria, frequency, menstrual problem, pelvic pain and vaginal pain  See HPI    Skin: Negative for color change and rash  Neurological: Negative for dizziness, numbness and headaches  Psychiatric/Behavioral: Negative for agitation and confusion  Objective     /88 (BP Location: Left arm, Patient Position: Sitting, Cuff Size: Standard)   Wt 86 2 kg (190 lb)   LMP  (LMP Unknown) Comment: Few months ago   BMI 28 89 kg/m²       Physical Exam  Constitutional:       Appearance: Normal appearance  She is normal weight  Genitourinary:      Bladder, rectum and urethral meatus normal       Right Labia: skin changes  Right Labia: No rash, tenderness or lesions  Left Labia: skin changes  Left Labia: No tenderness, lesions or rash  No labial fusion noted  Vulva exam comments: Erythema b/l labia majora and minora   Vaginal discharge present  No vaginal tenderness, bleeding or ulceration  Vaginal exam comments: Thin white/ gray discharge     KOH/ wet mount:  Positive for clue cells  Negative for hyphae   No cervical motion tenderness, discharge, lesion or polyp  HENT:      Head: Normocephalic and atraumatic  Nose: Nose normal    Eyes:      Extraocular Movements: Extraocular movements intact  Conjunctiva/sclera: Conjunctivae normal       Pupils: Pupils are equal, round, and reactive to light  Pulmonary:      Effort: Pulmonary effort is normal    Musculoskeletal:         General: Normal range of motion  Cervical back: Normal range of motion  Neurological:      General: No focal deficit present  Mental Status: She is alert and oriented to person, place, and time  Mental status is at baseline  Skin:     General: Skin is warm and dry  Psychiatric:         Mood and Affect: Mood normal          Behavior: Behavior normal          Thought Content: Thought content normal          Judgment: Judgment normal    Vitals and nursing note reviewed

## 2022-03-02 DIAGNOSIS — C50.919 MALIGNANT NEOPLASM OF BREAST IN FEMALE, ESTROGEN RECEPTOR POSITIVE, UNSPECIFIED LATERALITY, UNSPECIFIED SITE OF BREAST (HCC): ICD-10-CM

## 2022-03-02 DIAGNOSIS — Z17.0 MALIGNANT NEOPLASM OF BREAST IN FEMALE, ESTROGEN RECEPTOR POSITIVE, UNSPECIFIED LATERALITY, UNSPECIFIED SITE OF BREAST (HCC): ICD-10-CM

## 2022-03-02 RX ORDER — TAMOXIFEN CITRATE 20 MG/1
TABLET ORAL
Qty: 30 TABLET | Refills: 7 | Status: SHIPPED | OUTPATIENT
Start: 2022-03-02

## 2022-03-23 ENCOUNTER — OFFICE VISIT (OUTPATIENT)
Dept: SURGICAL ONCOLOGY | Facility: CLINIC | Age: 52
End: 2022-03-23
Payer: COMMERCIAL

## 2022-03-23 VITALS
DIASTOLIC BLOOD PRESSURE: 80 MMHG | OXYGEN SATURATION: 98 % | RESPIRATION RATE: 18 BRPM | WEIGHT: 188 LBS | SYSTOLIC BLOOD PRESSURE: 122 MMHG | TEMPERATURE: 98.5 F | HEIGHT: 68 IN | HEART RATE: 74 BPM | BODY MASS INDEX: 28.49 KG/M2

## 2022-03-23 DIAGNOSIS — Z79.810 USE OF TAMOXIFEN (NOLVADEX): ICD-10-CM

## 2022-03-23 DIAGNOSIS — Z17.0 MALIGNANT NEOPLASM OF UPPER-INNER QUADRANT OF RIGHT BREAST IN FEMALE, ESTROGEN RECEPTOR POSITIVE (HCC): Primary | ICD-10-CM

## 2022-03-23 DIAGNOSIS — C50.211 MALIGNANT NEOPLASM OF UPPER-INNER QUADRANT OF RIGHT BREAST IN FEMALE, ESTROGEN RECEPTOR POSITIVE (HCC): Primary | ICD-10-CM

## 2022-03-23 DIAGNOSIS — Z13.71 BRCA NEGATIVE: ICD-10-CM

## 2022-03-23 PROCEDURE — 99214 OFFICE O/P EST MOD 30 MIN: CPT | Performed by: SURGERY

## 2022-03-23 NOTE — PROGRESS NOTES
Surgical Oncology Follow Up       [address] [facility] [place]  [address]  [facility] [address]    Alfa Arenas  1970  [id number]  [address]  [facility] [place]  [place] [address]    Chief Complaint   Patient presents with    Follow-up       Assessment/Plan   Diagnoses and all orders for this visit:    Malignant neoplasm of upper-inner quadrant of right breast in female, estrogen receptor positive ([address] )    BRCA negative    Use of tamoxifen (Nolvadex)        Advance Care Planning/Advance Directives:  Discussed disease status, cancer treatment plans and/or cancer treatment goals with the patient  Oncology History:    Oncology History   Malignant neoplasm of upper-inner quadrant of right breast in female, estrogen receptor positive ([address] )   5/14/2015 Surgery    Right US guided breast biopsy     6/30/2015 Surgery    Bilateral; mastectomies, right SLNB  Bilateral reconstruction with expanders  Dr Clementine Zamudio     8/12/2015 -  Hormone Therapy    Tamoxifen  Dr Amita Mayes  10/23/2015 Surgery    Expanders removed, implants placed  11/6/2017 Initial Diagnosis    Malignant neoplasm of upper-inner quadrant of right breast in female, estrogen receptor positive (HCC)      Genetic Testing    BRCA negative      Genomic Testing    Oncotype DX  14         History of Present Illness: breast cancer f/u, no concerns, recent diet changes and feels better  -Interval History:none    Review of Systems:  Review of Systems   Constitutional: Negative  Negative for appetite change and fever  Eyes: Negative  Respiratory: Negative for shortness of breath  Cardiovascular: Negative  Gastrointestinal: Negative  Endocrine: Negative  Genitourinary: Negative  Musculoskeletal: Negative  Negative for arthralgias and myalgias  Skin: Negative  Allergic/Immunologic: Negative  Neurological: Negative  Hematological: Negative  Negative for adenopathy  Does not bruise/bleed easily  Psychiatric/Behavioral: Negative          Patient Active Problem List   Diagnosis    Irritable bowel syndrome (IBS)    Malignant neoplasm of upper-inner quadrant of right breast in female, estrogen receptor positive (HCC)    Psoriasis    Seasonal allergies    BRCA negative    Use of tamoxifen (Nolvadex)    Seborrheic keratosis    Bilateral plantar fasciitis    Dyspepsia    Monoallelic mutation of MUTYH gene     Past Medical History:   Diagnosis Date    BRCA negative     Endometriosis     IBS (irritable bowel syndrome)     Insomnia     Night sweats     Psoriasis     Wears glasses      Past Surgical History:   Procedure Laterality Date    BREAST BIOPSY Right [id number]    IDC     SECTION      LAPAROSCOPY      exploratory, endometriosis    LYMPHADENECTOMY Right     x2    MASTECTOMY      bilateral mastectomy    SD NIPPLE/AREOLA RECONSTRUCTION Bilateral 10/13/2016    Procedure: NIPPLE RECONSTRUCTION ;  Surgeon: Jamal Castro MD;  Location: [facility];  Service: Plastics    SD REVISION OF RECONSTRUCTED BREAST Bilateral 3/9/2016    Procedure: RECONSTRUCTION REVISION  BREAST MOUND ,FAT GRAFTS ;  Surgeon: Jamal Castro MD;  Location: [facility];  Service: Plastics    SENTINEL LYMPH NODE BIOPSY Right      Family History   Problem Relation Age of Onset    Lung cancer Paternal Grandfather 48    Breast cancer Other     Thyroid cancer Mother 36    Breast cancer Mother 76    Psoriasis Father     Basal cell carcinoma Father     Heart disease Other     Prostate cancer Other      Social History     Socioeconomic History    Marital status: /Civil Union     Spouse name: Not on file    Number of children: 1    Years of education: Not on file    Highest education level: Not on file   Occupational History    Occupation: employed   Tobacco Use    Smoking status: Never Smoker    Smokeless tobacco: Never Used   Vaping Use    Vaping Use: Never used   Substance and Sexual Activity    Alcohol use: Yes     Comment: social    Drug use: Never    Sexual activity: Yes     Partners: Male     Birth control/protection: None   Other Topics Concern    Not on file   Social History Narrative    Caffeine use- coffee     Social Determinants of Health     Financial Resource Strain: Not on file   Food Insecurity: Not on file   Transportation Needs: Not on file   Physical Activity: Not on file   Stress: Not on file   Social Connections: Not on file   Intimate Partner Violence: Not on file   Housing Stability: Not on file       Current Outpatient Medications:     Ashwagandha 125 MG CAPS, Take by mouth, Disp: , Rfl:     Calcium 500 MG tablet, Take by mouth, Disp: , Rfl:     Cholecalciferol (VITAMIN D PO), Take 5,000 Units by mouth daily , Disp: , Rfl:     Digestive Enzymes (DIGESTIVE ENZYME PO), Take 1 tablet by mouth daily as needed  , Disp: , Rfl:     Magnesium 400 MG CAPS, Take 1 tablet by mouth daily  , Disp: , Rfl:     MILK THISTLE PO, Take 600 mg by mouth daily  , Disp: , Rfl:     Multiple Vitamins-Minerals (ONE DAILY MULTIVITAMIN ADULT) TABS, Take by mouth, Disp: , Rfl:     NON FORMULARY, , Disp: , Rfl:     Probiotic Product (PROBIOTIC DAILY PO), Take 2 tablets by mouth daily  , Disp: , Rfl:     secukinumab (Cosentyx Sensoready Pen) 150 mg/mL SOAJ injection, Inject 2 mL (300 mg total) under the skin every 30 (thirty) days, Disp: 2 mL, Rfl: 11    tamoxifen (NOLVADEX) 20 mg tablet, TAKE 1 TABLET BY MOUTH EVERY DAY, Disp: 30 tablet, Rfl: 7    triamcinolone (KENALOG) 0 1 % ointment, Apply topically 3 (three) times a day for 10 days, Disp: 30 g, Rfl: 0  Allergies   Allergen Reactions    Other Hives     Kait selzer    Kait-[place] Plus Cold & Cough  [[id number]] Hives       The following portions of the patient's history were reviewed and updated as appropriate: allergies, current medications, past family history, past medical history, past social history, past surgical history and problem list         Vitals:    03/23/22 0945   BP: 122/80   Pulse: 74   Resp: 18   Temp: 98 5 °F (36 9 °C)   SpO2: 98%       Physical Exam  Constitutional:       General: She is not in acute distress  Appearance: She is well-developed  HENT:      Head: Normocephalic and atraumatic  Cardiovascular:      Heart sounds: Normal heart sounds  Pulmonary:      Breath sounds: Normal breath sounds  Chest:   Breasts:      Right: Skin change (mastectomy scar with implant) present  No mass, tenderness, axillary adenopathy or supraclavicular adenopathy  Left: Skin change (mastectomy scar with implant) present  No mass, tenderness, axillary adenopathy or supraclavicular adenopathy  Abdominal:      Palpations: Abdomen is soft  Lymphadenopathy:      Upper Body:      Right upper body: No supraclavicular, axillary or pectoral adenopathy  Left upper body: No supraclavicular, axillary or pectoral adenopathy  Neurological:      Mental Status: She is alert and oriented to person, place, and time  Psychiatric:         Mood and Affect: Mood normal            Results:  Labs:      Imaging  9/8/21 kev breast mri benign    I reviewed the above imaging data  Discussion/Summary:53 yo female s/p kev mastectomy and recon for a right sided T2N0 IDC ER+, HER2-  She continues on marcial  There are no concerns on exam  She had an MRI six months ago and prefers to continue this every other year for now  We will see her again in six months for survivorship  She will likely follow with me annually as well

## 2022-03-28 ENCOUNTER — OFFICE VISIT (OUTPATIENT)
Dept: DERMATOLOGY | Facility: CLINIC | Age: 52
End: 2022-03-28
Payer: COMMERCIAL

## 2022-03-28 VITALS — HEIGHT: 68 IN | BODY MASS INDEX: 28.49 KG/M2 | WEIGHT: 188 LBS | TEMPERATURE: 96.9 F

## 2022-03-28 DIAGNOSIS — L40.9 PSORIASIS: Primary | ICD-10-CM

## 2022-03-28 DIAGNOSIS — L81.4 LENTIGO: ICD-10-CM

## 2022-03-28 DIAGNOSIS — L82.1 SEBORRHEIC KERATOSIS: ICD-10-CM

## 2022-03-28 DIAGNOSIS — D18.01 CHERRY ANGIOMA: ICD-10-CM

## 2022-03-28 DIAGNOSIS — L85.3 XEROSIS OF SKIN: ICD-10-CM

## 2022-03-28 DIAGNOSIS — D22.9 MULTIPLE MELANOCYTIC NEVI: ICD-10-CM

## 2022-03-28 DIAGNOSIS — Z11.1 SCREENING FOR TUBERCULOSIS: ICD-10-CM

## 2022-03-28 PROCEDURE — 99213 OFFICE O/P EST LOW 20 MIN: CPT | Performed by: DERMATOLOGY

## 2022-03-28 NOTE — PATIENT INSTRUCTIONS
Assessment and Plan:  Based on a thorough discussion of this condition and the management approach to it (including a comprehensive discussion of the known risks, side effects and potential benefits of treatment), the patient (family) agrees to implement the following specific plan:  Micheal Cleaning lab order placed, plan to get done in 2022   Once results received, Cosentyx prescription renewal will be ordered   Cosentyx prior authorization will  in November

## 2022-03-28 NOTE — PROGRESS NOTES
[facility] Follow Up Note    Patient Name: Marv Lara  Encounter Date: 3/28/2022    Today's Chief Concerns:  · Concern #1:  Psoriasis follow up  · Concern #2:  Full body exam    Current Medications:    Current Outpatient Medications:     Ashwagandha 125 MG CAPS, Take by mouth, Disp: , Rfl:     Calcium 500 MG tablet, Take by mouth, Disp: , Rfl:     Cholecalciferol (VITAMIN D PO), Take 5,000 Units by mouth daily , Disp: , Rfl:     Digestive Enzymes (DIGESTIVE ENZYME PO), Take 1 tablet by mouth daily as needed  , Disp: , Rfl:     Magnesium 400 MG CAPS, Take 1 tablet by mouth daily  , Disp: , Rfl:     MILK THISTLE PO, Take 600 mg by mouth daily  , Disp: , Rfl:     Multiple Vitamins-Minerals (ONE DAILY MULTIVITAMIN ADULT) TABS, Take by mouth, Disp: , Rfl:     NON FORMULARY, , Disp: , Rfl:     Probiotic Product (PROBIOTIC DAILY PO), Take 2 tablets by mouth daily  , Disp: , Rfl:     secukinumab (Cosentyx Sensoready Pen) 150 mg/mL SOAJ injection, Inject 2 mL (300 mg total) under the skin every 30 (thirty) days, Disp: 2 mL, Rfl: 11    tamoxifen (NOLVADEX) 20 mg tablet, TAKE 1 TABLET BY MOUTH EVERY DAY, Disp: 30 tablet, Rfl: 7    triamcinolone (KENALOG) 0 1 % ointment, Apply topically 3 (three) times a day for 10 days, Disp: 30 g, Rfl: 0    Allergies   Allergen Reactions    Other Hives     Kait selzer    Kait-[place] Plus Cold & Cough  [Aovjoivbm-Ere-El-Apap] Hives       CONSTITUTIONAL:   Vitals:    03/28/22 0854   Temp: (!) 96 9 °F (36 1 °C)   TempSrc: Temporal   Weight: 85 3 kg (188 lb)   Height: 5' 8" (1 727 m)     Specific Alerts:    Have you been seen by a [facility]atologist in the last 3 years? YES    Are you pregnant or planning to become pregnant? No    Are you currently or planning to be nursing or breast feeding?  No    Allergies   Allergen Reactions    Other Hives     Kait selzer    Kait-[place] Plus Cold & Cough  [Ryowkaldb-Cjz-Kp-Apap] Hives       May we call your Preferred Phone number to discuss your specific medical information? YES    May we leave a detailed message that includes your specific medical information? YES    Have you traveled outside of the [facility] in the past 3 months? No    Do you currently have a pacemaker or defibrillator? No    Do you have any artificial heart valves, joints, plates, screws, rods, stents, pins, etc? No    Do you require any medications prior to a surgical procedure? No    Are you taking any medications that cause you to bleed more easily ("blood thinners") No    Have you ever experienced a rapid heartbeat with epinephrine? No    Have you ever been treated with "gold" (gold sodium thiomalate) therapy? [facility] can help with wrinkles, "laugh lines," facial volume loss, "double chin," "love handles," age spots, and more  Are you interested in learning today about some of the skin enhancement procedures that we offer? (If Yes, please provide more detail) {    Review of Systems:  Have you recently had or currently have any of the following?     · Fever or chills: No  · Night Sweats: No  · Headaches: No  · Weight Gain: No  · Weight Loss: No  · Blurry Vision: No  · Nausea: No  · Vomiting: No  · Diarrhea: No  · Blood in Stool: No  · Abdominal Pain: No  · Itchy Skin: YES  · Painful Joints: YES  · Swollen Joints: YES  · Muscle Pain: No  · Irregular Mole: No  · Sun Burn: No  · Dry Skin: YES  · Skin Color Changes: No  · Scar or Keloid: No  · Cold Sores/Fever Blisters: No  · Bacterial Infections/MRSA: No  · Anxiety: No  · Depression: No  · Suicidal or Homicidal Thoughts: No    PSYCH: Normal mood and affect  EYES: Normal conjunctiva  ENT: Normal lips and oral mucosa  CARDIOVASCULAR: No edema  RESPIRATORY: Normal respirations  HEME/LYMPH/IMMUNO:  No regional lymphadenopathy except as noted below in ASSESSMENT AND PLAN BY DIAGNOSIS    FULL ORGAN SYSTEM SKIN EXAM (SKIN)   Hair, Scalp, Ears, Face Normal except as noted below in Assessment   Neck, Cervical Chain Nodes Normal except as noted below in Assessment   Right Arm/Hand/Fingers Normal except as noted below in Assessment   Left Arm/Hand/Fingers Normal except as noted below in Assessment   Chest/Axillae Viewed areas Normal except as noted below in Assessment   Abdomen, Umbilicus Normal except as noted below in Assessment   Back/Spine Normal except as noted below in Assessment   Right Leg, Foot, Toes Normal except as noted below in Assessment   Left Leg, Foot, Toes Normal except as noted below in Assessment       1  FOLLOW UP: PSORIASIS     Physical Exam:   Anatomic Location Affected:  Clear today   Morphological Description:  Clear today   Pertinent Positives: Some joint pain   Pertinent Negatives: Additional History of Present Condition:     History of Arthritis: YES   Any recent infections: No   History of malignancy: YES, Breast cancer   Previous Treatment: Cosentyx    Affected Body surface area: 0%   Did you experience any side effects of treatment: denies    Are you happy with the improvement: Patient is very pleased with Cosentyx treatment  She has been clear since using Cosentyx   Patient does not want to go on methotrexate at all if possible  Assessment and Plan:  Based on a thorough discussion of this condition and the management approach to it (including a comprehensive discussion of the known risks, side effects and potential benefits of treatment), the patient (family) agrees to implement the following specific plan:  Geraline Adjutant lab order placed, plan to get done in 2022   Once results received, Cosentyx prescription renewal will be ordered   Cosentyx prior authorization will  in November    Follow up yearly for psoriasis and full body exam    2   MELANOCYTIC NEVI ("Moles")    Physical Exam:   Anatomic Location Affected:   Mostly on sun-exposed areas of the trunk and extremities   Morphological Description:  Scattered, 1-4mm round to ovoid, symmetrical-appearing, even bordered, skin colored to dark brown macules/papules, mostly in sun-exposed areas   Pertinent Positives:   Pertinent Negatives:    Assessment and Plan:  Based on a thorough discussion of this condition and the management approach to it (including a comprehensive discussion of the known risks, side effects and potential benefits of treatment), the patient (family) agrees to implement the following specific plan:   When outside we recommend using a wide brim hat, sunglasses, long sleeve and pants, sunscreen with SPF 33+ with reapplication every 2 hours, or SPF specific clothing    Benign, reassured   Annual skin check     Melanocytic Nevi  Melanocytic nevi ("moles") are tan or brown, raised or flat areas of the skin which have an increased number of melanocytes  Melanocytes are the cells in our body which make pigment and account for skin color  Some moles are present at birth (I e , "congenital nevi"), while others come up later in life (i e , "acquired nevi")  The sun can stimulate the body to make more moles  Sunburns are not the only thing that triggers more moles  Chronic sun exposure can do it too  Clinically distinguishing a healthy mole from melanoma may be difficult, even for experienced dermatologists  The "ABCDE's" of moles have been suggested as a means of helping to alert a person to a suspicious mole and the possible increased risk of melanoma  The suggestions for raising alert are as follows:    Asymmetry: Healthy moles tend to be symmetric, while melanomas are often asymmetric  Asymmetry means if you draw a line through the mole, the two halves do not match in color, size, shape, or surface texture  Asymmetry can be a result of rapid enlargement of a mole, the development of a raised area on a previously flat lesion, scaling, ulceration, bleeding or scabbing within the mole    Any mole that starts to demonstrate "asymmetry" should be examined promptly by a board certified dermatologist      Julieta Ricketts: Healthy moles tend to have discrete, even borders  The border of a melanoma often blends into the normal skin and does not sharply delineate the mole from normal skin  Any mole that starts to demonstrate "uneven borders" should be examined promptly by a board certified dermatologist      Color: Healthy moles tend to be one color throughout  Melanomas tend to be made up of different colors ranging from dark black, blue, white, or red  Any mole that demonstrates a color change should be examined promptly by a board certified dermatologist      Diameter: Healthy moles tend to be smaller than 0 6 cm in size; an exception are "congenital nevi" that can be larger  Melanomas tend to grow and can often be greater than 0 6 cm (1/4 of an inch, or the size of a pencil eraser)  This is only a guideline, and many normal moles may be larger than 0 6 cm without being unhealthy  Any mole that starts to change in size (small to bigger or bigger to smaller) should be examined promptly by a board certified dermatologist      Evolving: Healthy moles tend to "stay the same "  Melanomas may often show signs of change or evolution such as a change in size, shape, color, or elevation    Any mole that starts to itch, bleed, crust, burn, hurt, or ulcerate or demonstrate a change or evolution should be examined promptly by a board certified dermatologist       3  LENTIGO    Physical Exam:   Anatomic Location Affected:  Trunk, arms, face   Morphological Description:  Light brown macules   Pertinent Positives:   Pertinent Negatives:      Assessment and Plan:  Based on a thorough discussion of this condition and the management approach to it (including a comprehensive discussion of the known risks, side effects and potential benefits of treatment), the patient (family) agrees to implement the following specific plan:   When outside we recommend using a wide brim hat, sunglasses, long sleeve and pants, sunscreen with SPF 80+ with reapplication every 2 hours, or SPF specific clothing     What is a lentigo? A lentigo is a pigmented flat or slightly raised lesion with a clearly defined edge  Unlike an ephelis (freckle), it does not fade in the winter months  There are several kinds of lentigo  The name lentigo originally referred to its appearance resembling a small lentil  The plural of lentigo is lentigines, although lentigos is also in common use  Who gets lentigines? Lentigines can affect males and females of all ages and races  Solar lentigines are especially prevalent in fair skinned adults  Lentigines associated with syndromes are present at birth or arise during childhood  What causes lentigines? Common forms of lentigo are due to exposure to ultraviolet radiation:   Sun damage including sunburn    Indoor tanning    Phototherapy, especially photochemotherapy (PUVA)    Ionizing radiation, eg radiation therapy, can also cause lentigines  Several familial syndromes associated with widespread lentigines originate from mutations in Panchito-MAP kinase, mTOR signaling and PTEN pathways  What is the treatment for lentigines? Most lentigines are left alone  Attempts to lighten them may not be successful  The following approaches are used:   SPF 50+ broad-spectrum sunscreen    Hydroquinone bleaching cream    Alpha hydroxy acids    Vitamin C    Retinoids    Azelaic acid    Chemical peels  Individual lesions can be permanently removed using:   Cryotherapy    Intense pulsed light    Pigment lasers    How can lentigines be prevented? Lentigines associated with exposure ultraviolet radiation can be prevented by very careful sun protection  Clothing is more successful at preventing new lentigines than are sunscreens  What is the outlook for lentigines? Lentigines usually persist  They may increase in number with age and sun exposure   Some in sun-protected sites may fade and disappear  4  SCHNEIDER ANGIOMAS    Physical Exam:   Anatomic Location Affected:  Trunk    Morphological Description:  Scattered cherry red, 1-4 mm papules   Pertinent Positives:   Pertinent Negatives:    Assessment and Plan:  Based on a thorough discussion of this condition and the management approach to it (including a comprehensive discussion of the known risks, side effects and potential benefits of treatment), the patient (family) agrees to implement the following specific plan:   Monitor for changes   Benign, reassured    Assessment and Plan:    Cherry angioma, also known as [employer] spots, are benign vascular skin lesions  A "cherry angioma" is a firm red, blue or purple papule, 0 1-1 cm in diameter  When thrombosed, they can appear black in colour until evaluated with a dermatoscope when the red or purple colour is more easily seen  Cherry angioma may develop on any part of the body but most often appear on the scalp, face, lips and trunk  An angioma is due to proliferating endothelial cells; these are the cells that line the inside of a blood vessel  Angiomas can arise in early life or later in life; the most common type of angioma is a cherry angioma  Cherry angiomas are very common in males and females of any age or race  They are more noticeable in white skin than in skin of colour  They markedly increase in number from about the age of 36  There may be a family history of similar lesions  Eruptive cherry angiomas have been rarely reported to be associated with internal malignancy  The cause of angiomas is unknown  Genetic analysis of cherry angiomas has shown that they frequently carry specific somatic missense mutations in the GNAQ and GNA11 (Q209H) genes, which are involved in other vascular and melanocytic proliferations      5  SEBORRHEIC KERATOSIS; NON-INFLAMED    Physical Exam:   Anatomic Location Affected:  Trunk    Morphological Description:  Flat and raised, waxy, smooth to warty textured, yellow to brownish-grey to dark brown to blackish, discrete, "stuck-on" appearing papules   Pertinent Positives:   Pertinent Negatives:    Assessment and Plan:  Based on a thorough discussion of this condition and the management approach to it (including a comprehensive discussion of the known risks, side effects and potential benefits of treatment), the patient (family) agrees to implement the following specific plan:   Monitor for changes   Benign, reassured    Seborrheic Keratosis  A seborrheic keratosis is a harmless warty spot that appears during adult life as a common sign of skin aging  Seborrheic keratoses can arise on any area of skin, covered or uncovered, with the usual exception of the palms and soles  They do not arise from mucous membranes  Seborrheic keratoses can have highly variable appearance  Seborrheic keratoses are extremely common  It has been estimated that over 90% of adults over the age of 61 years have one or more of them  They occur in males and females of all races, typically beginning to erupt in the 35s or 45s  They are uncommon under the age of 21 years  The precise cause of seborrhoeic keratoses is not known  Seborrhoeic keratoses are considered degenerative in nature  As time goes by, seborrheic keratoses tend to become more numerous  Some people inherit a tendency to develop a very large number of them; some people may have hundreds of them  There is no easy way to remove multiple lesions on a single occasion  Unless a specific lesion is "inflamed" and is causing pain or stinging/burning or is bleeding, most insurance companies do not authorize treatment  6  XEROSIS ("DRY SKIN")    Physical Exam:   Anatomic Location Affected:  diffuse   Morphological Description:  xerosis   Pertinent Positives:   Pertinent Negatives:     Additional History of Present Condition:      Assessment and Plan:  Based on a thorough discussion of this condition and the management approach to it (including a comprehensive discussion of the known risks, side effects and potential benefits of treatment), the patient (family) agrees to implement the following specific plan:   Use moisturizer like Eucerin,Cerave or Aveeno Cream 3 times a day for the dry skin               Dry skin refers to skin that feels dry to touch  Dry skin has a dull surface with a rough, scaly quality  The skin is less pliable and cracked  When dryness is severe, the skin may become inflamed and fissured  Although any body site can be dry, dry skin tends to affect the shins more than any other site  Dry skin is lacking moisture in the outer horny cell layer (stratum corneum) and this results in cracks in the skin surface  Dry skin is also called xerosis, xeroderma or asteatosis (lack of fat)  It can affect males and females of all ages  There is some racial variability in water and lipid content of the skin   Dry skin that starts in early childhood may be one of about 20 types of ichthyosis (fish-scale skin)  There is often a family history of dry skin   Dry skin is commonly seen in people with atopic dermatitis   Nearly everyone > 60 years has dry skin  Dry skin that begins later may be seen in people with certain diseases and conditions   Postmenopausal women   Hypothyroidism   Chronic renal disease    Malnutrition and weight loss    Subclinical dermatitis    Treatment with certain drugs such as oral retinoids, diuretics and epidermal growth factor receptor inhibitors      What is the treatment for dry skin? The mainstay of treatment of dry skin and ichthyosis is moisturisers/emollients  They should be applied liberally and often enough to:   Reduce itch    Improve the barrier function    Prevent entry of irritants, bacteria    Reduce transepidermal water loss  How can dry skin be prevented?   Eliminate aggravating factors:   Reduce the frequency of bathing   A humidifier in winter and air conditioner in summer    Compare having a short shower with a prolonged soak in a bath   Use lukewarm, not hot, water   Replace standard soap with a substitute such as a synthetic detergent cleanser, water-miscible emollient, bath oil, anti-pruritic tar oil, colloidal oatmeal etc     Apply an emollient liberally and often, particularly shortly after bathing, and when itchy  The drier the skin, the thicker this should be, especially on the hands  What is the outlook for dry skin? A tendency to dry skin may persist life-long, or it may improve once contributing factors are controlled        Scribe Attestation    I,:  Pooja Mancini am acting as a scribe while in the presence of the attending physician :       I,:  Teagan Concepcion MD personally performed the services described in this documentation    as scribed in my presence :

## 2022-04-20 ENCOUNTER — PATIENT OUTREACH (OUTPATIENT)
Dept: HEMATOLOGY ONCOLOGY | Facility: CLINIC | Age: 52
End: 2022-04-20

## 2022-04-20 NOTE — PROGRESS NOTES
Oncology Nurse Navigator    Patient sent an email with state Medicaid renewal paperwork attached that needed to be completed  Email forwarded to Dr Vanessa Elias nurse and oncology social worker  Responded to patient informing her of above

## 2022-04-26 ENCOUNTER — TELEPHONE (OUTPATIENT)
Dept: GASTROENTEROLOGY | Facility: AMBULARY SURGERY CENTER | Age: 52
End: 2022-04-26

## 2022-06-21 ENCOUNTER — TELEPHONE (OUTPATIENT)
Dept: GASTROENTEROLOGY | Facility: AMBULARY SURGERY CENTER | Age: 52
End: 2022-06-21

## 2022-06-21 NOTE — TELEPHONE ENCOUNTER
Patient is scheduled for colonoscopy on October 19, 2022 at [facility]  with Ria Sanchez MD  Patient is aware of pre-procedure prep of Miralax/ Magnesium Citrate and they will be called the day prior between 2 and 6 pm for time to report for procedure  Pre-procedure prep has been given to the patient  via [address] mail on June 21 , 2022   (recall colonoscopy)

## 2022-06-24 DIAGNOSIS — L40.9 PSORIASIS: ICD-10-CM

## 2022-06-27 RX ORDER — SECUKINUMAB 150 MG/ML
INJECTION SUBCUTANEOUS
Qty: 2 ML | Refills: 1 | Status: SHIPPED | OUTPATIENT
Start: 2022-06-27 | End: 2022-07-10 | Stop reason: SDUPTHER

## 2022-07-06 ENCOUNTER — TELEPHONE (OUTPATIENT)
Dept: FAMILY MEDICINE CLINIC | Facility: CLINIC | Age: 52
End: 2022-07-06

## 2022-07-06 DIAGNOSIS — E78.00 HYPERCHOLESTEROLEMIA: Primary | ICD-10-CM

## 2022-07-06 NOTE — TELEPHONE ENCOUNTER
Pt called asking for a new lipid panel blood work order be placed in computer  Going for blood work tomorrow

## 2022-07-07 ENCOUNTER — APPOINTMENT (OUTPATIENT)
Dept: LAB | Age: 52
End: 2022-07-07
Payer: COMMERCIAL

## 2022-07-07 DIAGNOSIS — E78.00 HYPERCHOLESTEROLEMIA: ICD-10-CM

## 2022-07-07 DIAGNOSIS — L40.9 PSORIASIS: ICD-10-CM

## 2022-07-07 DIAGNOSIS — Z11.1 SCREENING FOR TUBERCULOSIS: ICD-10-CM

## 2022-07-07 LAB
CHOLEST SERPL-MCNC: 183 MG/DL
HDLC SERPL-MCNC: 60 MG/DL
LDLC SERPL CALC-MCNC: 102 MG/DL (ref 0–100)
TRIGL SERPL-MCNC: 104 MG/DL

## 2022-07-07 PROCEDURE — 86480 TB TEST CELL IMMUN MEASURE: CPT

## 2022-07-07 PROCEDURE — 80061 LIPID PANEL: CPT

## 2022-07-08 LAB
GAMMA INTERFERON BACKGROUND BLD IA-ACNC: 0.04 IU/ML
M TB IFN-G BLD-IMP: NEGATIVE
M TB IFN-G CD4+ BCKGRND COR BLD-ACNC: 0 IU/ML
M TB IFN-G CD4+ BCKGRND COR BLD-ACNC: 0 IU/ML
MITOGEN IGNF BCKGRD COR BLD-ACNC: 4.92 IU/ML

## 2022-07-14 ENCOUNTER — TELEPHONE (OUTPATIENT)
Dept: SURGICAL ONCOLOGY | Facility: CLINIC | Age: 52
End: 2022-07-14

## 2022-09-08 ENCOUNTER — TELEPHONE (OUTPATIENT)
Dept: HEMATOLOGY ONCOLOGY | Facility: CLINIC | Age: 52
End: 2022-09-08

## 2022-09-08 NOTE — TELEPHONE ENCOUNTER
Left voicemail for patient about rescheduled follow up  Provided date and time of new follow up  Provided our callback number for patient in case of need to reschedule

## 2022-10-05 ENCOUNTER — ANESTHESIA EVENT (OUTPATIENT)
Dept: ANESTHESIOLOGY | Facility: HOSPITAL | Age: 52
End: 2022-10-05

## 2022-10-05 ENCOUNTER — ANESTHESIA (OUTPATIENT)
Dept: ANESTHESIOLOGY | Facility: HOSPITAL | Age: 52
End: 2022-10-05

## 2022-10-17 ENCOUNTER — TELEPHONE (OUTPATIENT)
Dept: OBGYN CLINIC | Facility: CLINIC | Age: 52
End: 2022-10-17

## 2022-10-17 ENCOUNTER — NURSE TRIAGE (OUTPATIENT)
Dept: OTHER | Facility: OTHER | Age: 52
End: 2022-10-17

## 2022-10-17 DIAGNOSIS — N76.0 BACTERIAL VAGINOSIS: Primary | ICD-10-CM

## 2022-10-17 DIAGNOSIS — B96.89 BACTERIAL VAGINOSIS: Primary | ICD-10-CM

## 2022-10-17 RX ORDER — METRONIDAZOLE 500 MG/1
500 TABLET ORAL EVERY 12 HOURS SCHEDULED
Qty: 14 TABLET | Refills: 0 | Status: SHIPPED | OUTPATIENT
Start: 2022-10-17 | End: 2022-10-24

## 2022-10-17 NOTE — TELEPHONE ENCOUNTER
Patient called, she saw you in February for BV  She is experiencing the same thing again  She has a fishy odor, discharge and irritation  Can you send Flagyl to the pharmacy for her?  She uses the [facility] in [place]

## 2022-10-17 NOTE — TELEPHONE ENCOUNTER
Patient called in due to mag citrate recall   Advised dulcolax/miralax prep per protocol  Verbalized understanding and will sent prep via [employer]  Patient also states she is unable to leave hour prior to 7am due to putting daughter on bus  Reason for Disposition  • Information only question and nurse able to answer    Answer Assessment - Initial Assessment Questions  1  REASON FOR CALL or QUESTION: "What is your reason for calling today?" or "How can I best help you?" or "What question do you have that I can help answer?"      Prep for colonoscopy   Mag citrate recalled    Protocols used: INFORMATION ONLY CALL - NO TRIAGE-ADULT-[place]

## 2022-10-18 ENCOUNTER — TELEPHONE (OUTPATIENT)
Dept: HEMATOLOGY ONCOLOGY | Facility: CLINIC | Age: 52
End: 2022-10-18

## 2022-10-18 ENCOUNTER — ONCOLOGY SURVIVORSHIP (OUTPATIENT)
Dept: SURGICAL ONCOLOGY | Facility: CLINIC | Age: 52
End: 2022-10-18
Payer: COMMERCIAL

## 2022-10-18 VITALS
RESPIRATION RATE: 16 BRPM | DIASTOLIC BLOOD PRESSURE: 80 MMHG | HEART RATE: 85 BPM | TEMPERATURE: 98.7 F | BODY MASS INDEX: 28.49 KG/M2 | HEIGHT: 68 IN | OXYGEN SATURATION: 99 % | SYSTOLIC BLOOD PRESSURE: 120 MMHG | WEIGHT: 188 LBS

## 2022-10-18 DIAGNOSIS — Z79.810 USE OF TAMOXIFEN (NOLVADEX): ICD-10-CM

## 2022-10-18 DIAGNOSIS — Z17.0 MALIGNANT NEOPLASM OF UPPER-INNER QUADRANT OF RIGHT BREAST IN FEMALE, ESTROGEN RECEPTOR POSITIVE (HCC): Primary | ICD-10-CM

## 2022-10-18 DIAGNOSIS — C50.211 MALIGNANT NEOPLASM OF UPPER-INNER QUADRANT OF RIGHT BREAST IN FEMALE, ESTROGEN RECEPTOR POSITIVE (HCC): Primary | ICD-10-CM

## 2022-10-18 PROCEDURE — 99215 OFFICE O/P EST HI 40 MIN: CPT | Performed by: NURSE PRACTITIONER

## 2022-10-18 NOTE — PROGRESS NOTES
Assessment/Plan:    Diagnoses and all orders for this visit:    Malignant neoplasm of upper-inner quadrant of right breast in female, estrogen receptor positive ([facility] [address] )    Patient is a 56-year-old female that was diagnosed with a right-sided breast cancer in 2015  Her pathology revealed invasive ductal carcinoma, ER/TX positive, HER2 negative  She underwent bilateral mastectomies and a right sentinel lymph node biopsy with Dr Andrew Burns  She had reconstruction with Dr Billie Harkins   She is currently maintained on tamoxifen  She offers no new complaints today and there are no concerns on today's exam   Breast cancer survivorship visit performed today in treatment summary and care plan were reviewed with patient  She is scheduled for a colonoscopy tomorrow  She carries an MUTYH genetic mutation  She is up-to-date on cervical cancer screening  Referral placed to the strength ABC program  She would like to become more active again, interested in yoga classes as well  She will return to the office in 6 months for a follow-up visit with Dr Andrew Burns  An MRI can be ordered at that visit as we will be obtaining every other year MRIs, due in July of 2023  She will return to the survivorship Clinic in 1 year  She was instructed to contact us with any changes or concerns in the interim  All of her questions were answered today  -     Ambulatory referral to Physical Therapy; Future  -     Ambulatory Referral to Oncology Social Worker;  Future    Use of tamoxifen (Nolvadex)        REASON FOR VISIT:   Survivorship      Previous therapy:  Oncology History   Malignant neoplasm of upper-inner quadrant of right breast in female, estrogen receptor positive ([facility] [address] )   5/14/2015 Biopsy    Right US guided breast biopsy     Invasive ductal carcinoma  %  TX 90-95%  HER-2 negative     6/2015 Genetic Testing    6/3/15: [employer] BRCAplus (5 genes): BRCA1, BRCA2, CDH1, PTEN, TP53      6/30/2015 Surgery    Bilateral mastectomies, Right sentinel lymph node biopsy    Bilateral reconstruction with expanders  Dr Lizette Duran DX  14     7/8/2015 -  Cancer Staged    Staging form: Breast, AJCC 7th Edition  - Pathologic stage from 7/8/2015: Stage IIA (T2, N0, cM0) - Signed by Nicolette Mcguire on 10/17/2022  Stage prefix: Initial diagnosis  Method of lymph node assessment: [place] lymph node biopsy       8/12/2015 -  Hormone Therapy    Tamoxifen     10/23/2015 Surgery    Expanders removed, implants placed  3/9/2016 Surgery    Bilateral breast mound revisions, fat grafting     10/13/2016 Surgery    Nipple reconstruction     6/2021 Genetic Testing    Test(s): [employer] Core Cancer Panel (36 genes): APC, DADA, AXIN2 BARD1, BRCA1, BRCA2, BRIP1, BMPR1A, CDH1, CDK4, CDKN2A, CHEK2, DICER1, EPCAM, GREM1, HOXB13, MLH1, MSH2, MSH3, MSH6, MUTYH, NBN, NF1, NTHL1, PALB2, PMS2, POLD1, POLE, PTEN, RAD51C, RAD51D, RECQL SMAD4, SMARCA4, STK11, TP53     Result: Positive - MUTYH c 1187G>A (p [id number]), Heterozygous, Pathogenic               Patient ID: Tom Mujica is a 47 y o  female  HPI      Review of Systems   Constitutional: Negative for activity change, appetite change, chills, fatigue, fever and unexpected weight change  Respiratory: Negative for cough and shortness of breath  Cardiovascular: Negative for chest pain  Gastrointestinal: Negative for abdominal pain, constipation, diarrhea, nausea and vomiting  Musculoskeletal: Negative for arthralgias, back pain, gait problem and myalgias  Skin: Negative for color change and rash  Neurological: Negative for dizziness and headaches  Hematological: Negative for adenopathy  Psychiatric/Behavioral: Negative for agitation and confusion  All other systems reviewed and are negative  Objective:    Blood pressure 120/80, pulse 85, temperature 98 7 °F (37 1 °C), temperature source Temporal, resp   rate 16, height 5' 8" (1 727 m), weight 85 3 kg (188 lb), SpO2 99 %, not currently breastfeeding  Body mass index is 28 59 kg/m²  Physical Exam  Vitals reviewed  Constitutional:       General: She is not in acute distress  Appearance: Normal appearance  She is well-developed  She is not diaphoretic  HENT:      Head: Normocephalic and atraumatic  Cardiovascular:      Rate and Rhythm: Normal rate and regular rhythm  Heart sounds: Normal heart sounds  Pulmonary:      Effort: Pulmonary effort is normal       Breath sounds: Normal breath sounds  Chest:   Breasts:      Right: No axillary adenopathy or supraclavicular adenopathy  Left: No axillary adenopathy or supraclavicular adenopathy  Comments: Bilateral reconstructed breasts with implants present  No masses, skin changes or nodules  Abdominal:      Palpations: Abdomen is soft  There is no mass  Tenderness: There is no abdominal tenderness  Musculoskeletal:         General: Normal range of motion  Cervical back: Normal range of motion  Lymphadenopathy:      Upper Body:      Right upper body: No supraclavicular or axillary adenopathy  Left upper body: No supraclavicular or axillary adenopathy  Skin:     General: Skin is warm and dry  Findings: No rash  Neurological:      Mental Status: She is alert and oriented to person, place, and time     Psychiatric:         Speech: Speech normal          Discussed symptoms related to disease recurrence, Yes    Evaluated for late effects related to cancer treatment, Yes, describe: discussed effects of surgery, SERM therapy    Screening current for cervical cancer, Yes, describe: pap 11/2021    Screening current for colon cancer, Yes, describe: going for colonoscopy tomorrow    Cancer rehabilitation services addressed, Yes, describe: Referral placed to [facility]    Screening current for osteoporosis, No n/a age    Oncology Treatment Summary reviewed with patient and copy provided, Yes    Referral placed for psychosocial evaluation/screening to oncology social work  Yes    I have spent 45 minutes with Patient  today in which greater than 50% of this time was spent in counseling/coordination of care regarding breast cancer survivorship

## 2022-10-19 ENCOUNTER — ANESTHESIA EVENT (OUTPATIENT)
Dept: GASTROENTEROLOGY | Facility: AMBULARY SURGERY CENTER | Age: 52
End: 2022-10-19

## 2022-10-19 ENCOUNTER — HOSPITAL ENCOUNTER (OUTPATIENT)
Dept: GASTROENTEROLOGY | Facility: AMBULARY SURGERY CENTER | Age: 52
Setting detail: OUTPATIENT SURGERY
Discharge: HOME | End: 2022-10-19
Attending: INTERNAL MEDICINE
Payer: COMMERCIAL

## 2022-10-19 ENCOUNTER — ANESTHESIA (OUTPATIENT)
Dept: GASTROENTEROLOGY | Facility: AMBULARY SURGERY CENTER | Age: 52
End: 2022-10-19

## 2022-10-19 VITALS
SYSTOLIC BLOOD PRESSURE: 138 MMHG | WEIGHT: 185 LBS | OXYGEN SATURATION: 99 % | DIASTOLIC BLOOD PRESSURE: 79 MMHG | HEART RATE: 79 BPM | HEIGHT: 68 IN | RESPIRATION RATE: 22 BRPM | BODY MASS INDEX: 28.04 KG/M2 | TEMPERATURE: 97.9 F

## 2022-10-19 DIAGNOSIS — Z86.010 HX OF COLONIC POLYPS: ICD-10-CM

## 2022-10-19 LAB
EXT PREGNANCY TEST URINE: NEGATIVE
EXT. CONTROL: NORMAL

## 2022-10-19 PROCEDURE — 81025 URINE PREGNANCY TEST: CPT | Performed by: INTERNAL MEDICINE

## 2022-10-19 RX ORDER — SODIUM CHLORIDE, SODIUM LACTATE, POTASSIUM CHLORIDE, CALCIUM CHLORIDE 600; 310; 30; 20 MG/100ML; MG/100ML; MG/100ML; MG/100ML
INJECTION, SOLUTION INTRAVENOUS CONTINUOUS PRN
Status: DISCONTINUED | OUTPATIENT
Start: 2022-10-19 | End: 2022-10-19

## 2022-10-19 RX ORDER — LIDOCAINE HYDROCHLORIDE 10 MG/ML
INJECTION, SOLUTION EPIDURAL; INFILTRATION; INTRACAUDAL; PERINEURAL AS NEEDED
Status: DISCONTINUED | OUTPATIENT
Start: 2022-10-19 | End: 2022-10-19

## 2022-10-19 RX ORDER — PROPOFOL 10 MG/ML
INJECTION, EMULSION INTRAVENOUS AS NEEDED
Status: DISCONTINUED | OUTPATIENT
Start: 2022-10-19 | End: 2022-10-19

## 2022-10-19 RX ADMIN — SODIUM CHLORIDE, SODIUM LACTATE, POTASSIUM CHLORIDE, AND CALCIUM CHLORIDE: .6; .31; .03; .02 INJECTION, SOLUTION INTRAVENOUS at 13:37

## 2022-10-19 RX ADMIN — PROPOFOL 100 MG: 10 INJECTION, EMULSION INTRAVENOUS at 13:45

## 2022-10-19 RX ADMIN — LIDOCAINE HYDROCHLORIDE 50 MG: 10 INJECTION, SOLUTION EPIDURAL; INFILTRATION; INTRACAUDAL at 13:42

## 2022-10-19 RX ADMIN — PROPOFOL 30 MG: 10 INJECTION, EMULSION INTRAVENOUS at 13:54

## 2022-10-19 RX ADMIN — LIDOCAINE HYDROCHLORIDE 50 MG: 10 INJECTION, SOLUTION EPIDURAL; INFILTRATION; INTRACAUDAL at 13:45

## 2022-10-19 RX ADMIN — PROPOFOL 50 MG: 10 INJECTION, EMULSION INTRAVENOUS at 13:50

## 2022-10-19 RX ADMIN — PROPOFOL 50 MG: 10 INJECTION, EMULSION INTRAVENOUS at 13:46

## 2022-10-19 NOTE — ANESTHESIA PREPROCEDURE EVALUATION
Review of Systems/Medical History  Patient summary reviewed  Chart reviewed  No history of anesthetic complications     Cardiovascular  Exercise tolerance (METS): >4 METS  ,     Pulmonary  Not a smoker ,        GI/Hepatic       Negative  ROS        Endo/Other     GYN    Breast cancer Right mastectomy       Hematology  Negative hematology ROS      Musculoskeletal  Negative musculoskeletal ROS        Neurology  Negative neurology ROS      Psychology           Physical Exam    Airway    Mallampati score: I  TM Distance: >3 FB  Neck ROM: full     Dental   No notable dental hx     Cardiovascular      Pulmonary      Other Findings        Anesthesia Plan  ASA Score- 2     Anesthesia Type- IV sedation with anesthesia with ASA Monitors  Additional Monitors:   Airway Plan:     Comment: I have seen the patient and reviewed the history  Patient to receive IV sedation with full ASA monitors  Risks discussed with the patient, consent signed          Plan Factors-Exercise tolerance (METS): >4 METS  Chart reviewed  Patient summary reviewed  Induction- intravenous  Postoperative Plan-     Informed Consent- Anesthetic plan and risks discussed with patient  I personally reviewed this patient with the CRNA  Discussed and agreed on the Anesthesia Plan with the DEMI Foley

## 2022-10-19 NOTE — ANESTHESIA POSTPROCEDURE EVALUATION
Post-Op Assessment Note    CV Status:  Stable  Pain Score: 0    Pain management: adequate     Mental Status:  Sleepy   Hydration Status:  Stable and euvolemic   PONV Controlled:  None   Airway Patency:  Patent      Post Op Vitals Reviewed: Yes      Staff: CRNA         No complications documented      BP   138/73   Temp      Pulse 81   Resp 18   99

## 2022-10-19 NOTE — H&P
History and Physical - [facility]  Cornelia Callahan 47 y o  female MRN: [id number]        HPI:  81-year-old female with history of polyps had flat polypoid lesion that was removed during last colonoscopy couple of years ago and the biopsies came back as benign  Historical Information   Past Medical History:   Diagnosis Date   • BRCA negative    • Endometriosis    • IBS (irritable bowel syndrome)    • Insomnia    • Night sweats    • Psoriasis    • Wears glasses      Past Surgical History:   Procedure Laterality Date   • BREAST BIOPSY Right [id number]    IDC   •  SECTION     • LAPAROSCOPY      exploratory, endometriosis   • LYMPHADENECTOMY Right     x2   • MASTECTOMY      bilateral mastectomy   • TN NIPPLE/AREOLA RECONSTRUCTION Bilateral 10/13/2016    Procedure: NIPPLE RECONSTRUCTION ;  Surgeon: Ralph Blount MD;  Location: [facility];  Service: Plastics   • TN REVISION OF RECONSTRUCTED BREAST Bilateral 3/9/2016    Procedure: RECONSTRUCTION REVISION  BREAST MOUND ,FAT GRAFTS ;  Surgeon: Ralph Blount MD;  Location: [facility];  Service: Plastics   • SENTINEL LYMPH NODE BIOPSY Right      Social History   Social History     Substance and Sexual Activity   Alcohol Use Yes    Comment: social     Social History     Substance and Sexual Activity   Drug Use Never     Social History     Tobacco Use   Smoking Status Never Smoker   Smokeless Tobacco Never Used     Family History   Problem Relation Age of Onset   • Lung cancer Paternal Grandfather 48   • Breast cancer Other    • Thyroid cancer Mother 36   • Breast cancer Mother 76   • Psoriasis Father    • Basal cell carcinoma Father    • Heart disease Other    • Prostate cancer Other        Meds/Allergies     (Not in a hospital admission)      Allergies   Allergen Reactions   • Other Hives     Kait selzer   • Kait-[place] Plus Cold & Cough  [[id number]] Hives       Objective     not currently breastfeeding      PHYSICAL EXAM:    Gen: NAD  CV: S1 & S2 normal, RRR  CHEST: Clear to auscultate  ABD: soft, NT/ND, good bowel sounds  EXT: no edema    ASSESSMENT:     History of colon polyps    PLAN:    Colonoscopy

## 2022-10-20 ENCOUNTER — PATIENT OUTREACH (OUTPATIENT)
Dept: CASE MANAGEMENT | Facility: OTHER | Age: 52
End: 2022-10-20

## 2022-10-20 NOTE — PROGRESS NOTES
OSW received SW referral  OSW will place outreach TC to complete distress thermometer and psychosocial assessment

## 2022-10-28 ENCOUNTER — TELEPHONE (OUTPATIENT)
Dept: DERMATOLOGY | Facility: CLINIC | Age: 52
End: 2022-10-28

## 2022-10-28 NOTE — TELEPHONE ENCOUNTER
Patient called stating she is over due for her cosentyx and that the pharmacy said they fax over two letters for a prior auth request I looked under media and nothing is there

## 2022-11-01 ENCOUNTER — TELEPHONE (OUTPATIENT)
Dept: DERMATOLOGY | Facility: CLINIC | Age: 52
End: 2022-11-01

## 2022-11-01 ENCOUNTER — PATIENT OUTREACH (OUTPATIENT)
Dept: CASE MANAGEMENT | Facility: OTHER | Age: 52
End: 2022-11-01

## 2022-11-01 ENCOUNTER — TELEPHONE (OUTPATIENT)
Dept: FAMILY MEDICINE CLINIC | Facility: CLINIC | Age: 52
End: 2022-11-01

## 2022-11-01 DIAGNOSIS — C50.919 MALIGNANT NEOPLASM OF BREAST IN FEMALE, ESTROGEN RECEPTOR POSITIVE, UNSPECIFIED LATERALITY, UNSPECIFIED SITE OF BREAST (HCC): ICD-10-CM

## 2022-11-01 DIAGNOSIS — J01.90 ACUTE SINUSITIS, RECURRENCE NOT SPECIFIED, UNSPECIFIED LOCATION: Primary | ICD-10-CM

## 2022-11-01 DIAGNOSIS — Z17.0 MALIGNANT NEOPLASM OF BREAST IN FEMALE, ESTROGEN RECEPTOR POSITIVE, UNSPECIFIED LATERALITY, UNSPECIFIED SITE OF BREAST (HCC): ICD-10-CM

## 2022-11-01 RX ORDER — PREDNISONE 10 MG/1
TABLET ORAL
Qty: 26 TABLET | Refills: 0 | Status: SHIPPED | OUTPATIENT
Start: 2022-11-01

## 2022-11-01 RX ORDER — TAMOXIFEN CITRATE 20 MG/1
TABLET ORAL
Qty: 30 TABLET | Refills: 7 | Status: SHIPPED | OUTPATIENT
Start: 2022-11-01

## 2022-11-01 RX ORDER — CEFUROXIME AXETIL 500 MG/1
500 TABLET ORAL EVERY 12 HOURS SCHEDULED
Qty: 20 TABLET | Refills: 0 | Status: SHIPPED | OUTPATIENT
Start: 2022-11-01 | End: 2022-11-11

## 2022-11-01 NOTE — PROGRESS NOTES
OSW placed outreach TC to complete the distress thermometer and psychosocial assessment  Pt states that she is not feeling well, as she has a sinus infection  OSW offered to call back later this week and she as agreeable

## 2022-11-01 NOTE — TELEPHONE ENCOUNTER
Patient called stating that as of 10/28/22 she has had a sore throat, nasal congestion, intense sinus pressure, and headache  She states that she does not have a home test, and she is not going to test for COVID  She states that she knows she has a sinus infection  She is asking for medication to be sent to her pharmacy please  [address]   She is aware to follow up with pharmacy if no call back

## 2022-11-03 ENCOUNTER — PATIENT OUTREACH (OUTPATIENT)
Dept: CASE MANAGEMENT | Facility: OTHER | Age: 52
End: 2022-11-03

## 2022-11-03 NOTE — PROGRESS NOTES
Biopsychosocial and Barriers Assessment    Cancer Diagnosis: Breast survivorship  Home/Cell Phone: [phone]  Emergency Contact: [facility]- [phone]  Marital Status:   Interpretation concerns, speaks another language, preferred language: English  Cultural concerns: none  Ability to read or write: yes    Caregiver/Support: Strong family support/strong roula  Children: 1 daughter and 1 step daughter  Child/Elder care: n/a    Housing: [place]  Home Setup: no steps  Lives With: spouse and daughter  Daily Living Activities: independent  Durable Medical Equipment: none  Ambulation: independent    Preferred Pharmacy: [facility] co-pays with insurance: [place] co-pays with medication coverage: none  No medication coverage: n/a    Primary Care Provider: Dr Mynor Merino  [address]: none  Hx of Short term rehab: not addressed  Mental Health Hx: none  Substance Abuse Hx: none  Employment: self employed   Status/Location: n/a  Ability to pay bills: yes  POA/LW/AD: not addressed  Transportation Plan/Concerns: independent      What do you know about your Cancer Diagnosis    What has your doctor told you about your cancer diagnosis: Breast cancer survivorship  What has your doctor told you about your cancer treatment: In Survivorship  What specific concerns do you have about your diagnosis and treatment: None    Have you been made aware of any hair loss associated with treatment: N/A    Additional Comments:    OSW placed outreach TC this morning  OSW introduced self and role, as well as the reason for the call  Pt is a very pleasant woman who is in survivorship from breast cancer  Pt completed a DT, where she scored a 0/10  The only concern she has is whenever she has an appointment she experiences some worry/anxiety  Pt lives with her spouse and her daughter  She is well supported by her family and has a very strong roula   She states that she is doing really well  She has her own craft business  She states that she is in a "good head space" at this time  OSW will close the referral at this time  OSW did provide contact information if she has any needs in the future  She was appreciative of the outreach

## 2022-11-10 NOTE — TELEPHONE ENCOUNTER
Prior authorization form for Cosentyx was filled out and faxed to insurance along with recent clinical notes and demographics and marked as urgent

## 2022-11-21 ENCOUNTER — EVALUATION (OUTPATIENT)
Dept: PHYSICAL THERAPY | Facility: CLINIC | Age: 52
End: 2022-11-21

## 2022-11-21 ENCOUNTER — APPOINTMENT (OUTPATIENT)
Dept: PHYSICAL THERAPY | Facility: CLINIC | Age: 52
End: 2022-11-21

## 2022-11-21 DIAGNOSIS — Z17.0 MALIGNANT NEOPLASM OF UPPER-INNER QUADRANT OF RIGHT BREAST IN FEMALE, ESTROGEN RECEPTOR POSITIVE (HCC): Primary | ICD-10-CM

## 2022-11-21 DIAGNOSIS — G89.18 POST-MASTECTOMY PAIN: Primary | ICD-10-CM

## 2022-11-21 DIAGNOSIS — C50.211 MALIGNANT NEOPLASM OF UPPER-INNER QUADRANT OF RIGHT BREAST IN FEMALE, ESTROGEN RECEPTOR POSITIVE (HCC): Primary | ICD-10-CM

## 2022-11-21 NOTE — PROGRESS NOTES
PT Evaluation     Today's date: 2022  Patient name: Bhavik Montoya  : 1970  MRN: [id number]  Referring provider: Jose Roberto Watts  Dx: No diagnosis found  Assessment  Assessment details: Bhavik Montoya is a 47 y o  female s/p R mastectomy pain  She presents with pain, decreased strength, decreased ROM, impaired sensation, possible mild stage 1 lymphedema  and postural  dysfunction  Due to these impairments, Patient has difficulty performing a/iadls, recreational activities and engaging in social activities  Patient's clinical presentation is consistent with their referring diagnosis  Patient would benefit from skilled physical therapy to address their aforementioned impairments, improve their level of function and to improve their overall quality of life  She would benefit from a compression sleeve 20-30 mmHg to use with increased activity consistently for 2-3 weeks to reduce edema  She will be progressed to the strength [facility]S program and discharged to the [facility] program    She has been given a home exercise program and is in agreement with the plan of care  Thank you for your referral   Impairments: abnormal or restricted ROM, impaired physical strength, lacks appropriate home exercise program and pain with function  Understanding of Dx/Px/POC: excellent  Goals  ST Goals - 2-4 weeks  1  Patient will report decreased pain with activity by at least 2 points within 4 weeks  2  Patient will improve ROM 5-10 degrees within 4 weeks  3  Patient will demonstrate ability to actively correct posture without cueing within 4 weeks  4  Patient will obtain compression garment in 2 weeks  5  Patient will increase strength by 25% in 4 weeks  6  Patient will independently don and doff her compression sleeve in 2-3 weeks    LT Goals - Discharge  1  Patient will improve FOTO score to maximum stated or greater by discharge  2   Patient will return to preferred recreational activity without significant pain increase by discharge   3  Patient will return to all work related activities without pain by discharge     Plan  Patient would benefit from: skilled physical therapy  Planned therapy interventions: joint mobilization, manual therapy, neuromuscular re-education, strengthening, stretching, therapeutic activities, therapeutic exercise and home exercise program  Frequency: 2x week  Duration in visits: 20  Duration in weeks: 20  Plan of Care beginning date: 2022  Plan of Care expiration date: 2023  Treatment plan discussed with: patient        Subjective Evaluation    History of Present Illness  Mechanism of injury:   Patient had B mastectomy/ reconstruction in 2015 due to diagnosis of R BCA  She had 2 lymph nodes removed on the R  She had implants placed in Oct 2015  She also had fat grafting in   CC:  Patient reports limited ROM in B UEs jannette with flexion overhead  She also has psoriatic arthritis  She does have some pain sx related   Function:  She works in her craft business making dolls  She does use her R UE with doll making  She also has a new dog which she is walking daily  She feels that her strength, particularly overhead, is challenged    She feels that she is weaker in general              Recurrent probem    Quality of life: excellent    Pain  Current pain rating: 3  At best pain rating: 3  At worst pain ratin  Quality: pulling  Relieving factors: medications          Objective     Cervical/Thoracic Screen   Cervical range of motion within normal limits    Active Range of Motion   Left Shoulder   Flexion: 160 degrees   Abduction: 160 degrees   External rotation BTH: T2     Right Shoulder   Flexion: 160 degrees with pain  Abduction: 156 degrees with pain  External rotation BTH: T1     Strength/Myotome Testing     Left Shoulder   Normal muscle strength    Right Shoulder     Planes of Motion   Flexion: 4+   Abduction: 4+   External rotation at 0°: 4- Internal rotation at 0°: 4-     Swelling   Left shoulder swelling details: Lymphedema MMTs                                            LEFT    palm                            19  wrist                            16 5  Wrist + 10 cm                 20  Wrist + 16 cm                            23 5   Elbow                             26 5  Wrist + 42                                  32 2    Wrist + 40                                  35 5        Right shoulder swelling details: Lymphedema MMTs                                        RIGHT - RHD    palm                      19 5  wrist                       16  Wrist + 10 cm            22  Wrist + 16 cm            26  Elbow                        27  Wrist + 42                            33 5    Wrist + 40                       36                  Precautions: R BCA      Manuals 11/21            MFR R Axillary cords                                                    Neuro Re-Ed                                                                                                        Ther Ex                          Lucio                          Wall climbs flex/abd 5 x :10            [address]             Supine cane flex             Supine haircut                          Ther Activity             Strength abcs                                                                 Modalities

## 2022-11-29 ENCOUNTER — OFFICE VISIT (OUTPATIENT)
Dept: PHYSICAL THERAPY | Facility: CLINIC | Age: 52
End: 2022-11-29

## 2022-11-29 DIAGNOSIS — G89.18 POST-MASTECTOMY PAIN: Primary | ICD-10-CM

## 2022-11-29 NOTE — PROGRESS NOTES
Daily Note     Today's date: 2022  Patient name: Dean Groves  : 1970  MRN: [id number]  Referring provider: Cynthea Dancer  Dx: No diagnosis found  Subjective: Patient states that she has remained compliant with HEP  Objective: See treatment diary below      Assessment: Tolerated treatment well  Patient exhibited good technique with therapeutic exercises      Plan: Continue per plan of care        Precautions: R BCA      Manuals            MFR R Axillary cords  10                                                   Neuro Re-Ed                                                                                                        Ther Ex                          [place]  5 min                         Wall climbs flex/abd 5 x :10 10" x 5            [address]             Supine cane flex  10" x 10            Supine haircut  NV                        Ther Activity             Strength abcs                                                                 Modalities

## 2022-12-01 ENCOUNTER — OFFICE VISIT (OUTPATIENT)
Dept: PHYSICAL THERAPY | Facility: CLINIC | Age: 52
End: 2022-12-01

## 2022-12-01 DIAGNOSIS — G89.18 POST-MASTECTOMY PAIN: Primary | ICD-10-CM

## 2022-12-01 NOTE — PROGRESS NOTES
Daily Note     Today's date: 2022  Patient name: Kaylee Linares  : 1970  MRN: [id number]  Referring provider: AGUSTINA Blood  Dx:   Encounter Diagnosis     ICD-10-CM    1  Post-mastectomy pain  G89 18                      Subjective: Mild soreness following last treatment session       Objective: See treatment diary below      Assessment: Tolerated treatment well  Patient able to progress with strength abc exercises without complaints  Plan: Continue per plan of care        Precautions: R BCA      Manuals           MFR R Axillary cords  10  10                                                  Neuro Re-Ed                                                                                                        Ther Ex                          [place]  5 min  5 min                        Wall climbs flex/abd 5 x :10 10" x 5  10" x 10           [address]             Supine cane flex  10" x 10            Supine haircut  NV                        Ther Activity             Strength abcs             Theraband rows  RTB 2 x 10            Therabadn   RTB 2 x 10            Theraband postural ER  YTB 2 x 10           Bicep curls  4# 2 x 10           Modalities

## 2022-12-05 ENCOUNTER — ANNUAL EXAM (OUTPATIENT)
Dept: OBGYN CLINIC | Facility: CLINIC | Age: 52
End: 2022-12-05

## 2022-12-05 VITALS
SYSTOLIC BLOOD PRESSURE: 126 MMHG | DIASTOLIC BLOOD PRESSURE: 78 MMHG | HEIGHT: 68 IN | BODY MASS INDEX: 28.82 KG/M2 | WEIGHT: 190.2 LBS

## 2022-12-05 DIAGNOSIS — Z01.419 WELL WOMAN EXAM WITH ROUTINE GYNECOLOGICAL EXAM: Primary | ICD-10-CM

## 2022-12-05 DIAGNOSIS — Z11.51 SCREENING FOR HPV (HUMAN PAPILLOMAVIRUS): ICD-10-CM

## 2022-12-05 DIAGNOSIS — Z12.31 ENCOUNTER FOR SCREENING MAMMOGRAM FOR MALIGNANT NEOPLASM OF BREAST: ICD-10-CM

## 2022-12-05 DIAGNOSIS — Z12.4 ENCOUNTER FOR SCREENING FOR MALIGNANT NEOPLASM OF CERVIX: ICD-10-CM

## 2022-12-05 NOTE — PROGRESS NOTES
ASSESSMENT & PLAN:   Diagnoses and all orders for this visit:    Well woman exam with routine gynecological exam  - continues to follow with Cancer survivorship program, on Tamoxifen for a total of 10 years    The following were reviewed in today's visit: ASCCP guidelines, STD testing breast self exam, menopause, exercise and healthy diet  Patient to return to office in yearly for annual exam      All questions have been answered to her satisfaction  CC:  Annual Gynecologic Examination  Chief Complaint   Patient presents with   • Gynecologic Exam     PAP EVERY YEAR--CG  Last pap 2021 neg/hpv neg   mammo 2021   DXA not indicated   colonoscopy 10/19/22 repeat 5 years        HPI: Lyudmila Blank is a 47 y o  Stephanie Do who presents for annual gynecologic examination    She has the following concerns:  None, last period in       Health Maintenance:    Exercise: intermittently  Breast exams/breast awareness: yes  Diet: well balanced diet  Last mammogram: N/A  Colorectal cancer screenin      Past Medical History:   Diagnosis Date   • BRCA negative    • Colon polyp    • Endometriosis    • IBS (irritable bowel syndrome)    • Insomnia    • Night sweats    • Psoriasis    • Wears glasses        Past Surgical History:   Procedure Laterality Date   • BREAST BIOPSY Right     IDC   •  SECTION     • LAPAROSCOPY      exploratory, endometriosis   • LYMPHADENECTOMY Right     x2   • MASTECTOMY      bilateral mastectomy   • [place] NIPPLE/AREOLA RECONSTRUCTION Bilateral 10/13/2016    Procedure: NIPPLE RECONSTRUCTION ;  Surgeon: Viktoriya Mcdonald MD;  [facility];  Service: Plastics   • [place] REVISION OF RECONSTRUCTED BREAST Bilateral 3/9/2016    Procedure: RECONSTRUCTION REVISION  BREAST MOUND ,FAT GRAFTS ;  Surgeon: Viktoriya Mcdonald MD;  Location: [facility];  Service: Plastics   • SENTINEL LYMPH NODE BIOPSY Right        Past OB/Gyn History:  Period Pattern: (!) IrregularPatient's last menstrual period was 06/15/2022  Menopausal status: perimenopausal  Menopausal symptoms: None    Last Pap: 2021 : no abnormalities  History of abnormal Pap smear: no    Patient is currently sexually active  STD testing: no  Current contraception: none      Family History  Family History   Problem Relation Age of Onset   • Lung cancer Paternal Grandfather 48   • Breast cancer Other    • Thyroid cancer Mother 36   • Breast cancer Mother 76   • Psoriasis Father    • Basal cell carcinoma Father    • Heart disease Other    • Prostate cancer Other        Family history of uterine or ovarian cancer: yes  Family history of breast cancer: no  Family history of colon cancer: no    Social History:  Social History     Socioeconomic History   • Marital status: /Civil Union     Spouse name: Not on file   • Number of children: 1   • Years of education: Not on file   • Highest education level: Not on file   Occupational History   • Occupation: employed   Tobacco Use   • Smoking status: Never   • Smokeless tobacco: Never   Vaping Use   • Vaping Use: Never used   Substance and Sexual Activity   • Alcohol use: Yes     Comment: social   • Drug use: Never   • Sexual activity: Not Currently     Partners: Male     Birth control/protection: None   Other Topics Concern   • Not on file   Social History Narrative    Caffeine use- coffee     Social Determinants of Health     Financial Resource Strain: Not on file   Food Insecurity: Not on file   Transportation Needs: Not on file   Physical Activity: Not on file   Stress: Not on file   Social Connections: Not on file   Intimate Partner Violence: Not on file   Housing Stability: Not on file     Domestic violence screen: negative    Allergies:   Allergies   Allergen Reactions   • Other Hives     Kait selzer   • Kait-[place] Plus Cold & Cough  [[id number]-Apap] Hives       Medications:    Current Outpatient Medications:   •  Ashwagandha 125 MG CAPS, Take by mouth, Disp: , Rfl:   •  Calcium 500 MG tablet, Take by mouth, Disp: , Rfl:   •  Cholecalciferol (VITAMIN D PO), Take 5,000 Units by mouth daily , Disp: , Rfl:   •  Magnesium 400 MG CAPS, Take 1 tablet by mouth daily  , Disp: , Rfl:   •  MILK THISTLE PO, Take 600 mg by mouth daily  , Disp: , Rfl:   •  Multiple Vitamins-Minerals (ONE DAILY MULTIVITAMIN ADULT) TABS, Take by mouth, Disp: , Rfl:   •  NON FORMULARY, , Disp: , Rfl:   •  Probiotic Product (PROBIOTIC DAILY PO), Take 2 tablets by mouth daily  , Disp: , Rfl:   •  secukinumab (Cosentyx Sensoready Pen) 150 mg/mL SOAJ injection, Inject 2 mL (300 mg total) under the skin every 30 (thirty) days, Disp: 2 mL, Rfl: 11  •  secukinumab (Cosentyx Sensoready Pen) 150 mg/mL SOAJ injection, Inject 300 mg (2 pens) subcutaneously every 4 weeks  , Disp: 2 mL, Rfl: 11  •  tamoxifen (NOLVADEX) 20 mg tablet, TAKE 1 TABLET BY MOUTH EVERY DAY, Disp: 30 tablet, Rfl: 7    Review of Systems:  Review of Systems   Constitutional: Negative  HENT: Negative  Respiratory: Negative  Cardiovascular: Negative  Gastrointestinal: Negative  Genitourinary: Negative  Neurological: Negative  Psychiatric/Behavioral: Negative  Physical Exam:  /78 (BP Location: Right arm, Patient Position: Standing, Cuff Size: Standard)   Ht 5' 8" (1 727 m)   Wt 86 3 kg (190 lb 3 2 oz)   LMP 06/15/2022   BMI 28 92 kg/m²    Physical Exam  Constitutional:       Appearance: Normal appearance  Genitourinary:      Bladder and urethral meatus normal       No lesions in the vagina  Genitourinary Comments: Breast exam deferred - 3 times per year with onc/breast surgeons      Right Labia: No rash, tenderness, lesions, skin changes or Bartholin's cyst      Left Labia: No tenderness, lesions, skin changes, Bartholin's cyst or rash  No vaginal erythema, tenderness or bleeding  Right Adnexa: not tender, not full and no mass present  Left Adnexa: not tender, not full and no mass present       Cervix is nulliparous  No cervical motion tenderness, discharge, lesion or polyp  Uterus is not enlarged, fixed or tender  No uterine mass detected  Urethral meatus caruncle not present  No urethral tenderness or mass present  HENT:      Head: Normocephalic and atraumatic  Eyes:      Extraocular Movements: Extraocular movements intact  Conjunctiva/sclera: Conjunctivae normal       Pupils: Pupils are equal, round, and reactive to light  Cardiovascular:      Rate and Rhythm: Normal rate and regular rhythm  Heart sounds: Normal heart sounds  No murmur heard  Pulmonary:      Effort: Pulmonary effort is normal  No respiratory distress  Breath sounds: Normal breath sounds  No wheezing or rales  Abdominal:      General: There is no distension  Palpations: Abdomen is soft  Tenderness: There is no abdominal tenderness  There is no guarding  Neurological:      General: No focal deficit present  Mental Status: She is alert and oriented to person, place, and time  Skin:     General: Skin is warm and dry  Psychiatric:         Mood and Affect: Mood normal          Behavior: Behavior normal    Vitals and nursing note reviewed

## 2022-12-06 ENCOUNTER — APPOINTMENT (OUTPATIENT)
Dept: PHYSICAL THERAPY | Facility: CLINIC | Age: 52
End: 2022-12-06

## 2022-12-07 ENCOUNTER — APPOINTMENT (OUTPATIENT)
Dept: PHYSICAL THERAPY | Facility: CLINIC | Age: 52
End: 2022-12-07

## 2022-12-08 ENCOUNTER — OFFICE VISIT (OUTPATIENT)
Dept: PHYSICAL THERAPY | Facility: CLINIC | Age: 52
End: 2022-12-08

## 2022-12-08 DIAGNOSIS — G89.18 POST-MASTECTOMY PAIN: Primary | ICD-10-CM

## 2022-12-08 NOTE — PROGRESS NOTES
Daily Note     Today's date: 2022  Patient name: Magalys Chu  : 1970  MRN: [id number]  Referring provider: AGUSTINA Reilly  Dx:   Encounter Diagnosis     ICD-10-CM    1  Post-mastectomy pain  G89 18                      Subjective: Patient reports less pain and increased ROM with stretching  Objective: See treatment diary below      Assessment: Tolerated treatment well  Patient would benefit from continued PT      Plan: Continue per plan of care        Precautions: R BCA      Manuals          MFR R Axillary cords/PROM  10  10  10                                                Neuro Re-Ed                                                                                                        Ther Ex                          Pulley  5 min  5 min  5'                      Wall climbs flex/abd 5 x :10 10" x 5  10" x 10  10 x :10         [address]             Supine cane flex  10" x 10  10 x :10 10 x :10         Supine haircut  NV  10 x :10                      Ther Activity             Strength abcs             Theraband rows  RTB 2 x 10  RTB 2 x 10 RTB x 20         Theraband  RTB 2 x 10  RTB 2 x 10 RTB x 20         Theraband postural ER  YTB 2 x 10 RTB x 10 RTB x 20         Bicep curls  4# 2 x 10  5# 2 x 10         Rows    5# 2 x 10         tricep extension    3/4# 2 x 10

## 2022-12-12 ENCOUNTER — OFFICE VISIT (OUTPATIENT)
Dept: PHYSICAL THERAPY | Facility: CLINIC | Age: 52
End: 2022-12-12

## 2022-12-12 DIAGNOSIS — G89.18 POST-MASTECTOMY PAIN: Primary | ICD-10-CM

## 2022-12-12 NOTE — PROGRESS NOTES
Daily Note     Today's date: 2022  Patient name: Jany Beltre  : 1970  MRN: [id number]  Referring provider: Crystal Stanford  Dx: No diagnosis found  Subjective: Patient states that she has noticed an improvement since starting therapy  Objective: See treatment diary below      Assessment: Tolerated treatment well  Patient exhibited good technique with therapeutic exercises      Plan: Continue per plan of care        Precautions: R BCA      Manuals         MFR R Axillary cords/PROM  10  10  10 10                                                Neuro Re-Ed                                                                                                        Ther Ex                          P[place]ley  5 min  5 min  5' 5                     Wall climbs flex/abd 5 x :10 10" x 5  10" x 10  10 x :10 10" x 10         [address]             Supine cane flex  10" x 10  10 x :10 10 x :10 10" x 10         Supine haircut  NV  10 x :10 10" x 10                      Ther Activity             Strength abcs             Theraband rows  RTB 2 x 10  RTB 2 x 10 RTB x 20 GTB 2 x 10         Theraband  RTB 2 x 10  RTB 2 x 10 RTB x 20 GTB 2 x 10         Theraband postural ER  YTB 2 x 10 RTB x 10 RTB x 20 RTB x 20         Bicep curls  4# 2 x 10  5# 2 x 10 5# 2 x 10         Rows    5# 2 x 10 5# 2 x 10         tricep extension    3/4# 2 x 10 4# 2 x 10

## 2022-12-14 ENCOUNTER — OFFICE VISIT (OUTPATIENT)
Dept: PHYSICAL THERAPY | Facility: CLINIC | Age: 52
End: 2022-12-14

## 2022-12-14 DIAGNOSIS — G89.18 POST-MASTECTOMY PAIN: Primary | ICD-10-CM

## 2022-12-14 NOTE — PROGRESS NOTES
Daily Note     Today's date: 2022  Patient name: Leanne Park  : 1970  MRN: [id number]  Referring provider: AGUSTINA Ramsay  Dx:   Encounter Diagnosis     ICD-10-CM    1  Post-mastectomy pain  G89 18                      Subjective: Patient reports that she feels well  Objective: See treatment diary below      Assessment: Tolerated treatment well  Patient would benefit from continued PT      Plan: Continue per plan of care        Precautions: R BCA      Manuals 11/21 11/29 12/1 12/8 12/12 12/15       MFR R Axillary cords/PROM  10  10  10 10  10                                              Neuro Re-Ed                                                                                                        Ther Ex                          P[place]ley  5 min  5 min  5' 5 5'                    Wall climbs flex/abd 5 x :10 10" x 5  10" x 10  10 x :10 10" x 10  10 x :10       [address]             Supine cane flex  10" x 10  10 x :10 10 x :10 10" x 10  dc       Supine haircut  NV  10 x :10 10" x 10  10 x :10       Chest Press      3# 2 x 10       Ther Activity             Strength abcs             Theraband rows  RTB 2 x 10  RTB 2 x 10 RTB x 20 GTB 2 x 10  GTB x 20       Theraband  RTB 2 x 10  RTB 2 x 10 RTB x 20 GTB 2 x 10  GTB x 20       Theraband postural ER  YTB 2 x 10 RTB x 10 RTB x 20 RTB x 20  RTB x 20       Bicep curls  4# 2 x 10  5# 2 x 10 5# 2 x 10  5# 2 x 10       Rows    5# 2 x 10 5# 2 x 10  5# 2 x 10       tricep extension    3/4# 2 x 10 4# 2 x 10  4# 2 x 10

## 2022-12-20 ENCOUNTER — OFFICE VISIT (OUTPATIENT)
Dept: PHYSICAL THERAPY | Facility: CLINIC | Age: 52
End: 2022-12-20

## 2022-12-20 DIAGNOSIS — G89.18 POST-MASTECTOMY PAIN: Primary | ICD-10-CM

## 2022-12-20 NOTE — PROGRESS NOTES
Daily Note     Today's date: 2022  Patient name: Gene Kayser  : 1970  MRN: [id number]  Referring provider: AGUSTINA Douglas  Dx:   Encounter Diagnosis     ICD-10-CM    1  Post-mastectomy pain  G89 18                      Subjective: Patient reports that she is doing well  She was able to lift heavy bins recently  Objective: See treatment diary below      Assessment: Tolerated treatment well  Patient would benefit from continued PT      Plan: Continue per plan of care        Precautions: R BCA      Manuals 11/21 11/29 12/1 12/8 12/12 12/15 12/20      MFR R Axillary cords/PROM  10  10  10 10  10 10                                             Neuro Re-Ed                                                                                                        Ther Ex                          [place]  5 min  5 min  5' 5 5' 5'                   Wall climbs flex/abd 5 x :10 10" x 5  10" x 10  10 x :10 10" x 10  10 x :10 10 x :10      [address]             Supine cane flex  10" x 10  10 x :10 10 x :10 10" x 10  dc       Supine haircut  NV  10 x :10 10" x 10  10 x :10 10 x :10      Chest Press      3# 2 x 10 3# 2 x 10      Ther Activity             Strength abcs             Theraband rows  RTB 2 x 10  RTB 2 x 10 RTB x 20 GTB 2 x 10  GTB x 20 GTB x 20       Theraband  RTB 2 x 10  RTB 2 x 10 RTB x 20 GTB 2 x 10  GTB x 20 GTB x 20      Theraband postural ER  YTB 2 x 10 RTB x 10 RTB x 20 RTB x 20  RTB x 20 RTB x 20      Bicep curls  4# 2 x 10  5# 2 x 10 5# 2 x 10  5# 2 x 10 5# 2 x 10      Rows    5# 2 x 10 5# 2 x 10  5# 2 x 10 5# 2 x 10      tricep extension    3/4# 2 x 10 4# 2 x 10  4# 2 x 10 4# 2 x 10

## 2023-01-17 ENCOUNTER — OFFICE VISIT (OUTPATIENT)
Dept: HEMATOLOGY ONCOLOGY | Facility: CLINIC | Age: 53
End: 2023-01-17

## 2023-01-17 VITALS
DIASTOLIC BLOOD PRESSURE: 80 MMHG | HEIGHT: 68 IN | BODY MASS INDEX: 29.1 KG/M2 | OXYGEN SATURATION: 99 % | SYSTOLIC BLOOD PRESSURE: 130 MMHG | WEIGHT: 192 LBS | RESPIRATION RATE: 18 BRPM | HEART RATE: 91 BPM

## 2023-01-17 DIAGNOSIS — Z17.0 MALIGNANT NEOPLASM OF UPPER-INNER QUADRANT OF RIGHT BREAST IN FEMALE, ESTROGEN RECEPTOR POSITIVE (HCC): Primary | ICD-10-CM

## 2023-01-17 DIAGNOSIS — C50.211 MALIGNANT NEOPLASM OF UPPER-INNER QUADRANT OF RIGHT BREAST IN FEMALE, ESTROGEN RECEPTOR POSITIVE (HCC): Primary | ICD-10-CM

## 2023-01-17 NOTE — PROGRESS NOTES
Hematology / Oncology Outpatient Follow Up Note    Julia Ann 47 y o  female :1970 MNO:[id number]         Date:  2023    Assessment / Plan:  A 54 year old premenopausal woman with stage IIA right breast cancer, grade 1, ER/NM positive, HER-2 negative disease  She has no evidence of BRCA gene mutation  She underwent right mastectomy with sentinel lymph node biopsy as well as left prophylactic mastectomy, resulting in GINI  Her tumor has Oncotype DX recurrence score of 14  Since 2015, she has been on adjuvant hormonal therapy with tamoxifen with minimal side effects  She is still premenopausal   She is going to stay on tamoxifen for 2 more years to complete 10 years of adjuvant hormonal therapy  She is in agreement with my recommendations  I will see her again in a year for routine follow-up  She is in agreement with my recommendations       Subjective:      HPI:  A 41year old premenopausal woman, who is found to have abnormality, based on the screening mammography in her right breast  Subsequently, she underwent biopsy from the right breast lesion, which showed invasive ductal carcinoma  She was seen and evaluated by Dr Julio C Dover  She was found to have no evidence of BRCA gene mutation  Despite this, she elected to have mastectomy as well as prophylactic left mastectomy, which was done in 2015  She has no evidence of carcinoma in the left mastectomy specimen  Right mastectomy specimen showed 2 1x1 8x1 0 cm of invasive ductal carcinoma, grade 1  There was no evidence of lymphovascular invasion  2 sentinel lymph node were negative for metastatic disease  This was % positive, NM 95% positive, HER-2 negative disease  Dr Julio C Dover  That her tumor tissue for Oncotype DX testing which came back 14 as of recurrence score  She had immediate reconstruction with tissue expander  She presents today to discuss adjuvant treatment options  She has some chest tightness after the expansion  Otherwise, she feels well  She has no complaint of bone pain  She denies respiratory symptoms  Her weight has been stable  She has regular menstrual cycle  Her performance status is normal             Interval History:  A 52-year-old premenopausal woman with stage IIA right breast cancer, grade 1, ER/CT positive, HER-2 negative disease  She underwent mastectomy as well as prophylactic left mastectomy, resulting in GINI  Her tumor had Oncotype DX recurrence score of 14  Therefore, adjuvant chemotherapy was not indicated  Since August 2015, she has been on adjuvant hormonal therapy with tamoxifen  She presented today for follow-up  In the last 12 months, she had 2 menstrual bleedings  She is tolerating tamoxifen well  She has no complaint of pain  She denied any respiratory symptoms  Her weight is stable  Her performance status is normal           Objective:      Primary Diagnosis:     1  Right breast cancer, stage IIA (pT2, pN0,M0)  Grade 1, ER/CT positive, HER-2 negative disease  Oncotype DX recurrence score 14  Diagnosed in June 2015   2  BRCA gene mutation negative       Cancer Staging:  Cancer Staging  No matching staging information was found for the patient         Previous Hematologic/ Oncologic Treatment:            Current Hematologic/ Oncologic Treatment:       Adjuvant hormonal therapy with tamoxifen 20 mg once a day since August 2015       Disease Status:      GINI status post mastectomy and contralateral prophylactic mastectomy       Test Results:     Pathology:     2 1, x1 8x1 0 cm of invasive ductal carcinoma, grade 1  No evidence of lymphovascular invasion  2 sentinel lymph nodes were negative for metastatic disease  % positive, CT 95% positive, HER-2 negative disease  Stage IIA (pT2, pN0,M0)  Oncotype DX recurrence score 14        Radiology:      MRI of the breast in August 2021 was benign    BI-RADS 2       Laboratory:           Physical Exam:        General Appearance:    Alert, oriented       Eyes:    PERRL   Ears:    Normal external ear canals, both ears   Nose:   Nares normal, septum midline   Throat:   Mucosa moist  Pharynx without injection  Neck:   Supple         Lungs:     Clear to auscultation bilaterally   Chest Wall:    No tenderness or deformity    Heart:    Regular rate and rhythm         Abdomen:     Soft, non-tender, bowel sounds +, no organomegaly               Extremities:   Extremities no cyanosis or edema         Skin:   no rash or icterus  Lymph nodes:   Cervical, supraclavicular, and axillary nodes normal   Neurologic:   CNII-XII intact, normal strength, sensation and reflexes     Throughout             Breast exam:   status post bilateral mastectomy with reconstruction  No palpable abnormality in either reconstructed breast              ROS: Review of Systems   All other systems reviewed and are negative  Imaging: No results found  Labs:   Lab Results   Component Value Date    WBC 5 87 08/17/2020    HGB 12 6 08/17/2020    HCT 38 4 08/17/2020    MCV 94 08/17/2020     08/17/2020     Lab Results   Component Value Date     10/06/2015    K 3 9 07/07/2022     07/07/2022    CO2 28 07/07/2022    ANIONGAP 9 10/06/2015    BUN 11 07/07/2022    CREATININE 0 95 07/07/2022    GLUCOSE 81 10/06/2015    GLUF 91 07/07/2022    CALCIUM 8 9 07/07/2022    AST 24 07/07/2022    ALT 34 07/07/2022    ALKPHOS 70 07/07/2022    PROT 7 4 10/06/2015    BILITOT 0 51 10/06/2015    EGFR 69 07/07/2022     Current Medications: Reviewed  Allergies: Reviewed  PMH/FH/SH:  Reviewed      Vital Sign:    Body surface area is 2 01 meters squared      Wt Readings from Last 3 Encounters:   01/17/23 87 1 kg (192 lb)   12/05/22 86 3 kg (190 lb 3 2 oz)   10/19/22 83 9 kg (185 lb)        Temp Readings from Last 3 Encounters:   10/19/22 97 9 °F (36 6 °C) (Temporal)   10/18/22 98 7 °F (37 1 °C) (Temporal)   03/28/22 (!) 96 9 °F (36 1 °C) (Temporal)        BP Readings from Last 3 Encounters: 01/17/23 130/80   12/05/22 126/78   10/19/22 138/79         Pulse Readings from Last 3 Encounters:   01/17/23 91   10/19/22 79   10/18/22 85     @LASTSAO2(3)@

## 2023-03-25 ENCOUNTER — OFFICE VISIT (OUTPATIENT)
Dept: URGENT CARE | Age: 53
End: 2023-03-25

## 2023-03-25 VITALS
HEART RATE: 92 BPM | OXYGEN SATURATION: 98 % | RESPIRATION RATE: 18 BRPM | TEMPERATURE: 97.8 F | DIASTOLIC BLOOD PRESSURE: 98 MMHG | SYSTOLIC BLOOD PRESSURE: 156 MMHG

## 2023-03-25 DIAGNOSIS — J06.9 UPPER RESPIRATORY TRACT INFECTION, UNSPECIFIED TYPE: ICD-10-CM

## 2023-03-25 DIAGNOSIS — J02.9 SORE THROAT: Primary | ICD-10-CM

## 2023-03-25 LAB — S PYO AG THROAT QL: NEGATIVE

## 2023-03-25 RX ORDER — AZITHROMYCIN 250 MG/1
TABLET, FILM COATED ORAL
Qty: 6 TABLET | Refills: 0 | Status: SHIPPED | OUTPATIENT
Start: 2023-03-25 | End: 2023-03-29

## 2023-03-25 RX ORDER — SECUKINUMAB 150 MG/ML
INJECTION SUBCUTANEOUS
COMMUNITY
Start: 2023-03-08

## 2023-03-25 NOTE — PROGRESS NOTES
[facility]        NAME: Avtar Soler is a 47 y o  female  : 1970    MRN: [id number]  DATE: 2023  TIME: 11:22 AM    Assessment and Plan   Sore throat [J02 9]  1  Sore throat  POCT rapid strepA    Throat culture      2  Upper respiratory tract infection, unspecified type  azithromycin (ZITHROMAX) 250 mg tablet            Patient Instructions     Rapid strep is negative; will send for culture  In meantime, start zpak  Follow up with PCP in 3-5 days  Proceed to  ER if symptoms worsen  Chief Complaint     Chief Complaint   Patient presents with   • Sore Throat   • Fever   • Swollen Glands   • Nausea     Patient was exposed to a student who was diagnosis with strep on Monday and Wednesday she started with her symptoms         History of Present Illness       HPI     Patient reports working as a  on Monday and was exposed to students with illnesses  Wednesday afternoon, she felt fatigued and had a scratchy throat  She has chills but denies fevers  She has tried OTC advil and nyquil at nightime to help her sleep  Review of Systems   Review of Systems   Constitutional: Positive for appetite change, chills and fatigue  Negative for fever  HENT: Positive for congestion and sore throat  Respiratory: Positive for cough  Negative for shortness of breath  Cardiovascular: Negative for chest pain  Gastrointestinal: Positive for nausea  Negative for abdominal pain, diarrhea and vomiting  Neurological: Positive for light-headedness and headaches           Current Medications       Current Outpatient Medications:   •  azithromycin (ZITHROMAX) 250 mg tablet, Take 2 tablets today then 1 tablet daily x 4 days, Disp: 6 tablet, Rfl: 0  •  Ashwagandha 125 MG CAPS, Take by mouth, Disp: , Rfl:   •  Calcium 500 MG tablet, Take by mouth, Disp: , Rfl:   •  Cholecalciferol (VITAMIN D PO), Take 5,000 Units by mouth daily , Disp: , Rfl:   •  Cosentyx Sensoready, 300 MG, 150 MG/ML SOAJ injection, , Disp: , Rfl:   •  Magnesium 400 MG CAPS, Take 1 tablet by mouth daily  , Disp: , Rfl:   •  MILK THISTLE PO, Take 600 mg by mouth daily  , Disp: , Rfl:   •  Multiple Vitamins-Minerals (ONE DAILY MULTIVITAMIN ADULT) TABS, Take by mouth, Disp: , Rfl:   •  NON FORMULARY, , Disp: , Rfl:   •  Probiotic Product (PROBIOTIC DAILY PO), Take 2 tablets by mouth daily  , Disp: , Rfl:   •  secukinumab (Cosentyx Sensoready Pen) 150 mg/mL SOAJ injection, Inject 2 mL (300 mg total) under the skin every 30 (thirty) days, Disp: 2 mL, Rfl: 11  •  secukinumab (Cosentyx Sensoready Pen) 150 mg/mL SOAJ injection, Inject 300 mg (2 pens) subcutaneously every 4 weeks  , Disp: 2 mL, Rfl: 11  •  tamoxifen (NOLVADEX) 20 mg tablet, TAKE 1 TABLET BY MOUTH EVERY DAY, Disp: 30 tablet, Rfl: 7    Current Allergies     Allergies as of 2023 - Reviewed 2023   Allergen Reaction Noted   • Other Hives 2016   • Kait-seltzer plus cold & cough  [[id number]] Hives 12/15/2015            The following portions of the patient's history were reviewed and updated as appropriate: allergies, current medications, past family history, past medical history, past social history, past surgical history and problem list      Past Medical History:   Diagnosis Date   • BRCA negative    • Colon polyp    • Endometriosis    • IBS (irritable bowel syndrome)    • Insomnia    • Night sweats    • Psoriasis    • Wears glasses        Past Surgical History:   Procedure Laterality Date   • BREAST BIOPSY Right     IDC   •  SECTION     • LAPAROSCOPY      exploratory, endometriosis   • LYMPHADENECTOMY Right     x2   • MASTECTOMY      bilateral mastectomy   • [place] NIPPLE/AREOLA RECONSTRUCTION Bilateral 10/13/2016    Procedure: NIPPLE RECONSTRUCTION ;  Surgeon: Dalila Escamilla MD;  [facility];  Service: Plastics   • [place] REVISION OF RECONSTRUCTED BREAST Bilateral 3/9/2016    Procedure: RECONSTRUCTION REVISION  BREAST MOUND ,FAT GRAFTS ;  Surgeon: Néstor Daniel MD;  [facility];  Service: Plastics   • SENTINEL LYMPH NODE BIOPSY Right        Family History   Problem Relation Age of Onset   • Lung cancer Paternal Grandfather 48   • Breast cancer Other    • Thyroid cancer Mother 36   • Breast cancer Mother 76   • Psoriasis Father    • Basal cell carcinoma Father    • Heart disease Other    • Prostate cancer Other          Medications have been verified  Objective   /98 (BP Location: Right arm, Patient Position: Sitting, Cuff Size: Standard)   Pulse 92   Temp 97 8 °F (36 6 °C)   Resp 18   SpO2 98%   No LMP recorded  Physical Exam     Physical Exam  Constitutional:       Appearance: She is ill-appearing  HENT:      Right Ear: Tympanic membrane normal       Left Ear: Tympanic membrane normal       Nose: Congestion present  Mouth/Throat:      Mouth: Mucous membranes are moist       Pharynx: Posterior oropharyngeal erythema present  Tonsils: 2+ on the right  2+ on the left  Cardiovascular:      Rate and Rhythm: Normal rate and regular rhythm  Pulmonary:      Effort: Pulmonary effort is normal       Breath sounds: Normal breath sounds  Abdominal:      General: Bowel sounds are normal       Tenderness: There is no abdominal tenderness  Neurological:      Mental Status: She is alert

## 2023-03-26 LAB — BACTERIA THROAT CULT: NORMAL

## 2023-03-27 LAB — BACTERIA THROAT CULT: NORMAL

## 2023-04-28 ENCOUNTER — VBI (OUTPATIENT)
Dept: ADMINISTRATIVE | Facility: OTHER | Age: 53
End: 2023-04-28

## 2023-05-02 ENCOUNTER — OFFICE VISIT (OUTPATIENT)
Dept: SURGICAL ONCOLOGY | Facility: CLINIC | Age: 53
End: 2023-05-02

## 2023-05-02 VITALS
SYSTOLIC BLOOD PRESSURE: 124 MMHG | HEART RATE: 78 BPM | TEMPERATURE: 98.4 F | WEIGHT: 193 LBS | HEIGHT: 68 IN | DIASTOLIC BLOOD PRESSURE: 78 MMHG | RESPIRATION RATE: 16 BRPM | OXYGEN SATURATION: 99 % | BODY MASS INDEX: 29.25 KG/M2

## 2023-05-02 DIAGNOSIS — Z13.71 BRCA NEGATIVE: ICD-10-CM

## 2023-05-02 DIAGNOSIS — Z79.810 USE OF TAMOXIFEN (NOLVADEX): ICD-10-CM

## 2023-05-02 DIAGNOSIS — C50.211 MALIGNANT NEOPLASM OF UPPER-INNER QUADRANT OF RIGHT BREAST IN FEMALE, ESTROGEN RECEPTOR POSITIVE (HCC): Primary | ICD-10-CM

## 2023-05-02 DIAGNOSIS — Z17.0 MALIGNANT NEOPLASM OF UPPER-INNER QUADRANT OF RIGHT BREAST IN FEMALE, ESTROGEN RECEPTOR POSITIVE (HCC): Primary | ICD-10-CM

## 2023-05-02 PROBLEM — Z28.9 COVID-19 VACCINATION NOT DONE: Status: ACTIVE | Noted: 2023-05-02

## 2023-05-02 NOTE — PROGRESS NOTES
Surgical Oncology Follow Up       [address] [facility] [place]  [address] [address]    Jeanmarie Bhandari  1970  [id number]  [address]  [facility] [place]  [place] [address]    Chief Complaint   Patient presents with    Follow-up       Assessment/Plan   Diagnoses and all orders for this visit:    Malignant neoplasm of upper-inner quadrant of right breast in female, estrogen receptor positive ([address] )  -     MRI breast bilateral w and wo contrast w cad; Future    Use of tamoxifen (Nolvadex)    BRCA negative        Advance Care Planning/Advance Directives:  Discussed disease status, cancer treatment plans and/or cancer treatment goals with the patient  Oncology History:    Oncology History   Malignant neoplasm of upper-inner quadrant of right breast in female, estrogen receptor positive ([address] )   5/14/2015 Biopsy    Right US guided breast biopsy     Invasive ductal carcinoma  %  NV 90-95%  HER-2 negative     6/2015 Genetic Testing    6/3/15: Gio Ogden (5 genes): BRCA1, BRCA2, CDH1, PTEN, TP53      6/30/2015 Surgery    Bilateral mastectomies, Right sentinel lymph node biopsy    Bilateral reconstruction with expanders  Dr Isabel Wetzel DX  14     7/8/2015 -  Cancer Staged    Staging form: Breast, AJCC 7th Edition  - Pathologic stage from 7/8/2015: Stage IIA (T2, N0, cM0) - Signed by Jennifer Cannon on 10/17/2022  Stage prefix: Initial diagnosis  Method of lymph node assessment: [place] lymph node biopsy       8/12/2015 -  Hormone Therapy    Tamoxifen     10/23/2015 Surgery    Expanders removed, implants placed  3/9/2016 Surgery    Bilateral breast mound revisions, fat grafting     10/13/2016 Surgery    Nipple reconstruction     6/2021 Genetic Testing    Test(s):  [employer] Core Cancer Panel (36 genes): APC, DADA, AXIN2 BARD1, BRCA1, BRCA2, BRIP1, BMPR1A, CDH1, CDK4, CDKN2A, CHEK2, DICER1, EPCAM, GREM1, HOXB13, MLH1, MSH2, MSH3, MSH6, MUTYH, NBN, NF1, NTHL1, PALB2, PMS2, POLD1, POLE, PTEN, RAD51C, RAD51D, RECQL SMAD4, SMARCA4, STK11, TP53     Result: Positive - MUTYH c 1187G>A (p [id number]), Heterozygous, Pathogenic             History of Present Illness: Breast cancer follow-up, reports occasional pulling in the left reconstructed breast, continues on tamoxifen  -Interval History: As noted    Review of Systems:  Review of Systems   Constitutional: Negative  Negative for appetite change and fever  Eyes: Negative  Respiratory: Negative for shortness of breath  Cardiovascular: Negative  Gastrointestinal: Negative  Endocrine: Negative  Genitourinary: Negative  Musculoskeletal: Negative  Negative for arthralgias and myalgias  Skin: Negative  Allergic/Immunologic: Negative  Neurological: Negative  Hematological: Negative  Negative for adenopathy  Does not bruise/bleed easily  Psychiatric/Behavioral: Negative          Patient Active Problem List   Diagnosis    Irritable bowel syndrome (IBS)    Malignant neoplasm of upper-inner quadrant of right breast in female, estrogen receptor positive (HCC)    Psoriasis    Seasonal allergies    BRCA negative    Use of tamoxifen (Nolvadex)    Seborrheic keratosis    Bilateral plantar fasciitis    Dyspepsia    Monoallelic mutation of MUTYH gene     Past Medical History:   Diagnosis Date    Allergic     Arthritis     BRCA negative     Colon polyp     Endometriosis     IBS (irritable bowel syndrome)     Insomnia     Night sweats     Psoriasis     Skin tag     Wears glasses      Past Surgical History:   Procedure Laterality Date    BREAST BIOPSY Right [id number]    IDC     SECTION      LAPAROSCOPY      exploratory, endometriosis    LYMPHADENECTOMY Right     x2    MASTECTOMY      bilateral mastectomy    [place] NIPPLE/AREOLA RECONSTRUCTION Bilateral 10/13/2016 Procedure: NIPPLE RECONSTRUCTION ;  Surgeon: Elli Nunn MD;  [facility];  Service: Plastics    ID REVISION OF RECONSTRUCTED BREAST Bilateral 3/9/2016    Procedure: RECONSTRUCTION REVISION  BREAST MOUND ,FAT GRAFTS ;  Surgeon: Elli Nunn MD;  [facility];  Service: Plastics    SENTINEL LYMPH NODE BIOPSY Right      Family History   Problem Relation Age of Onset    Lung cancer Paternal Grandfather 48    Breast cancer Other     Thyroid cancer Mother 36    Breast cancer Mother 76    Psoriasis Father     Basal cell carcinoma Father     Heart disease Other     Prostate cancer Other      Social History     Socioeconomic History    Marital status: /Civil Union     Spouse name: Not on file    Number of children: 1    Years of education: Not on file    Highest education level: Not on file   Occupational History    Occupation: employed   Tobacco Use    Smoking status: Never    Smokeless tobacco: Never   Vaping Use    Vaping Use: Never used   Substance and Sexual Activity    Alcohol use: Yes     Comment: social    Drug use: Never    Sexual activity: Not Currently     Partners: Male     Birth control/protection: None   Other Topics Concern    Not on file   Social History Narrative    Caffeine use- coffee     Social Determinants of Health     Financial Resource Strain: Not on file   Food Insecurity: Not on file   Transportation Needs: Not on file   Physical Activity: Not on file   Stress: Not on file   Social Connections: Not on file   Intimate Partner Violence: Not on file   Housing Stability: Not on file       Current Outpatient Medications:     Ashwagandha 125 MG CAPS, Take by mouth, Disp: , Rfl:     Calcium 500 MG tablet, Take by mouth, Disp: , Rfl:     Cholecalciferol (VITAMIN D PO), Take 5,000 Units by mouth daily , Disp: , Rfl:     Cosentyx Sensoready, 300 MG, 150 MG/ML SOAJ injection, , Disp: , Rfl:     Magnesium 400 MG CAPS, Take 1 tablet by mouth daily  , Disp: , Rfl:   MILK THISTLE PO, Take 600 mg by mouth daily  , Disp: , Rfl:     Multiple Vitamins-Minerals (ONE DAILY MULTIVITAMIN ADULT) TABS, Take by mouth, Disp: , Rfl:     NON FORMULARY, , Disp: , Rfl:     Probiotic Product (PROBIOTIC DAILY PO), Take 2 tablets by mouth daily  , Disp: , Rfl:     secukinumab (Cosentyx Sensoready Pen) 150 mg/mL SOAJ injection, Inject 2 mL (300 mg total) under the skin every 30 (thirty) days, Disp: 2 mL, Rfl: 11    secukinumab (Cosentyx Sensoready Pen) 150 mg/mL SOAJ injection, Inject 300 mg (2 pens) subcutaneously every 4 weeks  , Disp: 2 mL, Rfl: 11    tamoxifen (NOLVADEX) 20 mg tablet, TAKE 1 TABLET BY MOUTH EVERY DAY, Disp: 30 tablet, Rfl: 7  Allergies   Allergen Reactions    Other Hives     Kait selzer    Kait-[place] Plus Cold & Cough  [[id number]] Hives       The following portions of the patient's history were reviewed and updated as appropriate: allergies, current medications, past family history, past medical history, past social history, past surgical history and problem list         Vitals:    05/02/23 0903   BP: 124/78   Pulse: 78   Resp: 16   Temp: 98 4 °F (36 9 °C)   SpO2: 99%       Physical Exam  Constitutional:       General: She is not in acute distress  Appearance: Normal appearance  She is well-developed  HENT:      Head: Normocephalic and atraumatic  Cardiovascular:      Heart sounds: Normal heart sounds  Pulmonary:      Breath sounds: Normal breath sounds  Chest:   Breasts:     Right: Skin change ( Mastectomy scar with implant) present  No mass or tenderness  Left: Skin change ( Mastectomy scar with implant) present  No mass or tenderness  Abdominal:      Palpations: Abdomen is soft  Lymphadenopathy:      Upper Body:      Right upper body: No supraclavicular, axillary or pectoral adenopathy  Left upper body: No supraclavicular, axillary or pectoral adenopathy     Neurological:      Mental Status: She is alert and oriented to person, place, and time  Psychiatric:         Mood and Affect: Mood normal            Results:      Imaging  9/8/2021 bilateral breast MRI was benign    I reviewed the above imaging data  Discussion/Summary: 61-year-old female status post bilateral mastectomy and reconstruction secondary to a right-sided invasive duct carcinoma stage IIa  Her genetic testing showed a mutation in MUTYH  She continues on tamoxifen  There is no evidence of disease based on exam today  She has been doing breast MRI every other year  I will make these arrangements for this coming September  She will be seen again in our survivorship clinic in 6 months

## 2023-07-03 ENCOUNTER — OFFICE VISIT (OUTPATIENT)
Dept: OBGYN CLINIC | Facility: CLINIC | Age: 53
End: 2023-07-03
Payer: COMMERCIAL

## 2023-07-03 VITALS
HEIGHT: 68 IN | WEIGHT: 193.8 LBS | DIASTOLIC BLOOD PRESSURE: 104 MMHG | BODY MASS INDEX: 29.37 KG/M2 | HEART RATE: 75 BPM | OXYGEN SATURATION: 97 % | SYSTOLIC BLOOD PRESSURE: 150 MMHG

## 2023-07-03 DIAGNOSIS — G56.21 NEURITIS OF RIGHT ULNAR NERVE: Primary | ICD-10-CM

## 2023-07-03 PROCEDURE — 99203 OFFICE O/P NEW LOW 30 MIN: CPT | Performed by: PHYSICIAN ASSISTANT

## 2023-07-03 RX ORDER — METHYLPREDNISOLONE 4 MG/1
TABLET ORAL
Qty: 1 EACH | Refills: 0 | Status: SHIPPED | OUTPATIENT
Start: 2023-07-03 | End: 2023-07-03 | Stop reason: SINTOL

## 2023-07-03 NOTE — PROGRESS NOTES
ASSESSMENT/PLAN:    Assessment:   Ulnar neuritis vs cervical neuritis       - Suspect there is an element of both contributing     Plan:   US MSK  Medrol Taper    Follow Up:  3  week(s) with Dr. Sheridan Wood or Dr. Car Johnson    To Do Next Visit:       General Discussions:       Addendum: pt called and states she does not want the Medrol due to an adverse reaction (insomnia & reflux) to prednisone. I cancelled the rx and will add this to her allergy list.    _____________________________________________________  CHIEF COMPLAINT:  Chief Complaint   Patient presents with   • Right Hand - Numbness, Pain, Tingling     Symptoms for 1 year  2 weeks-Pain to elbow  RHD         SUBJECTIVE:  rBad Erickson is a 47 y.o. female who presents with painful dysesthesias of the right ring and small finger. This started  1 year(s) ago: Insidious, and was very mild. Then, 2 weeks ago, on a long car trip, her symptoms increased. Now the dysesthesias are more intense and extend from the medial elbow to the ring and small fingers. She occasionally has pain up into the shoulder and neck, but not usually.    Previous Treatments: None   Associated symptoms: Numbness  Handedness: right  Work status:  (by hand)    PAST MEDICAL HISTORY:  Past Medical History:   Diagnosis Date   • Allergic    • Arthritis    • BRCA negative    • Colon polyp    • Endometriosis    • IBS (irritable bowel syndrome)    • Insomnia    • Night sweats    • Psoriasis    • Skin tag    • Wears glasses        PAST SURGICAL HISTORY:  Past Surgical History:   Procedure Laterality Date   • BREAST BIOPSY Right [id number]    IDC   •  SECTION     • LAPAROSCOPY      exploratory, endometriosis   • LYMPHADENECTOMY Right     x2   • MASTECTOMY      bilateral mastectomy   • NM NIPPLE/AREOLA RECONSTRUCTION Bilateral 10/13/2016    Procedure: NIPPLE RECONSTRUCTION ;  Surgeon: Penny Lopez MD;  [facility];  Service: Plastics   • NM REVISION OF RECONSTRUCTED BREAST Bilateral 3/9/2016    Procedure: RECONSTRUCTION REVISION  BREAST MOUND ,FAT GRAFTS ;  Surgeon: Bharat Ackerman MD;  [facility];  Service: Plastics   • SENTINEL LYMPH NODE BIOPSY Right        FAMILY HISTORY:  Family History   Problem Relation Age of Onset   • Lung cancer Paternal Grandfather 48   • Breast cancer Other    • Thyroid cancer Mother 36   • Breast cancer Mother 76   • Psoriasis Father    • Basal cell carcinoma Father    • Heart disease Other    • Prostate cancer Other        SOCIAL HISTORY:  Social History     Tobacco Use   • Smoking status: Never   • Smokeless tobacco: Never   Vaping Use   • Vaping Use: Never used   Substance Use Topics   • Alcohol use: Yes     Comment: social   • Drug use: Never       MEDICATIONS:    Current Outpatient Medications:   •  methylPREDNISolone 4 MG tablet therapy pack, Use as directed on package, Disp: 1 each, Rfl: 0  •  Ashwagandha 125 MG CAPS, Take by mouth, Disp: , Rfl:   •  Calcium 500 MG tablet, Take by mouth, Disp: , Rfl:   •  Cholecalciferol (VITAMIN D PO), Take 5,000 Units by mouth daily , Disp: , Rfl:   •  Cosentyx Sensoready, 300 MG, 150 MG/ML SOAJ injection, , Disp: , Rfl:   •  Magnesium 400 MG CAPS, Take 1 tablet by mouth daily. , Disp: , Rfl:   •  MILK THISTLE PO, Take 600 mg by mouth daily. , Disp: , Rfl:   •  Multiple Vitamins-Minerals (ONE DAILY MULTIVITAMIN ADULT) TABS, Take by mouth, Disp: , Rfl:   •  NON FORMULARY, , Disp: , Rfl:   •  Probiotic Product (PROBIOTIC DAILY PO), Take 2 tablets by mouth daily. , Disp: , Rfl:   •  secukinumab (Cosentyx Sensoready Pen) 150 mg/mL SOAJ injection, Inject 2 mL (300 mg total) under the skin every 30 (thirty) days, Disp: 2 mL, Rfl: 11  •  secukinumab (Cosentyx Sensoready Pen) 150 mg/mL SOAJ injection, Inject 300 mg (2 pens) subcutaneously every 4 weeks. , Disp: 2 mL, Rfl: 11  •  tamoxifen (NOLVADEX) 20 mg tablet, TAKE 1 TABLET BY MOUTH EVERY DAY, Disp: 30 tablet, Rfl: 7    ALLERGIES:  Allergies   Allergen Reactions   • Other Hives     Kait selzer   • Kait-[place] Plus Cold & Cough  [[id number]] Hives       REVIEW OF SYSTEMS:  Pertinent items are noted in HPI. A comprehensive review of systems was negative.     LABS:  HgA1c: No results found for: "HGBA1C"  BMP:   Lab Results   Component Value Date    GLUCOSE 81 10/06/2015    CALCIUM 8.9 07/07/2022     10/06/2015    K 3.9 07/07/2022    CO2 28 07/07/2022     07/07/2022    BUN 11 07/07/2022    CREATININE 0.95 07/07/2022         _____________________________________________________  PHYSICAL EXAMINATION:  Vital signs: BP (!) 150/104 Comment: patient aware  Pulse 75   Ht 5' 8" (1.727 m)   Wt 87.9 kg (193 lb 12.8 oz)   SpO2 97%   BMI 29.47 kg/m²   General: well developed and well nourished, alert, oriented times 3 and appears comfortable  Psychiatric: Normal  HEENT: Trachea Midline, No torticollis  Cardiovascular: Regular rate and rhythm  Pulmonary: No audible wheezing   Abdomen: No guarding  Extremities: No lymphedema  Skin: No masses, erythema, lacerations, fluctation, ulcerations  Neurovascular: Sensation Intact to the Median, Ulnar, Radial Nerve, Motor Intact to the Median, Ulnar, Radial Nerve and Pulses Intact    MUSCULOSKELETAL EXAMINATION:  RIGHT SIDE:    Carpal tunnel:  No tinels, phalens, weakness, or atrophy  Cubital Tunnel:  + Tinels and no nerve subluxation  Cervical Spine:  Full ROM- pain with rotation to the right. + spurlings        _____________________________________________________  STUDIES REVIEWED:  No Studies to review      PROCEDURES PERFORMED:  Procedures  No Procedures performed today

## 2023-07-11 DIAGNOSIS — Z17.0 MALIGNANT NEOPLASM OF BREAST IN FEMALE, ESTROGEN RECEPTOR POSITIVE, UNSPECIFIED LATERALITY, UNSPECIFIED SITE OF BREAST (HCC): ICD-10-CM

## 2023-07-11 DIAGNOSIS — C50.919 MALIGNANT NEOPLASM OF BREAST IN FEMALE, ESTROGEN RECEPTOR POSITIVE, UNSPECIFIED LATERALITY, UNSPECIFIED SITE OF BREAST (HCC): ICD-10-CM

## 2023-07-11 RX ORDER — TAMOXIFEN CITRATE 20 MG/1
TABLET ORAL
Qty: 30 TABLET | Refills: 7 | Status: SHIPPED | OUTPATIENT
Start: 2023-07-11

## 2023-09-05 ENCOUNTER — HOSPITAL ENCOUNTER (OUTPATIENT)
Dept: RADIOLOGY | Facility: HOSPITAL | Age: 53
Discharge: HOME | End: 2023-09-05
Attending: SURGERY
Payer: COMMERCIAL

## 2023-09-05 DIAGNOSIS — C50.211 MALIGNANT NEOPLASM OF UPPER-INNER QUADRANT OF RIGHT BREAST IN FEMALE, ESTROGEN RECEPTOR POSITIVE (HCC): ICD-10-CM

## 2023-09-05 DIAGNOSIS — Z17.0 MALIGNANT NEOPLASM OF UPPER-INNER QUADRANT OF RIGHT BREAST IN FEMALE, ESTROGEN RECEPTOR POSITIVE (HCC): ICD-10-CM

## 2023-09-05 PROCEDURE — C8908 MRI W/O FOL W/CONT, BREAST,: HCPCS

## 2023-09-05 PROCEDURE — G1004 CDSM NDSC: HCPCS

## 2023-09-05 PROCEDURE — C8937 CAD BREAST MRI: HCPCS

## 2023-09-05 PROCEDURE — A9585 GADOBUTROL INJECTION: HCPCS | Performed by: SURGERY

## 2023-09-05 RX ORDER — GADOBUTROL 604.72 MG/ML
9 INJECTION INTRAVENOUS
Status: COMPLETED | OUTPATIENT
Start: 2023-09-05 | End: 2023-09-05

## 2023-09-05 RX ADMIN — GADOBUTROL 9 ML: 604.72 INJECTION INTRAVENOUS at 17:15

## 2023-09-06 ENCOUNTER — TELEPHONE (OUTPATIENT)
Dept: SURGICAL ONCOLOGY | Facility: CLINIC | Age: 53
End: 2023-09-06

## 2023-09-06 NOTE — TELEPHONE ENCOUNTER
Received a phone  Message from Vika regarding her Breast MRI results. Returned her call and informed her that her MRI has not been read yet. Discussed that the report wiil be sent to Dr Pauline Rolle to review and also to her MyChart.

## 2023-09-07 ENCOUNTER — TELEPHONE (OUTPATIENT)
Dept: SURGICAL ONCOLOGY | Facility: CLINIC | Age: 53
End: 2023-09-07

## 2023-09-07 ENCOUNTER — HOSPITAL ENCOUNTER (OUTPATIENT)
Dept: ULTRASOUND IMAGING | Facility: CLINIC | Age: 53
End: 2023-09-07
Payer: COMMERCIAL

## 2023-09-07 ENCOUNTER — TELEPHONE (OUTPATIENT)
Dept: HEMATOLOGY ONCOLOGY | Facility: CLINIC | Age: 53
End: 2023-09-07

## 2023-09-07 DIAGNOSIS — R92.8 ABNORMAL MRI, BREAST: ICD-10-CM

## 2023-09-07 DIAGNOSIS — R92.8 ABNORMAL MRI, BREAST: Primary | ICD-10-CM

## 2023-09-07 PROCEDURE — 76642 ULTRASOUND BREAST LIMITED: CPT

## 2023-09-07 NOTE — TELEPHONE ENCOUNTER
I called and spoke to the patient and she is aware of today's breast ultrasound was benign and Dr Africa Branch would like her appointment with Mark Christie moved from 9/24/23. She was agreeable and has rescheduled to Monday 9/11/23 at 9 am in the [facility] office with [employer]. She is aware she had a second appointment for November with [employer] that I will verify she doesn't need and if so I will cancel it.  ----- Message from [address] [place] [address] sent at 9/7/2023  3:43 PM EDT -----  I have a couple openings Monday  ----- Message -----  From: Dallin Weeks MD  Sent: 9/7/2023   3:29 PM EDT  To: Hussain Langford RN; [facility]; #    Ultrasound is benign. Would move appt up with [employer] please.

## 2023-09-07 NOTE — TELEPHONE ENCOUNTER
As per Dr Eneida Zavala, called and informed Frankey Fermo of her Breast MRI results and the recommendations for a right breast second look US. She understands and is very anxious. Notified [facility] Nurse Navigators to assist pt in scheduling pt. Pt aware they will be calling her to schedule appt. for the [address]

## 2023-09-07 NOTE — TELEPHONE ENCOUNTER
Call Transfer   Who are you speaking with? self   If it is not the patient, are they listed on an active communication consent form? self   Who is the patients HemOnc/SurgOnc provider? Cherise Arango   What is the reason for this call? Mri results   Person/Department that the call was transferred to? Time that call was transferred? Jeraline Lanes 8:27am 9/7   Your call will be transferred now. If you receive a voicemail, please leave a detailed message and a member of the team will return your call as soon as possible. Did you relay this information to the caller?  yes

## 2023-09-07 NOTE — TELEPHONE ENCOUNTER
----- Message from La Nena Brown MD sent at 9/7/2023  9:34 AM EDT -----  Norm Peaks has something maybe in the skin but perhaps a subcutaneous component so I ordered the second look us right side. Carolyn Michelle  She is seeing you in both oct and nov??? We should maybe move that up based on timing of the us. Thanks.

## 2023-09-11 ENCOUNTER — ONCOLOGY SURVIVORSHIP (OUTPATIENT)
Dept: SURGICAL ONCOLOGY | Facility: CLINIC | Age: 53
End: 2023-09-11
Payer: COMMERCIAL

## 2023-09-11 VITALS
OXYGEN SATURATION: 99 % | DIASTOLIC BLOOD PRESSURE: 98 MMHG | WEIGHT: 192.6 LBS | TEMPERATURE: 97.4 F | SYSTOLIC BLOOD PRESSURE: 150 MMHG | HEART RATE: 87 BPM | HEIGHT: 68 IN | RESPIRATION RATE: 16 BRPM | BODY MASS INDEX: 29.19 KG/M2

## 2023-09-11 DIAGNOSIS — Z79.810 USE OF TAMOXIFEN (NOLVADEX): ICD-10-CM

## 2023-09-11 DIAGNOSIS — C50.211 MALIGNANT NEOPLASM OF UPPER-INNER QUADRANT OF RIGHT BREAST IN FEMALE, ESTROGEN RECEPTOR POSITIVE: Primary | ICD-10-CM

## 2023-09-11 DIAGNOSIS — Z17.0 MALIGNANT NEOPLASM OF UPPER-INNER QUADRANT OF RIGHT BREAST IN FEMALE, ESTROGEN RECEPTOR POSITIVE: Primary | ICD-10-CM

## 2023-09-11 DIAGNOSIS — R92.8 ABNORMAL MRI, BREAST: ICD-10-CM

## 2023-09-11 PROCEDURE — 99214 OFFICE O/P EST MOD 30 MIN: CPT | Performed by: NURSE PRACTITIONER

## 2023-09-11 NOTE — PROGRESS NOTES
Assessment/Plan:    Diagnoses and all orders for this visit:    Malignant neoplasm of upper-inner quadrant of right breast in female, estrogen receptor positive ([address])  -     MRI breast bilateral w and wo contrast w cad; Future  -     Ambulatory referral to oncology social worker; Future    Use of tamoxifen (Nolvadex)    Abnormal MRI, breast  -     MRI breast bilateral w and wo contrast w cad; Future    Patient is a 71-year-old female that was diagnosed with a right-sided breast cancer in 2015. Her pathology revealed invasive ductal carcinoma, ER/AR positive, HER2 negative. She underwent bilateral mastectomies and a right sentinel lymph node biopsy with Dr. Britney Carranza. She had reconstruction with Dr. Eleonora Schulz.  She is currently maintained on tamoxifen. She carries an MUTYH genetic mutation. She had a bilateral breast MRI performed earlier this month which revealed a focal area of enhancement of the skin and subcutaneous tissue in the upper outer quadrant of the right breast.  She underwent diagnostic ultrasound which revealed no worrisome findings. There are no worrisome findings on her clinical exam today. She was examined by both myself and Dr. Britney Carranza. I recommended observation with a short-term follow-up MRI. I instructed the patient to continue self exams and contact us with any changes. We will see her back in 6 months or sooner should the need arise. She is up-to-date on colorectal and cervical cancer screenings. All of her questions were answered today.     REASON FOR VISIT:   Survivorship      Previous therapy:  Oncology History   Malignant neoplasm of upper-inner quadrant of right breast in female, estrogen receptor positive ([address])   5/14/2015 Biopsy    Right US guided breast biopsy     Invasive ductal carcinoma  %  AR 90-95%  HER-2 negative     6/2015 Genetic Testing    6/3/15: [employer] BRCAplus (5 genes): BRCA1, BRCA2, CDH1, PTEN, TP53      6/30/2015 Surgery    Bilateral mastectomies, Right sentinel lymph node biopsy    Bilateral reconstruction with expanders. Dr Krishan Sanford DX  14     7/8/2015 -  Cancer Staged    Staging form: Breast, AJCC 7th Edition  - Pathologic stage from 7/8/2015: Stage IIA (T2, N0, cM0) - Signed by Kaleb Franks on 10/17/2022  Stage prefix: Initial diagnosis  Method of lymph node assessment: [place] lymph node biopsy       8/12/2015 -  Hormone Therapy    Tamoxifen     10/23/2015 Surgery    Expanders removed, implants placed. 3/9/2016 Surgery    Bilateral breast mound revisions, fat grafting     10/13/2016 Surgery    Nipple reconstruction     6/2021 Genetic Testing    Test(s): [employer] Core Cancer Panel (36 genes): APC, DADA, AXIN2 BARD1, BRCA1, BRCA2, BRIP1, BMPR1A, CDH1, CDK4, CDKN2A, CHEK2, DICER1, EPCAM, GREM1, HOXB13, MLH1, MSH2, MSH3, MSH6, MUTYH, NBN, NF1, NTHL1, PALB2, PMS2, POLD1, POLE, PTEN, RAD51C, RAD51D, RECQL SMAD4, SMARCA4, STK11, TP53     Result: Positive - MUTYH c.1187G>A (p.[id number]), Heterozygous, Pathogenic               Patient ID: Mae Hood is a 47 y.o. female  Patient presents today for follow-up visit. She had a bilateral breast MRI which revealed a possible skin/subcutaneous mass in the upper outer quadrant of the right breast.  She underwent a diagnostic ultrasound which revealed no suspicious findings. Patient has not appreciated any changes on her self-exam.  She denies persistent headaches, back pain or bone pain, cough or shortness of breath, abdominal pain. She does report increased stress recently, which she attributes to family life. Review of Systems   Constitutional: Negative for activity change, appetite change, chills, fatigue, fever and unexpected weight change. Respiratory: Negative for cough and shortness of breath. Cardiovascular: Negative for chest pain. Gastrointestinal: Negative for abdominal pain, constipation, diarrhea, nausea and vomiting.    Musculoskeletal: Negative for arthralgias, back pain, gait problem and myalgias. Skin: Negative for color change and rash. Neurological: Negative for dizziness and headaches. Hematological: Negative for adenopathy. Psychiatric/Behavioral: Negative for agitation and confusion. All other systems reviewed and are negative. Objective:    Blood pressure 150/98, pulse 87, temperature (!) 97.4 °F (36.3 °C), temperature source Temporal, resp. rate 16, height 5' 8" (1.727 m), weight 87.4 kg (192 lb 9.6 oz), last menstrual period 03/01/2022, SpO2 99 %, not currently breastfeeding. Body mass index is 29.28 kg/m². Physical Exam  Vitals reviewed. Constitutional:       General: She is not in acute distress. Appearance: Normal appearance. She is well-developed. She is not diaphoretic. HENT:      Head: Normocephalic and atraumatic. Cardiovascular:      Rate and Rhythm: Normal rate and regular rhythm. Heart sounds: Normal heart sounds. Pulmonary:      Effort: Pulmonary effort is normal.      Breath sounds: Normal breath sounds. Chest:      Comments: Bilateral reconstructed breasts with implants present. No masses, skin changes or nodules. Abdominal:      Palpations: Abdomen is soft. There is no mass. Tenderness: There is no abdominal tenderness. Musculoskeletal:         General: Normal range of motion. Cervical back: Normal range of motion. Lymphadenopathy:      Upper Body:      Right upper body: No supraclavicular or axillary adenopathy. Left upper body: No supraclavicular or axillary adenopathy. Skin:     General: Skin is warm and dry. Findings: No rash. Neurological:      Mental Status: She is alert and oriented to person, place, and time.    Psychiatric:         Speech: Speech normal.         Discussed symptoms related to disease recurrence, Yes    Evaluated for late effects related to cancer treatment, Yes    Screening current for cervical cancer, Yes, describe: pap 2021    Screening current for colon cancer, Yes, describe: colonoscopy 2022 , repeat in 5 years    Cancer rehabilitation services addressed, No    Screening current for osteoporosis, Yes, describe: n/a    Referral placed for psychosocial evaluation/screening to oncology social work  Yes

## 2023-09-12 ENCOUNTER — PATIENT OUTREACH (OUTPATIENT)
Dept: CASE MANAGEMENT | Facility: HOSPITAL | Age: 53
End: 2023-09-12

## 2023-09-12 ENCOUNTER — HOSPITAL ENCOUNTER (OUTPATIENT)
Dept: ULTRASOUND IMAGING | Facility: HOSPITAL | Age: 53
Discharge: HOME | End: 2023-09-12
Payer: COMMERCIAL

## 2023-09-12 DIAGNOSIS — G56.21 NEURITIS OF RIGHT ULNAR NERVE: ICD-10-CM

## 2023-09-12 PROCEDURE — 76882 US LMTD JT/FCL EVL NVASC XTR: CPT

## 2023-09-12 NOTE — PROGRESS NOTES
OSW received referral for Survivorship. Patient saw AGUSTINA Acosta on 9/11/23. LSW will outreach for assessment and DT.

## 2023-09-18 ENCOUNTER — PATIENT OUTREACH (OUTPATIENT)
Dept: CASE MANAGEMENT | Facility: OTHER | Age: 53
End: 2023-09-18

## 2023-09-18 NOTE — PROGRESS NOTES
Biopsychosocial and Barriers Assessment Survivorship     Home/Cell Phone: Sharda Fletcher- [phone]  Emergency Contact: [phone]  Marital Status:   Interpretation concerns, speaks another language, preferred language: English  Cultural concerns: none  Ability to read or write: yes    Housin [address]: none  Lives With: spouse and daughter  Daily Living Activities: independent  Durable Medical Equipment: none  Ambulation: independent    Preferred Pharmacy: [facility]  Medication coverage: yes    Employment: self employed  Far[place] Status/Location: n/a  Ability to pay bills: yes-but always concerned about finances  POA/LW/AD: None    Additional Comments:  OSW placed outreach TC to pt this afternoon. OSW introduced self and reason for the call. Pt is a very pleasant woman who is in survivorship. She completed a Dt, where she scored a 4/10. She reports the following concerns: worry/anxiety, relationship concerns with her daughter and finances. Pt states that she was very worried last week, as she had an MRI and she thought that it indicated that her cancer had returned. OSW validated how scary that must have been. She expressed that thankfully everything turned out well. Pt states that she worries about her daughter, who is going through her own difficulties right now. Pt states that her  is a disabled  and she is self employed. She states that finances are always tough. She expressed that in about a year and a half she will have to get another insurance, as [facility] will no longer cover her because they count her spouse's income. Pt states that they do the best that they can. She thanked this writer for [employer].

## 2023-09-21 DIAGNOSIS — L40.9 PSORIASIS: Primary | ICD-10-CM

## 2023-09-21 RX ORDER — SECUKINUMAB 150 MG/ML
300 INJECTION SUBCUTANEOUS ONCE
Qty: 2 ML | Refills: 0 | Status: SHIPPED | OUTPATIENT
Start: 2023-09-21 | End: 2023-09-21

## 2023-09-21 NOTE — TELEPHONE ENCOUNTER
Reason for call:   [x] Refill   [] Prior Auth  [] Other:     Office:   [x] PCP/Provider - Godwin Dodd  [] Speciality/Provider -     Medication COSENTYX     Dose/Frequency: 300 MG/ 150 MG    Quantity:      Pharmacy:   [facility]    Does the patient have enough for 3 days?    [x] Yes   [] No - Send as HP to POD

## 2023-10-03 ENCOUNTER — APPOINTMENT (OUTPATIENT)
Dept: LAB | Facility: MEDICAL CENTER | Age: 53
End: 2023-10-03
Payer: COMMERCIAL

## 2023-10-03 ENCOUNTER — OFFICE VISIT (OUTPATIENT)
Dept: DERMATOLOGY | Facility: CLINIC | Age: 53
End: 2023-10-03
Payer: COMMERCIAL

## 2023-10-03 VITALS — BODY MASS INDEX: 27.89 KG/M2 | HEIGHT: 68 IN | WEIGHT: 184 LBS | TEMPERATURE: 98.2 F

## 2023-10-03 DIAGNOSIS — L40.9 PSORIASIS: Primary | ICD-10-CM

## 2023-10-03 DIAGNOSIS — L40.9 PSORIASIS: ICD-10-CM

## 2023-10-03 PROCEDURE — 36415 COLL VENOUS BLD VENIPUNCTURE: CPT

## 2023-10-03 PROCEDURE — 99213 OFFICE O/P EST LOW 20 MIN: CPT | Performed by: DERMATOLOGY

## 2023-10-03 PROCEDURE — 86480 TB TEST CELL IMMUN MEASURE: CPT

## 2023-10-03 NOTE — PATIENT INSTRUCTIONS
PSORIASIS    Physical Exam:  Anatomic Location Affected:  Currently clear   Morphological Description:    Severity: mild  Body Percent Affected:   Pertinent Positives: Joint pain   Pertinent Negatives: Additional History of Present Condition:  Patient here to follow up on psoriasis. Patient is on cosentyx and has been helping her, patient has not had any psoriasis flare up. Assessment and Plan:  Based on a thorough discussion of this condition and the management approach to it (including a comprehensive discussion of the known risks, side effects and potential benefits of treatment), the patient (family) agrees to implement the following specific plan:  Continue Cosentyx 300 mg monthly. Psoriasis is a chronic inflammatory condition that causes the body to make new skin cells in days rather than weeks. As these cells pile up on the surface of the skin, you may see thick, scaly patches of thickened skin. Psoriasis affects 2-4% of males and females. It can start at any age including childhood, with peaks of onset at 15-25 years and 50-60 years. It tends to persist lifelong, fluctuating in extent and severity. It is particularly common in Caucasians but may affect people of any race. About one-third of patients with psoriasis have family members with psoriasis. Psoriasis is multifactorial. It is classified as an immune-mediated inflammatory disease (IMID). Genetic factors are important and influence the type of psoriasis and response to treatment. What are the signs and symptoms of psoriasis? There are many different types of psoriasis that each have present uniquely. The types of psoriasis include:    Plaque psoriasis: About 80% to 90% of people who have psoriasis develop this type. When plaque psoriasis appears, you may see:  Plaque psoriasis usually presents with symmetrically distributed, red, scaly plaques with well-defined edges.  The scale is typically silvery white, except in skin folds where the plaques often appear shiny and they may have a moist peeling surface. The most common sites are scalp, elbows and knees, but any part of the skin can be involved. The plaques are usually very persistent without treatment. Itch is mostly mild but may be severe in some patients, leading to scratching and lichenification (thickened leathery skin with increased skin markings). Painful skin cracks or fissures may occur. When psoriatic plaques clear up, they may leave brown or pale marks that can be expected to fade over several months. Guttate psoriasis: When someone gets this type of psoriasis, you often see tiny bumps appear on the skin quite suddenly. The bumps tend to cover much of the torso, legs, and arms. Sometimes, the bumps also develop on the face, scalp, and ears. No matter where they appear, the bumps tend to be:   Small and scaly  [place]-colored to pink  Temporary, clearing in a few weeks or months without treatment  When guttate psoriasis clears, it may never return. Why this happens is still a bit of a mystery. Guttate psoriasis tends to develop in children and young adults who've had an infection, such as strep throat. It's possible that when the infection clears so does guttate psoriasis. It's also possible to have:  Guttate psoriasis for life  See the guttate psoriasis clear and plaque psoriasis develop later in life  Plaque psoriasis when you develop guttate psoriasis  There's no way to predict what will happen after the first flare-up of guttate psoriasis clears. Inverse psoriasis: This type of psoriasis develops in areas where skin touches skin, such as the armpits, genitals, and crease of the buttocks. Where the inverse psoriasis appears, you're likely to notice:  Smooth, red patches of skin that look raw  Little, if any, silvery-white coating  Sore or painful skin  Other names for this type of psoriasis are intertriginous psoriasis or flexural psoriasis. Pustular psoriasis:  This type of psoriasis causes pus-filled bumps that usually appear only on the feet and hands. While the pus-filled bumps may look like an infection, the skin is not infected. The bumps don't contain bacteria or anything else that could cause an infection. Where pustular psoriasis appears, you tend to notice:  Red, swollen skin that is dotted with pus-filled bumps  Extremely sore or painful skin  Brown dots (and sometimes scale) appear as the pus-filled bumps dry  Pustular psoriasis can make just about any activity that requires your hands or feet, such as typing or walking, unbearably painful. Pustular psoriasis (generalized): Serious and life-threatening, this rare type of psoriasis causes pus-filled bumps to develop on much of the skin. Also called von Zumbusch psoriasis, a flare-up causes this sequence of events:  Skin on most of the body suddenly turns dry, red, and tender. Within hours, pus-filled bumps cover most of the skin. Often within a day, the pus-filled bumps break open and pools of pus leak onto the skin. As the pus dries (usually within 24 to 48 hours), the skin dries out and peels (as shown in this picture). When the dried skin peels off, you see a smooth, glazed surface. In a few days or weeks, you may see a new crop of pus-filled bumps covering most of the skin, as the cycle repeats itself. Anyone with pustular psoriasis also feels very sick, and may develop a fever, headache, muscle weakness, and other symptoms. Medical care is often necessary to save the person's life. Erythrodermic psoriasis: Serious and life-threatening, this type of psoriasis requires immediate medical care. When someone develops erythrodermic psoriasis, you may notice:  Skin on most of the body looks burnt  Chills, fever, and the person looks extremely ill  Muscle weakness, a rapid pulse, and severe itch  The person may also be unable to keep warm, so hypothermia can set in quickly.   Most people who develop this type of psoriasis already have another type of psoriasis. Before developing erythrodermic psoriasis, they often notice that their psoriasis is worsening or not improving with treatment. If you notice either of these happening, see a board-certified dermatologist.    Nails    Nail psoriasis: With any type of psoriasis, you may see changes to your fingernails or toenails. About half of the people who have plaque psoriasis see signs of psoriasis on their fingernails at some point2. When psoriasis affects the nails, you may notice:  Tiny dents in your nails (called “nail pits”)  White, yellow, or brown discoloration under one or more nails  Crumbling, rough nails  A nail lifting up so that it's no longer attached  Buildup of skin cells beneath one or more nails, which lifts up the nail  Treatment and proper nail care can help you control nail psoriasis. Psoriatic arthritis: If you have psoriasis, it's important to pay attention to your joints. Some people who have psoriasis develop a type of arthritis called psoriatic arthritis. This is more likely to occur if you have severe psoriasis. Most people notice psoriasis on their skin years before they develop psoriatic arthritis. It's also possible to get psoriatic arthritis before psoriasis, but this is less common. When psoriatic arthritis develops, the signs can be subtle. At first, you may notice:  A swollen and tender joint, especially in a finger or toe  Heel pain  Swelling on the back of your leg, just above your heel  Stiffness in the morning that fades during the day  Like psoriasis, psoriatic arthritis cannot be cured. Treatment can prevent psoriatic arthritis from worsening, which is important. Allowed to progress, psoriatic arthritis can become disabling. Diagnosis and treatment of psoriasis   Psoriasis is usually diagnosed by clinical features, and skin biopsy if necessary. It is important to decrease factors that aggravate psoriasis.  These include treating streptococcal infections, minimizing skin injuries, avoiding sun exposure if it exacerbates psoriasis, smoking, alcohol usage, decreasing stress, and maintaining an optimal body weight. Certain medications such as lithium, beta blockers, antimalarials, and NSAIDs have also been implicated. Suddenly stopping oral steroids or strong topical steroids can cause rebound disease. There are many categories of psoriasis treatments available. Topical therapy  Mild psoriasis is generally treated with topical agents alone. Which treatment is selected may depend on body site, extent and severity of psoriasis. Emollients  Coal tar preparations  Dithranol  Salicylic acid  Vitamin D analogue (calcipotriol)  Topical corticosteroids  Calcineurin inhibitor (tacrolimus, pimecrolimus)  Phototherapy  Most psoriasis centres offer phototherapy with ultraviolet (UV) radiation, often in combination with topical or systemic agents. Types of phototherapy include  Narrowband UVB  Broadband UVB  Photochemotherapy (PUVA)  Targeted phototherapy  Systemic therapy  Moderate to severe psoriasis warrants treatment with a systemic agent and/or phototherapy. The most common treatments are:  Methotrexate  Ciclosporin  Acitretin  Other medicines occasionally used for psoriasis include:  Mycophenolate  Apremilast  Hydroxyurea  Azathioprine  6-mercaptopurine  Systemic corticosteroids are best avoided due to a risk of severe withdrawal flare of psoriasis and adverse effects. Biologics or targeted therapies are reserved for conventional treatment-resistant severe psoriasis, mainly because of expense, as side effects compare favorably with other systemic agents.  These include:  Anti-tumour necrosis factor-alpha antagonists (anti-TNF?) infliximab, adalimumab and etanercept  The interleukin (IL)-12/23 antagonist ustekinumab  IL-17 antagonists such as secukinumab  Many other monoclonal antibodies are under investigation in the treatment of psoriasis.

## 2023-10-03 NOTE — PROGRESS NOTES
[facility] Note     Patient Name: Tamara Mathews  Encounter Date: 10/03/2023    Have you been cared for by a [facility] Dermatologist in the last 3 years and, if so, which description applies to you? Yes. I have been here within the last 3 years, and my medical history has NOT changed since that time. I am FEMALE/of child-bearing potential.    REVIEW OF SYSTEMS:  Have you recently had or currently have any of the following? · No changes in my recent health. PAST MEDICAL HISTORY:  Have you personally ever had or currently have any of the following? If "YES," then please provide more detail. · No changes in my medical history. FAMILY HISTORY:  Any "first degree relatives" (parent, brother, sister, or child) with the following? • No changes in my family's known health. PATIENT EXPERIENCE:    • Do you want the Dermatologist to perform a COMPLETE skin exam today including a clinical examination under the "bra and underwear" areas? NO  • If necessary, do we have your permission to call and leave a detailed message on your Preferred Phone number that includes your specific medical information? Yes      Allergies   Allergen Reactions   • Other Hives     Kait selzer   • Kait-Tangier Plus Cold & Cough  [Afggmpcjm-[id number]-Apap] Hives   • Prednisone Other (See Comments) and GI Intolerance     insomnia      Current Outpatient Medications:   •  Ashwagandha 125 MG CAPS, Take by mouth, Disp: , Rfl:   •  Calcium 500 MG tablet, Take by mouth, Disp: , Rfl:   •  Cholecalciferol (VITAMIN D PO), Take 5,000 Units by mouth daily , Disp: , Rfl:   •  Magnesium 400 MG CAPS, Take 1 tablet by mouth daily. , Disp: , Rfl:   •  MILK THISTLE PO, Take 600 mg by mouth daily. , Disp: , Rfl:   •  Multiple Vitamins-Minerals (ONE DAILY MULTIVITAMIN ADULT) TABS, Take by mouth, Disp: , Rfl:   •  NON FORMULARY, , Disp: , Rfl:   •  Probiotic Product (PROBIOTIC DAILY PO), Take 2 tablets by mouth daily. , Disp: , Rfl:   • secukinumab (Cosentyx Sensoready Pen) 150 mg/mL SOAJ injection, Inject 2 mL (300 mg total) under the skin every 30 (thirty) days, Disp: 2 mL, Rfl: 11  •  tamoxifen (NOLVADEX) 20 mg tablet, TAKE 1 TABLET BY MOUTH EVERY DAY, Disp: 30 tablet, Rfl: 7  •  Cosentyx Sensoready, 300 MG, 150 MG/ML SOAJ injection, Inject 2 mL (300 mg total) under the skin once for 1 dose (Patient not taking: Reported on 10/3/2023), Disp: 2 mL, Rfl: 0  •  secukinumab (Cosentyx Sensoready Pen) 150 mg/mL SOAJ injection, Inject 300 mg (2 pens) subcutaneously every 4 weeks. (Patient not taking: Reported on 10/3/2023), Disp: 2 mL, Rfl: 11          • Whom besides the patient is providing clinical information about today's encounter?   o NO ADDITIONAL HISTORIAN (patient alone provided history)    Physical Exam and Assessment/Plan by Diagnosis:    Patient present today to follow up on Psoriasis. PSORIASIS    Physical Exam:  • Anatomic Location Affected:  Currently clear   • Morphological Description:    • Severity: mild  • Body Percent Affected:   • Pertinent Positives: Joint pain   • Pertinent Negatives: Additional History of Present Condition:  Patient here to follow up on psoriasis. Patient is on cosentyx and has been helping her, patient has not had any psoriasis flare up. Assessment and Plan:  Based on a thorough discussion of this condition and the management approach to it (including a comprehensive discussion of the known risks, side effects and potential benefits of treatment), the patient (family) agrees to implement the following specific plan:  • Continue Cosentyx 300 mg monthly. • Monitor joint pain     Psoriasis is a chronic inflammatory condition that causes the body to make new skin cells in days rather than weeks. As these cells pile up on the surface of the skin, you may see thick, scaly patches of thickened skin. Psoriasis affects 2-4% of males and females.  It can start at any age including childhood, with peaks of onset at 15-25 years and 50-60 years. It tends to persist lifelong, fluctuating in extent and severity. It is particularly common in Caucasians but may affect people of any race. About one-third of patients with psoriasis have family members with psoriasis. Psoriasis is multifactorial. It is classified as an immune-mediated inflammatory disease (IMID). Genetic factors are important and influence the type of psoriasis and response to treatment. What are the signs and symptoms of psoriasis? There are many different types of psoriasis that each have present uniquely. The types of psoriasis include:    Plaque psoriasis: About 80% to 90% of people who have psoriasis develop this type. When plaque psoriasis appears, you may see:  Plaque psoriasis usually presents with symmetrically distributed, red, scaly plaques with well-defined edges. The scale is typically silvery white, except in skin folds where the plaques often appear shiny and they may have a moist peeling surface. The most common sites are scalp, elbows and knees, but any part of the skin can be involved. The plaques are usually very persistent without treatment. Itch is mostly mild but may be severe in some patients, leading to scratching and lichenification (thickened leathery skin with increased skin markings). Painful skin cracks or fissures may occur. When psoriatic plaques clear up, they may leave brown or pale marks that can be expected to fade over several months. Guttate psoriasis: When someone gets this type of psoriasis, you often see tiny bumps appear on the skin quite suddenly. The bumps tend to cover much of the torso, legs, and arms. Sometimes, the bumps also develop on the face, scalp, and ears. No matter where they appear, the bumps tend to be:   • Small and scaly  • [place]-colored to pink  • Temporary, clearing in a few weeks or months without treatment  When guttate psoriasis clears, it may never return.  Why this happens is still a bit of a mystery. Guttate psoriasis tends to develop in children and young adults who've had an infection, such as strep throat. It's possible that when the infection clears so does guttate psoriasis. It's also possible to have:  • Guttate psoriasis for life  • See the guttate psoriasis clear and plaque psoriasis develop later in life  • Plaque psoriasis when you develop guttate psoriasis  There's no way to predict what will happen after the first flare-up of guttate psoriasis clears. Inverse psoriasis: This type of psoriasis develops in areas where skin touches skin, such as the armpits, genitals, and crease of the buttocks. Where the inverse psoriasis appears, you're likely to notice:  • Smooth, red patches of skin that look raw  • Little, if any, silvery-white coating  • Sore or painful skin  Other names for this type of psoriasis are intertriginous psoriasis or flexural psoriasis. Pustular psoriasis: This type of psoriasis causes pus-filled bumps that usually appear only on the feet and hands. While the pus-filled bumps may look like an infection, the skin is not infected. The bumps don't contain bacteria or anything else that could cause an infection. Where pustular psoriasis appears, you tend to notice:  • Red, swollen skin that is dotted with pus-filled bumps  • Extremely sore or painful skin  • Brown dots (and sometimes scale) appear as the pus-filled bumps dry  Pustular psoriasis can make just about any activity that requires your hands or feet, such as typing or walking, unbearably painful. Pustular psoriasis (generalized): Serious and life-threatening, this rare type of psoriasis causes pus-filled bumps to develop on much of the skin. Also called von Zumbusch psoriasis, a flare-up causes this sequence of events:  1. Skin on most of the body suddenly turns dry, red, and tender. 2. Within hours, pus-filled bumps cover most of the skin.   3. Often within a day, the pus-filled bumps break open and pools of pus leak onto the skin. 4. As the pus dries (usually within 24 to 48 hours), the skin dries out and peels (as shown in this picture). 5. When the dried skin peels off, you see a smooth, glazed surface. 6. In a few days or weeks, you may see a new crop of pus-filled bumps covering most of the skin, as the cycle repeats itself. Anyone with pustular psoriasis also feels very sick, and may develop a fever, headache, muscle weakness, and other symptoms. Medical care is often necessary to save the person's life. Erythrodermic psoriasis: Serious and life-threatening, this type of psoriasis requires immediate medical care. When someone develops erythrodermic psoriasis, you may notice:  • Skin on most of the body looks burnt  • Chills, fever, and the person looks extremely ill  • Muscle weakness, a rapid pulse, and severe itch  The person may also be unable to keep warm, so hypothermia can set in quickly. Most people who develop this type of psoriasis already have another type of psoriasis. Before developing erythrodermic psoriasis, they often notice that their psoriasis is worsening or not improving with treatment. If you notice either of these happening, see a board-certified dermatologist.    Nails    Nail psoriasis: With any type of psoriasis, you may see changes to your fingernails or toenails. About half of the people who have plaque psoriasis see signs of psoriasis on their fingernails at some point2. When psoriasis affects the nails, you may notice:  • Tiny dents in your nails (called “nail pits”)  • White, yellow, or brown discoloration under one or more nails  • Crumbling, rough nails  • A nail lifting up so that it's no longer attached  • Buildup of skin cells beneath one or more nails, which lifts up the nail  Treatment and proper nail care can help you control nail psoriasis. Psoriatic arthritis: If you have psoriasis, it's important to pay attention to your joints.  Some people who have psoriasis develop a type of arthritis called psoriatic arthritis. This is more likely to occur if you have severe psoriasis. Most people notice psoriasis on their skin years before they develop psoriatic arthritis. It's also possible to get psoriatic arthritis before psoriasis, but this is less common. When psoriatic arthritis develops, the signs can be subtle. At first, you may notice:  • A swollen and tender joint, especially in a finger or toe  • Heel pain  • Swelling on the back of your leg, just above your heel  • Stiffness in the morning that fades during the day  Like psoriasis, psoriatic arthritis cannot be cured. Treatment can prevent psoriatic arthritis from worsening, which is important. Allowed to progress, psoriatic arthritis can become disabling. Diagnosis and treatment of psoriasis   Psoriasis is usually diagnosed by clinical features, and skin biopsy if necessary. It is important to decrease factors that aggravate psoriasis. These include treating streptococcal infections, minimizing skin injuries, avoiding sun exposure if it exacerbates psoriasis, smoking, alcohol usage, decreasing stress, and maintaining an optimal body weight. Certain medications such as lithium, beta blockers, antimalarials, and NSAIDs have also been implicated. Suddenly stopping oral steroids or strong topical steroids can cause rebound disease. There are many categories of psoriasis treatments available. Topical therapy  Mild psoriasis is generally treated with topical agents alone. Which treatment is selected may depend on body site, extent and severity of psoriasis. • Emollients  • Coal tar preparations  • Dithranol  • Salicylic acid  • Vitamin D analogue (calcipotriol)  • Topical corticosteroids  • Calcineurin inhibitor (tacrolimus, pimecrolimus)  Phototherapy  Most psoriasis centres offer phototherapy with ultraviolet (UV) radiation, often in combination with topical or systemic agents.  Types of phototherapy include  • Narrowband UVB  • Broadband UVB  • Photochemotherapy (PUVA)  • Targeted phototherapy  Systemic therapy  Moderate to severe psoriasis warrants treatment with a systemic agent and/or phototherapy. The most common treatments are:  • Methotrexate  • Ciclosporin  • Acitretin  Other medicines occasionally used for psoriasis include:  • Mycophenolate  • Apremilast  • Hydroxyurea  • Azathioprine  • 6-mercaptopurine  Systemic corticosteroids are best avoided due to a risk of severe withdrawal flare of psoriasis and adverse effects. Biologics or targeted therapies are reserved for conventional treatment-resistant severe psoriasis, mainly because of expense, as side effects compare favorably with other systemic agents. These include:  • Anti-tumour necrosis factor-alpha antagonists (anti-TNF?) infliximab, adalimumab and etanercept  • The interleukin (IL)-12/23 antagonist ustekinumab  • IL-17 antagonists such as secukinumab  Many other monoclonal antibodies are under investigation in the treatment of psoriasis.   Scribe Attestation    I,:  Lissette Cee MA am acting as a scribe while in the presence of the attending physician.:       I,:  Hansel Mendoza MD personally performed the services described in this documentation    as scribed in my presence.:

## 2023-10-04 ENCOUNTER — TELEPHONE (OUTPATIENT)
Dept: DERMATOLOGY | Facility: CLINIC | Age: 53
End: 2023-10-04

## 2023-10-04 LAB
GAMMA INTERFERON BACKGROUND BLD IA-ACNC: 0.02 IU/ML
M TB IFN-G BLD-IMP: NEGATIVE
M TB IFN-G CD4+ BCKGRND COR BLD-ACNC: -0.01 IU/ML
M TB IFN-G CD4+ BCKGRND COR BLD-ACNC: 0 IU/ML
MITOGEN IGNF BCKGRD COR BLD-ACNC: 9.98 IU/ML

## 2023-10-11 DIAGNOSIS — L40.9 PSORIASIS: ICD-10-CM

## 2023-10-12 RX ORDER — SECUKINUMAB 150 MG/ML
INJECTION SUBCUTANEOUS
Qty: 2 ML | Refills: 5 | Status: SHIPPED | OUTPATIENT
Start: 2023-10-12

## 2023-10-17 ENCOUNTER — OFFICE VISIT (OUTPATIENT)
Dept: OBGYN CLINIC | Facility: CLINIC | Age: 53
End: 2023-10-17

## 2023-10-17 VITALS — HEIGHT: 68 IN | WEIGHT: 184 LBS | BODY MASS INDEX: 27.89 KG/M2

## 2023-10-17 DIAGNOSIS — M65.331 TRIGGER FINGER, RIGHT MIDDLE FINGER: ICD-10-CM

## 2023-10-17 DIAGNOSIS — G56.01 CARPAL TUNNEL SYNDROME ON RIGHT: ICD-10-CM

## 2023-10-17 DIAGNOSIS — G56.21 CUBITAL TUNNEL SYNDROME ON RIGHT: Primary | ICD-10-CM

## 2023-10-17 DIAGNOSIS — M77.11 LATERAL EPICONDYLITIS OF RIGHT ELBOW: ICD-10-CM

## 2023-10-17 DIAGNOSIS — M77.01 MEDIAL EPICONDYLITIS OF ELBOW, RIGHT: ICD-10-CM

## 2023-10-17 RX ORDER — LIDOCAINE HYDROCHLORIDE 10 MG/ML
1 INJECTION, SOLUTION INFILTRATION; PERINEURAL
Status: COMPLETED | OUTPATIENT
Start: 2023-10-17 | End: 2023-10-17

## 2023-10-17 RX ORDER — BETAMETHASONE SODIUM PHOSPHATE AND BETAMETHASONE ACETATE 3; 3 MG/ML; MG/ML
3 INJECTION, SUSPENSION INTRA-ARTICULAR; INTRALESIONAL; INTRAMUSCULAR; SOFT TISSUE
Status: COMPLETED | OUTPATIENT
Start: 2023-10-17 | End: 2023-10-17

## 2023-10-17 RX ADMIN — LIDOCAINE HYDROCHLORIDE 1 ML: 10 INJECTION, SOLUTION INFILTRATION; PERINEURAL at 09:00

## 2023-10-17 RX ADMIN — BETAMETHASONE SODIUM PHOSPHATE AND BETAMETHASONE ACETATE 3 MG: 3; 3 INJECTION, SUSPENSION INTRA-ARTICULAR; INTRALESIONAL; INTRAMUSCULAR; SOFT TISSUE at 09:00

## 2023-10-17 NOTE — PROGRESS NOTES
ORTHOPAEDIC HAND, WRIST, AND ELBOW OFFICE  VISIT       ASSESSMENT/PLAN:      Diagnoses and all orders for this visit:    Cubital tunnel syndrome on right  -     Ambulatory Referral to PT/OT Hand Therapy; Future  -     US msk guidance; Future    Carpal tunnel syndrome on right  -     Ambulatory Referral to PT/OT Hand Therapy; Future  -     Durable Medical Equipment    Trigger finger, right middle finger  -     Ambulatory Referral to PT/OT Hand Therapy; Future  -     Hand/upper extremity injection: R long A1  -     lidocaine (XYLOCAINE) 1 % injection 1 mL  -     betamethasone acetate-betamethasone sodium phosphate (CELESTONE) injection 3 mg    Medial epicondylitis of elbow, right  -     Ambulatory Referral to PT/OT Hand Therapy; Future  -     Durable Medical Equipment    Lateral epicondylitis of right elbow  -     Ambulatory Referral to PT/OT Hand Therapy; Future  -     Durable Medical Equipment          47 y.o. female with right elbow tendinitis (medial and lateral), right long trigger finger, right cubital tunnel syndrome, and suspected right carpal tunnel syndrome   Conservative and operative treatment options and expected outcomes were discussed. Patient would like to proceed conservatively for the time being. She was provided with a cockup wrist brace to wear at night, this will help with her elbow tendinitis as well as the carpal tunnel syndrome. OT referral also placed for all of the above issues  Trigger finger injection was discussed including risks, benefits, and alternatives. Injection was performed in office today and tolerated well. If symptoms persist after several injections can consider surgical release in the future   US guided cubital tunnel injection ordered today as well  Consider EMG testing vs US evaluation of the right carpal tunnel. The patient verbalized understanding of exam findings and treatment plan. The patient was given the opportunity to ask questions.   Questions were answered to the patient's satisfaction. The patient decided to move forward with conservative measures via shared decision making. Follow Up:  6 weeks       To Do Next Visit:  Re-evaluation of current issue      Discussions:  Cubital Tunnel Syndrome: The anatomy and physiology of cubital tunnel syndrome were discussed with the patient today in the office. Typically, increased elbow flexion activities decrease blood flow within the intraneural spaces, resulting in a feeling of numbness, tingling, weakness, or clumsiness within the hand and fingers. Occasionally, anatomic structures such as medial elbow osteophytes, the medial head of the triceps, were subluxing ulnar nerve may result in increased pressure or aggravation at the cubital tunnel. Typical signs and symptoms usually include numbness and tingling within the ring and small finger, weakness with , and weakness with pinch. Conservative treatment and includes nocturnal bracing to keep the elbow in a semi-extended position, activity modification, therapy, and avoiding excessive elbow flexion activities. Vitamin B6 one tablet daily over the counter may helpful to reduce symptoms. A majority of patients typically respond to conservative treatment over a period of approximately 3-6 months. EMG/NCV testing of the ulnar nerve at the elbow is not as reliable as carpal tunnel syndrome. Surgical intervention in the form of in situ release of the ulnar nerve at the elbow or ulnar nerve transposition may be required in up to 20% of patients. and Trigger Finger: The anatomy and physiology of trigger finger was discussed with the patient today in the office. Edema and increased contact pressure within the flexor tendons at the A1 pulley can cause pain, crepitation, and triggering or locking of the digit resulting in limitation of function. Symptoms can occur at anytime but are typically worse in the morning or after a brief rest from repetitive activity.   Treatment options include resting/nighttime MP blocking splints to decrease edema, oral anti-inflammatory medications, home or formal therapy exercises, up to 2 steroid injections within the tendon sheath, or surgical release. While majority of patients do respond to conservative treatment, up to 20% may require surgical release. Eileen Rivera MD  Attending, Orthopaedic Surgery  Hand, Wrist, and Elbow Surgery  [facility]    ____________________________________________________________________________________________________________________________________________      CHIEF COMPLAINT:  Chief Complaint   Patient presents with   • Right Wrist - Pain       SUBJECTIVE:  Patient is a 47 y.o. RHD female who presents today for evaluation and treatment of the right upper extremity. She notes pain primarily at the medial elbow which radiates into the hand and forearm. She notes associated numbness in the ulnar 3 digits, and whole hand numbness at night which does wake her up. The right long finger also gets stuck in a flexed position when she  tight. These issues have been ongoing for months and seem to get progressively worse. 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 No prior treatments. She does have hx of psoriasis and breast cancer. Referred by Marcela LYNCH.        I have personally reviewed all the relevant PMH, PSH, SH, FH, Medications and allergies      PAST MEDICAL HISTORY:  Past Medical History:   Diagnosis Date   • Allergic    • Arthritis    • BRCA negative    • Colon polyp    • Endometriosis    • IBS (irritable bowel syndrome)    • Insomnia    • Night sweats    • Psoriasis    • Skin tag    • Wears glasses        PAST SURGICAL HISTORY:  Past Surgical History:   Procedure Laterality Date   • BREAST BIOPSY Right     IDC   •  SECTION     • LAPAROSCOPY      exploratory, endometriosis   • LYMPHADENECTOMY Right x2   • MASTECTOMY      bilateral mastectomy   • ME NIPPLE/AREOLA RECONSTRUCTION Bilateral 10/13/2016    Procedure: NIPPLE RECONSTRUCTION ;  Surgeon: Rose Yancey MD;  Location: [facility];  Service: Plastics   • ME REVISION OF RECONSTRUCTED BREAST Bilateral 3/9/2016    Procedure: RECONSTRUCTION REVISION  BREAST MOUND ,FAT GRAFTS ;  Surgeon: Rose Yancey MD;  Location: [facility];  Service: Plastics   • SENTINEL LYMPH NODE BIOPSY Right        FAMILY HISTORY:  Family History   Problem Relation Age of Onset   • Lung cancer Paternal Grandfather 48   • Breast cancer Other    • Thyroid cancer Mother 36   • Breast cancer Mother 76   • Psoriasis Father    • Basal cell carcinoma Father    • Heart disease Other    • Prostate cancer Other        SOCIAL HISTORY:  Social History     Tobacco Use   • Smoking status: Never   • Smokeless tobacco: Never   Vaping Use   • Vaping Use: Never used   Substance Use Topics   • Alcohol use: Yes     Comment: social   • Drug use: Never       MEDICATIONS:    Current Outpatient Medications:   •  Ashwagandha 125 MG CAPS, Take by mouth, Disp: , Rfl:   •  Calcium 500 MG tablet, Take by mouth, Disp: , Rfl:   •  Cholecalciferol (VITAMIN D PO), Take 5,000 Units by mouth daily , Disp: , Rfl:   •  Magnesium 400 MG CAPS, Take 1 tablet by mouth daily. , Disp: , Rfl:   •  MILK THISTLE PO, Take 600 mg by mouth daily. , Disp: , Rfl:   •  Multiple Vitamins-Minerals (ONE DAILY MULTIVITAMIN ADULT) TABS, Take by mouth, Disp: , Rfl:   •  NON FORMULARY, , Disp: , Rfl:   •  Probiotic Product (PROBIOTIC DAILY PO), Take 2 tablets by mouth daily. , Disp: , Rfl:   •  secukinumab (Cosentyx Sensoready Pen) 150 mg/mL SOAJ injection, Inject 2 mL (300 mg total) under the skin every 30 (thirty) days, Disp: 2 mL, Rfl: 11  •  secukinumab (Cosentyx Sensoready, 300 MG,) 150 mg/mL SOAJ injection, Inject 300mg (2 pens) under the skin every 4 weeks. , Disp: 2 mL, Rfl: 5  •  tamoxifen (NOLVADEX) 20 mg tablet, TAKE 1 TABLET BY MOUTH EVERY DAY, Disp: 30 tablet, Rfl: 7  •  secukinumab (Cosentyx Sensoready Pen) 150 mg/mL SOAJ injection, Inject 300 mg (2 pens) subcutaneously every 4 weeks. (Patient not taking: Reported on 10/3/2023), Disp: 2 mL, Rfl: 11  No current facility-administered medications for this visit. ALLERGIES:  Allergies   Allergen Reactions   • Other Hives     Kait selzer   • Kait-Carbon Plus Cold & Cough  [[id number]] Hives   • Prednisone Other (See Comments) and GI Intolerance     insomnia           REVIEW OF SYSTEMS:  Musculoskeletal:        As noted in HPI. All other systems reviewed and are negative. VITALS:  There were no vitals filed for this visit. LABS:  HgA1c: No results found for: "HGBA1C"  BMP:   Lab Results   Component Value Date    GLUCOSE 81 10/06/2015    CALCIUM 8.9 07/07/2022     10/06/2015    K 3.9 07/07/2022    CO2 28 07/07/2022     07/07/2022    BUN 11 07/07/2022    CREATININE 0.95 07/07/2022       _____________________________________________________  PHYSICAL EXAMINATION:  General: Well developed and well nourished, alert & oriented x 3, appears comfortable  Psychiatric: Normal  HEENT: Normocephalic, Atraumatic Trachea Midline, No torticollis  Pulmonary: No audible wheezing or respiratory distress   Abdomen/GI: Non tender, non distended   Cardiovascular: No pitting edema, 2+ radial pulse   Skin: No masses, erythema, lacerations, fluctation, ulcerations  Neurovascular: Sensation Intact to the Median, Ulnar, Radial Nerve, Motor Intact to the Median, Ulnar, Radial Nerve, and Pulses Intact  Musculoskeletal: Normal, except as noted in detailed exam and in HPI. MUSCULOSKELETAL EXAMINATION:  Right Ulnar Nerve Exam:    Negative intrinsic atrophy. Negative  deformity at the elbow. Full range of motion with flexion and extension of the elbow. Positive ulnar nerve compression test at the elbow. Positive tinels over the ulnar nerve at the elbow.   Negative cross finger test in the index and long fingers. FDP strength is 5/5 to the ring finger. FDP strength is 5/5 to the small finger. Intrinsic strength 5/5 . Right Carpal Tunnel Exam:    Negative thenar atrophy. Positive phalen's test. Positive carpal tunnel compression. Positive tinels over median nerve at the wrist.  Opposition strength 5/5. Abduction strength 5/5.      right middle finger:  Negative palpable nodule over the A1 pulley. Positive tenderness to palpation over A1 pulley. Positive catching. Positive clicking. Right Elbow:    No swelling or deformity  Full range of motion with flexion, extension, supination and pronation. Positive tenderness to palpation over the Lateral and Medical Epicondyles. No pain with passive flexion of the wrist with elbow fully extended. Pain with resisted wrist flexion and extension with elbow fully extended. Pain with resisted forearm supination/pronation with the elbow fully extended.      ___________________________________________________  STUDIES REVIEWED:  Ultrasound report was reviewed and demonstrates evidence of cubital tunnel syndrome with soft tissue subluxation, no ulnar nerve subluxation          PROCEDURES PERFORMED:  Hand/upper extremity injection: R long A1  Universal Protocol:  Consent: Verbal consent obtained. Risks and benefits: risks, benefits and alternatives were discussed  Consent given by: patient  Time out: Immediately prior to procedure a "time out" was called to verify the correct patient, procedure, equipment, support staff and site/side marked as required.   Patient understanding: patient states understanding of the procedure being performed  Site marked: the operative site was marked  Required items: required blood products, implants, devices, and special equipment available  Patient identity confirmed: verbally with patient  Supporting Documentation  Indications: pain and therapeutic   Procedure Details  Condition:trigger finger Location: long finger - R long A1   Preparation: Patient was prepped and draped in the usual sterile fashion  Needle size: 25 G  Ultrasound guidance: no  Approach: volar  Medications administered: 1 mL lidocaine 1 %; 3 mg betamethasone acetate-betamethasone sodium phosphate 6 (3-3) mg/mL  Patient tolerance: patient tolerated the procedure well with no immediate complications  Dressing:  Sterile dressing applied              _____________________________________________________      Scribe Attestation    I,:  Narciso Sloan PA-C am acting as a scribe while in the presence of the attending physician.:       I,:  Frank Cerna MD personally performed the services described in this documentation    as scribed in my presence.:

## 2023-10-31 ENCOUNTER — EVALUATION (OUTPATIENT)
Dept: OCCUPATIONAL THERAPY | Age: 53
End: 2023-10-31
Payer: COMMERCIAL

## 2023-10-31 DIAGNOSIS — M77.11 LATERAL EPICONDYLITIS OF RIGHT ELBOW: ICD-10-CM

## 2023-10-31 DIAGNOSIS — M65.331 TRIGGER FINGER, RIGHT MIDDLE FINGER: ICD-10-CM

## 2023-10-31 DIAGNOSIS — G56.01 CARPAL TUNNEL SYNDROME ON RIGHT: ICD-10-CM

## 2023-10-31 DIAGNOSIS — M77.01 MEDIAL EPICONDYLITIS OF ELBOW, RIGHT: ICD-10-CM

## 2023-10-31 DIAGNOSIS — G56.21 CUBITAL TUNNEL SYNDROME ON RIGHT: Primary | ICD-10-CM

## 2023-10-31 PROCEDURE — 97140 MANUAL THERAPY 1/> REGIONS: CPT

## 2023-10-31 PROCEDURE — 97010 HOT OR COLD PACKS THERAPY: CPT

## 2023-10-31 PROCEDURE — 97165 OT EVAL LOW COMPLEX 30 MIN: CPT

## 2023-10-31 PROCEDURE — 97110 THERAPEUTIC EXERCISES: CPT

## 2023-10-31 NOTE — PROGRESS NOTES
OT Evaluation     Today's date: 10/31/2023  Patient name: Mikayla Manley  : 1970  MRN: [id number]  Referring provider: Frosty Aschoff, PA-C  Dx:   Encounter Diagnosis     ICD-10-CM    1. Cubital tunnel syndrome on right  G56.21 Ambulatory Referral to PT/OT Hand Therapy      2. Carpal tunnel syndrome on right  G56.01 Ambulatory Referral to PT/OT Hand Therapy      3. Trigger finger, right middle finger  M65.331 Ambulatory Referral to PT/OT Hand Therapy      4. Medial epicondylitis of elbow, right  M77.01 Ambulatory Referral to PT/OT Hand Therapy      5. Lateral epicondylitis of right elbow  M77.11 Ambulatory Referral to PT/OT Hand Therapy                     Assessment  Assessment details: Andrew Grimm is a 46 y/o F who presents with pain with function, N/T and tenderness at the medial/lateral epicondyles of the R elbow. She also presents with R LF trigger finger, s/p injection on 10/17/2023. She has increased tenderness at the medial epicondyle, tightness/tenderness along the flexor and extensor compartments of the forearm. She was positive for Tinel's at both the carpal and cubital tunnel. Presents with decreased pinch and  strength on her R dominant side. Her pain is affecting her ability to work. Pt would benefit from skilled services to address pain, N/T and strength. 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 STG: in 4-6 weeks, patient's pain with activity will not exceed a 4/10. STG: In 2 weeks, patient will be compliant with her HEP in 5/7 days of the week. LTG: To return to work without pain  LTG: To adhere to HEP 7/7 days of the week. Plan  Plan details: Pt was provided with an HEP including MNGs, UNGs, eccentric wrist stretches and gentle tendon gliding.  She was educated on the use of modalities (heat and ice) in coordination with her HEP. We discussed the option of a trigger finger splint if locking persists. Pt was educated on attempting to keep the elbow straight while sleeping and avoiding repetitive gripping. She will return for skilled services 2x/week for the next 5 weeks until she has her follow up with her doctor. Patient would benefit from: skilled occupational therapy, custom splinting and OT eval  Planned modality interventions: cryotherapy, TENS and thermotherapy: paraffin bath  Planned therapy interventions: IASTM, activity modification, ADL retraining, kinesiology taping, manual therapy, massage, nerve gliding, neuromuscular re-education, orthotic fitting/training, orthotic management and training, patient education, strengthening, stretching, therapeutic activities, therapeutic exercise, IADL retraining, home exercise program, functional ROM exercises, graded activity, fine motor coordination training, graded exercise, compression and behavior modification  Frequency: 2x week  Duration in visits: 10  Duration in weeks: 5  Treatment plan discussed with: patient        Subjective Evaluation    History of Present Illness  Mechanism of injury: R elbow tendinitis (medial and lateral, cubital tunnel, possible CTS, R LF A1 injection for trigger finger on 10/17/2023- does not feel like it did much    Make dolls for a living, 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 Exacerbated by: writing, opening a jar, etc.          Objective     Tenderness     Right Elbow   Tenderness in the lateral epicondyle and medial epicondyle. Right Wrist/Hand   Tenderness in the lateral epicondyle and medial epicondyle. Additional Tenderness Details  Increased tenderness at medial epicondyle  Tightness and tender along the extensor and flexor compartments of the forearm (R)    Active Range of Motion     Right Elbow   Normal active range of motion    Right Wrist   Normal active range of motion    Additional Active Range of Motion Details  Normal digit ROM    Strength/Myotome Testing     Left Wrist/Hand      (2nd hand position)     Trial 1: 39.4    Thumb Strength  Key/Lateral Pinch     Trial 1: 11  Tip/Two-Point Pinch     Trial 1: 8  Palmar/Three-Point Pinch     Trial 1: 9    Right Wrist/Hand   Wrist extension: 4  Wrist flexion: 4  Radial deviation: 4  Ulnar deviation: 4     (2nd hand position)     Trial 1: 18.4    Thumb Strength   Key/Lateral Pinch     Trial 1: 6  Tip/Two-Point Pinch     Trial 1: 4  Palmar/Three-Point Pinch     Trial 1: 6    Additional Strength Details  Generalized discomfort with strength testing    Tests     Right Elbow   Positive Cozen's. Right Wrist/Hand   Positive Tinel's sign (medial nerve).      Additional Tests Details  Pain in LF with Tinel (median nerve test)      SW:   2.83 all digits, light touch in tact            Flowsheet Rows      Flowsheet Row Most Recent Value   PT/OT G-Codes    Current Score 44   Projected Score 62               Precautions: Universal      Manuals 10/31            IASTM 10            NEG PREss 5            KT                          Neuro Re-Ed                                                                                                        Ther Ex             HEP 10                                                                                                       Ther Activity                                       Gait Training                                       Modalities             P 5'

## 2023-11-07 ENCOUNTER — OFFICE VISIT (OUTPATIENT)
Dept: OCCUPATIONAL THERAPY | Age: 53
End: 2023-11-07
Payer: COMMERCIAL

## 2023-11-07 DIAGNOSIS — M65.331 TRIGGER MIDDLE FINGER OF RIGHT HAND: ICD-10-CM

## 2023-11-07 DIAGNOSIS — G56.21 CUBITAL TUNNEL SYNDROME ON RIGHT: Primary | ICD-10-CM

## 2023-11-07 PROCEDURE — 97010 HOT OR COLD PACKS THERAPY: CPT

## 2023-11-07 PROCEDURE — 97140 MANUAL THERAPY 1/> REGIONS: CPT

## 2023-11-07 PROCEDURE — 97110 THERAPEUTIC EXERCISES: CPT

## 2023-11-07 NOTE — PROGRESS NOTES
Daily Note     Today's date: 2023  Patient name: Dom Denise  : 1970  MRN: [id number]  Referring provider: Tommy Bloom PA-C  Dx:   Encounter Diagnosis     ICD-10-CM    1. Cubital tunnel syndrome on right  G56.21       2. Trigger middle finger of right hand  M65.331                      Subjective: Mobility felt better in the arm, after therapy. IASTM has helped. Objective: See treatment diary below      Assessment: IASTM completed along A1 pulley, flexor and extensor compartments. No locking occurred with TGE at the A1 of the LF. Finger N/T reported with UNGs at the wrist, after 7x. Did well with light strengthening today. PT would benefit from cont skilled services. Plan: Continue per plan of care.       Precautions: Universal      Manuals 10/31 11/6           IASTM 10 18'           NEG PREss 5            KT                          Neuro Re-Ed             TGE  Straight to table top 10-15x    Straight to hook 10-15x    Straight to gentle fist 10-15x                                                                                         Ther Ex             HEP 10            Scap ADD  GTB 3x10           Wrist Ecc   1# 3x10n           Wrist stretches  3x 30s           NGs  UNGs 10x    [id number]           Wall walking   TB 10x           WBing  Ball on wall CW/CCW30x           Grasp/Pinch  RPW center 3x10    Pinch YR tip pinch 2x           Ther Activity                                       Gait Training                                       Modalities             MHP 5' 5'

## 2023-11-09 ENCOUNTER — OFFICE VISIT (OUTPATIENT)
Dept: OCCUPATIONAL THERAPY | Age: 53
End: 2023-11-09
Payer: COMMERCIAL

## 2023-11-09 DIAGNOSIS — M77.01 MEDIAL EPICONDYLITIS OF RIGHT ELBOW: ICD-10-CM

## 2023-11-09 DIAGNOSIS — M65.331 TRIGGER MIDDLE FINGER OF RIGHT HAND: ICD-10-CM

## 2023-11-09 DIAGNOSIS — M77.11 LATERAL EPICONDYLITIS OF RIGHT ELBOW: Primary | ICD-10-CM

## 2023-11-09 PROCEDURE — 97010 HOT OR COLD PACKS THERAPY: CPT

## 2023-11-09 PROCEDURE — 97140 MANUAL THERAPY 1/> REGIONS: CPT

## 2023-11-09 PROCEDURE — 97110 THERAPEUTIC EXERCISES: CPT

## 2023-11-09 NOTE — PROGRESS NOTES
Daily Note     Today's date: 2023  Patient name: Catie Sanchez  : 1970  MRN: [id number]  Referring provider: Sol Maya PA-C  Dx:   Encounter Diagnosis     ICD-10-CM    1. Lateral epicondylitis of right elbow  M77.11       2. Trigger middle finger of right hand  M65.331       3. Medial epicondylitis of right elbow  M77.01                        Subjective: I worked a lot so it is very sore. The finger is not sticking as much, it feels better. Objective: See treatment diary below      Assessment: No sticking of the LF with tendon glides. Increased pain with negative pressure along the flexor compartment, increased tension with movement. Did well with upgrades in PREs, no increase in pain. Pt would benefit from cont skilled services. Plan: Continue per plan of care.       Precautions: Universal      Manuals 10/31 11/7 11/9          IASTM 10 18' 15'          NEG PREss 5  5'          KT                          Neuro Re-Ed             TGE  Straight to table top 10-15x    Straight to hook 10-15x    Straight to gentle fist 10-15x Straight to table top 20x    Straight to hook 20x    Straight to gentle fist 20x          EDC    Y gummy 3x10                                                                           Ther Ex             HEP 10            Scap ADD  GTB 3x10 GTB 3x10; IR 3x10; ER 3x10          Wrist Ecc   1# 3x10n 2# 3x10          Wrist stretches  3x 30s 3x30s          NGs  UNGs 10x    [id number]           Wall walking   TB 10x TB x10          WBing  Ball on wall CW/CCW30x Ball on wall 30x30          Grasp/Pinch  RPW center 3x10    Pinch YR tip pinch 2x RPW [id number]      Pinch RG key 2x              Ther Activity                                       Gait Training                                       Modalities             MHP 5' 5' 5'

## 2023-11-10 ENCOUNTER — TELEPHONE (OUTPATIENT)
Dept: DERMATOLOGY | Facility: CLINIC | Age: 53
End: 2023-11-10

## 2023-11-10 DIAGNOSIS — L40.9 PSORIASIS: Primary | ICD-10-CM

## 2023-11-13 NOTE — TELEPHONE ENCOUNTER
Just received Prior Authorization request for Cosentyx today, form was completed and faxed to insurance along with recent clinical notes and marked as urgent. Form was uploaded into patients chart.

## 2023-11-16 ENCOUNTER — OFFICE VISIT (OUTPATIENT)
Dept: OCCUPATIONAL THERAPY | Age: 53
End: 2023-11-16
Payer: COMMERCIAL

## 2023-11-16 DIAGNOSIS — M77.11 LATERAL EPICONDYLITIS OF RIGHT ELBOW: Primary | ICD-10-CM

## 2023-11-16 PROCEDURE — 97110 THERAPEUTIC EXERCISES: CPT

## 2023-11-16 PROCEDURE — 97010 HOT OR COLD PACKS THERAPY: CPT

## 2023-11-16 PROCEDURE — 97140 MANUAL THERAPY 1/> REGIONS: CPT

## 2023-11-16 RX ORDER — SECUKINUMAB 150 MG/ML
300 INJECTION SUBCUTANEOUS ONCE
Qty: 2 ML | Refills: 0 | Status: CANCELLED | OUTPATIENT
Start: 2023-11-16 | End: 2023-11-16

## 2023-11-16 RX ORDER — SECUKINUMAB 150 MG/ML
300 INJECTION SUBCUTANEOUS
Qty: 2 ML | Refills: 11 | Status: SHIPPED | OUTPATIENT
Start: 2023-11-16

## 2023-11-16 NOTE — TELEPHONE ENCOUNTER
Received approval for the continuation of coverage of Cosentyx 300mg, Letter was uploaded into patients chart and patient was notified via [employer].

## 2023-11-16 NOTE — PROGRESS NOTES
Daily Note     Today's date: 2023  Patient name: Tamara Mathews  : 1970  MRN: [id number]  Referring provider: Toni Kaur PA-C  Dx:   Encounter Diagnosis     ICD-10-CM    1. Lateral epicondylitis of right elbow  M77.11               Start Time: 1030  Stop Time: 1115  Total time in clinic (min): 45 minutes    Subjective: It was really sore and N/T on Tuesday after a lot of driving. Objective: See treatment diary below      Assessment: No sticking of the LF with tendon glides. Increased scar tissue along A1 pulley of LF as well as over the pronator teres, which may be causing increased pressure on the ulnar nerve. Cont with skilled services. Plan: Continue per plan of care.       Precautions: Universal      Manuals 10/31 11/7 11/9 11/16         IASTM 10 18' 15' 20         NEG PREss 5  5'          KT                          Neuro Re-Ed             TGE  Straight to table top 10-15x    Straight to hook 10-15x    Straight to gentle fist 10-15x Straight to table top 20x    Straight to hook 20x    Straight to gentle fist 20x 20x         EDC    Y gummy 3x10 Y gummy 3x10                                                                          Ther Ex             HEP 10            Scap ADD  GTB 3x10 GTB 3x10; IR 3x10; ER 3x10 GTB 3x10; IR 3x10; ER 3x10         Wrist Ecc   1# 3x10n 2# 3x10 2# 3x10         Wrist stretches  3x 30s 3x30s 3x30s         NGs  UNGs 10x    [id number]  UNGs 20x         Wall walking   TB 10x TB x10          WBing  Ball on wall CW/CCW30x Ball on wall 30x30 2# ball on wall CW CCW 30x30         Grasp/Pinch  RPW center 3x10    Pinch YR tip pinch 2x RPW [id number]      Pinch RG key 2x              Ther Activity                                       Gait Training                                       Modalities             MHP 5' 5' 5' 5'

## 2023-11-21 ENCOUNTER — OFFICE VISIT (OUTPATIENT)
Dept: OCCUPATIONAL THERAPY | Age: 53
End: 2023-11-21
Payer: COMMERCIAL

## 2023-11-21 DIAGNOSIS — M77.11 LATERAL EPICONDYLITIS OF RIGHT ELBOW: Primary | ICD-10-CM

## 2023-11-21 PROCEDURE — 97140 MANUAL THERAPY 1/> REGIONS: CPT

## 2023-11-21 PROCEDURE — 97110 THERAPEUTIC EXERCISES: CPT

## 2023-11-21 PROCEDURE — 97010 HOT OR COLD PACKS THERAPY: CPT

## 2023-11-21 NOTE — PROGRESS NOTES
Daily Note     Today's date: 2023  Patient name: Jess Jordan  : 1970  MRN: [id number]  Referring provider: Dahiana Barton PA-C  Dx:   Encounter Diagnosis     ICD-10-CM    1. Lateral epicondylitis of right elbow  M77.11                            Subjective: Continue to have N/T with activity, is being adherent to HEP. Objective: See treatment diary below      Assessment: Did well with strengthening upgrades. No neutral tension felt with manual nerve glides in either the median or ulnar nerve. Min-Mod scar tissue noted in the flexor compartment of the forearm as well as around the A1 pulley of the LF. No pain reported with PREs. Plan: Going to see the doctor next week, will discuss next steps.       Precautions: Universal      Manuals 10/31 11/7 11/9 11/16 11/21        IASTM 10 18' 15' 20 13        NEG PREss 5  5'          KT             Nerve Glides     5        Neuro Re-Ed             TGE  Straight to table top 10-15x    Straight to hook 10-15x    Straight to gentle fist 10-15x Straight to table top 20x    Straight to hook 20x    Straight to gentle fist 20x 20x         EDC    Y gummy 3x10 Y gummy 3x10 Y gummy 30x                                                                         Ther Ex             HEP 10            Scap ADD  GTB 3x10 GTB 3x10; IR 3x10; ER 3x10 GTB 3x10; IR 3x10; ER 3x10 BTB 3x10; IR 3x10; ER 3x10        Wrist Ecc   1# 3x10n 2# 3x10 2# 3x10 3# 3x10        Wrist stretches  3x 30s 3x30s 3x30s 3x30s        NGs  UNGs 10x    [id number]  UNGs 20x         Wall walking   TB 10x TB x10          WBing  Ball on wall CW/CCW30x Ball on wall 30x30 2# ball on wall CW CCW 30x30 2# ball on wall 30x        Grasp/Pinch  RPW center 3x10    Pinch YR tip pinch 2x RPW [id number]      Pinch RG key 2x              Ther Activity                                       Gait Training                                       Modalities             MHP 5' 5' 5' 5' 5

## 2023-11-28 ENCOUNTER — APPOINTMENT (OUTPATIENT)
Dept: OCCUPATIONAL THERAPY | Age: 53
End: 2023-11-28
Payer: COMMERCIAL

## 2023-11-28 ENCOUNTER — OFFICE VISIT (OUTPATIENT)
Dept: OBGYN CLINIC | Facility: CLINIC | Age: 53
End: 2023-11-28
Payer: COMMERCIAL

## 2023-11-28 ENCOUNTER — TELEPHONE (OUTPATIENT)
Dept: OBGYN CLINIC | Facility: CLINIC | Age: 53
End: 2023-11-28

## 2023-11-28 VITALS — HEIGHT: 68 IN | BODY MASS INDEX: 27.89 KG/M2 | WEIGHT: 184 LBS

## 2023-11-28 DIAGNOSIS — G56.21 CUBITAL TUNNEL SYNDROME ON RIGHT: ICD-10-CM

## 2023-11-28 DIAGNOSIS — M65.331 TRIGGER FINGER, RIGHT MIDDLE FINGER: ICD-10-CM

## 2023-11-28 DIAGNOSIS — G56.01 CARPAL TUNNEL SYNDROME ON RIGHT: ICD-10-CM

## 2023-11-28 PROCEDURE — 99214 OFFICE O/P EST MOD 30 MIN: CPT | Performed by: STUDENT IN AN ORGANIZED HEALTH CARE EDUCATION/TRAINING PROGRAM

## 2023-11-28 NOTE — PROGRESS NOTES
ORTHOPAEDIC HAND, WRIST, AND ELBOW OFFICE  VISIT       ASSESSMENT/PLAN:      Diagnoses and all orders for this visit:    Carpal tunnel syndrome on right  -     Case request operating room: [facility], RELEASE TRIGGER FINGER - RIGHT MIDDLE, RELEASE CARPAL TUNNEL ENDOSCOPIC; Standing  -     [facility]; Future  -     CBC and differential; Future  -     Basic metabolic panel; Future  -     EKG 12 lead; Future  -     Case request operating room: RELEASE CUBITAL TUNNEL WITH POSSIBLE ULNAR NERVE TRANSPOSITION, RELEASE TRIGGER FINGER - RIGHT MIDDLE, RELEASE CARPAL TUNNEL ENDOSCOPIC    Cubital tunnel syndrome on right  -     Case request operating room: RELEASE CUBITAL TUNNEL WITH POSSIBLE ULNAR NERVE TRANSPOSITION, RELEASE TRIGGER FINGER - RIGHT MIDDLE, RELEASE CARPAL TUNNEL ENDOSCOPIC; Standing  -     [facility]; Future  -     CBC and differential; Future  -     Basic metabolic panel; Future  -     EKG 12 lead; Future  -     Case request operating room: RELEASE CUBITAL TUNNEL WITH POSSIBLE ULNAR NERVE TRANSPOSITION, RELEASE TRIGGER FINGER - RIGHT MIDDLE, RELEASE CARPAL TUNNEL ENDOSCOPIC    Trigger finger, right middle finger  -     Case request operating room: RELEASE CUBITAL TUNNEL WITH POSSIBLE ULNAR NERVE TRANSPOSITION, RELEASE TRIGGER FINGER - RIGHT MIDDLE, RELEASE CARPAL TUNNEL ENDOSCOPIC; Standing  -     [facility]; Future  -     CBC and differential; Future  -     Basic metabolic panel; Future  -     EKG 12 lead;  Future  -     Case request operating room: RELEASE CUBITAL TUNNEL WITH POSSIBLE ULNAR NERVE TRANSPOSITION, RELEASE TRIGGER FINGER - RIGHT MIDDLE, RELEASE CARPAL TUNNEL ENDOSCOPIC    Other orders  -     Diet NPO; Sips with meds; Standing  -     Nursing Communication [address] [facility] Interventions Implemented; Standing  -     Nursing Communication [facility] bath, have staff wash entire body (neck down) per pre-op bathing protocol. Routine, evening prior to, and day of surgery.; Standing  -     Nursing Communication Swab both nares with Povidone-Iodine solution, EXCLUDE if patient has shellfish/Iodine allergy. Routine, day of surgery, on call to OR; Standing  -     Void on call to OR; Standing  -     Insert peripheral IV; Standing  -     ceFAZolin (ANCEF) 2,000 mg in dextrose 5 % 100 mL IVPB          47 y.o. female with right carpal and cubital tunnel syndrome and trigger finger of the right long finger  Treatment options and expected outcomes were discussed. The patient verbalized understanding of exam findings and treatment plan. The patient was given the opportunity to ask questions. Questions were answered to the patient's satisfaction. We discussed that it is reasonable to obtain an EMG to determine the severity of her ulnar neuropathy, which she deferred at this time. The patient decided to move forward with carpal and cubital tunnel releases and trigger finger release procedures via shared decision making. Her desired surgical date is early January      Follow Up: After surgery       To Do Next Visit:  Splint off and Sutures out      Discussions:  Open Carpal Tunnel Release: The anatomy and physiology of carpal tunnel syndrome was discussed with the patient today. Increase pressure localized under the transverse carpal ligament can cause pain, numbness, tingling, or dysesthesias within the median nerve distribution as well as feelings of fatigue, clumsiness, or awkwardness. These symptoms typically occur at night and worse in the morning upon waking. Eventually, untreated carpal tunnel syndrome can result in weakness and permanent loss of muscle within the thenar compartment of the hand. Treatment options were discussed with the patient.   Conservative treatment includes nocturnal resting splints to keep the nerve in a neutral position, ergonomic changes within the work or home environment, activity modification, and tendon gliding exercises. Vitamin B6 one tablet daily over the counter may helpful to reduce symptoms. Steroid injections within the carpal canal can help a majority of patients, however this is often self-limited in a majority of patients. Surgical intervention to divide the transverse carpal ligament typically results in a long-lasting relief of the patient's complaints, with the recurrence rate of less than 1%. The patient has elected to undergo an open carpal tunnel release. The palmar incision technique was discussed in the office with the patient today. In the postoperative period, light activities are allowed immediately, driving is allowed when narcotic medication has stopped, and the bandages may be removed and incision may get wet after 2 days. Heavy activities (lifting more than approximately 10 pounds) will be allowed after follow up appointment in 1-2 weeks. While night symptoms (waking from sleep, pain, and discomfort in the hands) generally improves rapidly, the numbness and tingling as well as the strength will slowly improve over weeks to months depending on the chronicity and severity of the carpal tunnel syndrome. Pillar pain and scar discomfort were discussed with the patient which are self-limiting conditions. The risks of bleeding and infection from the surgery are less than 1%. Risk of recurrence is approximately 0.5%. The risks of nerve injury or nerve damage or damage to the blood vessels is approximately 1 in 1200. The patient has an understanding of the above mentioned discussion. The risks and benefits of the procedure were explained to the patient, which include, but are not limited to: Bleeding, infection, recurrence, pain, scar, damage to tendons, damage to nerves, and damage to blood vessels, failure to give desired results and complications related to anesthesia.   These risks, along with alternative conservative treatment options, and postoperative protocols were voiced back and understood by the patient. All questions were answered to the patient's satisfaction. The patient agrees to comply with a standard postoperative protocol, and is willing to proceed. Education was provided via written and auditory forms. There were no barriers to learning. Written handouts regarding wound care, incision and scar care, and general preoperative information was provided to the patient. Prior to surgery, the patient may be requested to stop all anti-inflammatory medications. Prophylactic aspirin, Plavix, and Coumadin may be allowed to be continued. Medications including vitamin E., ginkgo, and fish oil are requested to be stopped approximately one week prior to surgery. Cubital Tunnel Release: The anatomy and physiology of cubital tunnel syndrome were discussed with the patient today in the office. Typically, increased elbow flexion activities decrease blood flow within the intraneural spaces, resulting in a feeling of numbness, tingling, weakness, or clumsiness within the hand and fingers. Occasionally, anatomic structures such as medial elbow osteophytes, the medial head of the triceps, were subluxing ulnar nerve may result in increased pressure or aggravation at the cubital tunnel. Typical signs and symptoms usually include numbness and tingling within the ring and small finger, weakness with , and weakness with pinch. Conservative treatment and includes nocturnal bracing to keep the elbow in a semi-extended position, activity modification, therapy, and avoiding excessive elbow flexion activities. A majority of patients typically respond to conservative treatment over a period of approximately 3-6 months. Vitamin B6 one tablet daily over the counter may helpful to reduce symptoms. EMG/NCV testing of the ulnar nerve at the elbow is not as reliable as carpal tunnel syndrome.   Surgical intervention in the form of in situ release of the ulnar nerve at the elbow or ulnar nerve transposition may be required in up to 20% of patients. The patient has elected to undergo cubital tunnel release. The possibility of converting to a subcutaneous or submuscular ulnar nerve transposition depending on the nerve stability was discussed with the patient. Typically, in the postoperative period, light activities are allowed immediately, driving is allowed when narcotic medications have stopped, and the incision may get wet after 5 days. Heavy activities will be allowed after follow up appointment in 1-2 weeks. While the pain within the ring and small finger of the hands generally improves rapidly, the numbness and tingling, as well as the strength, will slowly improve over a period of weeks to months. Total recovery can take up to 18 months from the time of surgery. Numbness and tingling near the incision, or near the medial aspect of the forearm was discussed with the patient. The patient has an understanding of the above mentioned discussion. The risks and benefits of the procedure were explained to the patient, which include, but are not limited to: Bleeding, infection, recurrence, pain, scar, damage to tendons, damage to nerves, and damage to blood vessels, failure to give desired results and complications related to anesthesia. These risks, along with alternative conservative treatment options, and postoperative protocols were voiced back and understood by the patient. All questions were answered to the patient's satisfaction. The patient agrees to comply with a standard postoperative protocol, and is willing to proceed. Education was provided via written and auditory forms. There were no barriers to learning. Written handouts regarding wound care, incision and scar care, and general preoperative information was provided to the patient. Prior to surgery, the patient may be requested to stop all anti-inflammatory medications.   Prophylactic aspirin, Plavix, and Coumadin may be allowed to be continued. Medications including vitamin E., ginkgo, and fish oil are requested to be stopped approximately one week prior to surgery. Hypertensive medications and beta blockers, if taken, should be continued. Vitamin B6 one tablet daily over the counter may helpful to reduce symptoms. Trigger Finger Release: The anatomy and physiology of trigger finger was discussed with the patient today in the office. Edema and increased contact pressure within the flexor tendons at the A1 pulley can cause pain, crepitation, and limitation of function. Treatment options include resting MP blocking splints to decrease edema, oral anti-inflammatory medications, home or formal therapy exercises, up to 2 steroid injections or surgical release. While majority of patients do respond to conservative treatment, up to 20% may require surgical release. The patient has elected release of the trigger finger. The patient has elected to undergo a release of the A1 pulley (trigger finger). A small incision will be made over the palmar aspect of the hand, the tendon sheath holding the flexor tendons will be released. In the postoperative period, light activities are allowed immediately, driving is allowed when narcotic medication has stopped, and the incision may get wet after 2 days. Heavy activities (lifting more than approximately 10 pounds) will be allowed after the follow up appointment in 1-2 weeks. While the pain and discomfort within the wrist typically improves rapidly, some residual discomfort may be present for up to 6 weeks. The nodule that is typically palpable in the palmar aspect of the hand will not be removed, as this would necessitate removal of a portion of the flexor tendon, however the catching, clicking, and locking should resolve. Approximate success rate is 98%. The risks and benefits of the procedure were explained to the patient, which include, but are not limited to: Bleeding, infection, recurrence, pain, scar, damage to tendons, damage to nerves, and damage to blood vessels, need for future surgery and complications related to anesthesia. If bony work is done, risks also include malunion and nonunion. These risks, along with alternative conservative treatment options, and postoperative protocols were voiced back and understood by the patient. All questions were answered to the patient's satisfaction. The patient agrees to comply with a standard postoperative protocol, and is willing to proceed. Education was provided via written and auditory forms. There were no barriers to learning. Written handouts regarding wound care, incision and scar care, and general preoperative information, as well as risks and benefits were provided to the patient. Jamie Gama MD  Attending, Orthopaedic Surgery  Hand, Wrist, and Elbow Surgery  [facility]    ____________________________________________________________________________________________________________________________________________      CHIEF COMPLAINT:  Chief Complaint   Patient presents with   • Right Wrist - Follow-up   • Right Elbow - Follow-up       SUBJECTIVE:  Patient is a 47 y.o. RHD female who presents today for follow up of right cubital and carpal tunnel syndrome, as well as trigger finger of the right long finger. She has been attending occupational hand therapy, which she feels has worsened her symptoms. She reports the pain at her elbow has increased. Her right long finger is now locking and she is unable to perform ADLs without the finger triggering. She is interested in discussing surgical treatment options at today's visit.         I have personally reviewed all the relevant PMH, PSH, SH, FH, Medications and allergies      PAST MEDICAL HISTORY:  Past Medical History:   Diagnosis Date   • Allergic    • Arthritis    • BRCA negative    • Colon polyp    • Endometriosis • IBS (irritable bowel syndrome)    • Insomnia    • Night sweats    • Psoriasis    • Skin tag    • Wears glasses        PAST SURGICAL HISTORY:  Past Surgical History:   Procedure Laterality Date   • BREAST BIOPSY Right [id number]    ID[facility]   •  SECTION     • LAPAROSCOPY      exploratory, endometriosis   • LYMPHADENECTOMY Right     x2   • MASTECTOMY      bilateral mastectomy   • [place] NIPPLE/AREOLA RECONSTRUCTION Bilateral 10/13/2016    Procedure: NIPPLE RECONSTRUCTION ;  Surgeon: Beni Ellis MD;  Location: [facility];  Service: Plastics   • [place] REVISION OF RECONSTRUCTED BREAST Bilateral 3/9/2016    Procedure: RECONSTRUCTION REVISION  BREAST MOUND ,FAT GRAFTS ;  Surgeon: Beni Ellis MD;  Location: [facility];  Service: Plastics   • SENTINEL LYMPH NODE BIOPSY Right        FAMILY HISTORY:  Family History   Problem Relation Age of Onset   • Lung cancer Paternal Grandfather 48   • Breast cancer Other    • Thyroid cancer Mother 36   • Breast cancer Mother 76   • Psoriasis Father    • Basal cell carcinoma Father    • Heart disease Other    • Prostate cancer Other        SOCIAL HISTORY:  Social History     Tobacco Use   • Smoking status: Never   • Smokeless tobacco: Never   Vaping Use   • Vaping Use: Never used   Substance Use Topics   • Alcohol use: Yes     Comment: social   • Drug use: Never       MEDICATIONS:    Current Outpatient Medications:   •  Ashwagandha 125 MG CAPS, Take by mouth, Disp: , Rfl:   •  Calcium 500 MG tablet, Take by mouth, Disp: , Rfl:   •  Cholecalciferol (VITAMIN D PO), Take 5,000 Units by mouth daily , Disp: , Rfl:   •  Magnesium 400 MG CAPS, Take 1 tablet by mouth daily. , Disp: , Rfl:   •  MILK THISTLE PO, Take 600 mg by mouth daily. , Disp: , Rfl:   •  Multiple Vitamins-Minerals (ONE DAILY MULTIVITAMIN ADULT) TABS, Take by mouth, Disp: , Rfl:   •  NON FORMULARY, , Disp: , Rfl:   •  Probiotic Product (PROBIOTIC DAILY PO), Take 2 tablets by mouth daily. , Disp: , Rfl:   •  secukinumab (Cosentyx Sensoready Pen) 150 mg/mL SOAJ injection, Inject 2 mL (300 mg total) under the skin every 30 (thirty) days, Disp: 2 mL, Rfl: 11  •  tamoxifen (NOLVADEX) 20 mg tablet, TAKE 1 TABLET BY MOUTH EVERY DAY, Disp: 30 tablet, Rfl: 7    ALLERGIES:  Allergies   Allergen Reactions   • Other Hives     Kait selzer   • Kait-Dudley Plus Cold & Cough  [[id number]-Jfr-Gu-Apap] Hives   • Prednisone Other (See Comments) and GI Intolerance     insomnia           REVIEW OF SYSTEMS:  Musculoskeletal:        As noted in HPI. All other systems reviewed and are negative. VITALS:  There were no vitals filed for this visit. LABS:  BMP:   Lab Results   Component Value Date    GLUCOSE 81 10/06/2015    CALCIUM 8.9 07/07/2022     10/06/2015    K 3.9 07/07/2022    CO2 28 07/07/2022     07/07/2022    BUN 11 07/07/2022    CREATININE 0.95 07/07/2022       _____________________________________________________  PHYSICAL EXAMINATION:  General: Well developed and well nourished, alert & oriented x 3, appears comfortable  Psychiatric: Normal  HEENT: Normocephalic, Atraumatic Trachea Midline, No torticollis  Pulmonary: No audible wheezing or respiratory distress   Abdomen/GI: Non tender, non distended   Cardiovascular: No pitting edema, 2+ radial pulse   Skin: No masses, erythema, lacerations, fluctation, ulcerations  Neurovascular: Decreased Sensation to  the Ulnar Nerve, Decreased Sensation to  the Radial Nerve, Motor Intact to the Median, Ulnar, Radial Nerve, and Pulses Intact  Musculoskeletal: Normal, except as noted in detailed exam and in HPI. MUSCULOSKELETAL EXAMINATION:  Right Ulnar Nerve Exam:     Negative intrinsic atrophy. Negative  deformity at the elbow. Full range of motion with flexion and extension of the elbow. Positive ulnar nerve compression test at the elbow. Positive tinels over the ulnar nerve at the elbow. Negative cross finger test in the index and long fingers.    FDP strength is 5/5 to the ring finger. FDP strength is 5/5 to the small finger. Intrinsic strength 5/5 . Right Carpal Tunnel Exam:     Negative thenar atrophy. Positive phalen's test. Positive carpal tunnel compression. Positive tinels over median nerve at the wrist for ulnar 3 digits. Opposition strength 5/5. Abduction strength 5/5.       right middle finger:  Negative palpable nodule over the A1 pulley. Positive tenderness to palpation over A1 pulley. Positive catching. Positive clicking. Right Elbow:     No swelling or deformity  Full range of motion with flexion, extension, supination and pronation. Positive tenderness to palpation over the Lateral and Medical Epicondyles. No pain with passive flexion of the wrist with elbow fully extended. Pain with resisted wrist flexion and extension with elbow fully extended. Pain with resisted forearm supination/pronation with the elbow fully extended.  ___________________________________________________  STUDIES REVIEWED:  Ultrasound report was reviewed and demonstrates Enlarged ulnar nerve near/within cubital tunnel suspicious for ulnar neuropathy/cubital tunnel syndrome. With elbow flexion, patient did have a palpable click over the medial epicondyle, which on ultrasound was shown to be subcutaneous fascial tissue passing over the medial epicondyle, rather than ulnar nerve subluxation.           PROCEDURES PERFORMED:  No Procedures performed today    _____________________________________________________      Deshaun Romo    I,:  Enrique Haider am acting as a scribe while in the presence of the attending physician.:       I,:  Parth Candelario MD personally performed the services described in this documentation    as scribed in my presence.:

## 2024-01-11 ENCOUNTER — TELEPHONE (OUTPATIENT)
Dept: OBGYN CLINIC | Facility: CLINIC | Age: 54
End: 2024-01-11

## 2024-01-12 ENCOUNTER — TELEPHONE (OUTPATIENT)
Dept: HEMATOLOGY ONCOLOGY | Facility: CLINIC | Age: 54
End: 2024-01-12

## 2024-01-12 ENCOUNTER — APPOINTMENT (OUTPATIENT)
Dept: LAB | Facility: CLINIC | Age: 54
End: 2024-01-12
Payer: COMMERCIAL

## 2024-01-12 ENCOUNTER — TELEPHONE (OUTPATIENT)
Dept: SURGICAL ONCOLOGY | Facility: CLINIC | Age: 54
End: 2024-01-12

## 2024-01-12 DIAGNOSIS — G56.21 CUBITAL TUNNEL SYNDROME ON RIGHT: ICD-10-CM

## 2024-01-12 DIAGNOSIS — M65.331 TRIGGER FINGER, RIGHT MIDDLE FINGER: ICD-10-CM

## 2024-01-12 DIAGNOSIS — G56.01 CARPAL TUNNEL SYNDROME ON RIGHT: ICD-10-CM

## 2024-01-12 LAB
ANION GAP SERPL CALCULATED.3IONS-SCNC: 5 MMOL/L
ATRIAL RATE: 81 BPM
BASOPHILS # BLD AUTO: 0.04 THOUSANDS/ÂΜL (ref 0–0.1)
BASOPHILS NFR BLD AUTO: 1 % (ref 0–1)
BUN SERPL-MCNC: 11 MG/DL (ref 5–25)
CALCIUM SERPL-MCNC: 9.1 MG/DL (ref 8.4–10.2)
CHLORIDE SERPL-SCNC: 105 MMOL/L (ref 96–108)
CO2 SERPL-SCNC: 29 MMOL/L (ref 21–32)
CREAT SERPL-MCNC: 0.8 MG/DL (ref 0.6–1.3)
EOSINOPHIL # BLD AUTO: 0.14 THOUSAND/ÂΜL (ref 0–0.61)
EOSINOPHIL NFR BLD AUTO: 2 % (ref 0–6)
ERYTHROCYTE [DISTWIDTH] IN BLOOD BY AUTOMATED COUNT: 14.2 % (ref 11.6–15.1)
GFR SERPL CREATININE-BSD FRML MDRD: 84 ML/MIN/1.73SQ M
GLUCOSE SERPL-MCNC: 83 MG/DL (ref 65–140)
HCT VFR BLD AUTO: 38.2 % (ref 34.8–46.1)
HGB BLD-MCNC: 12.3 G/DL (ref 11.5–15.4)
IMM GRANULOCYTES # BLD AUTO: 0.02 THOUSAND/UL (ref 0–0.2)
IMM GRANULOCYTES NFR BLD AUTO: 0 % (ref 0–2)
LYMPHOCYTES # BLD AUTO: 1.27 THOUSANDS/ÂΜL (ref 0.6–4.47)
LYMPHOCYTES NFR BLD AUTO: 20 % (ref 14–44)
MCH RBC QN AUTO: 29.9 PG (ref 26.8–34.3)
MCHC RBC AUTO-ENTMCNC: 32.2 G/DL (ref 31.4–37.4)
MCV RBC AUTO: 93 FL (ref 82–98)
MONOCYTES # BLD AUTO: 0.36 THOUSAND/ÂΜL (ref 0.17–1.22)
MONOCYTES NFR BLD AUTO: 6 % (ref 4–12)
NEUTROPHILS # BLD AUTO: 4.67 THOUSANDS/ÂΜL (ref 1.85–7.62)
NEUTS SEG NFR BLD AUTO: 71 % (ref 43–75)
NRBC BLD AUTO-RTO: 0 /100 WBCS
P AXIS: 31 DEGREES
PLATELET # BLD AUTO: 228 THOUSANDS/UL (ref 149–390)
PMV BLD AUTO: 9.7 FL (ref 8.9–12.7)
POTASSIUM SERPL-SCNC: 3.6 MMOL/L (ref 3.5–5.3)
PR INTERVAL: 176 MS
QRS AXIS: 50 DEGREES
QRSD INTERVAL: 76 MS
QT INTERVAL: 366 MS
QTC INTERVAL: 425 MS
RBC # BLD AUTO: 4.12 MILLION/UL (ref 3.81–5.12)
SODIUM SERPL-SCNC: 139 MMOL/L (ref 135–147)
T WAVE AXIS: 81 DEGREES
VENTRICULAR RATE: 81 BPM
WBC # BLD AUTO: 6.5 THOUSAND/UL (ref 4.31–10.16)

## 2024-01-12 PROCEDURE — 36415 COLL VENOUS BLD VENIPUNCTURE: CPT

## 2024-01-12 PROCEDURE — 80048 BASIC METABOLIC PNL TOTAL CA: CPT

## 2024-01-12 PROCEDURE — 85025 COMPLETE CBC W/AUTO DIFF WBC: CPT

## 2024-01-12 NOTE — TELEPHONE ENCOUNTER
Called patient to let her now that her appointment on 1/17 had to be rescheduled due to schedule change. There was no answer so left message with reason for call and new appointment date.

## 2024-01-12 NOTE — TELEPHONE ENCOUNTER
Appointment Change  Cancel, Reschedule, Change to Virtual      Who are you speaking with? Patient   If it is not the patient, is the caller listed on the communication consent form? Yes   Which provider is the appointment scheduled with? Dr. Meza   When was the original appointment scheduled?    Please list date and time 3/5   At which location is the appointment scheduled to take place? [place]   Was the appointment rescheduled?     Was the appointment changed from an in person visit to a virtual visit?    If so, please list the details of the change. 2/27 8:20am   What is the reason for the appointment change? SIMRAN and job conflict       Was STAR transport scheduled? No   Does STAR transport need to be scheduled for the new visit (if applicable) No   Does the patient need an infusion appointment rescheduled? No   Does the patient have an upcoming infusion appointment scheduled? If so, when? No   Is the patient undergoing chemotherapy? No   For appointments cancelled with less than 24 hours:  Was the no-show policy reviewed? Yes

## 2024-01-18 ENCOUNTER — CONSULT (OUTPATIENT)
Dept: FAMILY MEDICINE CLINIC | Facility: CLINIC | Age: 54
End: 2024-01-18
Payer: COMMERCIAL

## 2024-01-18 VITALS
HEIGHT: 68 IN | SYSTOLIC BLOOD PRESSURE: 140 MMHG | OXYGEN SATURATION: 98 % | DIASTOLIC BLOOD PRESSURE: 82 MMHG | WEIGHT: 198 LBS | HEART RATE: 81 BPM | BODY MASS INDEX: 30.01 KG/M2 | TEMPERATURE: 98.5 F

## 2024-01-18 DIAGNOSIS — Z01.818 PREOPERATIVE EXAMINATION: Primary | ICD-10-CM

## 2024-01-18 DIAGNOSIS — G56.21 CUBITAL TUNNEL SYNDROME ON RIGHT: ICD-10-CM

## 2024-01-18 DIAGNOSIS — M65.331 TRIGGER FINGER, RIGHT MIDDLE FINGER: ICD-10-CM

## 2024-01-18 DIAGNOSIS — G56.01 CARPAL TUNNEL SYNDROME ON RIGHT: ICD-10-CM

## 2024-01-18 PROCEDURE — 99214 OFFICE O/P EST MOD 30 MIN: CPT | Performed by: NURSE PRACTITIONER

## 2024-01-18 NOTE — PROGRESS NOTES
PRE-OPERATIVE EVALUATION  [facility] PHYSICIAN GROUP - [facility]    NAME: Millie Moyer  AGE: 54 y.o. SEX: female  : 1970     DATE: 2024    Pre-Operative Evaluation:     Chief Complaint: Pre-operative Evaluation     Surgery: Release cubital tunnel with possible ulner nerve transposition (right elbow), release trigger finger - right middle, relase carpal tunnel endoscopic right wrist  Anticipated Date of Surgery: 2024  Referring Provider:  Dr Kennedy Garcia MD      History of Present Illness:     Millie Moyer is a 54 y.o. female who presents to the office today for a preoperative consultation at the request of surgeon, Dr Kennedy Garcia MD, who plans on performing release cubital tunnel with possible ulner nerve transposition (right elbow), release trigger finger - right middle, relase carpal tunnel endoscopic right wrist on 2024. Planned anesthesia is regional and IV sedation. Patient has a bleeding risk of: no recent abnormal bleeding, no remote history of abnormal bleeding, and no use of Ca-channel blockers. Patient does not have objections to receiving blood products if needed. Current anti-platelet/anti-coagulation medications that the patient is prescribed includes:  None .        Assessment of Cardiac Risk:  Denies unstable or severe angina or MI in the last 6 weeks or history of stent placement in the last year   Denies decompensated heart failure (e.g. New onset heart failure, NYHA functional class IV heart failure, or worsening existing heart failure)  Denies significant arrhythmias such as high grade AV block, symptomatic ventricular arrhythmia, newly recognized ventricular tachycardia, supraventricular tachycardia with resting heart rate >100, or symptomatic bradycardia  Denies severe heart valve disease including aortic stenosis or symptomatic mitral stenosis     Exercise Capacity:  Able to walk 4 blocks without symptoms?: Yes  Able to  walk 2 flights without symptoms?: Yes    Prior Anesthesia Reactions: Post operative nausea and vomiting     Personal history of venous thromboembolic disease? No    History of steroid use for >2 weeks within last year? No    STOP-BANG Sleep Apnea Screening Questionnaire:      Do you SNORE loudly (louder than talking or loud enough to be heard through closed doors)? Yes = 1 point   Do you often feel TIRED, fatigued, or sleepy during daytime? No = 0 point   Has anyone OBSERVED you stop breathing during your sleep? No = 0 point   Do you have or are you being treated for high blood pressure? No = 0 point   BMI more than 35 kg/m2? No = 0 point   AGE over 50 years old? Yes = 1 point   NECK circumference > 16 inches (40 cm)? No = 0 point   Male GENDER? No = 0 point   TOTAL SCORE 1 = LOW risk of ANTHONY       Review of Systems:     Review of Systems   Constitutional:  Negative for chills, fatigue and fever.   HENT:  Negative for dental problem, ear pain, hearing loss, nosebleeds, sneezing, sore throat, tinnitus and trouble swallowing.    Respiratory:  Negative for cough, chest tightness, shortness of breath and wheezing.    Cardiovascular:  Negative for chest pain, palpitations and leg swelling.   Gastrointestinal:  Negative for abdominal distention, abdominal pain, constipation, diarrhea and nausea.   Genitourinary: Negative.    Musculoskeletal:  Negative for arthralgias, back pain, myalgias and neck pain.   Skin:  Negative for color change and rash.   Allergic/Immunologic: Negative for environmental allergies.   Neurological:  Negative for dizziness, weakness, light-headedness and headaches.   Hematological: Negative.    Psychiatric/Behavioral: Negative.  Negative for dysphoric mood and sleep disturbance. The patient is not nervous/anxious.         Problem List:     Patient Active Problem List   Diagnosis   • Irritable bowel syndrome (IBS)   • Malignant neoplasm of upper-inner quadrant of right breast in female, estrogen  receptor positive    • Psoriasis   • Seasonal allergies   • BRCA negative   • Use of tamoxifen (Nolvadex)   • Seborrheic keratosis   • Bilateral plantar fasciitis   • Dyspepsia   • Monoallelic mutation of MUTYH gene   • COVID-19 vaccination not done   • Abnormal MRI, breast   • Carpal tunnel syndrome on right   • Lateral epicondylitis of right elbow   • Medial epicondylitis of elbow, right        Allergies:     Allergies   Allergen Reactions   • Other Hives     Kait selzer   • Kait-[place] Plus Cold & Cough  [Ofrbxkvej-Cvq-Sl-Apap] Hives   • Prednisone Other (See Comments) and GI Intolerance     insomnia        Current Medications:       Current Outpatient Medications:   •  Ashwagandha 125 MG CAPS, Take by mouth, Disp: , Rfl:   •  Cholecalciferol (VITAMIN D PO), Take 5,000 Units by mouth daily , Disp: , Rfl:   •  Magnesium 400 MG CAPS, Take 1 tablet by mouth daily., Disp: , Rfl:   •  MILK THISTLE PO, Take 600 mg by mouth daily., Disp: , Rfl:   •  Multiple Vitamins-Minerals (ONE DAILY MULTIVITAMIN ADULT) TABS, Take by mouth, Disp: , Rfl:   •  NON FORMULARY, , Disp: , Rfl:   •  Probiotic Product (PROBIOTIC DAILY PO), Take 2 tablets by mouth daily., Disp: , Rfl:   •  secukinumab (Cosentyx Sensoready Pen) 150 mg/mL SOAJ injection, Inject 2 mL (300 mg total) under the skin every 30 (thirty) days, Disp: 2 mL, Rfl: 11  •  tamoxifen (NOLVADEX) 20 mg tablet, TAKE 1 TABLET BY MOUTH EVERY DAY, Disp: 30 tablet, Rfl: 7     Past History:     Past Medical History:   Diagnosis Date   • Allergic    • Arthritis    • BRCA negative    • Colon polyp    • Endometriosis    • IBS (irritable bowel syndrome)    • Insomnia    • Night sweats    • Psoriasis    • Skin tag    • Wears glasses         Past Surgical History:   Procedure Laterality Date   • BREAST BIOPSY Right     IDC   •  SECTION     • LAPAROSCOPY      exploratory, endometriosis   • LYMPHADENECTOMY Right     x2   • MASTECTOMY      bilateral mastectomy   • LA  "NIPPLE/AREOLA RECONSTRUCTION Bilateral 10/13/2016    Procedure: NIPPLE RECONSTRUCTION ;  Surgeon: Tulio Wilson MD;  [facility];  Service: Plastics   • CT REVISION OF RECONSTRUCTED BREAST Bilateral 3/9/2016    Procedure: RECONSTRUCTION REVISION  BREAST MOUND ,FAT GRAFTS ;  Surgeon: Tulio Wilson MD;  [facility];  Service: Plastics   • SENTINEL LYMPH NODE BIOPSY Right         Family History   Problem Relation Age of Onset   • Lung cancer Paternal Grandfather 50   • Breast cancer Other    • Thyroid cancer Mother 40   • Breast cancer Mother 74   • Psoriasis Father    • Basal cell carcinoma Father    • Heart disease Other    • Prostate cancer Other         Social History     Tobacco Use   • Smoking status: Never   • Smokeless tobacco: Never   Vaping Use   • Vaping status: Never Used   Substance Use Topics   • Alcohol use: Yes     Comment: social   • Drug use: Never        Physical Exam:      /82   Pulse 81   Temp 98.5 °F (36.9 °C)   Ht 5' 8\" (1.727 m)   Wt 89.8 kg (198 lb)   LMP 03/01/2022   SpO2 98%   BMI 30.11 kg/m² (Reviewed)    Physical Exam  Vitals reviewed.   Constitutional:       General: She is not in acute distress.     Appearance: She is well-developed and well-groomed. She is not ill-appearing.   HENT:      Head: Normocephalic and atraumatic.      Right Ear: External ear normal.      Left Ear: External ear normal.      Nose: Nose normal.      Mouth/Throat:      Mouth: Mucous membranes are moist.      Pharynx: Oropharynx is clear.   Eyes:      General: Lids are normal.      Extraocular Movements: Extraocular movements intact.      Conjunctiva/sclera: Conjunctivae normal.      Pupils: Pupils are equal, round, and reactive to light.   Neck:      Trachea: Trachea and phonation normal.   Cardiovascular:      Rate and Rhythm: Normal rate and regular rhythm.      Heart sounds: Normal heart sounds.   Pulmonary:      Effort: Pulmonary effort is normal.      Breath sounds: Normal breath " sounds.   Abdominal:      General: Abdomen is flat. Bowel sounds are normal. There is no distension.      Palpations: Abdomen is soft.      Tenderness: There is no abdominal tenderness.   Musculoskeletal:      Cervical back: Neck supple.   Lymphadenopathy:      Cervical: No cervical adenopathy.   Skin:     General: Skin is warm and dry.      Capillary Refill: Capillary refill takes less than 2 seconds.   Neurological:      Mental Status: She is alert and oriented to person, place, and time.      Cranial Nerves: Cranial nerves 2-12 are intact.      Motor: No weakness or abnormal muscle tone.   Psychiatric:         Mood and Affect: Mood normal.         Behavior: Behavior normal. Behavior is cooperative.         Data:     Pre-operative work-up    Laboratory Results: I have personally reviewed the pertinent laboratory results/reports     EKG: I have personally reviewed pertinent reports.          Assessment:     1. Preoperative examination        2. Carpal tunnel syndrome on right        3. Cubital tunnel syndrome on right        4. Trigger finger, right middle finger             Plan:     54 y.o. female with planned surgery:  Release cubital tunnel with possible ulner nerve transposition (right elbow), release trigger finger - right middle, relase carpal tunnel endoscopic right wrist.      Cardiac Risk Estimation: per the Revised Cardiac Risk Index (Circ. 100:1043, 1999), the patient's risk factors for cardiac complications include none , putting her in: RCI RISK CLASS I (0 risk factors, risk of major cardiac compl. appr. 0.5%).    RCRI  High Risk surgery?         1 Point  CAD History:         1 Point   MI; Positive Stress Test; CP due to Mi;  Nitrate Usage to control Angina; Pathologic Q wave on EKG  CHF Active:         1 Point   Pulm Edema; Paroxysmal Nocturnal Dyspnea;  Bibasilar Rales (crackles);S3; CHF on CXR  Cerebrovascular Disease (TIA or CVA):     1 Point  DM on Insulin:        1 Point  Serum Creat >2.0  mg/dl:       1 Point          Total Points: 0     Scorin: Class I, Very Low Risk (0.4%)     1: Class II, Low risk (0.9%)     2: Class III Moderate (6.6%)     3: Class IV High (>11%)    1. Further preoperative workup as follows:   - None; no further preoperative work-up is required    2. Medication Management/Recommendations:   - None, continue medication regimen including morning of surgery, with sip of water  - Patient has been instructed to avoid herbs or non-directed vitamins the week prior to surgery to ensure no drug interactions with perioperative surgical and anesthetic medications.  - Patient has been instructed to avoid aspirin containing medications or non-steroidal anti-inflammatory drugs for the week preceding surgery.    3. Prophylaxis for cardiac events with perioperative beta-blockers: not indicated.    4. Patient requires further consultation with: None    EDEN Risk:  GFR:   eGFR   Date Value Ref Range Status   2024 84 ml/min/1.73sq m Final       - If GFR>60, Hold ACE/ARB/Diuretic on the day of surgery, and NSAIDS 10 days before.  - If GFR<45, Consider PRE and POST op Nephrology Consult.  - If 46 <GFR> 59 : Has Patient had EDEN in last 6 Months? no   If YES: Preop Nephrology consult   If No:  Post Op Nephrology consult.    Clearance  Patient is CLEARED for surgery without any additional cardiac testing.     A copy of this evaluation was transmitted electronically or by fax to the requesting provider.     AGUSTINA Brumfield  [facility]  [address] [address]  Phone#  [phone]  Fax#  [phone]

## 2024-01-18 NOTE — LETTER
2024     Kennedy Garcia MD  [address] [facility] [address]  [address]  [facility] [address]    Patient: Millie Moyer   YOB: 1970   Date of Visit: 2024       Dear Dr. Garcia:    Thank you for referring Millie Moyer to me for evaluation. Below are my notes for this consultation.    If you have questions, please do not hesitate to call me. I look forward to following your patient along with you.         Sincerely,        AGUSTINA Brumfield        CC: No Recipients    AGUSTINA Brumfield  2024  9:35 AM  Sign when Signing Visit  PRE-OPERATIVE EVALUATION  [facility] PHYSICIAN GROUP [facility]    NAME: Millie Moyer  AGE: 54 y.o. SEX: female  : 1970     DATE: 2024    Pre-Operative Evaluation:     Chief Complaint: Pre-operative Evaluation     Surgery: Release cubital tunnel with possible ulner nerve transposition (right elbow), release trigger finger - right middle, relase carpal tunnel endoscopic right wrist  Anticipated Date of Surgery: 2024  Referring Provider:  Dr Kennedy Garcia MD      History of Present Illness:     Millie Moyer is a 54 y.o. female who presents to the office today for a preoperative consultation at the request of surgeon, Dr Kennedy Garcia MD, who plans on performing release cubital tunnel with possible ulner nerve transposition (right elbow), release trigger finger - right middle, relase carpal tunnel endoscopic right wrist on 2024. Planned anesthesia is regional and IV sedation. Patient has a bleeding risk of: no recent abnormal bleeding, no remote history of abnormal bleeding, and no use of Ca-channel blockers. Patient does not have objections to receiving blood products if needed. Current anti-platelet/anti-coagulation medications that the patient is prescribed includes:  None .        Assessment of Cardiac Risk:  Denies unstable or severe angina or MI in the last 6 weeks or history of  stent placement in the last year   Denies decompensated heart failure (e.g. New onset heart failure, NYHA functional class IV heart failure, or worsening existing heart failure)  Denies significant arrhythmias such as high grade AV block, symptomatic ventricular arrhythmia, newly recognized ventricular tachycardia, supraventricular tachycardia with resting heart rate >100, or symptomatic bradycardia  Denies severe heart valve disease including aortic stenosis or symptomatic mitral stenosis     Exercise Capacity:  Able to walk 4 blocks without symptoms?: Yes  Able to walk 2 flights without symptoms?: Yes    Prior Anesthesia Reactions: Post operative nausea and vomiting     Personal history of venous thromboembolic disease? No    History of steroid use for >2 weeks within last year? No    STOP-BANG Sleep Apnea Screening Questionnaire:      Do you SNORE loudly (louder than talking or loud enough to be heard through closed doors)? Yes = 1 point   Do you often feel TIRED, fatigued, or sleepy during daytime? No = 0 point   Has anyone OBSERVED you stop breathing during your sleep? No = 0 point   Do you have or are you being treated for high blood pressure? No = 0 point   BMI more than 35 kg/m2? No = 0 point   AGE over 50 years old? Yes = 1 point   NECK circumference > 16 inches (40 cm)? No = 0 point   Male GENDER? No = 0 point   TOTAL SCORE 1 = LOW risk of ANTHONY       Review of Systems:     Review of Systems   Constitutional:  Negative for chills, fatigue and fever.   HENT:  Negative for dental problem, ear pain, hearing loss, nosebleeds, sneezing, sore throat, tinnitus and trouble swallowing.    Respiratory:  Negative for cough, chest tightness, shortness of breath and wheezing.    Cardiovascular:  Negative for chest pain, palpitations and leg swelling.   Gastrointestinal:  Negative for abdominal distention, abdominal pain, constipation, diarrhea and nausea.   Genitourinary: Negative.    Musculoskeletal:  Negative for  arthralgias, back pain, myalgias and neck pain.   Skin:  Negative for color change and rash.   Allergic/Immunologic: Negative for environmental allergies.   Neurological:  Negative for dizziness, weakness, light-headedness and headaches.   Hematological: Negative.    Psychiatric/Behavioral: Negative.  Negative for dysphoric mood and sleep disturbance. The patient is not nervous/anxious.         Problem List:     Patient Active Problem List   Diagnosis   • Irritable bowel syndrome (IBS)   • Malignant neoplasm of upper-inner quadrant of right breast in female, estrogen receptor positive    • Psoriasis   • Seasonal allergies   • BRCA negative   • Use of tamoxifen (Nolvadex)   • Seborrheic keratosis   • Bilateral plantar fasciitis   • Dyspepsia   • Monoallelic mutation of MUTYH gene   • COVID-19 vaccination not done   • Abnormal MRI, breast   • Carpal tunnel syndrome on right   • Lateral epicondylitis of right elbow   • Medial epicondylitis of elbow, right        Allergies:     Allergies   Allergen Reactions   • Other Hives     Kait selzer   • Kait-[place] Plus Cold & Cough  [Aervpfunk-Oun-Cr-Apap] Hives   • Prednisone Other (See Comments) and GI Intolerance     insomnia        Current Medications:       Current Outpatient Medications:   •  Ashwagandha 125 MG CAPS, Take by mouth, Disp: , Rfl:   •  Cholecalciferol (VITAMIN D PO), Take 5,000 Units by mouth daily , Disp: , Rfl:   •  Magnesium 400 MG CAPS, Take 1 tablet by mouth daily., Disp: , Rfl:   •  MILK THISTLE PO, Take 600 mg by mouth daily., Disp: , Rfl:   •  Multiple Vitamins-Minerals (ONE DAILY MULTIVITAMIN ADULT) TABS, Take by mouth, Disp: , Rfl:   •  NON FORMULARY, , Disp: , Rfl:   •  Probiotic Product (PROBIOTIC DAILY PO), Take 2 tablets by mouth daily., Disp: , Rfl:   •  secukinumab (Cosentyx Sensoready Pen) 150 mg/mL SOAJ injection, Inject 2 mL (300 mg total) under the skin every 30 (thirty) days, Disp: 2 mL, Rfl: 11  •  tamoxifen (NOLVADEX) 20 mg tablet,  "TAKE 1 TABLET BY MOUTH EVERY DAY, Disp: 30 tablet, Rfl: 7     Past History:     Past Medical History:   Diagnosis Date   • Allergic    • Arthritis    • BRCA negative    • Colon polyp    • Endometriosis    • IBS (irritable bowel syndrome)    • Insomnia    • Night sweats    • Psoriasis    • Skin tag    • Wears glasses         Past Surgical History:   Procedure Laterality Date   • BREAST BIOPSY Right     IDC   •  SECTION     • LAPAROSCOPY      exploratory, endometriosis   • LYMPHADENECTOMY Right     x2   • MASTECTOMY      bilateral mastectomy   • SD NIPPLE/AREOLA RECONSTRUCTION Bilateral 10/13/2016    Procedure: NIPPLE RECONSTRUCTION ;  Surgeon: Tulio Wilson MD;  [facility];  Service: Plastics   • SD REVISION OF RECONSTRUCTED BREAST Bilateral 3/9/2016    Procedure: RECONSTRUCTION REVISION  BREAST MOUND ,FAT GRAFTS ;  Surgeon: Tulio Wilson MD;  [facility];  Service: Plastics   • SENTINEL LYMPH NODE BIOPSY Right         Family History   Problem Relation Age of Onset   • Lung cancer Paternal Grandfather 50   • Breast cancer Other    • Thyroid cancer Mother 40   • Breast cancer Mother 74   • Psoriasis Father    • Basal cell carcinoma Father    • Heart disease Other    • Prostate cancer Other         Social History     Tobacco Use   • Smoking status: Never   • Smokeless tobacco: Never   Vaping Use   • Vaping status: Never Used   Substance Use Topics   • Alcohol use: Yes     Comment: social   • Drug use: Never        Physical Exam:      /82   Pulse 81   Temp 98.5 °F (36.9 °C)   Ht 5' 8\" (1.727 m)   Wt 89.8 kg (198 lb)   LMP 2022   SpO2 98%   BMI 30.11 kg/m² (Reviewed)    Physical Exam  Vitals reviewed.   Constitutional:       General: She is not in acute distress.     Appearance: She is well-developed and well-groomed. She is not ill-appearing.   HENT:      Head: Normocephalic and atraumatic.      Right Ear: External ear normal.      Left Ear: External ear normal.      " Nose: Nose normal.      Mouth/Throat:      Mouth: Mucous membranes are moist.      Pharynx: Oropharynx is clear.   Eyes:      General: Lids are normal.      Extraocular Movements: Extraocular movements intact.      Conjunctiva/sclera: Conjunctivae normal.      Pupils: Pupils are equal, round, and reactive to light.   Neck:      Trachea: Trachea and phonation normal.   Cardiovascular:      Rate and Rhythm: Normal rate and regular rhythm.      Heart sounds: Normal heart sounds.   Pulmonary:      Effort: Pulmonary effort is normal.      Breath sounds: Normal breath sounds.   Abdominal:      General: Abdomen is flat. Bowel sounds are normal. There is no distension.      Palpations: Abdomen is soft.      Tenderness: There is no abdominal tenderness.   Musculoskeletal:      Cervical back: Neck supple.   Lymphadenopathy:      Cervical: No cervical adenopathy.   Skin:     General: Skin is warm and dry.      Capillary Refill: Capillary refill takes less than 2 seconds.   Neurological:      Mental Status: She is alert and oriented to person, place, and time.      Cranial Nerves: Cranial nerves 2-12 are intact.      Motor: No weakness or abnormal muscle tone.   Psychiatric:         Mood and Affect: Mood normal.         Behavior: Behavior normal. Behavior is cooperative.         Data:     Pre-operative work-up    Laboratory Results: I have personally reviewed the pertinent laboratory results/reports     EKG: I have personally reviewed pertinent reports.          Assessment:     1. Preoperative examination        2. Carpal tunnel syndrome on right        3. Cubital tunnel syndrome on right        4. Trigger finger, right middle finger             Plan:     54 y.o. female with planned surgery:  Release cubital tunnel with possible ulner nerve transposition (right elbow), release trigger finger - right middle, relase carpal tunnel endoscopic right wrist.      Cardiac Risk Estimation: per the Revised Cardiac Risk Index (Circ.  100:1043, 1999), the patient's risk factors for cardiac complications include none , putting her in: RCI RISK CLASS I (0 risk factors, risk of major cardiac compl. appr. 0.5%).    RCRI  High Risk surgery?         1 Point  CAD History:         1 Point   MI; Positive Stress Test; CP due to Mi;  Nitrate Usage to control Angina; Pathologic Q wave on EKG  CHF Active:         1 Point   Pulm Edema; Paroxysmal Nocturnal Dyspnea;  Bibasilar Rales (crackles);S3; CHF on CXR  Cerebrovascular Disease (TIA or CVA):     1 Point  DM on Insulin:        1 Point  Serum Creat >2.0 mg/dl:       1 Point          Total Points: 0     Scorin: Class I, Very Low Risk (0.4%)     1: Class II, Low risk (0.9%)     2: Class III Moderate (6.6%)     3: Class IV High (>11%)    1. Further preoperative workup as follows:   - None; no further preoperative work-up is required    2. Medication Management/Recommendations:   - None, continue medication regimen including morning of surgery, with sip of water  - Patient has been instructed to avoid herbs or non-directed vitamins the week prior to surgery to ensure no drug interactions with perioperative surgical and anesthetic medications.  - Patient has been instructed to avoid aspirin containing medications or non-steroidal anti-inflammatory drugs for the week preceding surgery.    3. Prophylaxis for cardiac events with perioperative beta-blockers: not indicated.    4. Patient requires further consultation with: None    EDEN Risk:  GFR:   eGFR   Date Value Ref Range Status   2024 84 ml/min/1.73sq m Final       - If GFR>60, Hold ACE/ARB/Diuretic on the day of surgery, and NSAIDS 10 days before.  - If GFR<45, Consider PRE and POST op Nephrology Consult.  - If 46 <GFR> 59 : Has Patient had EDEN in last 6 Months? no   If YES: Preop Nephrology consult   If No:  Post Op Nephrology consult.    Clearance  Patient is CLEARED for surgery without any additional cardiac testing.     A copy of this evaluation  was transmitted electronically or by fax to the requesting provider.     AGUSTINA Brumfield  [facility] [place] [address] [place] [address] [address]  Phone#  [phone]  Fax#  [phone]

## 2024-01-25 ENCOUNTER — ANESTHESIA EVENT (OUTPATIENT)
Dept: PERIOP | Facility: AMBULARY SURGERY CENTER | Age: 54
End: 2024-01-25
Payer: COMMERCIAL

## 2024-01-30 NOTE — PRE-PROCEDURE INSTRUCTIONS
Pre-Surgery Instructions:   Medication Instructions    Magnesium 400 MG CAPS Instructions provided by MD    Probiotic Product (PROBIOTIC DAILY PO) Hold day of surgery.    tamoxifen (NOLVADEX) 20 mg tablet Take day of surgery.      Medication instructions for day surgery reviewed. Please use only a sip of water to take your instructed medications. Avoid all over the counter vitamins, supplements and NSAIDS for one week prior to surgery per anesthesia guidelines. Tylenol is ok to take as needed.     You will receive a call one business day prior to surgery with an arrival time and hospital directions. If your surgery is scheduled on a Monday, the hospital will be calling you on the Friday prior to your surgery. If you have not heard from anyone by 8pm, please call the hospital supervisor through the hospital  at [phone]. (Danyel [phone]).    Do not eat or drink anything after midnight the night before your surgery, including candy, mints, lifesavers, or chewing gum. Do not drink alcohol 24hrs before your surgery. Try not to smoke at least 24hrs before your surgery.       Follow the pre surgery showering instructions as listed in the “My Surgical Experience Booklet” or otherwise provided by your surgeon's office. Do not use a blade to shave the surgical area 1 week before surgery. It is okay to use a clean electric clippers up to 24 hours before surgery. Do not apply any lotions, creams, including makeup, cologne, deodorant, or perfumes after showering on the day of your surgery. Do not use dry shampoo, hair spray, hair gel, or any type of hair products.     No contact lenses, eye make-up, or artificial eyelashes. Remove nail polish, including gel polish, and any artificial, gel, or acrylic nails if possible. Remove all jewelry including rings and body piercing jewelry.     Wear causal clothing that is easy to take on and off. Consider your type of surgery.    Keep any valuables, jewelry, piercings  at home. Please bring any specially ordered equipment (sling, braces) if indicated.    Arrange for a responsible person to drive you to and from the hospital on the day of your surgery. Visitor Guidelines discussed.     Call the surgeon's office with any new illnesses, exposures, or additional questions prior to surgery.    Please reference your “My Surgical Experience Booklet” for additional information to prepare for your upcoming surgery.

## 2024-02-07 ENCOUNTER — ANESTHESIA (OUTPATIENT)
Dept: PERIOP | Facility: AMBULARY SURGERY CENTER | Age: 54
End: 2024-02-07
Payer: COMMERCIAL

## 2024-02-07 ENCOUNTER — HOSPITAL ENCOUNTER (OUTPATIENT)
Facility: AMBULARY SURGERY CENTER | Age: 54
Setting detail: OUTPATIENT SURGERY
Discharge: HOME | End: 2024-02-07
Attending: STUDENT IN AN ORGANIZED HEALTH CARE EDUCATION/TRAINING PROGRAM | Admitting: STUDENT IN AN ORGANIZED HEALTH CARE EDUCATION/TRAINING PROGRAM
Payer: COMMERCIAL

## 2024-02-07 VITALS
RESPIRATION RATE: 20 BRPM | BODY MASS INDEX: 28.79 KG/M2 | HEIGHT: 68 IN | OXYGEN SATURATION: 95 % | TEMPERATURE: 97.1 F | HEART RATE: 65 BPM | DIASTOLIC BLOOD PRESSURE: 80 MMHG | WEIGHT: 190 LBS | SYSTOLIC BLOOD PRESSURE: 140 MMHG

## 2024-02-07 DIAGNOSIS — Z98.890 S/P CUBITAL TUNNEL RELEASE: Primary | ICD-10-CM

## 2024-02-07 PROBLEM — R11.2 PONV (POSTOPERATIVE NAUSEA AND VOMITING): Status: ACTIVE | Noted: 2024-02-07

## 2024-02-07 PROCEDURE — 64718 REVISE ULNAR NERVE AT ELBOW: CPT | Performed by: PHYSICIAN ASSISTANT

## 2024-02-07 PROCEDURE — C9290 INJ, BUPIVACAINE LIPOSOME: HCPCS | Performed by: INTERNAL MEDICINE

## 2024-02-07 PROCEDURE — 29848 WRIST ENDOSCOPY/SURGERY: CPT | Performed by: STUDENT IN AN ORGANIZED HEALTH CARE EDUCATION/TRAINING PROGRAM

## 2024-02-07 PROCEDURE — 26055 INCISE FINGER TENDON SHEATH: CPT | Performed by: STUDENT IN AN ORGANIZED HEALTH CARE EDUCATION/TRAINING PROGRAM

## 2024-02-07 PROCEDURE — NC001 PR NO CHARGE: Performed by: STUDENT IN AN ORGANIZED HEALTH CARE EDUCATION/TRAINING PROGRAM

## 2024-02-07 PROCEDURE — 64718 REVISE ULNAR NERVE AT ELBOW: CPT | Performed by: STUDENT IN AN ORGANIZED HEALTH CARE EDUCATION/TRAINING PROGRAM

## 2024-02-07 PROCEDURE — 26055 INCISE FINGER TENDON SHEATH: CPT | Performed by: PHYSICIAN ASSISTANT

## 2024-02-07 PROCEDURE — 29848 WRIST ENDOSCOPY/SURGERY: CPT | Performed by: PHYSICIAN ASSISTANT

## 2024-02-07 RX ORDER — LABETALOL HYDROCHLORIDE 5 MG/ML
10 INJECTION, SOLUTION INTRAVENOUS
Status: DISCONTINUED | OUTPATIENT
Start: 2024-02-07 | End: 2024-02-07 | Stop reason: HOSPADM

## 2024-02-07 RX ORDER — BUPIVACAINE HYDROCHLORIDE 5 MG/ML
INJECTION, SOLUTION EPIDURAL; INTRACAUDAL AS NEEDED
Status: DISCONTINUED | OUTPATIENT
Start: 2024-02-07 | End: 2024-02-07

## 2024-02-07 RX ORDER — SODIUM CHLORIDE, SODIUM LACTATE, POTASSIUM CHLORIDE, CALCIUM CHLORIDE 600; 310; 30; 20 MG/100ML; MG/100ML; MG/100ML; MG/100ML
50 INJECTION, SOLUTION INTRAVENOUS CONTINUOUS
Status: DISCONTINUED | OUTPATIENT
Start: 2024-02-07 | End: 2024-02-07 | Stop reason: HOSPADM

## 2024-02-07 RX ORDER — MIDAZOLAM HYDROCHLORIDE 2 MG/2ML
INJECTION, SOLUTION INTRAMUSCULAR; INTRAVENOUS AS NEEDED
Status: DISCONTINUED | OUTPATIENT
Start: 2024-02-07 | End: 2024-02-07

## 2024-02-07 RX ORDER — MAGNESIUM HYDROXIDE 1200 MG/15ML
LIQUID ORAL AS NEEDED
Status: DISCONTINUED | OUTPATIENT
Start: 2024-02-07 | End: 2024-02-07 | Stop reason: HOSPADM

## 2024-02-07 RX ORDER — FENTANYL CITRATE 50 UG/ML
INJECTION, SOLUTION INTRAMUSCULAR; INTRAVENOUS AS NEEDED
Status: DISCONTINUED | OUTPATIENT
Start: 2024-02-07 | End: 2024-02-07

## 2024-02-07 RX ORDER — ALBUTEROL SULFATE 2.5 MG/3ML
2.5 SOLUTION RESPIRATORY (INHALATION) ONCE AS NEEDED
Status: DISCONTINUED | OUTPATIENT
Start: 2024-02-07 | End: 2024-02-07 | Stop reason: HOSPADM

## 2024-02-07 RX ORDER — ONDANSETRON 2 MG/ML
INJECTION INTRAMUSCULAR; INTRAVENOUS AS NEEDED
Status: DISCONTINUED | OUTPATIENT
Start: 2024-02-07 | End: 2024-02-07

## 2024-02-07 RX ORDER — SODIUM CHLORIDE, SODIUM LACTATE, POTASSIUM CHLORIDE, CALCIUM CHLORIDE 600; 310; 30; 20 MG/100ML; MG/100ML; MG/100ML; MG/100ML
INJECTION, SOLUTION INTRAVENOUS CONTINUOUS PRN
Status: DISCONTINUED | OUTPATIENT
Start: 2024-02-07 | End: 2024-02-07

## 2024-02-07 RX ORDER — ONDANSETRON 2 MG/ML
4 INJECTION INTRAMUSCULAR; INTRAVENOUS ONCE AS NEEDED
Status: DISCONTINUED | OUTPATIENT
Start: 2024-02-07 | End: 2024-02-07 | Stop reason: HOSPADM

## 2024-02-07 RX ORDER — DEXAMETHASONE SODIUM PHOSPHATE 10 MG/ML
INJECTION, SOLUTION INTRAMUSCULAR; INTRAVENOUS AS NEEDED
Status: DISCONTINUED | OUTPATIENT
Start: 2024-02-07 | End: 2024-02-07

## 2024-02-07 RX ORDER — LIDOCAINE HCL/PF 100 MG/5ML
SYRINGE (ML) INJECTION AS NEEDED
Status: DISCONTINUED | OUTPATIENT
Start: 2024-02-07 | End: 2024-02-07

## 2024-02-07 RX ORDER — TRAMADOL HYDROCHLORIDE 50 MG/1
50 TABLET ORAL EVERY 6 HOURS PRN
Status: DISCONTINUED | OUTPATIENT
Start: 2024-02-07 | End: 2024-02-07 | Stop reason: HOSPADM

## 2024-02-07 RX ORDER — CEFAZOLIN SODIUM 2 G/50ML
2000 SOLUTION INTRAVENOUS ONCE
Status: COMPLETED | OUTPATIENT
Start: 2024-02-07 | End: 2024-02-07

## 2024-02-07 RX ORDER — PROPOFOL 10 MG/ML
INJECTION, EMULSION INTRAVENOUS CONTINUOUS PRN
Status: DISCONTINUED | OUTPATIENT
Start: 2024-02-07 | End: 2024-02-07

## 2024-02-07 RX ORDER — FENTANYL CITRATE/PF 50 MCG/ML
50 SYRINGE (ML) INJECTION
Status: DISCONTINUED | OUTPATIENT
Start: 2024-02-07 | End: 2024-02-07 | Stop reason: HOSPADM

## 2024-02-07 RX ORDER — HYDRALAZINE HYDROCHLORIDE 20 MG/ML
5 INJECTION INTRAMUSCULAR; INTRAVENOUS ONCE AS NEEDED
Status: DISCONTINUED | OUTPATIENT
Start: 2024-02-07 | End: 2024-02-07 | Stop reason: HOSPADM

## 2024-02-07 RX ORDER — TRAMADOL HYDROCHLORIDE 50 MG/1
50 TABLET ORAL EVERY 6 HOURS PRN
Qty: 10 TABLET | Refills: 0 | Status: SHIPPED | OUTPATIENT
Start: 2024-02-07

## 2024-02-07 RX ORDER — PROPOFOL 10 MG/ML
INJECTION, EMULSION INTRAVENOUS AS NEEDED
Status: DISCONTINUED | OUTPATIENT
Start: 2024-02-07 | End: 2024-02-07

## 2024-02-07 RX ADMIN — SODIUM CHLORIDE, SODIUM LACTATE, POTASSIUM CHLORIDE, CALCIUM CHLORIDE: 600; 310; 30; 20 INJECTION, SOLUTION INTRAVENOUS at 09:40

## 2024-02-07 RX ADMIN — PROPOFOL 50 MCG/KG/MIN: 10 INJECTION, EMULSION INTRAVENOUS at 09:54

## 2024-02-07 RX ADMIN — PROPOFOL 200 MG: 10 INJECTION, EMULSION INTRAVENOUS at 09:49

## 2024-02-07 RX ADMIN — CEFAZOLIN SODIUM 2000 MG: 2 SOLUTION INTRAVENOUS at 09:43

## 2024-02-07 RX ADMIN — Medication 100 MG: at 09:49

## 2024-02-07 RX ADMIN — FENTANYL CITRATE 25 MCG: 50 INJECTION, SOLUTION INTRAMUSCULAR; INTRAVENOUS at 10:13

## 2024-02-07 RX ADMIN — FENTANYL CITRATE 25 MCG: 50 INJECTION, SOLUTION INTRAMUSCULAR; INTRAVENOUS at 10:26

## 2024-02-07 RX ADMIN — FENTANYL CITRATE 50 MCG: 50 INJECTION, SOLUTION INTRAMUSCULAR; INTRAVENOUS at 09:54

## 2024-02-07 RX ADMIN — ONDANSETRON 4 MG: 2 INJECTION INTRAMUSCULAR; INTRAVENOUS at 10:07

## 2024-02-07 RX ADMIN — BUPIVACAINE HYDROCHLORIDE 5 ML: 5 INJECTION, SOLUTION EPIDURAL; INTRACAUDAL at 10:58

## 2024-02-07 RX ADMIN — TRAMADOL HYDROCHLORIDE 50 MG: 50 TABLET, COATED ORAL at 11:45

## 2024-02-07 RX ADMIN — BUPIVACAINE 20 ML: 13.3 INJECTION, SUSPENSION, LIPOSOMAL INFILTRATION at 10:58

## 2024-02-07 RX ADMIN — DEXAMETHASONE SODIUM PHOSPHATE 5 MG: 10 INJECTION, SOLUTION INTRAMUSCULAR; INTRAVENOUS at 10:07

## 2024-02-07 RX ADMIN — MIDAZOLAM HYDROCHLORIDE 2 MG: 2 INJECTION, SOLUTION INTRAMUSCULAR; INTRAVENOUS at 09:43

## 2024-02-07 NOTE — DISCHARGE INSTR - AVS FIRST PAGE
Post Operative Instructions    You have had surgery on your arm today, please read and follow the information below:  Elevate your hand above your elbow during the next 24-48 hours to help with swelling.  Place your hand and arm over your head with motion at your shoulder three times a day.  Do not apply any cream/ointment/oil to your incisions including antibiotics (I.e.Neosporin)  Do not use peroxide to clean your wound   Do not soak your hands in standing water (dishwater, tubs, Jacuzzi's, pools, etc.) until given permission (typically 2-3 weeks after injury)    Call the office if you notice any:  Increased numbness or tingling of your hand or fingers that is not relieved with elevation.  Increasing pain that is not controlled with medication.  Difficulty chewing, breathing, swallowing.  Chest pains or shortness of breath.  Fever over 101.4 degrees.    Bandage: Do NOT remove bandage until follow-up appointment.    Motion: Move fingers into a fist 5 times a day, DO NOT move any splinted fingers.    Weight bearing status: Avoid heavy lifting (>2 pounds) with the extremity that was operated on until follow up appointment. Normal activities of daily living are OK.    Ice: Ice for 10 minutes every hour as needed for swelling x 24 hours.    Sling: Please use your sling while your arm is numb from the block. When your arm is FULLY awake again, you no longer need this and may use your sling as needed for comfort. While using the sling, make sure to move your shoulder throughout the day to prevent stiffness here.     Pain medication:   Tramadol 1 tab every 6 hours AS needed for pain  *   You may take Tylenol (acetaminophen) in addition to your pain medication. This is over the counter. Please follow the instructions on the bottle. BE AWARE - your pain medication may already include acetaminophen in it. If it does, you must include this in your daily amount and make sure not take more than 3000 mg per day.  *   You may  take antiinflammatory medications (i.e. ibuprofen/naproxen) in addition to your pain medication. These are found over the counter, but higher prescription doses are available if needed. Please follow the dosing instructions on the bottle and it recommended you take these with food. DO NOT take these medications if you are on a blood thinner, have history of gastrointestinal bleeding, chronic kidney disease, or if you have ever been told by a physician not to.     Antibiotics:  None     Therapy: An order for hand therapy has been placed in your chart. Please call [phone] at your earliest convenience to get an appointment scheduled for 3-5 days from now.     Follow-up Appointment: 10-14 days.        Please call the office if you have any questions or concerns regarding your post-operative care.

## 2024-02-07 NOTE — H&P
H&P reviewed. After examining the patient I find no changes in the patients condition since the H&P had been written. PIP of middle finger 15 degrees shy of full extension actively. Surgical site marked with patient participation. All questions were answered.    ASSESSMENT/PLAN:       Diagnoses and all orders for this visit:     Carpal tunnel syndrome on right  -     Case request operating room: RELEASE CUBITAL TUNNEL WITH POSSIBLE ULNAR NERVE TRANSPOSITION, RELEASE TRIGGER FINGER - RIGHT MIDDLE, RELEASE CARPAL TUNNEL ENDOSCOPIC; Standing  -     Ambulatory referral to Family Practice; Future  -     CBC and differential; Future  -     Basic metabolic panel; Future  -     EKG 12 lead; Future  -     Case request operating room: RELEASE CUBITAL TUNNEL WITH POSSIBLE ULNAR NERVE TRANSPOSITION, RELEASE TRIGGER FINGER - RIGHT MIDDLE, RELEASE CARPAL TUNNEL ENDOSCOPIC     Cubital tunnel syndrome on right  -     Case request operating room: RELEASE CUBITAL TUNNEL WITH POSSIBLE ULNAR NERVE TRANSPOSITION, RELEASE TRIGGER FINGER - RIGHT MIDDLE, RELEASE CARPAL TUNNEL ENDOSCOPIC; Standing  -     Ambulatory referral to Family [facility]; Future  -     CBC and differential; Future  -     Basic metabolic panel; Future  -     EKG 12 lead; Future  -     Case request operating room: RELEASE CUBITAL TUNNEL WITH POSSIBLE ULNAR NERVE TRANSPOSITION, RELEASE TRIGGER FINGER - RIGHT MIDDLE, RELEASE CARPAL TUNNEL ENDOSCOPIC     Trigger finger, right middle finger  -     Case request operating room: RELEASE CUBITAL TUNNEL WITH POSSIBLE ULNAR NERVE TRANSPOSITION, RELEASE TRIGGER FINGER - RIGHT MIDDLE, RELEASE CARPAL TUNNEL ENDOSCOPIC; Standing  -     Ambulatory referral to Family Practice; Future  -     CBC and differential; Future  -     Basic metabolic panel; Future  -     EKG 12 lead; Future  -     Case request operating room: RELEASE CUBITAL TUNNEL WITH POSSIBLE ULNAR NERVE TRANSPOSITION, RELEASE TRIGGER FINGER - RIGHT MIDDLE, RELEASE CARPAL  TUNNEL ENDOSCOPIC     Other orders  -     Diet NPO; Sips with meds; Standing  -     Nursing Communication Warmimg Interventions Implemented; Standing  -     Nursing Communication CHG bath, have staff wash entire body (neck down) per pre-op bathing protocol. Routine, evening prior to, and day of surgery.; Standing  -     Nursing Communication Swab both nares with Povidone-Iodine solution, EXCLUDE if patient has shellfish/Iodine allergy. Routine, day of surgery, on call to OR; Standing  -     Void on call to OR; Standing  -     Insert peripheral IV; Standing  -     ceFAZolin (ANCEF) 2,000 mg in dextrose 5 % 100 mL IVPB              53 y.o. female with right carpal and cubital tunnel syndrome and trigger finger of the right long finger  Treatment options and expected outcomes were discussed.  The patient verbalized understanding of exam findings and treatment plan.   The patient was given the opportunity to ask questions.  Questions were answered to the patient's satisfaction.  We discussed that it is reasonable to obtain an EMG to determine the severity of her ulnar neuropathy, which she deferred at this time.  The patient decided to move forward with carpal and cubital tunnel releases and trigger finger release procedures via shared decision making.  Her desired surgical date is early January        Follow Up:  After surgery         To Do Next Visit:  Splint off and Sutures out        Discussions:  Open Carpal Tunnel Release:   The anatomy and physiology of carpal tunnel syndrome was discussed with the patient today.  Increase pressure localized under the transverse carpal ligament can cause pain, numbness, tingling, or dysesthesias within the median nerve distribution as well as feelings of fatigue, clumsiness, or awkwardness.  These symptoms typically occur at night and worse in the morning upon waking.  Eventually, untreated carpal tunnel syndrome can result in weakness and permanent loss of muscle within the  thenar compartment of the hand.  Treatment options were discussed with the patient.  Conservative treatment includes nocturnal resting splints to keep the nerve in a neutral position, ergonomic changes within the work or home environment, activity modification, and tendon gliding exercises.  Vitamin B6 one tablet daily over the counter may helpful to reduce symptoms.  Steroid injections within the carpal canal can help a majority of patients, however this is often self-limited in a majority of patients.  Surgical intervention to divide the transverse carpal ligament typically results in a long-lasting relief of the patient's complaints, with the recurrence rate of less than 1%. The patient has elected to undergo an open carpal tunnel release.  The palmar incision technique was discussed in the office with the patient today.   In the postoperative period, light activities are allowed immediately, driving is allowed when narcotic medication has stopped, and the bandages may be removed and incision may get wet after 2 days.  Heavy activities (lifting more than approximately 10 pounds) will be allowed after follow up appointment in 1-2 weeks.  While night symptoms (waking from sleep, pain, and discomfort in the hands) generally improves rapidly, the numbness and tingling as well as the strength will slowly improve over weeks to months depending on the chronicity and severity of the carpal tunnel syndrome.  Pillar pain and scar discomfort were discussed with the patient which are self-limiting conditions.  The risks of bleeding and infection from the surgery are less than 1%.  Risk of recurrence is approximately 0.5%.  The risks of nerve injury or nerve damage or damage to the blood vessels is approximately 1 in 1200. The patient has an understanding of the above mentioned discussion.The risks and benefits of the procedure were explained to the patient, which include, but are not limited to: Bleeding, infection,  recurrence, pain, scar, damage to tendons, damage to nerves, and damage to blood vessels, failure to give desired results and complications related to anesthesia.  These risks, along with alternative conservative treatment options, and postoperative protocols were voiced back and understood by the patient.  All questions were answered to the patient's satisfaction.  The patient agrees to comply with a standard postoperative protocol, and is willing to proceed.  Education was provided via written and auditory forms.  There were no barriers to learning. Written handouts regarding wound care, incision and scar care, and general preoperative information was provided to the patient.  Prior to surgery, the patient may be requested to stop all anti-inflammatory medications.  Prophylactic aspirin, Plavix, and Coumadin may be allowed to be continued.  Medications including vitamin E., ginkgo, and fish oil are requested to be stopped approximately one week prior to surgery.      Cubital Tunnel Release:   The anatomy and physiology of cubital tunnel syndrome were discussed with the patient today in the office.  Typically, increased elbow flexion activities decrease blood flow within the intraneural spaces, resulting in a feeling of numbness, tingling, weakness, or clumsiness within the hand and fingers.  Occasionally, anatomic structures such as medial elbow osteophytes, the medial head of the triceps, were subluxing ulnar nerve may result in increased pressure or aggravation at the cubital tunnel.  Typical signs and symptoms usually include numbness and tingling within the ring and small finger, weakness with , and weakness with pinch.  Conservative treatment and includes nocturnal bracing to keep the elbow in a semi-extended position, activity modification, therapy, and avoiding excessive elbow flexion activities.  A majority of patients typically respond to conservative treatment over a period of approximately 3-6  months.  Vitamin B6 one tablet daily over the counter may helpful to reduce symptoms.  EMG/NCV testing of the ulnar nerve at the elbow is not as reliable as carpal tunnel syndrome.  Surgical intervention in the form of in situ release of the ulnar nerve at the elbow or ulnar nerve transposition may be required in up to 20% of patients. The patient has elected to undergo cubital tunnel release.  The possibility of converting to a subcutaneous or submuscular ulnar nerve transposition depending on the nerve stability was discussed with the patient.  Typically, in the postoperative period, light activities are allowed immediately, driving is allowed when narcotic medications have stopped, and the incision may get wet after 5 days.  Heavy activities will be allowed after follow up appointment in 1-2 weeks.  While the pain within the ring and small finger of the hands generally improves rapidly, the numbness and tingling, as well as the strength, will slowly improve over a period of weeks to months.  Total recovery can take up to 18 months from the time of surgery.  Numbness and tingling near the incision, or near the medial aspect of the forearm was discussed with the patient.  The patient has an understanding of the above mentioned discussion. The risks and benefits of the procedure were explained to the patient, which include, but are not limited to: Bleeding, infection, recurrence, pain, scar, damage to tendons, damage to nerves, and damage to blood vessels, failure to give desired results and complications related to anesthesia.  These risks, along with alternative conservative treatment options, and postoperative protocols were voiced back and understood by the patient.  All questions were answered to the patient's satisfaction.  The patient agrees to comply with a standard postoperative protocol, and is willing to proceed.  Education was provided via written and auditory forms.  There were no barriers to learning.  Written handouts regarding wound care, incision and scar care, and general preoperative information was provided to the patient.  Prior to surgery, the patient may be requested to stop all anti-inflammatory medications.  Prophylactic aspirin, Plavix, and Coumadin may be allowed to be continued.  Medications including vitamin E., ginkgo, and fish oil are requested to be stopped approximately one week prior to surgery.  Hypertensive medications and beta blockers, if taken, should be continued. Vitamin B6 one tablet daily over the counter may helpful to reduce symptoms.     Trigger Finger Release: The anatomy and physiology of trigger finger was discussed with the patient today in the office.  Edema and increased contact pressure within the flexor tendons at the A1 pulley can cause pain, crepitation, and limitation of function.  Treatment options include resting MP blocking splints to decrease edema, oral anti-inflammatory medications, home or formal therapy exercises, up to 2 steroid injections or surgical release.  While majority of patients do respond to conservative treatment, up to 20% may require surgical release.  The patient has elected release of the trigger finger.  The patient has elected to undergo a release of the A1 pulley (trigger finger).  A small incision will be made over the palmar aspect of the hand, the tendon sheath holding the flexor tendons will be released.  In the postoperative period, light activities are allowed immediately, driving is allowed when narcotic medication has stopped, and the incision may get wet after 2 days.  Heavy activities (lifting more than approximately 10 pounds) will be allowed after the follow up appointment in 1-2 weeks.  While the pain and discomfort within the wrist typically improves rapidly, some residual discomfort may be present for up to 6 weeks.  The nodule that is typically palpable in the palmar aspect of the hand will not be removed, as this would  necessitate removal of a portion of the flexor tendon, however the catching, clicking, and locking should resolve.  Approximate success rate is 98%.The risks and benefits of the procedure were explained to the patient, which include, but are not limited to: Bleeding, infection, recurrence, pain, scar, damage to tendons, damage to nerves, and damage to blood vessels, need for future surgery and complications related to anesthesia.  If bony work is done, risks also include malunion and nonunion.  These risks, along with alternative conservative treatment options, and postoperative protocols were voiced back and understood by the patient.  All questions were answered to the patient's satisfaction.  The patient agrees to comply with a standard postoperative protocol, and is willing to proceed.  Education was provided via written and auditory forms.  There were no barriers to learning. Written handouts regarding wound care, incision and scar care, and general preoperative information, as well as risks and benefits were provided to the patient.        Kennedy Garcia MD  Attending, Orthopaedic Surgery  Hand, Wrist, and Elbow Surgery  [facility]     ____________________________________________________________________________________________________________________________________________        CHIEF COMPLAINT:      Chief Complaint   Patient presents with    Right Wrist - Follow-up    Right Elbow - Follow-up         SUBJECTIVE:  Patient is a 53 y.o. RHD female who presents today for follow up of right cubital and carpal tunnel syndrome, as well as trigger finger of the right long finger. She has been attending occupational hand therapy, which she feels has worsened her symptoms. She reports the pain at her elbow has increased. Her right long finger is now locking and she is unable to perform ADLs without the finger triggering. She is interested in discussing surgical treatment options at today's  visit.         I have personally reviewed all the relevant PMH, PSH, SH, FH, Medications and allergies        PAST MEDICAL HISTORY:  Medical History        Past Medical History:   Diagnosis Date    Allergic      Arthritis      BRCA negative      Colon polyp      Endometriosis      IBS (irritable bowel syndrome)      Insomnia      Night sweats      Psoriasis      Skin tag      Wears glasses              PAST SURGICAL HISTORY:  Surgical History         Past Surgical History:   Procedure Laterality Date    BREAST BIOPSY Right      IDC     SECTION        LAPAROSCOPY        exploratory, endometriosis    LYMPHADENECTOMY Right       x2    MASTECTOMY         bilateral mastectomy    [place] NIPPLE/AREOLA RECONSTRUCTION Bilateral 10/13/2016     Procedure: NIPPLE RECONSTRUCTION ;  Surgeon: Tulio Wilson MD;  Location: [facility];  Service: Plastics    [place] REVISION OF RECONSTRUCTED BREAST Bilateral 3/9/2016     Procedure: RECONSTRUCTION REVISION  BREAST MOUND ,FAT GRAFTS ;  Surgeon: Tulio Wilson MD;  Location: [facility];  Service: Plastics    SENTINEL LYMPH NODE BIOPSY Right              FAMILY HISTORY:  Family History         Family History   Problem Relation Age of Onset    Lung cancer Paternal Grandfather 50    Breast cancer Other      Thyroid cancer Mother 40    Breast cancer Mother 74    Psoriasis Father      Basal cell carcinoma Father      Heart disease Other      Prostate cancer Other              SOCIAL HISTORY:  Social History            Tobacco Use    Smoking status: Never    Smokeless tobacco: Never   Vaping Use    Vaping Use: Never used   Substance Use Topics    Alcohol use: Yes       Comment: social    Drug use: Never         MEDICATIONS:     Current Outpatient Medications:     Ashwagandha 125 MG CAPS, Take by mouth, Disp: , Rfl:     Calcium 500 MG tablet, Take by mouth, Disp: , Rfl:     Cholecalciferol (VITAMIN D PO), Take 5,000 Units by mouth daily , Disp: , Rfl:     Magnesium 400 MG CAPS, Take 1  tablet by mouth daily., Disp: , Rfl:     MILK THISTLE PO, Take 600 mg by mouth daily., Disp: , Rfl:     Multiple Vitamins-Minerals (ONE DAILY MULTIVITAMIN ADULT) TABS, Take by mouth, Disp: , Rfl:     NON FORMULARY, , Disp: , Rfl:     Probiotic Product (PROBIOTIC DAILY PO), Take 2 tablets by mouth daily., Disp: , Rfl:     secukinumab (Cosentyx Sensoready Pen) 150 mg/mL SOAJ injection, Inject 2 mL (300 mg total) under the skin every 30 (thirty) days, Disp: 2 mL, Rfl: 11    tamoxifen (NOLVADEX) 20 mg tablet, TAKE 1 TABLET BY MOUTH EVERY DAY, Disp: 30 tablet, Rfl: 7     ALLERGIES:        Allergies   Allergen Reactions    Other Hives       Kait selzer    Kait-Grasston Plus Cold & Cough  [Tuqjrtsem-Rme-Ah-Apap] Hives    Prednisone Other (See Comments) and GI Intolerance       insomnia               REVIEW OF SYSTEMS:  Musculoskeletal:        As noted in HPI.   All other systems reviewed and are negative.     VITALS:  There were no vitals filed for this visit.     LABS:  BMP:         Lab Results   Component Value Date     GLUCOSE 81 10/06/2015     CALCIUM 8.9 07/07/2022      10/06/2015     K 3.9 07/07/2022     CO2 28 07/07/2022      07/07/2022     BUN 11 07/07/2022     CREATININE 0.95 07/07/2022         _____________________________________________________  PHYSICAL EXAMINATION:  General: Well developed and well nourished, alert & oriented x 3, appears comfortable  Psychiatric: Normal  HEENT: Normocephalic, Atraumatic Trachea Midline, No torticollis  Pulmonary: No audible wheezing or respiratory distress   Abdomen/GI: Non tender, non distended   Cardiovascular: No pitting edema, 2+ radial pulse   Skin: No masses, erythema, lacerations, fluctation, ulcerations  Neurovascular: Decreased Sensation to  the Ulnar Nerve, Decreased Sensation to  the Radial Nerve, Motor Intact to the Median, Ulnar, Radial Nerve, and Pulses Intact  Musculoskeletal: Normal, except as noted in detailed exam and in HPI.         MUSCULOSKELETAL EXAMINATION:  Right Ulnar Nerve Exam:     Negative intrinsic atrophy.  Negative  deformity at the elbow.   Full range of motion with flexion and extension of the elbow.   Positive ulnar nerve compression test at the elbow. Positive tinels over the ulnar nerve at the elbow.  Negative cross finger test in the index and long fingers.   FDP strength is 5/5 to the ring finger. FDP strength is 5/5 to the small finger.  Intrinsic strength 5/5 .     Right Carpal Tunnel Exam:     Negative thenar atrophy. Positive phalen's test. Positive carpal tunnel compression. Positive tinels over median nerve at the wrist for ulnar 3 digits. Opposition strength 5/5.  Abduction strength 5/5.       right middle finger:  Negative palpable nodule over the A1 pulley. Positive tenderness to palpation over A1 pulley. Positive catching. Positive clicking.       Right Elbow:     No swelling or deformity  Full range of motion with flexion, extension, supination and pronation.  Positive tenderness to palpation over the Lateral and Medical Epicondyles.  No pain with passive flexion of the wrist with elbow fully extended.  Pain with resisted wrist flexion and extension with elbow fully extended.  Pain with resisted forearm supination/pronation with the elbow fully extended.  ___________________________________________________  STUDIES REVIEWED:  Ultrasound report was reviewed and demonstrates Enlarged ulnar nerve near/within cubital tunnel suspicious for ulnar neuropathy/cubital tunnel syndrome. With elbow flexion, patient did have a palpable click over the medial epicondyle, which on ultrasound was shown to be subcutaneous fascial tissue passing over the medial epicondyle, rather than ulnar nerve subluxation.             PROCEDURES PERFORMED:  No Procedures performed today     _____________________________________________________             Scribe Attestation    I,:  Erika Faria am acting as a scribe while in the presence of the  attending physician.:       I,:  Kennedy Garcia MD personally performed the services described in this documentation    as scribed in my presence.:

## 2024-02-07 NOTE — ANESTHESIA PREPROCEDURE EVALUATION
Procedure:  RELEASE CUBITAL TUNNEL WITH POSSIBLE ULNAR NERVE TRANSPOSITION (Right: Elbow)  RELEASE TRIGGER FINGER - RIGHT MIDDLE (Right: Hand)  RELEASE CARPAL TUNNEL ENDOSCOPIC (Right: Wrist)    Relevant Problems   ANESTHESIA   (+) PONV (postoperative nausea and vomiting)      GYN   (+) Malignant neoplasm of upper-inner quadrant of right breast in female, estrogen receptor positive       MUSCULOSKELETAL   (+) Lateral epicondylitis of right elbow      Digestive   (+) Irritable bowel syndrome (IBS)      Musculoskeletal and Integument   (+) Psoriasis   (+) Seborrheic keratosis      Other   (+) Use of tamoxifen (Nolvadex)        Physical Exam    Airway    Mallampati score: III  TM Distance: >3 FB  Neck ROM: full     Dental   No notable dental hx     Cardiovascular      Pulmonary      Other Findings  post-pubertal.      Anesthesia Plan  ASA Score- 2     Anesthesia Type- general with ASA Monitors.         Additional Monitors:     Airway Plan: LMA.    Comment: GA-LMA with post-op Right Supraclavicular PNB.       Plan Factors-Exercise tolerance (METS): >4 METS.    Chart reviewed. EKG reviewed.  Existing labs reviewed. Patient summary reviewed.    Patient is not a current smoker.              Induction- intravenous.    Postoperative Plan-     Informed Consent- Anesthetic plan and risks discussed with patient.  I personally reviewed this patient with the CRNA. Discussed and agreed on the Anesthesia Plan with the CRNA..

## 2024-02-07 NOTE — ANESTHESIA PROCEDURE NOTES
Peripheral Block    Patient location during procedure: OR  Start time: 2/7/2024 10:58 AM  Reason for block: at surgeon's request and post-op pain management  Staffing  Performed by: Jose Antonio Angulo DO  Authorized by: Jose Antonio Angulo DO    Preanesthetic Checklist  Completed: patient identified, IV checked, site marked, risks and benefits discussed, surgical consent, monitors and equipment checked, pre-op evaluation and timeout performed  Peripheral Block  Prep: ChloraPrep  Patient monitoring: frequent blood pressure checks, continuous pulse oximetry and heart rate  Block type: Supraclavicular  Laterality: right  Injection technique: single-shot  Procedures: ultrasound guided, Ultrasound guidance required for the procedure to increase accuracy and safety of medication placement and decrease risk of complications.  Ultrasound permanent image saved  Needle  Needle type: Stimuplex   Needle gauge: 20 G  Needle length: 4 in  Needle localization: anatomical landmarks and ultrasound guidance  Assessment  Injection assessment: incremental injection, frequent aspiration, injected with ease, negative aspiration, negative for heart rate change, no paresthesia on injection, no symptoms of intraneural/intravenous injection and needle tip visualized at all times  Paresthesia pain: none  Post-procedure:  site cleaned  patient tolerated the procedure well with no immediate complications

## 2024-02-07 NOTE — ANESTHESIA POSTPROCEDURE EVALUATION
3/29/2022         RE: Sussy Cole  [address]  [place] [address]        Dear Colleague,    Thank you for referring your patient, Sussy Cole, to the [facility] [place]. Please see a copy of my visit note below.    NEUROLOGY FOLLOW UP VISIT  NOTE       [facility] [place]  [address] [place] [address]  Tel: [phone]  Fax: [phone]  [email or web address]     Sussy Cole,  1936, MRN [id number]  PCP: Rosalee Bravo  Date: 2022      ASSESSMENT & PLAN     Visit Diagnosis  1. TIA (transient ischemic attack)     Transient ischemic attack  85-year-old female with history of HTN, HLD, CKD stage III, breast cancer, B12 deficiency, vertebrobasilar insufficiency who returns for follow-up.  Since her last visit she had a 30-day event monitor and echocardiogram that was normal.  I have recommended:    1.  Switch to regular strength aspirin 325 mg daily.  Patient is quite concerned given her history of gastric ulcers in the past and reluctant to take aspirin but I have strongly encouraged her to take full-strength enteric-coated aspirin (new recommendations) but if she does not feel comfortable at least take 81 mg daily  2.  Continue Lipitor 20 mg daily  3.  Follow-up on as needed basis    Thank you again for this referral, please feel free to contact me if you have any questions.    Alfredo Lance MD  [facility]  (Former[facility], [facility]     HISTORY OF PRESENT ILLNESS     Patient is a 85-year-old female with history of HTN, HLD, CKD stage III, breast cancer, B12 deficiency and gout who was last seen on 2021 after she had a transient episode of speech difficulty.  This resolved spontaneously.  She had CTA of the head and neck that showed fairly extensive intracranial atherosclerotic disease with occlusion of bilateral vertebral arteries, basilar artery  Post-Op Assessment Note    CV Status:  Stable  Pain Score: 0    Pain management: adequate       Mental Status:  Sleepy and arousable   Hydration Status:  Stable   PONV Controlled:  Controlled   Airway Patency:  Patent     Post Op Vitals Reviewed: Yes    No anethesia notable event occurred.    Staff: CRNA               BP   150/70   Temp   98.1   Pulse  91   Resp   14   SpO2   99      suggesting vertebrobasilar insufficiency.  She was started on dual antiplatelet therapy and lipid profile was checked that showed a LDL of 50.  Also echocardiogram was done that showed a normal ejection fraction with concentric left ventricular hypertrophy.  No cardiac source of emboli noted.  30-day event monitor was normal.  Since her last visit she reports no further episodes of speech difficulty or focal weakness.    As noted above she was initially seen in [facility] emergency room on 12/24/2021 with acute onset of visual symptoms.  She was watching TV and suddenly felt lights were super bright and started hurting her eyes.  She got up and walked around and sat down and tried to talk to herself but she could not put the words together.  She tried to text her son who was in another room and had difficulty typing the words.  The whole episode lasted for about 30 to 45 minutes.  Eventually paramedics were called and her symptoms gradually improved.  She was taken to [facility] emergency room where CT of the head and CTA showed extensive intracranial atherosclerotic disease.  And afterwards she had above-noted work-up for TIA     PROBLEM LIST   Patient Active Problem List   Diagnosis Code     AK (actinic keratosis) L57.0     B12 deficiency E53.8     Breast cancer (H) C50.919     Carpal tunnel syndrome of right wrist G56.01     Gout M10.9     Hypertension I10     Hypothyroidism E03.9     Macrocytic anemia D53.9     Osteoporosis M81.0     Primary osteoarthritis of both knees M17.0     Pseudophakia of both eyes Z96.1     Rotator cuff tendonitis M75.80     Skin cancer C44.90     Tinnitus of both ears H93.13     Venous stasis dermatitis of both lower extremities I87.2     Obesity (BMI 35.0-39.9) with comorbidity (H) E66.01     Ulnar neuropathy of right upper extremity G56.21     Chronic kidney disease, stage 3 (H) N18.30     TIA (transient ischemic attack) G45.9     Vertebrobasilar insufficiency G45.0     Intracranial  "atherosclerosis I67.2         PAST MEDICAL & SURGICAL HISTORY     Past Medical History:   Patient  has a past medical history of Breast cancer (H), Closed compression fracture of L2 vertebra, initial encounter (H) (6/2/2021), Duodenal ulcer (04/2020), Duodenal ulcer due to nonsteroidal anti-inflammatory drug (NSAID) (4/3/2020), Gastrointestinal hemorrhage with melena (3/26/2020), History of humerus fracture (4/29/2021), HTN (hypertension), Osteoporosis, Status post shoulder surgery (3/24/2009), and Thyroid disease.    Surgical History:  She  has a past surgical history that includes IR Visceral Angiogram (3/29/2020); Midline Insertion - Double Lumen (03/29/2020); Ir Mesenteric Angiogram (03/29/2020); Pr Esophagogastroduodenoscopy Transoral Diagnostic (N/A, 03/29/2020); Lumpectomy breast; and back surgery.     SOCIAL HISTORY     Reviewed, and she  reports that she has quit smoking. She has never used smokeless tobacco. She reports that she does not drink alcohol and does not use drugs.     FAMILY HISTORY     Reviewed, and family history includes Glaucoma in her brother.     ALLERGIES     Allergies   Allergen Reactions     Hydrocodone-Acetaminophen Nausea and Vomiting     Amoxicillin Other (See Comments)     Sores in mouth, tongue slightly swollen     Hydrochlorothiazide Unknown     Levofloxacin Other (See Comments)     \"mouth gets raw\"     Morphine Nausea and Vomiting         REVIEW OF SYSTEMS     A 12 point review of system was performed and was negative except as outlined in the history of present illness.     HOME MEDICATIONS     Current Outpatient Rx   Medication Sig Dispense Refill     allopurinoL (ZYLOPRIM) 100 MG tablet [ALLOPURINOL (ZYLOPRIM) 100 MG TABLET] Take 100 mg by mouth 2 (two) times a day.        aspirin (ASA) 81 MG chewable tablet Take 1 tablet (81 mg) by mouth daily 90 tablet 0     atorvastatin (LIPITOR) 20 MG tablet Take 1 tablet (20 mg) by mouth daily 30 tablet 11     ipratropium - albuterol 0.5 " "mg/2.5 mg/3 mL (DUONEB) 0.5-2.5 (3) MG/3ML neb solution Inhale 3 mLs into the lungs       levothyroxine (SYNTHROID, LEVOTHROID) 112 MCG tablet [LEVOTHYROXINE (SYNTHROID, LEVOTHROID) 112 MCG TABLET] Take 112 mcg by mouth Daily at 6:00 am.        lisinopriL (PRINIVIL,ZESTRIL) 20 MG tablet [LISINOPRIL (PRINIVIL,ZESTRIL) 20 MG TABLET] Take 20 mg by mouth every evening.        omeprazole (PRILOSEC) 20 MG capsule [OMEPRAZOLE (PRILOSEC) 20 MG CAPSULE] Take 1 capsule (20 mg total) by mouth 2 (two) times a day before meals. 60 capsule 0     senna-docusate (SENNOSIDES-DOCUSATE SODIUM) 8.6-50 mg tablet [SENNA-DOCUSATE (SENNOSIDES-DOCUSATE SODIUM) 8.6-50 MG TABLET] Take 2 tablets by mouth daily. Hold for 2 or more loose stools per 24hrs  0     acetaminophen (TYLENOL) 500 MG tablet [ACETAMINOPHEN (TYLENOL) 500 MG TABLET] Take 2 tablets (1,000 mg total) by mouth 3 (three) times a day.  0     calcium-vitamin D3-vitamin K (VIACTIV) 650 mg-12.5 mcg-40 mcg Chew [CALCIUM-VITAMIN D3-VITAMIN K (VIACTIV) 650 MG-12.5 MCG-40 MCG CHEW] Chew 1 tablet 2 (two) times a day.        clotrimazole-betamethasone (LOTRISONE) 1-0.05 % external cream APPLY TOPICALLY TO THE FEET TWICE DAILY AS NEEDED       diclofenac sodium (VOLTAREN) 1 % Gel [DICLOFENAC SODIUM (VOLTAREN) 1 % GEL] Apply 2 g topically 4 (four) times a day as needed. 2g to LUE, 4g to LLE  0     hydrocortisone (CORTAID) 1 % external cream APPLY TO THE SKIN RASH LESIONS THREE TIMES DAILY AS NEEDED       lidocaine 4 % patch [LIDOCAINE 4 % PATCH] Place 1 patch on the skin daily. Remove and discard patch with 12 hours or as directed by MD. Apply to area of back pain  0     multivitamin with minerals (THERA-M) 9 mg iron-400 mcg Tab tablet [MULTIVITAMIN WITH MINERALS (THERA-M) 9 MG IRON-400 MCG TAB TABLET] Take 1 tablet by mouth daily.            PHYSICAL EXAM     Vital signs  /45 (BP Location: Left arm, Patient Position: Sitting)   Pulse 74   Ht 1.6 m (5' 3\")   Wt 79.4 kg (175 lb)   " BMI 31.00 kg/m      Weight:   175 lbs 0 oz    Patient is alert and oriented times 3 no acute distress somewhat anxious.  Vital signs were reviewed and are documented in electronic medical record.  Neck supple.  Neurologically speech was normal cranial nerves II through XII are intact except she is hard of hearing motor strength 5/5 except right hand intrinsic 4/5.  Reflexes 1+ toes downgoing.  She has dysmetria on finger-nose testing more so on the left side.  Sensation grossly intact.  She has a wide-based gait     PERTINENT DIAGNOSTIC STUDIES     Following studies were reviewed:     CTA HEAD & NECK 12/24/2021  HEAD CT:  1.  No acute intracranial process.  2.  Age-related changes described above.  3.  Right maxillary sinus air-fluid level. Please correlate for acute sinusitis.      HEAD CTA:   Right precavernous ICA mild stenosis. Right carotid siphon moderate stenosis.     Left precavernous ICA mild stenosis. Left cavernous ICA mild stenosis. Left carotid siphon mild stenosis. Left distal carotid siphon/proximal supraclinoid ICA moderate stenosis.     Right proximal V4 segment near dural ring severe stenosis.      Left proximal V4 segment severe stenosis.     Inferior basilar artery severe stenosis secondary to calcified plaque.     Otherwise, no significant stenosis/occlusion.      NECK CTA:  Right carotid bifurcation mild to moderate stenosis. Atherosclerotic plaque results in 50-70% stenosis in the right carotid bulb.       Left distal common carotid artery moderate to severe stenosis. Right external carotid artery origin severe stenosis. Right carotid bifurcation moderate stenosis.      Right vertebral artery origin severe stenosis.     ECHOCARDIOGRAM 1/31/2022  1.Left ventricular size, wall motion and function are normal. The ejection  fraction is 60-65%.  2.There is mild concentric left ventricular hypertrophy.  3.Normal right ventricle size and systolic function.  4.No hemodynamically significant valvular  abnormalities on 2D or color flow  imaging.  5.No obvious embolic source seen, however agitated saline injection looking  for PFO was not performed.  6.Compared to the prior study dated 3/28/2020, there have been no changes.    ECHOCARDIOGRAM 3/26/2020  1. Normal left ventricular size and systolic performance with a visually estimated ejection fraction of 65-70%.   2. There is mild to moderate concentric increase in left ventricular wall thickness.   3. No significant valvular heart disease is identified on this study.   4. Normal right ventricular size and systolic performance.       30-DAY EVENT MONITOR 1/31/2022  Essentially normal multi day patient activated monitor.  Indication for study: Transient cerebral ischemic attack.  No rhythm disturbance was demonstrated which might account for such a neurologic event.  No sustained atrial or ventricular tachyarrhythmia were observed.  Mildly increased ventricular ectopy which appears to be asymptomatic.  No profound bradycardia or pauses were observed.       PERTINENT LABS  Following labs were reviewed:  Hospital Outpatient Visit on 01/31/2022   Component Date Value     LVEF  01/31/2022 60-65%          Total time spent for face to face visit, reviewing labs/imaging studies, counseling and coordination of care was: 30 Minutes spent on the date of the encounter doing chart review, review of outside records, review of test results, interpretation of tests, patient visit and documentation       This note was dictated using voice recognition software.  Any grammatical or context distortions are unintentional and inherent to the software.    No orders of the defined types were placed in this encounter.     New Prescriptions    No medications on file     Modified Medications    No medications on file                     Again, thank you for allowing me to participate in the care of your patient.        Sincerely,        Alfredo Lance MD

## 2024-02-07 NOTE — OP NOTE
OPERATIVE REPORT  PATIENT NAME: Millie Moyer    :  1970  MRN: [id number]  Pt Location: [facility] OR ROOM 05    SURGERY DATE: 2024     Surgeons and Role:     * Kennedy Garcia MD - Primary     * Keshia Andre PA-C - Assisting    Physician Assistant need: A physician assistant was required during the procedure for retraction, tissue handling, dissection, and suturing. No qualified resident was available.    Pre-Op Diagnosis Codes:     * Carpal tunnel syndrome on right [G56.01]     * Cubital tunnel syndrome on right [G56.21]     * Trigger finger, right middle finger [M65.331]    Post-Op Diagnosis Codes:     * Carpal tunnel syndrome on right [G56.01]     * Cubital tunnel syndrome on right [G56.21]     * Trigger finger, right middle finger [M65.331]    Procedure(s) (LRB):  RELEASE CUBITAL TUNNEL (Right)  RELEASE TRIGGER FINGER - RIGHT MIDDLE (Right)  RELEASE CARPAL TUNNEL ENDOSCOPIC (Right)    Specimen(s):  * No specimens in log *    Estimated Blood Loss:   Minimal    Drains:  None    Implants:  * No implants in log *    Anesthesia Type:   Regional with Sedation    Operative Indications:  Patient is a 54 y.o. female evaluated in clinic with symptoms and clinical exam consistent with right carpal and cubital tunnel. Ultrasound was performed consistent with cubital tunnel. Additionally, patient had clinical exam consistent with right middle trigger finger. We discussed treatment options with patient including conservative management and surgical decompression. Discussed nonoperative management with splinting, injections, and observation.  We discussed risks including pain, bleeding, stiffness, infection, need for further surgeries.  We discussed with surgery the possibility of continued numbness and tingling after surgery and possible recurrence of symptoms many years down the line. Patient elected for surgical management. We also discussed possibility of ulnar nerve transposition as well.      Operative  Findings:  Thick transverse carpal ligament that was able to be released endoscopically.   Ulnar nerve was stable after release  Thick A1 pulley of right middle finger that was released     Complications:   None     Procedure and Technique:  Patient was identified in the preoperative holding area.  Surgical sites were marked with patient participation.  Patient was taken back to the operating theater.  Anesthesia was induced. Extremity was prepped and draped in typical fashion.  Formal time-out was performed confirming site, patient, and procedure. All present were in agreement. Hemoclear tourniquet was applied.      Procedure began with right middle trigger finger release.  A 1.5 cm longitudinal incision was made at the level of the A1 pulley.  Blunt dissection was carried down to the A1 pulley.  A1 pulley was incised longitudinally.  A1 pulley was noted to be thickened.  Wound was examined proximally and distally and there was no remaining A1 pulley noted.  Digit was ranged passively and no triggering was noted.    Attention was turned to the carpal tunnel release.  Incision made over proximal transverse wrist crease. Palmaris longus tendon was retracted radially.  Blunt dissection was carried to the antebrachial fascia, which was entered bluntly.  The dilator was inserted below the antebrachial fascia and above the median nerve.  The dilator was taken distally. The endoscope with [employer] endoscopic knife was introduced with care to be below antebrachial fascia and above the median nerve. Good visualization was noted.  The transverse carpal ligament fibers were directly visualized.  The transverse carpal ligament was incised using endoscopic knife.  The wound was reinspected.  There was complete release of the transverse carpal ligament.  Attention was turned proximally through the wound.  The distal 1.5 cm aspect of antebrachial fascia was incised under direct visualization.  Wound was inspected and palpated with  the hemostat.  There was no further sites of compression.      Attention was turned to the ulnar nerve decompression.  A 5 cm incision was made through skin.  Blunt dissection through soft tissues with performed to the level of Last's ligament.  There were no MABC branches identified. Last's ligament was incised.  Ulnar nerve was identified and dissected distally to the level of the 2 heads of the FCU.  Attention was turned proximally and sharp dissection with scissors was performed proximally past the level of the arcade of [place]. The elbow was placed through range of motion and ulnar nerve was found to be stable without subluxation.     Wounds were irrigated.  Tourniquet was removed. Cubital tunnel incision was closed with 3-0 Vicryl suture in inverted simple fashion.  4-0 running Monocryl closed the skin of cubital tunnel.  Exofin glue was applied. Carpal tunnel incision and trigger finger incision was closed with 4-0 chromic suture in horizontal mattress fashion. Wounds were dressed with Xeroform, 4x4s, cast padding, Ace bandage.  Patient was taken to PACU in stable condition.      I was present for all critical portions of procedure.    Postoperative Plan:  Patient may range digits as tolerated.  We will limit weightbearing to a couple of pounds for the first 2 weeks.  We will see patient back at the 2-week postoperative sherrie.      Patient Disposition:  PACU         SIGNATURE: Kennedy Garcia MD  DATE: February 7, 2024  TIME: 10:34 AM

## 2024-02-15 ENCOUNTER — EVALUATION (OUTPATIENT)
Dept: OCCUPATIONAL THERAPY | Age: 54
End: 2024-02-15
Payer: COMMERCIAL

## 2024-02-15 DIAGNOSIS — Z98.890 S/P MUSCULOSKELETAL SYSTEM SURGERY: Primary | ICD-10-CM

## 2024-02-15 DIAGNOSIS — Z98.890 S/P CUBITAL TUNNEL RELEASE: ICD-10-CM

## 2024-02-15 PROCEDURE — 97166 OT EVAL MOD COMPLEX 45 MIN: CPT

## 2024-02-15 PROCEDURE — 97110 THERAPEUTIC EXERCISES: CPT

## 2024-02-15 NOTE — PROGRESS NOTES
OT Evaluation     Today's date: 2/15/2024  Patient name: Millie Moyer  : 1970  MRN: [id number]  Referring provider: Keshia Andre PA-C  Dx:   Encounter Diagnosis     ICD-10-CM    1. S/P musculoskeletal system surgery  Z98.890                      Assessment  Assessment details: Pt is a 53 y/o F who presents s/p S/P R cubital, CTRS,and R LF Trigger Finger Release on 2024. She presents with the dressings still on and preference by doctor to leave them on. Presents with stiffness and swelling in the PIP and DIP of the digits. She would benefit from continued therapy to address swelling, ROM, pain and progress to strengthening as tolerated.   Impairments: abnormal or restricted ROM, impaired physical strength, lacks appropriate home exercise program and pain with function  Understanding of Dx/Px/POC: good   Prognosis: good    Goals  STG: ROM will increase by 10-15% in 4-6 weeks.  STG: Edema will decrease by 10-15% in 4-6 weeks.  STG: Pt will comply with HEP on 6/7 days of the week in 2 weeks.     LTG: To return to work  LTG: To return to leisure    Plan  Plan details: Patient will return next week for a reevaluation once dressings are removed. She will be taken through a more detailed evaluation at that time. She was provided with tendon glides and blocking for the digits, as well as coband and strategies to reduce swelling. She was educated on the use of ice as a modality. She had no questions following the evaluation.   Patient would benefit from: OT eval, skilled occupational therapy and custom splinting  Planned modality interventions: cryotherapy, TENS, thermotherapy: paraffin bath, ultrasound, high voltage pulsed current: pain management, hydrotherapy and fluidotherapy  Planned therapy interventions: IASTM, joint mobilization, manual therapy, kinesiology taping, massage, nerve gliding, neuromuscular re-education, orthotic fitting/training, orthotic management and training, patient education,  strengthening, stretching, therapeutic exercise, therapeutic activities, gait training, functional ROM exercises, graded exercise, home exercise program, IADL retraining, compression, coordination, ADL training, ADL retraining and activity modification  Frequency: 2x week  Duration in visits: 10  Duration in weeks: 10  Treatment plan discussed with: patient        Subjective Evaluation    History of Present Illness  Date of surgery: 2/7/2024  Mechanism of injury: surgery  Mechanism of injury: S/P R cubital and CTRS  R LF Trigger Finger Release  Was previously attending therapy for lateral epicondylitis     Self employed, a lot of work with hands  Hand dominance: right          Objective     Active Range of Motion     Right Digits   Flexion   Index     PIP: 78    DIP: 40  Middle     PIP: 86    DIP: 44  Ring     PIP: 80    DIP: 42  Little     PIP: 62    DIP: 50  Extension   Index     PIP: 0  Middle     PIP: -40  Ring     PIP: 0  Little     PIP: 0    Passive Range of Motion     Right Digits   Extension   Middle     PIP: -35    Swelling     Left Wrist/Hand   Index     Middle: 5.9 cm  Middle     Middle: 6 cm  Ring     Middle: 6 cm    Right Wrist/Hand   Index     Middle: 7 cm  Middle     Middle: 7.4 cm  Ring     Middle: 6.4 cm             Precautions: S/P Carpal and Cubital tunnel Release on 2/7/2024      Manuals                                                                 Neuro Re-Ed                                                                                                        Ther Ex             HEP EDU                                                                                                       Ther Activity                                       Gait Training                                       Modalities

## 2024-02-20 ENCOUNTER — OFFICE VISIT (OUTPATIENT)
Dept: OBGYN CLINIC | Facility: CLINIC | Age: 54
End: 2024-02-20

## 2024-02-20 VITALS — WEIGHT: 190 LBS | HEIGHT: 68 IN | BODY MASS INDEX: 28.79 KG/M2

## 2024-02-20 DIAGNOSIS — Z09 POSTOP CHECK: Primary | ICD-10-CM

## 2024-02-20 PROCEDURE — 99024 POSTOP FOLLOW-UP VISIT: CPT | Performed by: STUDENT IN AN ORGANIZED HEALTH CARE EDUCATION/TRAINING PROGRAM

## 2024-02-20 NOTE — PROGRESS NOTES
Assessment/Plan:  Patient ID: 54 y.o. female   Surgery: Release Cubital Tunnel - Right, Release Trigger Finger - Right Middle - Right, and Release Carpal Tunnel Endoscopic - Right  Date of Surgery: 2/7/2024    Resume activities as tolerated    Follow Up:  6 weeks    To Do Next Visit:  Re-evaluation of current issue  Reassess stiffness      CHIEF COMPLAINT:  Chief Complaint   Patient presents with   • Right Wrist - Post-op   • Right Elbow - Post-op   • Right Middle Finger - Post-op         SUBJECTIVE:  Patient is a 54 y.o. year old female who presents for follow up after Release Cubital Tunnel - Right, Release Trigger Finger - Right Middle - Right, and Release Carpal Tunnel Endoscopic - Right. Today patient states she is doing well. Endorses some stiffness to fingers. Has started therapy. Denies any numbness or tingling.  PHYSICAL EXAMINATION:  General: Well developed and well nourished, alert and oriented x 3, appears comfortable  Psychiatric: Normal    MUSCULOSKELETAL EXAMINATION:  Incision: Clean, dry, intact  Surgery Site: normal, no evidence of infection   Range of Motion: Pulp to palm distance 2 cm of index through small finger. Middle finger PIP active extension is 30 degrees shy of full extension  Neurovascular status: Neuro intact, good cap refill, SILT to radial and ulnar aspect of all digits. APB 5/5, Finger abduction and adduction 5/5  Sutures removed      STUDIES REVIEWED:  No new studies to review       PROCEDURES PERFORMED:  Procedures  No Procedures performed today    Scribe Attestation    I,:   am acting as a scribe while in the presence of the attending physician.:       I,:   personally performed the services described in this documentation    as scribed in my presence.:            complains of pain/discomfort

## 2024-02-22 ENCOUNTER — EVALUATION (OUTPATIENT)
Dept: OCCUPATIONAL THERAPY | Age: 54
End: 2024-02-22
Payer: COMMERCIAL

## 2024-02-22 DIAGNOSIS — Z98.890 S/P CARPAL TUNNEL RELEASE: ICD-10-CM

## 2024-02-22 DIAGNOSIS — Z98.890 S/P CUBITAL TUNNEL RELEASE: ICD-10-CM

## 2024-02-22 DIAGNOSIS — Z98.890 S/P TRIGGER FINGER RELEASE: Primary | ICD-10-CM

## 2024-02-22 PROCEDURE — 97760 ORTHOTIC MGMT&TRAING 1ST ENC: CPT

## 2024-02-22 PROCEDURE — 97168 OT RE-EVAL EST PLAN CARE: CPT

## 2024-02-22 NOTE — PROGRESS NOTES
OT Re-Evaluation     Today's date: 2024  Patient name: Millie Moyer  : 1970  MRN: [id number]  Referring provider: Keshia Andre PA-C  Dx:   Encounter Diagnosis     ICD-10-CM    1. S/P trigger finger release  Z98.890       2. S/P carpal tunnel release  Z98.890       3. S/P cubital tunnel release  Z98.890                      Assessment  Assessment details: Pt is a 55 y/o F who presents s/p S/P R cubital, CTRS,and R LF Trigger Finger Release on 2024. She presents with the dressings still on and preference by doctor to leave them on. Presents with stiffness and swelling in the PIP and DIP of the digits. She has minor stiffness in the wrist and elbow. Incisions look good with no signs of infection. She is presenting with weakness at the elbow, wrist, and  strengthening. PT would benefit from continued skilled services for pain, edema, strengthening and ROM.   Impairments: abnormal or restricted ROM, impaired physical strength, lacks appropriate home exercise program and pain with function  Understanding of Dx/Px/POC: good   Prognosis: good    Goals  STG: ROM will increase by 10-15% in 4-6 weeks.  STG: Edema will decrease by 10-15% in 4-6 weeks.  STG: Pt will comply with HEP on 6/7 days of the week in 2 weeks.   STG: In 4-6 weeks, patient's strength will increase by 10-15%.    LTG: To return to work  LTG: To return to leisure    Plan  Plan details: PT will continue with skilled services 2x/week for the next 5 weeks to address deficits listed above. She was provided with ROM and strengthening exercises as well as educated on scar massage to complete at home. Will progress as tolerated. Provided with an PIP extension at night for the R LF.   Patient would benefit from: OT eval, skilled occupational therapy and custom splinting  Planned modality interventions: cryotherapy, TENS, thermotherapy: paraffin bath, ultrasound, high voltage pulsed current: pain management, hydrotherapy and  fluidotherapy  Planned therapy interventions: IASTM, joint mobilization, manual therapy, kinesiology taping, massage, nerve gliding, neuromuscular re-education, orthotic fitting/training, orthotic management and training, patient education, strengthening, stretching, therapeutic exercise, therapeutic activities, gait training, functional ROM exercises, graded exercise, home exercise program, IADL retraining, compression, coordination, ADL training, ADL retraining and activity modification  Frequency: 2x week  Duration in visits: 10  Duration in weeks: 10  Treatment plan discussed with: patient        Subjective Evaluation    History of Present Illness  Date of surgery: 2024  Mechanism of injury: surgery  Mechanism of injury: S/P R cubital and CTRS  R LF Trigger Finger Release  Was previously attending therapy for lateral epicondylitis   No N/T  Self employed, a lot of work with hands  Pain  Current pain ratin  At worst pain ratin  Quality: sharp and tight  Aggravating factors: lifting    Hand dominance: right        Objective     Active Range of Motion     Right Elbow   Flexion: 145 degrees   Extension: -5 degrees   Forearm supination: 80 degrees   Forearm pronation: 90 degrees     Left Wrist   Wrist flexion: 75 degrees   Wrist extension: 73 degrees   Radial deviation: 25 degrees   Ulnar deviation: 35 degrees     Right Wrist   Wrist flexion: 60 degrees   Wrist extension: 55 degrees   Radial deviation: 20 degrees   Ulnar deviation: 30 degrees     Right Digits   Flexion   Index     MCP: 68    PIP: 86    DIP: 50  Middle     MCP: 70    PIP: 86    DIP: 50  Ring     MCP: 70    PIP: 88    DIP: 48  Little     MCP: 74    PIP: 90    DIP: 58  Extension   Index     PIP: 0  Middle     PIP: -20  Ring     PIP: 0  Little     PIP: 0    Strength/Myotome Testing     Right Elbow   Flexion: 4  Extension: 4+    Left Wrist/Hand      (2nd hand position)     Trial 1: 47.9    Trial 2: 44.4    Thumb Strength  Key/Lateral Pinch      Trial 1: 12  Palmar/Three-Point Pinch     Trial 1: 10    Right Wrist/Hand   Wrist extension: 4+  Wrist flexion: 4  Radial deviation: 4+  Ulnar deviation: 4+     (2nd hand position)     Trial 1: 8.5    Trial 2: 8.5    Thumb Strength   Key/Lateral Pinch     Trial 1: 9  Palmar/Three-Point Pinch     Trial 1: 4    Tests     Additional Tests Details  [place]:  3.61 all digits    Swelling   Left Elbow Girth Measurements   Joint line: 17 cm    Right Elbow Girth Measurements   Joint line: 18 cm    Left Wrist/Hand   Index     Middle: 5.9 cm  Middle     Middle: 6 cm  Ring     Middle: 6 cm  Circumference wrist: 16 cm    Right Wrist/Hand   Index     Middle: 7 cm  Middle     Middle: 6.7 cm  Ring     Middle: 6.4 cm  Circumference wrist: 17 cm             Precautions: S/P Carpal and Cubital tunnel Release on 2/7/2024      Manuals  2/22           Splint Glenn  15                                                  Neuro Re-Ed                                                                                                        Ther Ex             HEP EDU                                                                                                       Ther Activity                                       Gait Training                                       Modalities             P  5

## 2024-02-27 ENCOUNTER — OFFICE VISIT (OUTPATIENT)
Dept: HEMATOLOGY ONCOLOGY | Facility: CLINIC | Age: 54
End: 2024-02-27
Payer: COMMERCIAL

## 2024-02-27 ENCOUNTER — OFFICE VISIT (OUTPATIENT)
Dept: OCCUPATIONAL THERAPY | Age: 54
End: 2024-02-27
Payer: COMMERCIAL

## 2024-02-27 VITALS
DIASTOLIC BLOOD PRESSURE: 86 MMHG | RESPIRATION RATE: 17 BRPM | WEIGHT: 195 LBS | SYSTOLIC BLOOD PRESSURE: 132 MMHG | HEART RATE: 99 BPM | HEIGHT: 68 IN | TEMPERATURE: 98.2 F | BODY MASS INDEX: 29.55 KG/M2 | OXYGEN SATURATION: 99 %

## 2024-02-27 DIAGNOSIS — Z15.89 MONOALLELIC MUTATION OF MUTYH GENE: ICD-10-CM

## 2024-02-27 DIAGNOSIS — Z17.0 MALIGNANT NEOPLASM OF UPPER-INNER QUADRANT OF RIGHT BREAST IN FEMALE, ESTROGEN RECEPTOR POSITIVE: Primary | ICD-10-CM

## 2024-02-27 DIAGNOSIS — C50.211 MALIGNANT NEOPLASM OF UPPER-INNER QUADRANT OF RIGHT BREAST IN FEMALE, ESTROGEN RECEPTOR POSITIVE: Primary | ICD-10-CM

## 2024-02-27 DIAGNOSIS — Z98.890 S/P MUSCULOSKELETAL SYSTEM SURGERY: Primary | ICD-10-CM

## 2024-02-27 DIAGNOSIS — Z13.71 BRCA NEGATIVE: ICD-10-CM

## 2024-02-27 PROCEDURE — 97140 MANUAL THERAPY 1/> REGIONS: CPT

## 2024-02-27 PROCEDURE — 99213 OFFICE O/P EST LOW 20 MIN: CPT | Performed by: INTERNAL MEDICINE

## 2024-02-27 PROCEDURE — 97110 THERAPEUTIC EXERCISES: CPT

## 2024-02-27 NOTE — PROGRESS NOTES
Daily Note     Today's date: 2024  Patient name: Millie Moyer  : 1970  MRN: [id number]  Referring provider: Keshia Andre PA-C  Dx:   Encounter Diagnosis     ICD-10-CM    1. S/P musculoskeletal system surgery  Z98.890                      Subjective: The extension brace is really helping. More tenderness than pain.       Objective: See treatment diary below      Assessment: Tolerated treatment well. Tenderness along the incisions with scar massage, unable to tolerate grasten. Few stitches taken out of carpal tunnel incision. Incisions healing well. ROM improving in the LF in both flx and ext. Patient demonstrated fatigue post treatment and would benefit from continued OT      Plan: Continue per plan of care.      Precautions: S/P Carpal and Cubital tunnel Release on 2024      Manuals            Splint Glenn  15           Scar MAssage   6          Stitch removal   6                       Neuro Re-Ed                                                                                                        Ther Ex             HEP EDU            AROM   Shoulder circles 20x    Elbow f.e 20x    Ball roll 2'            S/P    Sm hammer 30x          Tendon Glides   30x hook to full     Table to full 30x          Grasp   RPW center; sdie 30x    RTP sm dowel 30x          Pinch   RG 2x          Scap Add   GTB 3x10          Wrist Strengthening   RTB S/P 30x          Ther Activity                                       Gait Training                                       Modalities             P  5 5'

## 2024-02-27 NOTE — PROGRESS NOTES
Hematology Outpatient Follow - Up Note  Millie Moyer 54 y.o. female MRN: @ Encounter: [id number]        Date:  2/27/2024        Assessment/ Plan:    She is a 54-year-old  female premenopausal with history of stage IIa right-sided breast cancer grade 1 ER/WV positive HER2 negative status post bilateral mastectomy with sentinel lymph node biopsy resulting in GINI Oncotype DX recurrence score of 14 she had been on tamoxifen 20 mg p.o. daily since August 2015    No evidence of disease by imaging studies and blood work    Will follow-up in 1 year with CBC, CMP patient to stay on tamoxifen for total of 10 years she has to do Pap smear once a year        Labs and imaging studies are reviewed by ordering provider once results are available. If there are findings that need immediate attention, you will be contacted when results available.   Discussing results and the implication on your healthcare is best discussed in person at your follow-up visit.       HPI:     She was a 45 year old premenopausal woman, who is found to have abnormality, based on the screening mammography in her right breast. Subsequently, she underwent biopsy from the right breast lesion, which showed invasive ductal carcinoma. She was seen and evaluated by Dr. Olvera. She was found to have no evidence of BRCA gene mutation. Despite this, she elected to have mastectomy as well as prophylactic left mastectomy, which was done in June 30, 2015. She has no evidence of carcinoma in the left mastectomy specimen. Right mastectomy specimen showed 2.1x1.8x1.0 cm of invasive ductal carcinoma, grade 1. There was no evidence of lymphovascular invasion. 2 sentinel lymph node were negative for metastatic disease. This was % positive, WV 95% positive, HER-2 negative disease. Dr. Olvera. That her tumor tissue for Oncotype DX testing which came back 14 as of recurrence score. She had immediate reconstruction with tissue expander.     Primary Diagnosis:    "  1. Right breast cancer, stage IIA (pT2, pN0,M0). Grade 1, ER/NH positive, HER-2 negative disease. Oncotype DX recurrence score 14. Diagnosed in June 2015.  2. BRCA gene mutation negative.  Carrier for MUTYH mutation     Cancer Staging:  Cancer Staging  No matching staging information was found for the patient.        Previous Hematologic/ Oncologic Treatment:            Current Hematologic/ Oncologic Treatment:       Adjuvant hormonal therapy with tamoxifen 20 mg once a day since August 2015.      Disease Status:      GINI status post mastectomy and contralateral prophylactic mastectomy.     Previous Treatment:         Test Results:    Imaging: No results found.    Labs:   Lab Results   Component Value Date    WBC 6.50 01/12/2024    HGB 12.3 01/12/2024    HCT 38.2 01/12/2024    MCV 93 01/12/2024     01/12/2024     Lab Results   Component Value Date     10/06/2015    K 3.6 01/12/2024     01/12/2024    CO2 29 01/12/2024    ANIONGAP 9 10/06/2015    BUN 11 01/12/2024    CREATININE 0.80 01/12/2024    GLUCOSE 81 10/06/2015    GLUF 91 07/07/2022    CALCIUM 9.1 01/12/2024    AST 24 07/07/2022    ALT 34 07/07/2022    ALKPHOS 70 07/07/2022    PROT 7.4 10/06/2015    BILITOT 0.51 10/06/2015    EGFR 84 01/12/2024       No results found for: \"IRON\", \"TIBC\", \"FERRITIN\"    No results found for: \"YWHBFXBD30\"      ROS: Review of Systems   Constitutional:  Negative for appetite change, chills, diaphoresis, fatigue and unexpected weight change.   HENT:   Negative for mouth sores, nosebleeds, sore throat, trouble swallowing and voice change.    Eyes:  Negative for eye problems and icterus.   Respiratory:  Negative for chest tightness, cough, hemoptysis and wheezing.    Cardiovascular:  Negative for chest pain, leg swelling and palpitations.   Gastrointestinal:  Negative for abdominal distention, abdominal pain, blood in stool, constipation, diarrhea, nausea and vomiting.   Endocrine: Negative for hot flashes. "   Genitourinary:  Negative for bladder incontinence, difficulty urinating, dyspareunia, dysuria and frequency.    Musculoskeletal:  Negative for arthralgias, back pain, gait problem, neck pain and neck stiffness.   Skin:  Negative for itching and rash.   Neurological:  Negative for dizziness, gait problem, headaches, numbness, seizures and speech difficulty.   Hematological:  Negative for adenopathy. Does not bruise/bleed easily.   Psychiatric/Behavioral:  Negative for decreased concentration, depression, sleep disturbance and suicidal ideas. The patient is not nervous/anxious.           Current Medications: Reviewed  Allergies: Reviewed  PMH/FH/SH:  Reviewed      Physical Exam:    Body surface area is 2.02 meters squared.    Wt Readings from Last 3 Encounters:   02/27/24 88.5 kg (195 lb)   02/20/24 86.2 kg (190 lb)   02/07/24 86.2 kg (190 lb)        Temp Readings from Last 3 Encounters:   02/27/24 98.2 °F (36.8 °C) (Temporal)   02/07/24 (!) 97.1 °F (36.2 °C) (Temporal)   01/18/24 98.5 °F (36.9 °C)        BP Readings from Last 3 Encounters:   02/27/24 132/86   02/07/24 140/80   01/18/24 140/82         Pulse Readings from Last 3 Encounters:   02/27/24 99   02/07/24 65   01/18/24 81        Physical Exam  Vitals reviewed.   Constitutional:       General: She is not in acute distress.     Appearance: She is well-developed. She is not diaphoretic.   HENT:      Head: Normocephalic and atraumatic.   Eyes:      Conjunctiva/sclera: Conjunctivae normal.   Neck:      Trachea: No tracheal deviation.   Cardiovascular:      Rate and Rhythm: Normal rate and regular rhythm.      Heart sounds: No murmur heard.     No friction rub. No gallop.   Pulmonary:      Effort: Pulmonary effort is normal. No respiratory distress.      Breath sounds: Normal breath sounds. No wheezing or rales.   Chest:      Chest wall: No tenderness.   Abdominal:      General: There is no distension.      Palpations: Abdomen is soft.      Tenderness: There is  no abdominal tenderness.   Musculoskeletal:      Cervical back: Normal range of motion and neck supple.   Lymphadenopathy:      Cervical: No cervical adenopathy.   Skin:     General: Skin is warm and dry.      Coloration: Skin is not pale.      Findings: No erythema.   Neurological:      Mental Status: She is alert and oriented to person, place, and time.   Psychiatric:         Behavior: Behavior normal.         Thought Content: Thought content normal.         Judgment: Judgment normal.         ECO    Goals and Barriers:  Current Goal: Minimize effects of disease.   Barriers: None.      Patient's Capacity to Self Care:  Patient is able to self care.    Code Status: [unfilled]

## 2024-02-29 ENCOUNTER — OFFICE VISIT (OUTPATIENT)
Dept: OCCUPATIONAL THERAPY | Age: 54
End: 2024-02-29
Payer: COMMERCIAL

## 2024-02-29 DIAGNOSIS — Z98.890 S/P MUSCULOSKELETAL SYSTEM SURGERY: Primary | ICD-10-CM

## 2024-02-29 PROCEDURE — 97110 THERAPEUTIC EXERCISES: CPT

## 2024-02-29 PROCEDURE — 97140 MANUAL THERAPY 1/> REGIONS: CPT

## 2024-02-29 NOTE — PROGRESS NOTES
Daily Note     Today's date: 2024  Patient name: Millie Moyer  : 1970  MRN: [id number]  Referring provider: Keshia Andre PA-C  Dx:   Encounter Diagnosis     ICD-10-CM    1. S/P musculoskeletal system surgery  Z98.890                        Subjective: Feel good over last session.       Objective: See treatment diary below      Assessment: Tolerated treatment well. Scar tissue beginning to soften around incisions. Surgical glue remains over the elbow incision. Min cueing for positioned with proximal strengthening. Patient demonstrated fatigue post treatment and would benefit from continued OT      Plan: Continue per plan of care.      Precautions: S/P Carpal and Cubital tunnel Release on 2024      Manuals           Splint Glenn  15           Scar MAssage   6 6         Stitch removal   6          Edema massage    LF 6'         Neuro Re-Ed                                                                                                        Ther Ex             HEP EDU            AROM   Shoulder circles 20x    Elbow f.e 20x    Ball roll 2'   Shoulder circles forwardback 20x    Elbow f/e 20x    Ball roll x2         S/P    Sm hammer 30x Sm hammer 30x         Tendon Glides   30x hook to full     Table to full 30x 15-20x          Grasp   RPW center; sdie 30x    RTP sm dowel 30x RTP sm dowel 30x     GPW 3x10 center;side          Pinch   RG 2x GGx2         Proximal strengthening   GTB 3x10 GTB 3x30 scap add, if er 30x         Wrist Strengthening   RTB S/P 30x RTB 3x10 all directions         Ther Activity                                       Gait Training                                       Modalities             MHP  5 5'

## 2024-03-05 ENCOUNTER — OFFICE VISIT (OUTPATIENT)
Dept: OCCUPATIONAL THERAPY | Age: 54
End: 2024-03-05
Payer: COMMERCIAL

## 2024-03-05 DIAGNOSIS — Z98.890 S/P MUSCULOSKELETAL SYSTEM SURGERY: Primary | ICD-10-CM

## 2024-03-05 PROCEDURE — 97140 MANUAL THERAPY 1/> REGIONS: CPT

## 2024-03-05 PROCEDURE — 97110 THERAPEUTIC EXERCISES: CPT

## 2024-03-05 NOTE — PROGRESS NOTES
Daily Note     Today's date: 3/5/2024  Patient name: Millie Moyer  : 1970  MRN: [id number]  Referring provider: Keshia Andre PA-C  Dx:   Encounter Diagnosis     ICD-10-CM    1. S/P musculoskeletal system surgery  Z98.890                          Subjective: Really sore.       Objective: See treatment diary below      Assessment: Tolerated treatment well. Scar tissue beginning to soften around incisions. Can begin scar massage on the elbow.  Patient demonstrated fatigue post treatment and would benefit from continued OT      Plan: Continue per plan of care.      Precautions: S/P Carpal and Cubital tunnel Release on 2024      Manuals  2/22 2/27 2/29 3/5        Splint Glenn  15           Scar MAssage   6 6 12        Stitch removal   6          Edema massage    LF 6'         Neuro Re-Ed                                                                                                        Ther Ex             HEP EDU            AROM   Shoulder circles 20x    Elbow f.e 20x    Ball roll 2'   Shoulder circles forwardback 20x    Elbow f/e 20x    Ball roll x2 Shoulder circles forwardback 20x    Elbow f/e 20x    Ball roll x2        S/P    Sm hammer 30x Sm hammer 30x Lg hammer 30x        Tendon Glides   30x hook to full     Table to full 30x 15-20x  30x        Grasp   RPW center; sdie 30x    RTP sm dowel 30x RTP sm dowel 30x     GPW 3x10 center;side  GPW 30x cneter; side            Pinch   RG 2x GGx2 GGx2        Proximal strengthening   GTB 3x10 GTB 3x30 scap add, if er 30x BluTB 30x scap add    IR/ER 30x GTB        Wrist Strengthening   RTB S/P 30x RTB 3x10 all directions GTB 30x all directions        Ther Activity             Bicep Curls     4# 30x                     Gait Training                                       Modalities             MHP  5 5'  5

## 2024-03-06 ENCOUNTER — TELEPHONE (OUTPATIENT)
Dept: HEMATOLOGY ONCOLOGY | Facility: CLINIC | Age: 54
End: 2024-03-06

## 2024-03-06 NOTE — TELEPHONE ENCOUNTER
Patient Call    Who are you speaking with? Patient    If it is not the patient, are they listed on an active communication consent form? Yes   What is the reason for this call? Patient wants to talk to Henry about the MRI    Does this require a call back? Yes   If a call back is required, please list best call back number [phone]   If a call back is required, advise that a message will be forwarded to their care team and someone will return their call as soon as possible.   Did you relay this information to the patient? Yes       Advised we were cancelling appt for 3/18/24 as Henry has personal day

## 2024-03-07 ENCOUNTER — OFFICE VISIT (OUTPATIENT)
Dept: OCCUPATIONAL THERAPY | Age: 54
End: 2024-03-07
Payer: COMMERCIAL

## 2024-03-07 DIAGNOSIS — Z98.890 S/P MUSCULOSKELETAL SYSTEM SURGERY: Primary | ICD-10-CM

## 2024-03-07 PROCEDURE — 97140 MANUAL THERAPY 1/> REGIONS: CPT

## 2024-03-07 PROCEDURE — 97110 THERAPEUTIC EXERCISES: CPT

## 2024-03-07 NOTE — PROGRESS NOTES
Daily Note     Today's date: 3/7/2024  Patient name: Millie Moyer  : 1970  MRN: [id number]  Referring provider: Keshia Andre PA-C  Dx:   Encounter Diagnosis     ICD-10-CM    1. S/P musculoskeletal system surgery  Z98.890                            Subjective: I think I did too much.     Objective: See treatment diary below      Assessment: Tolerated treatment well. Scar tissue beginning to soften around incisions. Tolerated upgrades well. Sensitivity specifically around trigger finger incision. No reports of N/T with nerve glides. Patient demonstrated fatigue post treatment and would benefit from continued OT      Plan: Continue per plan of care.      Precautions: S/P Carpal and Cubital tunnel Release on 2024      Manuals  2/22 2/27 2/29 3/5 3/7       Splint Glenn  15    4       Scar MAssage   6 6 12 8       Stitch removal   6          Edema massage    LF 6'         Neuro Re-Ed                                                                                                        Ther Ex             HEP EDU            AROM   Shoulder circles 20x    Elbow f.e 20x    Ball roll 2'   Shoulder circles forwardback 20x    Elbow f/e 20x    Ball roll x2 Shoulder circles forwardback 20x    Elbow f/e 20x    Ball roll x2 Shoulder circles forwardback 20x    Elbow f/e 20x    Ball roll x2       S/P    Sm hammer 30x Sm hammer 30x Lg hammer 30x Lg hammer 30x       Tendon Glides   30x hook to full     Table to full 30x 15-20x  30x Tendon glides 30x       Grasp   RPW center; sdie 30x    RTP sm dowel 30x RTP sm dowel 30x     GPW 3x10 center;side  GPW 30x cneter; side     RTP sm dowel 30x       BLUPW 30x cneter;side        Pinch   RG 2x GGx2 GGx2 GB x2       Proximal strengthening   GTB 3x10 GTB 3x30 scap add, if er 30x BluTB 30x scap add    IR/ER 30x GTB Tricep ext corinne 10# 39x    Scap ADD 30x 15#       Wrist Strengthening   RTB S/P 30x RTB 3x10 all directions GTB 30x all directions GTB 3x10 all directions        Ther Activity             Bicep Curls     4# 30x        Nerve glides      UNGs 15-20x    MNGs 15-20x       Gait Training                                       Modalities             [facility]  5 5'  5 5

## 2024-03-07 NOTE — TELEPHONE ENCOUNTER
Spoke with patient. She had recent arm surgery and does not think she can tolerate the breast MRI at the present time. I've recommended delaying the MRI for a few weeks to allow her more time to heal. Pt to call central scheduled to change this appt. She will call our office to r/s follow up appt with me for after the MRI.

## 2024-03-08 ENCOUNTER — TELEPHONE (OUTPATIENT)
Dept: HEMATOLOGY ONCOLOGY | Facility: CLINIC | Age: 54
End: 2024-03-08

## 2024-03-08 NOTE — TELEPHONE ENCOUNTER
Appointment Change  Cancel, Reschedule, Change to Virtual      Who are you speaking with? Patient   If it is not the patient, is the caller listed on the communication consent form? N/A   Which provider is the appointment scheduled with? AGUSTINA eDan   When was the original appointment scheduled?    Please list date and time 03/18/2024 @ 10AM    At which location is the appointment scheduled to take place? [place]   Was the appointment rescheduled?     Was the appointment changed from an in person visit to a virtual visit?    If so, please list the details of the change. Yes, 05/31/2024 @ 9AM    What is the reason for the appointment change? MRI scheduled for 05/22

## 2024-03-12 ENCOUNTER — OFFICE VISIT (OUTPATIENT)
Dept: OCCUPATIONAL THERAPY | Age: 54
End: 2024-03-12
Payer: COMMERCIAL

## 2024-03-12 DIAGNOSIS — Z98.890 S/P MUSCULOSKELETAL SYSTEM SURGERY: Primary | ICD-10-CM

## 2024-03-12 PROCEDURE — 97110 THERAPEUTIC EXERCISES: CPT

## 2024-03-12 PROCEDURE — 97112 NEUROMUSCULAR REEDUCATION: CPT

## 2024-03-12 NOTE — PROGRESS NOTES
Daily Note     Today's date: 3/12/2024  Patient name: Millie Moyer  : 1970  MRN: [id number]  Referring provider: Keshia Andre PA-C  Dx:   Encounter Diagnosis     ICD-10-CM    1. S/P musculoskeletal system surgery  Z98.890                        Subjective: Its a little sore. Super sensitive in the trigger finger release incision.     Objective: See treatment diary below      Assessment: Tolerated treatment well. Tolerated desensitization treatment well at both hand wrist incision sites. Frequent rest breaks needed. Patient demonstrated fatigue post treatment and would benefit from continued OT      Plan: Continue per plan of care.      Precautions: S/P Carpal and Cubital tunnel Release on 2024      Manuals  2/22 2/27 2/29 3/5 3/7 3/12      Splint Glenn  15    4       Scar MAssage   6 6 12 8       Stitch removal   6          Edema massage    LF 6'         Neuro Re-Ed             Desensitization       3' vib    3' square beads    3' velcro                                                                                    Ther Ex             HEP EDU            AROM   Shoulder circles 20x    Elbow f.e 20x    Ball roll 2'   Shoulder circles forwardback 20x    Elbow f/e 20x    Ball roll x2 Shoulder circles forwardback 20x    Elbow f/e 20x    Ball roll x2 Shoulder circles forwardback 20x    Elbow f/e 20x    Ball roll x2       S/P    Sm hammer 30x Sm hammer 30x Lg hammer 30x Lg hammer 30x       Tendon Glides   30x hook to full     Table to full 30x 15-20x  30x Tendon glides 30x Tendon glides 20x      Grasp   RPW center; sdie 30x    RTP sm dowel 30x RTP sm dowel 30x     GPW 3x10 center;side  GPW 30x cneter; side     RTP sm dowel 30x       BLUPW 30x cneter;side  RTP sm dowel 30x       BLUPW 30x center; side    3# f/e 30x      Pinch   RG 2x GGx2 GGx2 GB x2 GB x2      Proximal strengthening   GTB 3x10 GTB 3x30 scap add, if er 30x BluTB 30x scap add    IR/ER 30x GTB Tricep ext corinne 10# 39x    Scap ADD 30x  15# Scap add 20# 30x      Wrist Strengthening   RTB S/P 30x RTB 3x10 all directions GTB 30x all directions GTB 3x10 all directions       Ther Activity             Bicep Curls     4# 30x  6# 30x      Nerve glides      UNGs 15-20x    MNGs 15-20x       Gait Training                                       Modalities             P  5 5'  5 5

## 2024-03-14 ENCOUNTER — OFFICE VISIT (OUTPATIENT)
Dept: OCCUPATIONAL THERAPY | Age: 54
End: 2024-03-14
Payer: COMMERCIAL

## 2024-03-14 DIAGNOSIS — Z98.890 S/P MUSCULOSKELETAL SYSTEM SURGERY: Primary | ICD-10-CM

## 2024-03-14 PROCEDURE — 97110 THERAPEUTIC EXERCISES: CPT

## 2024-03-14 PROCEDURE — 97140 MANUAL THERAPY 1/> REGIONS: CPT

## 2024-03-14 NOTE — PROGRESS NOTES
Daily Note     Today's date: 3/14/2024  Patient name: Millie Moyer  : 1970  MRN: [id number]  Referring provider: Keshia Andre PA-C  Dx:   Encounter Diagnosis     ICD-10-CM    1. S/P musculoskeletal system surgery  Z98.890                          Subjective: Its getting better.    Objective: See treatment diary below      Assessment: Tolerated treatment well. Tolerated desensitization treatment well at both hand wrist incision sites. 1 stitch removed from the wrist incision. Patient demonstrated fatigue post treatment and would benefit from continued OT      Plan: Continue per plan of care.      Precautions: S/P Carpal and Cubital tunnel Release on 2024      Manuals  2/22 2/27 2/29 3/5 3/7 3/12 3/14     Splint adjust  15    4  2     Scar MAssage   6 6 12 8  10     Stitch removal   6          Edema massage    LF 6'         Neuro Re-Ed             Desensitization       3' vib    3' square beads    3' velcro 3' vib    3' square beads    3' velcro                                                                                   Ther Ex             HEP EDU            AROM   Shoulder circles 20x    Elbow f.e 20x    Ball roll 2'   Shoulder circles forwardback 20x    Elbow f/e 20x    Ball roll x2 Shoulder circles forwardback 20x    Elbow f/e 20x    Ball roll x2 Shoulder circles forwardback 20x    Elbow f/e 20x    Ball roll x2  Shoulder circles forwardback 20x    Elbow f/e 20x     S/P    Sm hammer 30x Sm hammer 30x Lg hammer 30x Lg hammer 30x  Lg hammer 30x     Tendon Glides   30x hook to full     Table to full 30x 15-20x  30x Tendon glides 30x Tendon glides 20x Tendon glides 20x     Grasp   RPW center; sdie 30x    RTP sm dowel 30x RTP sm dowel 30x     GPW 3x10 center;side  GPW 30x cneter; side     RTP sm dowel 30x       BLUPW 30x cneter;side  RTP sm dowel 30x       BLUPW 30x center; side    3# f/e 30x Ruperto pw 30x center;side     Pinch   RG 2x GGx2 GGx2 GB x2 GB x2 BB x2     Proximal strengthening   GTB  3x10 GTB 3x30 scap add, if er 30x BluTB 30x scap add    IR/ER 30x GTB Tricep ext corinne 10# 39x    Scap ADD 30x 15# Scap add 20# 30x      Wrist Strengthening   RTB S/P 30x RTB 3x10 all directions GTB 30x all directions GTB 3x10 all directions  GTB 30x all directions     Ther Activity             Bicep Curls     4# 30x  6# 30x 7# 30x neutral     Nerve glides      UNGs 15-20x    MNGs 15-20x       Gait Training                                       Modalities             [facility]  5 5'  5 5  5'

## 2024-03-18 ENCOUNTER — HOSPITAL ENCOUNTER (EMERGENCY)
Facility: HOSPITAL | Age: 54
Discharge: HOME | End: 2024-03-18
Attending: EMERGENCY MEDICINE
Payer: COMMERCIAL

## 2024-03-18 ENCOUNTER — APPOINTMENT (EMERGENCY)
Dept: VASCULAR ULTRASOUND | Facility: HOSPITAL | Age: 54
End: 2024-03-18
Payer: COMMERCIAL

## 2024-03-18 ENCOUNTER — APPOINTMENT (OUTPATIENT)
Dept: RADIOLOGY | Facility: HOSPITAL | Age: 54
End: 2024-03-18
Payer: COMMERCIAL

## 2024-03-18 VITALS
SYSTOLIC BLOOD PRESSURE: 196 MMHG | HEART RATE: 77 BPM | RESPIRATION RATE: 16 BRPM | OXYGEN SATURATION: 98 % | TEMPERATURE: 98.1 F | DIASTOLIC BLOOD PRESSURE: 96 MMHG

## 2024-03-18 DIAGNOSIS — M25.561 ACUTE PAIN OF RIGHT KNEE: Primary | ICD-10-CM

## 2024-03-18 PROCEDURE — 99283 EMERGENCY DEPT VISIT LOW MDM: CPT

## 2024-03-18 PROCEDURE — 73564 X-RAY EXAM KNEE 4 OR MORE: CPT

## 2024-03-18 PROCEDURE — 93971 EXTREMITY STUDY: CPT

## 2024-03-18 PROCEDURE — 93971 EXTREMITY STUDY: CPT | Performed by: SURGERY

## 2024-03-18 PROCEDURE — 99284 EMERGENCY DEPT VISIT MOD MDM: CPT | Performed by: EMERGENCY MEDICINE

## 2024-03-18 PROCEDURE — 96372 THER/PROPH/DIAG INJ SC/IM: CPT

## 2024-03-18 RX ORDER — METHOCARBAMOL 500 MG/1
1000 TABLET, FILM COATED ORAL
Qty: 14 TABLET | Refills: 0 | Status: SHIPPED | OUTPATIENT
Start: 2024-03-18 | End: 2024-03-29 | Stop reason: SDUPTHER

## 2024-03-18 RX ORDER — IBUPROFEN 400 MG/1
400 TABLET ORAL EVERY 6 HOURS PRN
Qty: 28 TABLET | Refills: 0 | Status: SHIPPED | OUTPATIENT
Start: 2024-03-18 | End: 2024-03-29

## 2024-03-18 RX ORDER — KETOROLAC TROMETHAMINE 30 MG/ML
15 INJECTION, SOLUTION INTRAMUSCULAR; INTRAVENOUS ONCE
Status: COMPLETED | OUTPATIENT
Start: 2024-03-18 | End: 2024-03-18

## 2024-03-18 RX ADMIN — KETOROLAC TROMETHAMINE 15 MG: 30 INJECTION, SOLUTION INTRAMUSCULAR; INTRAVENOUS at 14:37

## 2024-03-18 NOTE — ED PROVIDER NOTES
History  Chief Complaint   Patient presents with    Knee Pain     Pt presents to the ED with reports Wednesday morning woke up, got up out of bed, felt severe pain behind L knee. Pt reports while in the shower today, felt a pop in her knee, and has severe pain bearing weight since then.      The patient is a 53 y/o female who presents to ED for evaluation of Left knee pain. PMHx breast cancer, osteoarthritis.  The pt states she has been having some pain to the left knee for about 5 days. She states that today while showering, she felt a pop in the posterior portion of the left knee and had sudden increase in pain and she is now not able to walk on the knee due to pain. She states she last took some Advil for pain during the night last night. Denies trauma to the area, prior knee surgery, numbness, tingling, calf pain, chest pain, SOB        Prior to Admission Medications   Prescriptions Last Dose Informant Patient Reported? Taking?   Ashwagandha 125 MG CAPS  Self Yes No   Sig: Take by mouth   Patient not taking: Reported on 1/30/2024   Cholecalciferol (VITAMIN D PO)  Self Yes No   Sig: Take 5,000 Units by mouth daily    MILK THISTLE PO  Self Yes No   Sig: Take 600 mg by mouth daily.   Patient not taking: Reported on 1/30/2024   Magnesium 400 MG CAPS  Self Yes No   Sig: Take 1 tablet by mouth daily at bedtime   Multiple Vitamins-Minerals (ONE DAILY MULTIVITAMIN ADULT) TABS  Self Yes No   Sig: Take by mouth   NON FORMULARY  Self Yes No   Probiotic Product (PROBIOTIC DAILY PO)  Self Yes No   Sig: Take 2 tablets by mouth daily.   secukinumab (Cosentyx Sensoready Pen) 150 mg/mL SOAJ injection  Self No No   Sig: Inject 2 mL (300 mg total) under the skin every 30 (thirty) days   tamoxifen (NOLVADEX) 20 mg tablet  Self No No   Sig: TAKE 1 TABLET BY MOUTH EVERY DAY   traMADol (ULTRAM) 50 mg tablet   No No   Sig: Take 1 tablet (50 mg total) by mouth every 6 (six) hours as needed for moderate pain or severe pain for up to 10  doses Continuation of therapy      Facility-Administered Medications: None       Past Medical History:   Diagnosis Date    Allergic     Arthritis     BRCA negative     Colon polyp     Endometriosis     IBS (irritable bowel syndrome)     Insomnia     Night sweats     PONV (postoperative nausea and vomiting) 2024    Psoriasis     Skin tag     Wears glasses        Past Surgical History:   Procedure Laterality Date    BREAST BIOPSY Right     IDC     SECTION      LAPAROSCOPY      exploratory, endometriosis    LYMPHADENECTOMY Right     x2    MASTECTOMY      bilateral mastectomy    TX NDSC WRST SURG W/RLS TRANSVRS CARPL LIGM Right 2024    Procedure: RELEASE CARPAL TUNNEL ENDOSCOPIC;  Surgeon: Kennedy Garcia MD;  [facility];  Service: Orthopedics    TX NEUROPLASTY &/TRANSPOSITION ULNAR NERVE ELBOW Right 2024    Procedure: RELEASE CUBITAL TUNNEL;  Surgeon: Kennedy Garcia MD;  [facility];  Service: Orthopedics    TX NIPPLE/AREOLA RECONSTRUCTION Bilateral 10/13/2016    Procedure: NIPPLE RECONSTRUCTION ;  Surgeon: Tulio Wilson MD;  [facility];  Service: Plastics    TX REVISION OF RECONSTRUCTED BREAST Bilateral 3/9/2016    Procedure: RECONSTRUCTION REVISION  BREAST MOUND ,FAT GRAFTS ;  Surgeon: Tulio Wilson MD;  [facility];  Service: Plastics    TX TENDON SHEATH INCISION Right 2024    Procedure: RELEASE TRIGGER FINGER - RIGHT MIDDLE;  Surgeon: Kennedy Garcia MD;  [facility];  Service: Orthopedics    SENTINEL LYMPH NODE BIOPSY Right        Family History   Problem Relation Age of Onset    Lung cancer Paternal Grandfather 50    Breast cancer Other     Thyroid cancer Mother 40    Breast cancer Mother 74    Psoriasis Father     Basal cell carcinoma Father     Heart disease Other     Prostate cancer Other      I have reviewed and agree with the history as documented.    E-Cigarette/Vaping    E-Cigarette Use Never User       E-Cigarette/Vaping Substances    Nicotine No     THC No     CBD No     Flavoring No     Other No     Unknown No      Social History     Tobacco Use    Smoking status: Never    Smokeless tobacco: Never   Vaping Use    Vaping status: Never Used   Substance Use Topics    Alcohol use: Yes     Comment: social    Drug use: Never        Review of Systems   Constitutional:  Negative for chills and fever.   Respiratory:  Negative for cough and shortness of breath.    Cardiovascular:  Negative for chest pain, palpitations and leg swelling.   Gastrointestinal:  Negative for abdominal pain, nausea and vomiting.   Genitourinary:  Negative for difficulty urinating.   Musculoskeletal:  Positive for arthralgias and gait problem. Negative for back pain.   Skin:  Negative for color change, rash and wound.   Neurological:  Negative for dizziness, weakness and numbness.       Physical Exam  ED Triage Vitals [03/18/24 1008]   Temperature Pulse Respirations Blood Pressure SpO2   98.1 °F (36.7 °C) 77 16 (!) 196/96 98 %      Temp Source Heart Rate Source Patient Position - Orthostatic VS BP Location FiO2 (%)   Oral Monitor Sitting Right arm --      Pain Score       10 - Worst Possible Pain             Orthostatic Vital Signs  Vitals:    03/18/24 1008   BP: (!) 196/96   Pulse: 77   Patient Position - Orthostatic VS: Sitting       Physical Exam  Vitals and nursing note reviewed.   Constitutional:       Appearance: Normal appearance.   HENT:      Head: Normocephalic and atraumatic.   Cardiovascular:      Rate and Rhythm: Normal rate and regular rhythm.      Pulses:           Dorsalis pedis pulses are 2+ on the right side and 2+ on the left side.      Heart sounds: Normal heart sounds.   Pulmonary:      Effort: Pulmonary effort is normal. No respiratory distress.      Breath sounds: Normal breath sounds. No wheezing, rhonchi or rales.   Abdominal:      General: Abdomen is flat. Bowel sounds are normal.      Palpations: Abdomen is soft.       Tenderness: There is no abdominal tenderness.   Musculoskeletal:         General: Tenderness present. No deformity.      Right lower leg: No edema.      Left lower leg: No edema.      Comments: Tenderness to the left popliteal fossa with palpation. Fullness appreciated on palpation to the left popliteal fossa when compared to right side. Patella non tender. Left calf non tender.   Negative anterior and posterior drawer tests.    Skin:     General: Skin is warm and dry.      Findings: No bruising, erythema or rash.   Neurological:      General: No focal deficit present.      Mental Status: She is alert and oriented to person, place, and time.      Motor: No weakness.   Psychiatric:         Mood and Affect: Mood normal.         Behavior: Behavior normal.         Thought Content: Thought content normal.         Judgment: Judgment normal.         ED Medications  Medications   ketorolac (TORADOL) injection 15 mg (15 mg Intramuscular Given 3/18/24 1437)       Diagnostic Studies  Results Reviewed       None                   VAS VENOUS DUPLEX -LOWER LIMB UNILATERAL   Final Result by Bear Cortez DO (03/18 1601)      XR knee 4+ views LEFT   ED Interpretation by Mahesh Ramos DO (03/18 1344)   Slight posterior effusion. No bony patho            Procedures  Procedures      ED Course  ED Course as of 03/18/24 1722   Mon Mar 18, 2024   1502 VAS VENOUS DUPLEX -LOWER LIMB UNILATERAL  Per US tech [facility], negative for DVT. No bakers cyst.                                        Medical Decision Making  Ddx: bakers cyst, DVT, fracture, meniscal injury, ligamentous injury    Xray Left knee negative for fracture  LE venous US negative for DVT.  Referral sent for orthopedics.   Knee wrapped with ace bandage and pt given crutches.   RX sent for robaxin    Amount and/or Complexity of Data Reviewed  Independent Historian: spouse  Radiology: ordered and independent interpretation performed. Decision-making details  documented in ED Course.    Risk  Prescription drug management.          Disposition  Final diagnoses:   Acute pain of right knee     Time reflects when diagnosis was documented in both MDM as applicable and the Disposition within this note       Time User Action Codes Description Comment    3/18/2024  3:04 PM Mahesh Ramos [M25.561] Acute pain of right knee           ED Disposition       ED Disposition   Discharge    Condition   Stable    Date/Time   Mon Mar 18, 2024  3:04 PM    Comment   Millie Moyer discharge to home/self care.                   Follow-up Information       Follow up With Specialties Details Why Contact Info Additional Information    Margaret Becerra, DO [facility]   [address]  [address]  [place] [address]  [phone]       [facility] [place] [facility]   [address]  [address]  [place] [address] [phone] [facility] [place], [address], [address], [place], [address] [phone]            Discharge Medication List as of 3/18/2024  3:13 PM        START taking these medications    Details   ibuprofen (MOTRIN) 400 mg tablet Take 1 tablet (400 mg total) by mouth every 6 (six) hours as needed for mild pain for up to 7 days, Starting Mon 3/18/2024, Until Mon 3/25/2024 at 2359, Normal      methocarbamol (ROBAXIN) 500 mg tablet Take 2 tablets (1,000 mg total) by mouth daily at bedtime as needed for muscle spasms for up to 7 days, Starting Mon 3/18/2024, Until Mon 3/25/2024 at 2359, Normal           CONTINUE these medications which have NOT CHANGED    Details   Ashwagandha 125 MG CAPS Take by mouth, Historical Med      Cholecalciferol (VITAMIN D PO) Take 5,000 Units by mouth daily , Historical Med      Magnesium 400 MG CAPS Take 1 tablet by mouth daily at bedtime, Historical Med      MILK THISTLE PO Take 600 mg by mouth daily., Historical Med      Multiple Vitamins-Minerals (ONE  DAILY MULTIVITAMIN ADULT) TABS Take by mouth, Historical Med      NON FORMULARY Historical Med      Probiotic Product (PROBIOTIC DAILY PO) Take 2 tablets by mouth daily., Historical Med      secukinumab (Cosentyx Sensoready Pen) 150 mg/mL SOAJ injection Inject 2 mL (300 mg total) under the skin every 30 (thirty) days, Starting u 11/16/2023, Normal      tamoxifen (NOLVADEX) 20 mg tablet TAKE 1 TABLET BY MOUTH EVERY DAY, Normal      traMADol (ULTRAM) 50 mg tablet Take 1 tablet (50 mg total) by mouth every 6 (six) hours as needed for moderate pain or severe pain for up to 10 doses Continuation of therapy, Starting Wed 2/7/2024, Normal               PDMP Review         Value Time User    PDMP Reviewed  Yes 2/7/2024  9:52 AM Keshia Andre PA-C             ED Provider  Attending physically available and evaluated Millie Moyer. I managed the patient along with the ED Attending.    Electronically Signed by           Daija Estrada MD  03/18/24 2119

## 2024-03-18 NOTE — Clinical Note
Millie Moyer was seen and treated in our emergency department on 3/18/2024.                Diagnosis:     Millie  may return to work on return date.    She may return on this date: 03/25/2024    No work requiring standing or weight baring for 1 week. Rest as needed. Use crutches as needed.      If you have any questions or concerns, please don't hesitate to call.      Daija Estrada MD    ______________________________           _______________          _______________  Hospital Representative                              Date                                Time

## 2024-03-19 ENCOUNTER — APPOINTMENT (OUTPATIENT)
Dept: OCCUPATIONAL THERAPY | Age: 54
End: 2024-03-19
Payer: COMMERCIAL

## 2024-03-19 NOTE — ED ATTENDING ATTESTATION
"3/18/2024  IMahesh DO, saw and evaluated the patient. I have discussed the patient with the resident/non-physician practitioner and agree with the resident's/non-physician practitioner's findings, Plan of Care, and MDM as documented in the resident's/non-physician practitioner's note, except where noted. All available labs and Radiology studies were reviewed.  I was present for key portions of any procedure(s) performed by the resident/non-physician practitioner and I was immediately available to provide assistance.       At this point I agree with the current assessment done in the Emergency Department.  I have conducted an independent evaluation of this patient a history and physical is as follows: 54-year-old female, past medical history per resident note, presenting to the emergency department with posterior left knee pain x 1 to 2 weeks with subsequent \"pop\" while standing in the shower today with subsequent increase in pain thereafter.  Patient denies chest pain, shortness of breath, or any other symptom.  No trauma.    Vital signs stable.  Patient resting comfortably.  Bilateral lower extremities without edema, calf tenderness, dissymmetry, venous engorgement.  Left knee with negative valgus, varus, anterior and posterior drawer as well as axial load testing.  Mild limitation in extension and flexion preserved.  Distally neurovascularly intact with 5 out of 5 strength in flexion extension of the ankle and posterior tibial 2+.    54-year-old female presenting to the emergency department with left knee pain with unclear etiology but potentially secondary to Baker's cyst although no fluid identified on ultrasound.  X-ray without acute pathology.  Patient provided NSAID here as well as course of the same.  Ace wrap provided for comfort.  Patient to follow-up with orthopedics.  No DVT noted on ultrasound of the left lower extremity. Reviewed all findings both relevant and incidental with the patient at " bedside. Pt verbalized understanding of findings, neccesary follow up, return to ED precautions. Pt agreed to review today's findings with their primary care provider. Pt non-toxic appearing upon discharge.       ED Course         Critical Care Time  Procedures

## 2024-03-20 ENCOUNTER — OFFICE VISIT (OUTPATIENT)
Dept: OBGYN CLINIC | Facility: CLINIC | Age: 54
End: 2024-03-20
Payer: COMMERCIAL

## 2024-03-20 VITALS — HEIGHT: 68 IN | HEART RATE: 99 BPM | BODY MASS INDEX: 29.65 KG/M2 | OXYGEN SATURATION: 99 %

## 2024-03-20 DIAGNOSIS — R26.89 INABILITY TO BEAR WEIGHT: Primary | ICD-10-CM

## 2024-03-20 DIAGNOSIS — M25.561 ACUTE PAIN OF RIGHT KNEE: ICD-10-CM

## 2024-03-20 PROCEDURE — 99214 OFFICE O/P EST MOD 30 MIN: CPT | Performed by: PHYSICIAN ASSISTANT

## 2024-03-20 NOTE — PROGRESS NOTES
Assessment/Plan   Diagnoses and all orders for this visit:    Inability to bear weight x 1 week    Acute pain of right knee, popliteal    - concern for displaced cartilage tear, plantaris rupture  - MRI (with contrast due to history)  - Ice as needed  - Hinged brace  - Start PT  - Follow up with           Subjective   Patient ID: Millie Moyer is a 54 y.o. female.    Vitals:    24 0950   Pulse: 99   SpO2: 99%     53yo female comes in for an evaluation of her left knee.  A week ago, while in the shower, she felt and heard a loud pop in the popliteal fossa.  She had immediate, severe posterior knee pain.  She went to the ED where xrays were negative for fracture and an US was normal.  Since then, she is still unable to bear weight on the left knee.  She presents in a wheelchair today.  The pain is dull in character, mild in severity, does not radiate and is not associated with numbness.          The following portions of the patient's history were reviewed and updated as appropriate: allergies, current medications, past family history, past medical history, past social history, past surgical history, and problem list.    Review of Systems  Ortho Exam  Past Medical History:   Diagnosis Date    Allergic     Arthritis     BRCA negative     Colon polyp     Endometriosis     IBS (irritable bowel syndrome)     Insomnia     Night sweats     PONV (postoperative nausea and vomiting) 2024    Psoriasis     Skin tag     Wears glasses      Past Surgical History:   Procedure Laterality Date    BREAST BIOPSY Right     IDC     SECTION      LAPAROSCOPY      exploratory, endometriosis    LYMPHADENECTOMY Right     x2    MASTECTOMY      bilateral mastectomy    [place] NDSC WRST SURG W/RLS TRANSVRS CARPL LIGM Right 2024    Procedure: RELEASE CARPAL TUNNEL ENDOSCOPIC;  Surgeon: Kennedy Garcia MD;  [facility];  Service: Orthopedics    LA NEUROPLASTY &/TRANSPOSITION ULNAR NERVE  ELBOW Right 2/7/2024    Procedure: RELEASE CUBITAL TUNNEL;  Surgeon: Kennedy Garcia MD;  [facility];  Service: Orthopedics    ID NIPPLE/AREOLA RECONSTRUCTION Bilateral 10/13/2016    Procedure: NIPPLE RECONSTRUCTION ;  Surgeon: Tulio Wilson MD;  [facility];  Service: Plastics    ID REVISION OF RECONSTRUCTED BREAST Bilateral 3/9/2016    Procedure: RECONSTRUCTION REVISION  BREAST MOUND ,FAT GRAFTS ;  Surgeon: Tulio Wilson MD;  [facility];  Service: Plastics    ID TENDON SHEATH INCISION Right 2/7/2024    Procedure: RELEASE TRIGGER FINGER - RIGHT MIDDLE;  Surgeon: Kennedy Garcia MD;  [facility];  Service: Orthopedics    SENTINEL LYMPH NODE BIOPSY Right      Family History   Problem Relation Age of Onset    Lung cancer Paternal Grandfather 50    Breast cancer Other     Thyroid cancer Mother 40    Breast cancer Mother 74    Psoriasis Father     Basal cell carcinoma Father     Heart disease Other     Prostate cancer Other      Social History     Occupational History    Occupation: employed   Tobacco Use    Smoking status: Never    Smokeless tobacco: Never   Vaping Use    Vaping status: Never Used   Substance and Sexual Activity    Alcohol use: Yes     Comment: social    Drug use: Never    Sexual activity: Not Currently     Partners: Male     Birth control/protection: None       Review of Systems   Constitutional: Negative.    HENT: Negative.    Eyes: Negative.    Respiratory: Negative.    Cardiovascular: Negative.    Gastrointestinal: Negative.    Endocrine: Negative.    Genitourinary: Negative.    Musculoskeletal: As below..   Allergic/Immunologic: Negative.    Neurological: Negative.    Hematological: Negative.    Psychiatric/Behavioral: Negative.        Objective   Physical Exam    Constitutional: Awake, Alert, Oriented  Eyes: EOMI  Psych: Mood and affect appropriate  Heart: regular rate   Lungs: No audible wheezing  Abdomen: No guarding  Lymph: no lymphedema  left  Knee:  Appearance  No swelling, discoloration, deformity, or ecchymosis  Effusion  trace  Palpation  + very mild tenderness medial joint line.    ++ tenderness of the popliteal fossa  No tenderness of the patella, patellar tendon, pes anserine, lateral joint line, MCL, LCL, medial / lateral plateau  ROM  Extension: 20 and Flexion: 100  Special Tests  Jaleel's Test negative, Lachman's Test negative, Anterior Drawer Test negative, Posterior Drawer Test negative, Valgus Stress Test negative, Varus Stress Test negative, and Patellar apprehension negative  Motor  normal 5/5 in all planes  NVI distally    Xrays:  I have personally reviewed pertinent films in PACS and my interpretation is No acute displaced fracture.  Small posterior effusion.  The xray reading mentioned what is likely a bone island in the distal femur, but recommended f/u with bone scan due to malignancy history.  Will start with MRI to see soft tissue also and f/u from there.

## 2024-03-21 ENCOUNTER — APPOINTMENT (OUTPATIENT)
Dept: OCCUPATIONAL THERAPY | Age: 54
End: 2024-03-21
Payer: COMMERCIAL

## 2024-03-21 ENCOUNTER — HOSPITAL ENCOUNTER (OUTPATIENT)
Dept: RADIOLOGY | Facility: IMAGING CENTER | Age: 54
End: 2024-03-21
Payer: COMMERCIAL

## 2024-03-21 DIAGNOSIS — R26.89 INABILITY TO BEAR WEIGHT: ICD-10-CM

## 2024-03-21 DIAGNOSIS — M25.561 ACUTE PAIN OF RIGHT KNEE: ICD-10-CM

## 2024-03-21 PROCEDURE — 73723 MRI JOINT LWR EXTR W/O&W/DYE: CPT

## 2024-03-21 PROCEDURE — A9585 GADOBUTROL INJECTION: HCPCS | Performed by: PHYSICIAN ASSISTANT

## 2024-03-21 RX ORDER — GADOBUTROL 604.72 MG/ML
8 INJECTION INTRAVENOUS
Status: COMPLETED | OUTPATIENT
Start: 2024-03-21 | End: 2024-03-21

## 2024-03-21 RX ADMIN — GADOBUTROL 8 ML: 604.72 INJECTION INTRAVENOUS at 11:21

## 2024-03-22 DIAGNOSIS — Z17.0 MALIGNANT NEOPLASM OF BREAST IN FEMALE, ESTROGEN RECEPTOR POSITIVE, UNSPECIFIED LATERALITY, UNSPECIFIED SITE OF BREAST (HCC): ICD-10-CM

## 2024-03-22 DIAGNOSIS — C50.919 MALIGNANT NEOPLASM OF BREAST IN FEMALE, ESTROGEN RECEPTOR POSITIVE, UNSPECIFIED LATERALITY, UNSPECIFIED SITE OF BREAST (HCC): ICD-10-CM

## 2024-03-22 RX ORDER — TAMOXIFEN CITRATE 20 MG/1
TABLET ORAL
Qty: 90 TABLET | Refills: 3 | Status: SHIPPED | OUTPATIENT
Start: 2024-03-22

## 2024-03-25 ENCOUNTER — OFFICE VISIT (OUTPATIENT)
Dept: OBGYN CLINIC | Facility: CLINIC | Age: 54
End: 2024-03-25
Payer: COMMERCIAL

## 2024-03-25 ENCOUNTER — APPOINTMENT (OUTPATIENT)
Dept: LAB | Age: 54
End: 2024-03-25
Payer: COMMERCIAL

## 2024-03-25 VITALS
HEIGHT: 68 IN | BODY MASS INDEX: 29.55 KG/M2 | SYSTOLIC BLOOD PRESSURE: 159 MMHG | HEART RATE: 87 BPM | DIASTOLIC BLOOD PRESSURE: 99 MMHG | WEIGHT: 195 LBS

## 2024-03-25 DIAGNOSIS — S83.242A OTHER TEAR OF MEDIAL MENISCUS, CURRENT INJURY, LEFT KNEE, INITIAL ENCOUNTER: Primary | ICD-10-CM

## 2024-03-25 DIAGNOSIS — Z01.818 PRE-OPERATIVE CLEARANCE: ICD-10-CM

## 2024-03-25 DIAGNOSIS — S83.242A OTHER TEAR OF MEDIAL MENISCUS, CURRENT INJURY, LEFT KNEE, INITIAL ENCOUNTER: ICD-10-CM

## 2024-03-25 LAB
ANION GAP SERPL CALCULATED.3IONS-SCNC: 8 MMOL/L (ref 4–13)
ATRIAL RATE: 77 BPM
BASOPHILS # BLD AUTO: 0.03 THOUSANDS/ÂΜL (ref 0–0.1)
BASOPHILS NFR BLD AUTO: 1 % (ref 0–1)
BUN SERPL-MCNC: 17 MG/DL (ref 5–25)
CALCIUM SERPL-MCNC: 9.6 MG/DL (ref 8.4–10.2)
CHLORIDE SERPL-SCNC: 102 MMOL/L (ref 96–108)
CO2 SERPL-SCNC: 30 MMOL/L (ref 21–32)
CREAT SERPL-MCNC: 0.78 MG/DL (ref 0.6–1.3)
EOSINOPHIL # BLD AUTO: 0.12 THOUSAND/ÂΜL (ref 0–0.61)
EOSINOPHIL NFR BLD AUTO: 2 % (ref 0–6)
ERYTHROCYTE [DISTWIDTH] IN BLOOD BY AUTOMATED COUNT: 13 % (ref 11.6–15.1)
GFR SERPL CREATININE-BSD FRML MDRD: 87 ML/MIN/1.73SQ M
GLUCOSE SERPL-MCNC: 88 MG/DL (ref 65–140)
HCT VFR BLD AUTO: 40.1 % (ref 34.8–46.1)
HGB BLD-MCNC: 13.1 G/DL (ref 11.5–15.4)
IMM GRANULOCYTES # BLD AUTO: 0.03 THOUSAND/UL (ref 0–0.2)
IMM GRANULOCYTES NFR BLD AUTO: 1 % (ref 0–2)
LYMPHOCYTES # BLD AUTO: 1.46 THOUSANDS/ÂΜL (ref 0.6–4.47)
LYMPHOCYTES NFR BLD AUTO: 22 % (ref 14–44)
MCH RBC QN AUTO: 29.4 PG (ref 26.8–34.3)
MCHC RBC AUTO-ENTMCNC: 32.7 G/DL (ref 31.4–37.4)
MCV RBC AUTO: 90 FL (ref 82–98)
MONOCYTES # BLD AUTO: 0.34 THOUSAND/ÂΜL (ref 0.17–1.22)
MONOCYTES NFR BLD AUTO: 5 % (ref 4–12)
NEUTROPHILS # BLD AUTO: 4.62 THOUSANDS/ÂΜL (ref 1.85–7.62)
NEUTS SEG NFR BLD AUTO: 69 % (ref 43–75)
NRBC BLD AUTO-RTO: 0 /100 WBCS
P AXIS: 40 DEGREES
PLATELET # BLD AUTO: 271 THOUSANDS/UL (ref 149–390)
PMV BLD AUTO: 9.8 FL (ref 8.9–12.7)
POTASSIUM SERPL-SCNC: 3.8 MMOL/L (ref 3.5–5.3)
PR INTERVAL: 192 MS
QRS AXIS: 46 DEGREES
QRSD INTERVAL: 66 MS
QT INTERVAL: 372 MS
QTC INTERVAL: 420 MS
RBC # BLD AUTO: 4.45 MILLION/UL (ref 3.81–5.12)
SODIUM SERPL-SCNC: 140 MMOL/L (ref 135–147)
T WAVE AXIS: 81 DEGREES
VENTRICULAR RATE: 77 BPM
WBC # BLD AUTO: 6.6 THOUSAND/UL (ref 4.31–10.16)

## 2024-03-25 PROCEDURE — 99204 OFFICE O/P NEW MOD 45 MIN: CPT | Performed by: ORTHOPAEDIC SURGERY

## 2024-03-25 PROCEDURE — 80048 BASIC METABOLIC PNL TOTAL CA: CPT

## 2024-03-25 PROCEDURE — 85025 COMPLETE CBC W/AUTO DIFF WBC: CPT

## 2024-03-25 PROCEDURE — 36415 COLL VENOUS BLD VENIPUNCTURE: CPT

## 2024-03-25 NOTE — PROGRESS NOTES
CHIEF COMPLAINT/REASON FOR VISIT  Chief Complaint   Patient presents with    Left Knee - Pain        HISTORY OF PRESENT ILLNESS  Millie Moyer is a 54 y.o. female who presents for evaluation of her left knee.  Patient said that she weeks ago she woke up with some posterior knee pain after walking a mile the previous day.  She said this continued to persist up until a week ago when she was in the shower and felt a pop in her knee.  Afterwards, she was unable to bear weight and experience pain in both the medial and posterior aspects of the knee.  Since then, patient has been using crutches and a brace to help her ambulate.  She said she is still having trouble placing weight on the leg.  She said she has tried rest, ice, compression, and elevation with no relief.  She said that she has tried the methocarbamol from the ER which helps a little bit.  She does admit to some baseline knee pain but this is different in intensity and severity.  She denies previous knee injections.    REVIEW OF SYSTEMS  Review of systems was performed and, outside that mentioned in the HPI, it was negative for symptomology related to the integumentary, hematologic, immunologic, allergic, neurologic, cardiovascular, respiratory, GI or  systems.    MEDICAL HISTORY  Patient Active Problem List   Diagnosis    Irritable bowel syndrome (IBS)    Malignant neoplasm of upper-inner quadrant of right breast in female, estrogen receptor positive     Psoriasis    Seasonal allergies    BRCA negative    Use of tamoxifen (Nolvadex)    Seborrheic keratosis    Bilateral plantar fasciitis    Dyspepsia    Monoallelic mutation of MUTYH gene    COVID-19 vaccination not done    Abnormal MRI, breast    Carpal tunnel syndrome on right    Lateral epicondylitis of right elbow    Medial epicondylitis of elbow, right    PONV (postoperative nausea and vomiting)    Inability to bear weight    Acute pain of right knee       SURGICAL HISTORY  Past Surgical History:    Procedure Laterality Date    BREAST BIOPSY Right     IDC     SECTION      LAPAROSCOPY      exploratory, endometriosis    LYMPHADENECTOMY Right     x2    MASTECTOMY      bilateral mastectomy    OH NDSC WRST SURG W/RLS TRANSVRS CARPL LIGM Right 2024    Procedure: RELEASE CARPAL TUNNEL ENDOSCOPIC;  Surgeon: Kennedy Garcia MD;  Location[facility];  Service: Orthopedics    OH NEUROPLASTY &/TRANSPOSITION ULNAR NERVE ELBOW Right 2024    Procedure: RELEASE CUBITAL TUNNEL;  Surgeon: Kennedy Garcia MD;  Location: [facility];  Service: Orthopedics    OH NIPPLE/AREOLA RECONSTRUCTION Bilateral 10/13/2016    Procedure: NIPPLE RECONSTRUCTION ;  Surgeon: Tulio Wilson MD;  [facility];  Service: Plastics    OH REVISION OF RECONSTRUCTED BREAST Bilateral 3/9/2016    Procedure: RECONSTRUCTION REVISION  BREAST MOUND ,FAT GRAFTS ;  Surgeon: Tulio Wilson MD;  Location[facility];  Service: Plastics    OH TENDON SHEATH INCISION Right 2024    Procedure: RELEASE TRIGGER FINGER - RIGHT MIDDLE;  Surgeon: Kennedy Garcia MD;  Location: [facility];  Service: Orthopedics    SENTINEL LYMPH NODE BIOPSY Right        CURRENT MEDICATIONS    Current Outpatient Medications:     ibuprofen (MOTRIN) 400 mg tablet, Take 1 tablet (400 mg total) by mouth every 6 (six) hours as needed for mild pain for up to 7 days, Disp: 28 tablet, Rfl: 0    Magnesium 400 MG CAPS, Take 1 tablet by mouth daily at bedtime, Disp: , Rfl:     NON FORMULARY, , Disp: , Rfl:     tamoxifen (NOLVADEX) 20 mg tablet, TAKE 1 TABLET BY MOUTH EVERY DAY, Disp: 90 tablet, Rfl: 3    Ashwagandha 125 MG CAPS, Take by mouth (Patient not taking: Reported on 2024), Disp: , Rfl:     Cholecalciferol (VITAMIN D PO), Take 5,000 Units by mouth daily  (Patient not taking: Reported on 3/25/2024), Disp: , Rfl:     methocarbamol (ROBAXIN) 500 mg tablet, Take 2 tablets (1,000 mg total) by mouth daily at bedtime as needed for muscle spasms  "for up to 7 days (Patient not taking: Reported on 3/25/2024), Disp: 14 tablet, Rfl: 0    MILK THISTLE PO, Take 600 mg by mouth daily. (Patient not taking: Reported on 1/30/2024), Disp: , Rfl:     Multiple Vitamins-Minerals (ONE DAILY MULTIVITAMIN ADULT) TABS, Take by mouth (Patient not taking: Reported on 3/25/2024), Disp: , Rfl:     Probiotic Product (PROBIOTIC DAILY PO), Take 2 tablets by mouth daily. (Patient not taking: Reported on 3/25/2024), Disp: , Rfl:     secukinumab (Cosentyx Sensoready Pen) 150 mg/mL SOAJ injection, Inject 2 mL (300 mg total) under the skin every 30 (thirty) days (Patient not taking: Reported on 3/25/2024), Disp: 2 mL, Rfl: 11    traMADol (ULTRAM) 50 mg tablet, Take 1 tablet (50 mg total) by mouth every 6 (six) hours as needed for moderate pain or severe pain for up to 10 doses Continuation of therapy (Patient not taking: Reported on 3/25/2024), Disp: 10 tablet, Rfl: 0    SOCIAL HISTORY  Social History     Socioeconomic History    Marital status: /Civil Union     Spouse name: Not on file    Number of children: 1    Years of education: Not on file    Highest education level: Not on file   Occupational History    Occupation: employed   Tobacco Use    Smoking status: Never    Smokeless tobacco: Never   Vaping Use    Vaping status: Never Used   Substance and Sexual Activity    Alcohol use: Yes     Comment: social    Drug use: Never    Sexual activity: Not Currently     Partners: Male     Birth control/protection: None   Other Topics Concern    Not on file   Social History Narrative    Caffeine use- coffee     Social Determinants of Health     Financial Resource Strain: Not on file   Food Insecurity: Not on file   Transportation Needs: Not on file   Physical Activity: Not on file   Stress: Not on file   Social Connections: Not on file   Intimate Partner Violence: Not on file   Housing Stability: Not on file       Objective     VITAL SIGNS  /99   Pulse 87   Ht 5' 8\" (1.727 m)   " Wt 88.5 kg (195 lb)   LMP 03/01/2022   BMI 29.65 kg/m²      PHYSICAL EXAMINATION    Musculoskeletal: Left Knee Examination:  General: The patient is alert, oriented, and pleasant to interact with.  Patient ambulates with crutches  Assistive Device: Crutches, braces  Alignment: normal  Skin is warm and dry to touch with no signs of erythema, ecchymosis, or infection   Effusion: minimal  ROM: 0° - 100°   MMT: deferred  TTP: Medial joint line, posterior knee  Flexor and extensor mechanisms are intact   Knee is stable to varus and valgus stress  Jaleel's Test Positive Medial    Lachman's Test - 1A  2+ DP and PT pulses with brisk capillary refill to the toes  Sural, saphenous, tibial, superficial, and deep peroneal motor and sensory distributions intact  Sensation light touch intact distally    RADIOGRAPHIC EXAMINATION/DIAGNOSTICS:  Procedure: MRI knee left w wo contrast    Result Date: 3/21/2024  Narrative: MRI LEFT KNEE WITH AND WITHOUT CONTRAST INDICATION:   M25.561: Pain in right knee R26.89: Other abnormalities of gait and mobility. Popping sensation with posterior knee pain. Concern for displaced meniscus and plantaris rupture. COMPARISON: Left knee radiograph 3/18/2024 TECHNIQUE: Multiplanar/multisequence MR of the left knee was performed both pre and post IV contrast. Imaging performed on 1.5T MRI IV Contrast:  8 mL of Gadobutrol injection (SINGLE-DOSE) FINDINGS: SUBCUTANEOUS TISSUES: No mass, abscess, or cellulitis. JOINT EFFUSION: There is a small joint effusion. BAKER'S CYST: None. MENISCI: There is a horizontal tear through the body and posterior horn of the medial meniscus. There is a questionable tiny displaced meniscal fragment extending towards the intercondylar notch, anterior to the posterior cruciate ligament as seen on series 501, images 12-13. Intact lateral meniscus. CRUCIATE LIGAMENTS: Intact. EXTENSOR APPARATUS: Intact. COLLATERAL LIGAMENTS: Intact. ARTICULAR SURFACES: Moderate grade articular  cartilage loss along the weightbearing medial femoral condyle (series 701 image 14). Mild patellofemoral osteoarthritis. BONES: Intramedullary lesion within the distal femoral shaft demonstrates chondroid matrix calcifications. No endosteal scalloping. No surrounding marrow edema. This is compatible with a low-grade chondroid lesion such as an enchondroma. MUSCULATURE:  Intact.     Impression: Horizontal tear through the body and posterior horn of the medial meniscus. Questionable tiny displaced medial meniscal fragment extending towards the intercondylar notch. Moderate cartilage loss medial femoral tibial compartment. Mild patellofemoral compartment cartilage loss. Enchondroma in the distal femoral shaft. Resident: Kee Renee I, the attending radiologist, have reviewed the images and agree with the final report above. Workstation performed: [id number]     Procedure: XR knee 4+ views LEFT    Result Date: 3/18/2024  Narrative: XR KNEE 4+ VW LEFT INJURY INDICATION: Tenderness, decrease rom. unable to bear weight. COMPARISON: None FINDINGS: No acute fracture or dislocation. No joint effusion. There is moderate osteoarthritis of the knee mostly affecting the medial joint space but to a lesser extent the lateral and patellofemoral compartments. There is a minimally irregular densely sclerotic lesion within the femoral shaft centrally located in the medullary space. This measures 12 mm diameter. It has a small central lucent area. This is probably just a benign bone island, however, patient is noted to have a history of breast cancer and therefore a sclerotic metastatic deposit could not be conclusively excluded, though felt to be very unlikely. As a precaution, a follow-up bone scan might be reasonable to assess the patient's skeleton for any worrisome findings which might indicate metastases. Unremarkable soft tissues.     Impression: There is osteoarthritis of the knee. Indeterminate 12 mm sclerotic lesion of  the femoral shaft which is probably just a bone island. As a precaution, consider further diagnostic work-up with bone scan as the patient is noted to have a history of a prior malignancy. The study was marked in EPIC for immediate notification. Workstation performed: [id number]     ASSESSMENT/PLAN:  Left knee pain; medial meniscus tear    We discussed the importance of injury to the meniscus. We also discussed the role of the menisci as a shock absorber, but also as a secondary stabilizer of the knee in terms of stability. We also described that there can be associated wear and degeneration of the articular cartilage which may also be playing a role in her symptoms. Sometimes this can be difficult to distinguish from meniscus symptoms.    Options for treatment of the meniscus include partial meniscectomy versus repair. Options for treatment of the meniscus include partial meniscectomy versus repair in this setting. If repair is considered, this is more preserving for the meniscus tissue. However, there is also a chance a repair may not heal.    We discussed the typical rehabilitation following meniscus partial debridement. There is certainly a risk of re injury returning to twisting and pivoting activities. We discussed risks of surgery, which include knee stiffness, infection, risk of reinjury and future arthritis.    She will call to schedule knee arthroscopy with meniscus debridement as indicated. She will need a preoperative listing appointment and physical therapy.     Scribe Attestation      I,:  Sarmad Moon PA-C am acting as a scribe while in the presence of the attending physician.:       I,:  Jin Delong MD personally performed the services described in this documentation    as scribed in my presence.:

## 2024-03-25 NOTE — H&P (VIEW-ONLY)
CHIEF COMPLAINT/REASON FOR VISIT  Chief Complaint   Patient presents with    Left Knee - Pain        HISTORY OF PRESENT ILLNESS  Millie Moyer is a 54 y.o. female who presents for evaluation of her left knee.  Patient said that she weeks ago she woke up with some posterior knee pain after walking a mile the previous day.  She said this continued to persist up until a week ago when she was in the shower and felt a pop in her knee.  Afterwards, she was unable to bear weight and experience pain in both the medial and posterior aspects of the knee.  Since then, patient has been using crutches and a brace to help her ambulate.  She said she is still having trouble placing weight on the leg.  She said she has tried rest, ice, compression, and elevation with no relief.  She said that she has tried the methocarbamol from the ER which helps a little bit.  She does admit to some baseline knee pain but this is different in intensity and severity.  She denies previous knee injections.    REVIEW OF SYSTEMS  Review of systems was performed and, outside that mentioned in the HPI, it was negative for symptomology related to the integumentary, hematologic, immunologic, allergic, neurologic, cardiovascular, respiratory, GI or  systems.    MEDICAL HISTORY  Patient Active Problem List   Diagnosis    Irritable bowel syndrome (IBS)    Malignant neoplasm of upper-inner quadrant of right breast in female, estrogen receptor positive     Psoriasis    Seasonal allergies    BRCA negative    Use of tamoxifen (Nolvadex)    Seborrheic keratosis    Bilateral plantar fasciitis    Dyspepsia    Monoallelic mutation of MUTYH gene    COVID-19 vaccination not done    Abnormal MRI, breast    Carpal tunnel syndrome on right    Lateral epicondylitis of right elbow    Medial epicondylitis of elbow, right    PONV (postoperative nausea and vomiting)    Inability to bear weight    Acute pain of right knee       SURGICAL HISTORY  Past Surgical History:    Procedure Laterality Date    BREAST BIOPSY Right     IDC     SECTION      LAPAROSCOPY      exploratory, endometriosis    LYMPHADENECTOMY Right     x2    MASTECTOMY      bilateral mastectomy    OK NDSC WRST SURG W/RLS TRANSVRS CARPL LIGM Right 2024    Procedure: RELEASE CARPAL TUNNEL ENDOSCOPIC;  Surgeon: Kennedy Garcia MD;  Location: [facility];  Service: Orthopedics    OK NEUROPLASTY &/TRANSPOSITION ULNAR NERVE ELBOW Right 2024    Procedure: RELEASE CUBITAL TUNNEL;  Surgeon: Kennedy Garcia MD;  Location[facility];  Service: Orthopedics    OK NIPPLE/AREOLA RECONSTRUCTION Bilateral 10/13/2016    Procedure: NIPPLE RECONSTRUCTION ;  Surgeon: Tulio Wilson MD;  [facility];  Service: Plastics    OK REVISION OF RECONSTRUCTED BREAST Bilateral 3/9/2016    Procedure: RECONSTRUCTION REVISION  BREAST MOUND ,FAT GRAFTS ;  Surgeon: Tulio Wilson MD;  [facility];  Service: Plastics    OK TENDON SHEATH INCISION Right 2024    Procedure: RELEASE TRIGGER FINGER - RIGHT MIDDLE;  Surgeon: Kennedy Garcia MD;  Location: [facility];  Service: Orthopedics    SENTINEL LYMPH NODE BIOPSY Right        CURRENT MEDICATIONS    Current Outpatient Medications:     ibuprofen (MOTRIN) 400 mg tablet, Take 1 tablet (400 mg total) by mouth every 6 (six) hours as needed for mild pain for up to 7 days, Disp: 28 tablet, Rfl: 0    Magnesium 400 MG CAPS, Take 1 tablet by mouth daily at bedtime, Disp: , Rfl:     NON FORMULARY, , Disp: , Rfl:     tamoxifen (NOLVADEX) 20 mg tablet, TAKE 1 TABLET BY MOUTH EVERY DAY, Disp: 90 tablet, Rfl: 3    Ashwagandha 125 MG CAPS, Take by mouth (Patient not taking: Reported on 2024), Disp: , Rfl:     Cholecalciferol (VITAMIN D PO), Take 5,000 Units by mouth daily  (Patient not taking: Reported on 3/25/2024), Disp: , Rfl:     methocarbamol (ROBAXIN) 500 mg tablet, Take 2 tablets (1,000 mg total) by mouth daily at bedtime as needed for muscle spasms  "for up to 7 days (Patient not taking: Reported on 3/25/2024), Disp: 14 tablet, Rfl: 0    MILK THISTLE PO, Take 600 mg by mouth daily. (Patient not taking: Reported on 1/30/2024), Disp: , Rfl:     Multiple Vitamins-Minerals (ONE DAILY MULTIVITAMIN ADULT) TABS, Take by mouth (Patient not taking: Reported on 3/25/2024), Disp: , Rfl:     Probiotic Product (PROBIOTIC DAILY PO), Take 2 tablets by mouth daily. (Patient not taking: Reported on 3/25/2024), Disp: , Rfl:     secukinumab (Cosentyx Sensoready Pen) 150 mg/mL SOAJ injection, Inject 2 mL (300 mg total) under the skin every 30 (thirty) days (Patient not taking: Reported on 3/25/2024), Disp: 2 mL, Rfl: 11    traMADol (ULTRAM) 50 mg tablet, Take 1 tablet (50 mg total) by mouth every 6 (six) hours as needed for moderate pain or severe pain for up to 10 doses Continuation of therapy (Patient not taking: Reported on 3/25/2024), Disp: 10 tablet, Rfl: 0    SOCIAL HISTORY  Social History     Socioeconomic History    Marital status: /Civil Union     Spouse name: Not on file    Number of children: 1    Years of education: Not on file    Highest education level: Not on file   Occupational History    Occupation: employed   Tobacco Use    Smoking status: Never    Smokeless tobacco: Never   Vaping Use    Vaping status: Never Used   Substance and Sexual Activity    Alcohol use: Yes     Comment: social    Drug use: Never    Sexual activity: Not Currently     Partners: Male     Birth control/protection: None   Other Topics Concern    Not on file   Social History Narrative    Caffeine use- coffee     Social Determinants of Health     Financial Resource Strain: Not on file   Food Insecurity: Not on file   Transportation Needs: Not on file   Physical Activity: Not on file   Stress: Not on file   Social Connections: Not on file   Intimate Partner Violence: Not on file   Housing Stability: Not on file       Objective     VITAL SIGNS  /99   Pulse 87   Ht 5' 8\" (1.727 m)   " Wt 88.5 kg (195 lb)   LMP 03/01/2022   BMI 29.65 kg/m²      PHYSICAL EXAMINATION    Musculoskeletal: Left Knee Examination:  General: The patient is alert, oriented, and pleasant to interact with.  Patient ambulates with crutches  Assistive Device: Crutches, braces  Alignment: normal  Skin is warm and dry to touch with no signs of erythema, ecchymosis, or infection   Effusion: minimal  ROM: 0° - 100°   MMT: deferred  TTP: Medial joint line, posterior knee  Flexor and extensor mechanisms are intact   Knee is stable to varus and valgus stress  Jaleel's Test Positive Medial    Lachman's Test - 1A  2+ DP and PT pulses with brisk capillary refill to the toes  Sural, saphenous, tibial, superficial, and deep peroneal motor and sensory distributions intact  Sensation light touch intact distally    RADIOGRAPHIC EXAMINATION/DIAGNOSTICS:  Procedure: MRI knee left w wo contrast    Result Date: 3/21/2024  Narrative: MRI LEFT KNEE WITH AND WITHOUT CONTRAST INDICATION:   M25.561: Pain in right knee R26.89: Other abnormalities of gait and mobility. Popping sensation with posterior knee pain. Concern for displaced meniscus and plantaris rupture. COMPARISON: Left knee radiograph 3/18/2024 TECHNIQUE: Multiplanar/multisequence MR of the left knee was performed both pre and post IV contrast. Imaging performed on 1.5T MRI IV Contrast:  8 mL of Gadobutrol injection (SINGLE-DOSE) FINDINGS: SUBCUTANEOUS TISSUES: No mass, abscess, or cellulitis. JOINT EFFUSION: There is a small joint effusion. BAKER'S CYST: None. MENISCI: There is a horizontal tear through the body and posterior horn of the medial meniscus. There is a questionable tiny displaced meniscal fragment extending towards the intercondylar notch, anterior to the posterior cruciate ligament as seen on series 501, images 12-13. Intact lateral meniscus. CRUCIATE LIGAMENTS: Intact. EXTENSOR APPARATUS: Intact. COLLATERAL LIGAMENTS: Intact. ARTICULAR SURFACES: Moderate grade articular  cartilage loss along the weightbearing medial femoral condyle (series 701 image 14). Mild patellofemoral osteoarthritis. BONES: Intramedullary lesion within the distal femoral shaft demonstrates chondroid matrix calcifications. No endosteal scalloping. No surrounding marrow edema. This is compatible with a low-grade chondroid lesion such as an enchondroma. MUSCULATURE:  Intact.     Impression: Horizontal tear through the body and posterior horn of the medial meniscus. Questionable tiny displaced medial meniscal fragment extending towards the intercondylar notch. Moderate cartilage loss medial femoral tibial compartment. Mild patellofemoral compartment cartilage loss. Enchondroma in the distal femoral shaft. Resident: Kee Renee I, the attending radiologist, have reviewed the images and agree with the final report above. Workstation performed: [id number]     Procedure: XR knee 4+ views LEFT    Result Date: 3/18/2024  Narrative: XR KNEE 4+ VW LEFT INJURY INDICATION: Tenderness, decrease rom. unable to bear weight. COMPARISON: None FINDINGS: No acute fracture or dislocation. No joint effusion. There is moderate osteoarthritis of the knee mostly affecting the medial joint space but to a lesser extent the lateral and patellofemoral compartments. There is a minimally irregular densely sclerotic lesion within the femoral shaft centrally located in the medullary space. This measures 12 mm diameter. It has a small central lucent area. This is probably just a benign bone island, however, patient is noted to have a history of breast cancer and therefore a sclerotic metastatic deposit could not be conclusively excluded, though felt to be very unlikely. As a precaution, a follow-up bone scan might be reasonable to assess the patient's skeleton for any worrisome findings which might indicate metastases. Unremarkable soft tissues.     Impression: There is osteoarthritis of the knee. Indeterminate 12 mm sclerotic lesion of  the femoral shaft which is probably just a bone island. As a precaution, consider further diagnostic work-up with bone scan as the patient is noted to have a history of a prior malignancy. The study was marked in EPIC for immediate notification. Workstation performed: [id number]     ASSESSMENT/PLAN:  Left knee pain; medial meniscus tear    We discussed the importance of injury to the meniscus. We also discussed the role of the menisci as a shock absorber, but also as a secondary stabilizer of the knee in terms of stability. We also described that there can be associated wear and degeneration of the articular cartilage which may also be playing a role in her symptoms. Sometimes this can be difficult to distinguish from meniscus symptoms.    Options for treatment of the meniscus include partial meniscectomy versus repair. Options for treatment of the meniscus include partial meniscectomy versus repair in this setting. If repair is considered, this is more preserving for the meniscus tissue. However, there is also a chance a repair may not heal.    We discussed the typical rehabilitation following meniscus partial debridement. There is certainly a risk of re injury returning to twisting and pivoting activities. We discussed risks of surgery, which include knee stiffness, infection, risk of reinjury and future arthritis.    She will call to schedule knee arthroscopy with meniscus debridement as indicated. She will need a preoperative listing appointment and physical therapy.     Scribe Attestation      I,:  Sarmad Moon PA-C am acting as a scribe while in the presence of the attending physician.:       I,:  Jin Delong MD personally performed the services described in this documentation    as scribed in my presence.:

## 2024-03-26 ENCOUNTER — APPOINTMENT (OUTPATIENT)
Dept: OCCUPATIONAL THERAPY | Age: 54
End: 2024-03-26
Payer: COMMERCIAL

## 2024-03-28 ENCOUNTER — APPOINTMENT (OUTPATIENT)
Dept: OCCUPATIONAL THERAPY | Age: 54
End: 2024-03-28
Payer: COMMERCIAL

## 2024-03-29 ENCOUNTER — CONSULT (OUTPATIENT)
Dept: FAMILY MEDICINE CLINIC | Facility: CLINIC | Age: 54
End: 2024-03-29

## 2024-03-29 VITALS
TEMPERATURE: 97.5 F | HEIGHT: 68 IN | BODY MASS INDEX: 29.55 KG/M2 | HEART RATE: 79 BPM | WEIGHT: 195 LBS | DIASTOLIC BLOOD PRESSURE: 94 MMHG | SYSTOLIC BLOOD PRESSURE: 140 MMHG | OXYGEN SATURATION: 98 %

## 2024-03-29 DIAGNOSIS — S83.242A OTHER TEAR OF MEDIAL MENISCUS, CURRENT INJURY, LEFT KNEE, INITIAL ENCOUNTER: ICD-10-CM

## 2024-03-29 DIAGNOSIS — M25.562 ACUTE PAIN OF LEFT KNEE: ICD-10-CM

## 2024-03-29 DIAGNOSIS — Z98.890 S/P CUBITAL TUNNEL RELEASE: ICD-10-CM

## 2024-03-29 DIAGNOSIS — Z01.818 PREOPERATIVE CLEARANCE: Primary | ICD-10-CM

## 2024-03-29 RX ORDER — TRAMADOL HYDROCHLORIDE 50 MG/1
50 TABLET ORAL EVERY 6 HOURS PRN
Qty: 60 TABLET | Refills: 0 | Status: SHIPPED | OUTPATIENT
Start: 2024-03-29

## 2024-03-29 RX ORDER — METHOCARBAMOL 500 MG/1
500 TABLET, FILM COATED ORAL 3 TIMES DAILY PRN
Qty: 30 TABLET | Refills: 1 | Status: SHIPPED | OUTPATIENT
Start: 2024-03-29 | End: 2024-04-11

## 2024-04-02 ENCOUNTER — OFFICE VISIT (OUTPATIENT)
Dept: OBGYN CLINIC | Facility: CLINIC | Age: 54
End: 2024-04-02

## 2024-04-02 VITALS — BODY MASS INDEX: 29.55 KG/M2 | HEIGHT: 68 IN | WEIGHT: 195 LBS

## 2024-04-02 DIAGNOSIS — Z98.890 POSTOPERATIVE STATE: Primary | ICD-10-CM

## 2024-04-02 PROCEDURE — 99024 POSTOP FOLLOW-UP VISIT: CPT | Performed by: STUDENT IN AN ORGANIZED HEALTH CARE EDUCATION/TRAINING PROGRAM

## 2024-04-02 NOTE — PROGRESS NOTES
Assessment/Plan:  Patient ID: 54 y.o. female   Surgery: Release Cubital Tunnel - Right, Release Trigger Finger - Right Middle - Right, and Release Carpal Tunnel Endoscopic - Right  Date of Surgery: 2/7/2024    Pt has healed very well.   No restrictions, c/w activities as tolerated.  Encouraged to c/w stretches for the arm.  Call if any questions/concerns.     Follow Up:  PRN      CHIEF COMPLAINT:  Chief Complaint   Patient presents with   • Right Wrist - Post-op   • Right Elbow - Post-op   • Right Middle Finger - Post-op         SUBJECTIVE:  Patient is a 54 y.o. year old female who presents for follow up after Release Cubital Tunnel - Right, Release Trigger Finger - Right Middle - Right, and Release Carpal Tunnel Endoscopic - Right. At her last follow up, pt still with stiffness of the fingers and was encouraged to c/w therapy. Today patient states she did therapy and noticed improvement with this. She did end up with a meniscus tear and has had to use crutches. N/T is resolved.     Occupation: Makes dolls/crafts    PHYSICAL EXAMINATION:  General: Well developed and well nourished, alert and oriented x 3, appears comfortable  Psychiatric: Normal    MUSCULOSKELETAL EXAMINATION:  Incision: Clean, dry, intact  Surgery Site: normal, no evidence of infection   Range of Motion: Pt able to fully actively extend the fingers. Pt able to make a fist.  Neurovascular status: Neuro intact, good cap refill         STUDIES REVIEWED:  No new studies to review       PROCEDURES PERFORMED:  Procedures  No Procedures performed today    Scribe Attestation    I,:  Keshia Andre PA-C am acting as a scribe while in the presence of the attending physician.:       I,:  Kennedy Garcia MD personally performed the services described in this documentation    as scribed in my presence.:

## 2024-04-03 RX ORDER — MULTIVITAMIN
1 CAPSULE ORAL DAILY
COMMUNITY

## 2024-04-03 NOTE — PRE-PROCEDURE INSTRUCTIONS
Pre-Surgery Instructions:   Medication Instructions    ASHWAGANDHA PO Stop taking 7 days prior to surgery.    Cholecalciferol (Vitamin D3) 125 MCG (5000 UT) TBDP Stop taking 7 days prior to surgery.    Magnesium 400 MG CAPS Take night before surgery    methocarbamol (ROBAXIN) 500 mg tablet Take night before surgery    milk thistle 175 MG tablet Stop taking 7 days prior to surgery.    Multiple Vitamin (multivitamin) capsule Stop taking 7 days prior to surgery.    secukinumab (Cosentyx Sensoready Pen) 150 mg/mL SOAJ injection Hold day of surgery.    tamoxifen (NOLVADEX) 20 mg tablet Take day of surgery.    traMADol (ULTRAM) 50 mg tablet Uses PRN- OK to take day of surgery   Medication instructions for day surgery reviewed. Please use only a sip of water to take your instructed medications. Avoid all over the counter vitamins, supplements and NSAIDS for one week prior to surgery per anesthesia guidelines. Tylenol is ok to take as needed.     You will receive a call one business day prior to surgery with an arrival time and hospital directions. If your surgery is scheduled on a Monday, the hospital will be calling you on the Friday prior to your surgery. If you have not heard from anyone by 8pm, please call the hospital supervisor through the hospital  at [phone]. ([place] [phone] or [place] [phone]).    Do not eat or drink anything after midnight the night before your surgery, including candy, mints, lifesavers, or chewing gum. Do not drink alcohol 24hrs before your surgery. Try not to smoke at least 24hrs before your surgery.       Follow the pre surgery showering instructions as listed in the “My Surgical Experience Booklet” or otherwise provided by your surgeon's office. Do not use a blade to shave the surgical area 1 week before surgery. It is okay to use a clean electric clippers up to 24 hours before surgery. Do not apply any lotions, creams, including makeup, cologne, deodorant, or  perfumes after showering on the day of your surgery. Do not use dry shampoo, hair spray, hair gel, or any type of hair products.     No contact lenses, eye make-up, or artificial eyelashes. Remove nail polish, including gel polish, and any artificial, gel, or acrylic nails if possible. Remove all jewelry including rings and body piercing jewelry.     Wear causal clothing that is easy to take on and off. Consider your type of surgery.    Keep any valuables, jewelry, piercings at home. Please bring any specially ordered equipment (sling, braces) if indicated.    Arrange for a responsible person to drive you to and from the hospital on the day of your surgery. Please confirm the visitor policy for the day of your procedure when you receive your phone call with an arrival time.     Call the surgeon's office with any new illnesses, exposures, or additional questions prior to surgery.    Please reference your “My Surgical Experience Booklet” for additional information to prepare for your upcoming surgery.

## 2024-04-04 ENCOUNTER — OFFICE VISIT (OUTPATIENT)
Dept: OCCUPATIONAL THERAPY | Age: 54
End: 2024-04-04
Payer: COMMERCIAL

## 2024-04-04 ENCOUNTER — TELEPHONE (OUTPATIENT)
Age: 54
End: 2024-04-04

## 2024-04-04 DIAGNOSIS — Z98.890 S/P MUSCULOSKELETAL SYSTEM SURGERY: Primary | ICD-10-CM

## 2024-04-04 PROCEDURE — 97110 THERAPEUTIC EXERCISES: CPT

## 2024-04-04 PROCEDURE — 97140 MANUAL THERAPY 1/> REGIONS: CPT

## 2024-04-04 NOTE — TELEPHONE ENCOUNTER
Caller: Patient    Doctor: Sindi    Reason for call: Patient os calling to see if she is going to have a nerve block done for her surgery. Also, she would like to know the estimated time frame for recovery and when she will be able to drive.    Call back#: [phone] to leave a message

## 2024-04-04 NOTE — PROGRESS NOTES
Daily Note     Today's date: 2024  Patient name: Millie Moyer  : 1970  MRN: [id number]  Referring provider: Keshia Andre PA-C  Dx:   Encounter Diagnosis     ICD-10-CM    1. S/P musculoskeletal system surgery  Z98.890                            Subjective: Im able to do everything.     Objective: See treatment diary below      Assessment: Tolerated treatment well. Mod difficulty with upgrades in pinch strengthening. Pt is ready to D/C to [facility] and no long requires skilled services. She displayed full understanding of strengthening exercises to be completed at home.       Plan: D/C to [facility]     Precautions: S/P Carpal and Cubital tunnel Release on 2024      Manuals  2/22 2/27 2/29 3/5 3/7 3/12 3/14 4/4    Splint adjust  15    4  2     Scar MAssage   6 6 12 8  10     Stitch removal   6          Edema massage    LF 6'     10'    Neuro Re-Ed             Desensitization       3' vib    3' square beads    3' velcro 3' vib    3' square beads    3' velcro                                                                                   Ther Ex             HEP EDU            AROM   Shoulder circles 20x    Elbow f.e 20x    Ball roll 2'   Shoulder circles forwardback 20x    Elbow f/e 20x    Ball roll x2 Shoulder circles forwardback 20x    Elbow f/e 20x    Ball roll x2 Shoulder circles forwardback 20x    Elbow f/e 20x    Ball roll x2  Shoulder circles forwardback 20x    Elbow f/e 20x     S/P    Sm hammer 30x Sm hammer 30x Lg hammer 30x Lg hammer 30x  Lg hammer 30x     Tendon Glides   30x hook to full     Table to full 30x 15-20x  30x Tendon glides 30x Tendon glides 20x Tendon glides 20x Tendon glides 30x    Grasp   RPW center; sdie 30x    RTP sm dowel 30x RTP sm dowel 30x     GPW 3x10 center;side  GPW 30x cneter; side     RTP sm dowel 30x       BLUPW 30x cneter;side  RTP sm dowel 30x       BLUPW 30x center; side    3# f/e 30x Ruperto pw 30x center;side G3Bl    G3 /[id number]    BluPW 30x center;side        Pinch    RG 2x GGx2 GGx2 GB x2 GB x2 BB x2     Proximal strengthening   GTB 3x10 GTB 3x30 scap add, if er 30x BluTB 30x scap add    IR/ER 30x GTB Tricep ext corinne 10# 39x    Scap ADD 30x 15# Scap add 20# 30x      Wrist Strengthening   RTB S/P 30x RTB 3x10 all directions GTB 30x all directions GTB 3x10 all directions  GTB 30x all directions GTB 3x30    Ther Activity             Bicep Curls     4# 30x  6# 30x 7# 30x neutral 7# 30x in S    Nerve glides      UNGs 15-20x    MNGs 15-20x       EDC         Lg rb 30x                              Modalities             MHP  5 5'  5 5  5' 5'

## 2024-04-08 NOTE — PROGRESS NOTES
PRE-OPERATIVE EVALUATION  [facility] - [facility] [place]    NAME: Millie Moyer  AGE: 54 y.o. SEX: female  : 1970     DATE: 2024    Pre-Operative Evaluation:     Chief Complaint: Pre-operative Evaluation     Surgery: left TKR  Anticipated Date of Surgery:   Referring Provider: Sarmad Moon PA*        History of Present Illness:     Millie Moyer is a 54 y.o. female who presents to the office today for a preoperative consultation at the request of surgeon, , who plans on performing left TKR on 24. Planned anesthesia is general. Patient has a bleeding risk of: no remote history of abnormal bleeding. Patient does not have objections to receiving blood products if needed. Current anti-platelet/anti-coagulation medications that the patient is prescribed includes:    none  Assessment of Chronic Conditions:   - osteoarthritis     Assessment of Cardiac Risk:  Denies unstable or severe angina or MI in the last 6 weeks or history of stent placement in the last year   Denies decompensated heart failure (e.g. New onset heart failure, NYHA functional class IV heart failure, or worsening existing heart failure)  Denies significant arrhythmias such as high grade AV block, symptomatic ventricular arrhythmia, newly recognized ventricular tachycardia, supraventricular tachycardia with resting heart rate >100, or symptomatic bradycardia  Denies severe heart valve disease including aortic stenosis or symptomatic mitral stenosis     Exercise Capacity:  Able to walk 4 blocks without symptoms?: Yes  Able to walk 2 flights without symptoms?: Yes    Prior Anesthesia Reactions: No     Personal history of venous thromboembolic disease? No    History of steroid use for >2 weeks within last year? No    STOP-BANG Sleep Apnea Screening Questionnaire:      Do you SNORE loudly (louder than talking or loud enough to be heard through closed doors)? No = 0 point   Do  you often feel TIRED, fatigued, or sleepy during daytime? No = 0 point   Has anyone OBSERVED you stop breathing during your sleep? No = 0 point   Do you have or are you being treated for high blood pressure? No = 0 point   BMI more than 35 kg/m2? No = 0 point   AGE over 50 years old? No = 0 point   NECK circumference > 16 inches (40 cm)? No = 0 point   Male GENDER? No = 0 point   TOTAL SCORE 0 = LOW risk of ANTHONY       Review of Systems:     Review of Systems     Problem List:     Patient Active Problem List   Diagnosis    Irritable bowel syndrome (IBS)    Malignant neoplasm of upper-inner quadrant of right breast in female, estrogen receptor positive (HCC)    Psoriasis    Seasonal allergies    BRCA negative    Use of tamoxifen (Nolvadex)    Seborrheic keratosis    Bilateral plantar fasciitis    Dyspepsia    Monoallelic mutation of MUTYH gene    COVID-19 vaccination not done    Abnormal MRI, breast    Carpal tunnel syndrome on right    Lateral epicondylitis of right elbow    Medial epicondylitis of elbow, right    PONV (postoperative nausea and vomiting)    Inability to bear weight    Acute pain of right knee        Allergies:     Allergies   Allergen Reactions    Other Hives     Kait selzer    Kait-Carmi Plus Cold & Cough  [[id number]-[id number]-Apap] Hives    Morphine Vomiting     Vomits profusely     Prednisone Other (See Comments) and GI Intolerance     insomnia        Current Medications:       Current Outpatient Medications:     ibuprofen (MOTRIN) 400 mg tablet, Take 1 tablet (400 mg total) by mouth every 6 (six) hours as needed for mild pain for up to 7 days (Patient not taking: Reported on 4/3/2024), Disp: 28 tablet, Rfl: 0    Magnesium 400 MG CAPS, Take 1 tablet by mouth daily at bedtime, Disp: , Rfl:     methocarbamol (ROBAXIN) 500 mg tablet, Take 1 tablet (500 mg total) by mouth 3 (three) times a day as needed for muscle spasms for up to 7 days, Disp: 30 tablet, Rfl: 1    secukinumab (Cosentyx Sensoready Pen)  150 mg/mL SOAJ injection, Inject 2 mL (300 mg total) under the skin every 30 (thirty) days, Disp: 2 mL, Rfl: 11    tamoxifen (NOLVADEX) 20 mg tablet, TAKE 1 TABLET BY MOUTH EVERY DAY, Disp: 90 tablet, Rfl: 3    traMADol (ULTRAM) 50 mg tablet, Take 1 tablet (50 mg total) by mouth every 6 (six) hours as needed for moderate pain or severe pain for up to 10 doses Continuation of therapy, Disp: 60 tablet, Rfl: 0    ASHWAGANDHA PO, Take by mouth every evening, Disp: , Rfl:     Cholecalciferol (Vitamin D3) 125 MCG (5000 UT) TBDP, Take by mouth daily, Disp: , Rfl:     milk thistle 175 MG tablet, Take 175 mg by mouth daily, Disp: , Rfl:     Multiple Vitamin (multivitamin) capsule, Take 1 capsule by mouth daily, Disp: , Rfl:      Past History:     Past Medical History:   Diagnosis Date    Arthritis     BRCA negative     Cancer (HCC)     Breast right    Colon polyp     COVID     x 2 -last time     Endometriosis     H/O bilateral breast implants     Headache     occ    IBS (irritable bowel syndrome)     Insomnia     Night sweats     Pain in left knee     PONV (postoperative nausea and vomiting) 2024    Psoriasis     Skin tag     Uses crutches     Wears glasses         Past Surgical History:   Procedure Laterality Date    BREAST BIOPSY Right     IDC     SECTION      COLONOSCOPY      LAPAROSCOPY      exploratory, endometriosis    LYMPHADENECTOMY Right     x2    MASTECTOMY      bilateral mastectomy    AK NDSC WRST SURG W/RLS TRANSVRS CARPL LIGM Right 2024    Procedure: RELEASE CARPAL TUNNEL ENDOSCOPIC;  Surgeon: Kennedy Garcia MD;  [facility];  Service: Orthopedics    AK NEUROPLASTY &/TRANSPOSITION ULNAR NERVE ELBOW Right 2024    Procedure: RELEASE CUBITAL TUNNEL;  Surgeon: Kennedy Garcia MD;  [facility];  Service: Orthopedics    AK NIPPLE/AREOLA RECONSTRUCTION Bilateral 10/13/2016    Procedure: NIPPLE RECONSTRUCTION ;  Surgeon: Tulio Wilson MD;   "[facility];  Service: Plastics    MA REVISION OF RECONSTRUCTED BREAST Bilateral 03/09/2016    Procedure: RECONSTRUCTION REVISION  BREAST MOUND ,FAT GRAFTS ;  Surgeon: Tulio Wilson MD;  [facility];  Service: Plastics    MA TENDON SHEATH INCISION Right 02/07/2024    Procedure: RELEASE TRIGGER FINGER - RIGHT MIDDLE;  Surgeon: Kennedy Garcia MD;  [facility];  Service: Orthopedics    SENTINEL LYMPH NODE BIOPSY Right         Family History   Problem Relation Age of Onset    Lung cancer Paternal Grandfather 50    Breast cancer Other     Thyroid cancer Mother 40    Breast cancer Mother 74    Asthma Mother     Psoriasis Father     Basal cell carcinoma Father     Arthritis Father         Psoriatic    Heart disease Other     Prostate cancer Other         Social History     Tobacco Use    Smoking status: Never     Passive exposure: Never    Smokeless tobacco: Never   Vaping Use    Vaping status: Never Used   Substance Use Topics    Alcohol use: Yes     Comment: social    Drug use: No        Physical Exam:      /94   Pulse 79   Temp 97.5 °F (36.4 °C)   Ht 5' 8\" (1.727 m)   Wt 88.5 kg (195 lb)   LMP 12/27/2021 (Approximate)   SpO2 98%   BMI 29.65 kg/m²     Physical Exam      Data:     Pre-operative work-up    Laboratory Results: I have personally reviewed the pertinent laboratory results/reports     EKG: I have personally reviewed pertinent reports.        Previous cardiopulmonary studies within the past year:  Echocardiogram: none  Cardiac Catheterization: none  Stress Test: none  Pulmonary Function Testing: none       Assessment:     1. Preoperative clearance        2. Other tear of medial meniscus, current injury, left knee, initial encounter  Ambulatory referral to Family Practice      3. Acute pain of left knee  methocarbamol (ROBAXIN) 500 mg tablet      4. S/P cubital tunnel release  traMADol (ULTRAM) 50 mg tablet           Plan:     54 y.o. female with planned surgery: left " TKR.      Cardiac Risk Estimation: per the Revised Cardiac Risk Index (Circ. 100:1043, 1999), the patient's risk factors for cardiac complications include  none , putting her in: RCI RISK CLASS I (0 risk factors, risk of major cardiac compl. appr. 0.5%).    RCRI  High Risk surgery?         1 Point  CAD History:         1 Point   MI; Positive Stress Test; CP due to Mi;  Nitrate Usage to control Angina; Pathologic Q wave on EKG  CHF Active:         1 Point   Pulm Edema; Paroxysmal Nocturnal Dyspnea;  Bibasilar Rales (crackles);S3; CHF on CXR  Cerebrovascular Disease (TIA or CVA):     1 Point  DM on Insulin:        1 Point  Serum Creat >2.0 mg/dl:       1 Point          Total Points: 0     Scorin: Class I, Very Low Risk (0.4%)     1: Class II, Low risk (0.9%)     2: Class III Moderate (6.6%)     3: Class IV High (>11%)    1. Further preoperative workup as follows:   - None; no further preoperative work-up is required    2. Medication Management/Recommendations:   - None, continue medication regimen including morning of surgery, with sip of water    3. Prophylaxis for cardiac events with perioperative beta-blockers: not indicated.    4. Patient requires further consultation with: None    EDEN Risk:  GFR:   eGFR   Date Value Ref Range Status   2024 87 ml/min/1.73sq m Final       - If GFR>60, Hold ACE/ARB/Diuretic on the day of surgery, and NSAIDS 10 days before.  - If GFR<45, Consider PRE and POST op Nephrology Consult.  - If 46 <GFR> 59 : Has Patient had EDEN in last 6 Months? no   If YES: Preop Nephrology consult   If No:  Post Op Nephrology consult.    Clearance  Patient is CLEARED for surgery without any additional cardiac testing.     A copy of this evaluation was transmitted electronically or by fax to the requesting provider.     Margaret Becerra [place] [address]  [place] [address]  Phone#  [phone]  Fax#  [phone]    Please be aware that  "this note contains text that was dictated and there may be errors pertaining to \"sound-alike\" words during the dictation process.     "

## 2024-04-09 ENCOUNTER — ANESTHESIA EVENT (OUTPATIENT)
Dept: PERIOP | Facility: HOSPITAL | Age: 54
End: 2024-04-09
Payer: COMMERCIAL

## 2024-04-11 ENCOUNTER — ANESTHESIA (OUTPATIENT)
Dept: PERIOP | Facility: HOSPITAL | Age: 54
End: 2024-04-11
Payer: COMMERCIAL

## 2024-04-11 ENCOUNTER — HOSPITAL ENCOUNTER (OUTPATIENT)
Facility: HOSPITAL | Age: 54
Setting detail: OUTPATIENT SURGERY
Discharge: HOME | End: 2024-04-11
Attending: ORTHOPAEDIC SURGERY | Admitting: ORTHOPAEDIC SURGERY
Payer: COMMERCIAL

## 2024-04-11 VITALS
SYSTOLIC BLOOD PRESSURE: 166 MMHG | RESPIRATION RATE: 16 BRPM | OXYGEN SATURATION: 96 % | TEMPERATURE: 97.3 F | WEIGHT: 192.9 LBS | DIASTOLIC BLOOD PRESSURE: 84 MMHG | HEART RATE: 80 BPM | BODY MASS INDEX: 29.24 KG/M2 | HEIGHT: 68 IN

## 2024-04-11 DIAGNOSIS — Z98.890 S/P MENISCECTOMY: Primary | ICD-10-CM

## 2024-04-11 DIAGNOSIS — Z98.890 S/P MENISCECTOMY: ICD-10-CM

## 2024-04-11 LAB
EXT PREGNANCY TEST URINE: NEGATIVE
EXT. CONTROL: NORMAL

## 2024-04-11 PROCEDURE — 81025 URINE PREGNANCY TEST: CPT | Performed by: ORTHOPAEDIC SURGERY

## 2024-04-11 RX ORDER — NAPROXEN 500 MG/1
500 TABLET ORAL 2 TIMES DAILY WITH MEALS
Qty: 28 TABLET | Refills: 0 | Status: SHIPPED | OUTPATIENT
Start: 2024-04-11 | End: 2024-04-11

## 2024-04-11 RX ORDER — ACETAMINOPHEN 325 MG/1
325 TABLET ORAL EVERY 6 HOURS PRN
Qty: 60 TABLET | Refills: 0 | Status: SHIPPED | OUTPATIENT
Start: 2024-04-11 | End: 2024-04-11

## 2024-04-11 RX ORDER — ROPIVACAINE HYDROCHLORIDE 5 MG/ML
INJECTION, SOLUTION EPIDURAL; INFILTRATION; PERINEURAL
Status: COMPLETED | OUTPATIENT
Start: 2024-04-11 | End: 2024-04-11

## 2024-04-11 RX ORDER — MIDAZOLAM HYDROCHLORIDE 2 MG/2ML
INJECTION, SOLUTION INTRAMUSCULAR; INTRAVENOUS
Status: COMPLETED | OUTPATIENT
Start: 2024-04-11 | End: 2024-04-11

## 2024-04-11 RX ORDER — NALOXONE HYDROCHLORIDE 4 MG/.1ML
SPRAY NASAL
Qty: 1 EACH | Refills: 1 | Status: SHIPPED | OUTPATIENT
Start: 2024-04-11 | End: 2025-04-11

## 2024-04-11 RX ORDER — OXYCODONE HYDROCHLORIDE 5 MG/1
5 TABLET ORAL EVERY 4 HOURS PRN
Qty: 10 TABLET | Refills: 0 | Status: SHIPPED | OUTPATIENT
Start: 2024-04-11 | End: 2024-04-11

## 2024-04-11 RX ORDER — EPHEDRINE SULFATE 50 MG/ML
INJECTION INTRAVENOUS AS NEEDED
Status: DISCONTINUED | OUTPATIENT
Start: 2024-04-11 | End: 2024-04-11

## 2024-04-11 RX ORDER — SODIUM CHLORIDE 9 MG/ML
125 INJECTION, SOLUTION INTRAVENOUS CONTINUOUS
Status: DISCONTINUED | OUTPATIENT
Start: 2024-04-11 | End: 2024-04-11 | Stop reason: HOSPADM

## 2024-04-11 RX ORDER — OXYCODONE HYDROCHLORIDE 5 MG/1
5 TABLET ORAL EVERY 4 HOURS PRN
Qty: 10 TABLET | Refills: 0 | Status: SHIPPED | OUTPATIENT
Start: 2024-04-11 | End: 2024-04-21

## 2024-04-11 RX ORDER — PROPOFOL 10 MG/ML
INJECTION, EMULSION INTRAVENOUS AS NEEDED
Status: DISCONTINUED | OUTPATIENT
Start: 2024-04-11 | End: 2024-04-11

## 2024-04-11 RX ORDER — FENTANYL CITRATE/PF 50 MCG/ML
25 SYRINGE (ML) INJECTION
Status: COMPLETED | OUTPATIENT
Start: 2024-04-11 | End: 2024-04-11

## 2024-04-11 RX ORDER — ONDANSETRON 2 MG/ML
INJECTION INTRAMUSCULAR; INTRAVENOUS AS NEEDED
Status: DISCONTINUED | OUTPATIENT
Start: 2024-04-11 | End: 2024-04-11

## 2024-04-11 RX ORDER — NAPROXEN 500 MG/1
500 TABLET ORAL 2 TIMES DAILY WITH MEALS
Qty: 28 TABLET | Refills: 0 | Status: SHIPPED | OUTPATIENT
Start: 2024-04-11

## 2024-04-11 RX ORDER — ONDANSETRON 2 MG/ML
4 INJECTION INTRAMUSCULAR; INTRAVENOUS EVERY 6 HOURS PRN
Status: DISCONTINUED | OUTPATIENT
Start: 2024-04-11 | End: 2024-04-11 | Stop reason: HOSPADM

## 2024-04-11 RX ORDER — LIDOCAINE HYDROCHLORIDE 20 MG/ML
INJECTION, SOLUTION EPIDURAL; INFILTRATION; INTRACAUDAL; PERINEURAL AS NEEDED
Status: DISCONTINUED | OUTPATIENT
Start: 2024-04-11 | End: 2024-04-11

## 2024-04-11 RX ORDER — OXYCODONE HYDROCHLORIDE 5 MG/1
5 TABLET ORAL EVERY 4 HOURS PRN
Status: DISCONTINUED | OUTPATIENT
Start: 2024-04-11 | End: 2024-04-11 | Stop reason: HOSPADM

## 2024-04-11 RX ORDER — CEFAZOLIN SODIUM 2 G/50ML
2000 SOLUTION INTRAVENOUS ONCE
Status: COMPLETED | OUTPATIENT
Start: 2024-04-11 | End: 2024-04-11

## 2024-04-11 RX ORDER — ONDANSETRON 2 MG/ML
4 INJECTION INTRAMUSCULAR; INTRAVENOUS ONCE AS NEEDED
Status: DISCONTINUED | OUTPATIENT
Start: 2024-04-11 | End: 2024-04-11 | Stop reason: HOSPADM

## 2024-04-11 RX ORDER — ACETAMINOPHEN 325 MG/1
325 TABLET ORAL EVERY 6 HOURS PRN
Qty: 60 TABLET | Refills: 0 | Status: SHIPPED | OUTPATIENT
Start: 2024-04-11

## 2024-04-11 RX ORDER — LIDOCAINE HCL/EPINEPHRINE/PF 2%-1:200K
VIAL (ML) INJECTION AS NEEDED
Status: DISCONTINUED | OUTPATIENT
Start: 2024-04-11 | End: 2024-04-11

## 2024-04-11 RX ORDER — FENTANYL CITRATE 50 UG/ML
INJECTION, SOLUTION INTRAMUSCULAR; INTRAVENOUS
Status: COMPLETED | OUTPATIENT
Start: 2024-04-11 | End: 2024-04-11

## 2024-04-11 RX ADMIN — FENTANYL CITRATE 25 MCG: 50 INJECTION INTRAMUSCULAR; INTRAVENOUS at 11:24

## 2024-04-11 RX ADMIN — LIDOCAINE HYDROCHLORIDE 100 MG: 20 INJECTION, SOLUTION EPIDURAL; INFILTRATION; INTRACAUDAL at 10:23

## 2024-04-11 RX ADMIN — CEFAZOLIN SODIUM 2000 MG: 2 SOLUTION INTRAVENOUS at 09:40

## 2024-04-11 RX ADMIN — SODIUM CHLORIDE 125 ML/HR: 0.9 INJECTION, SOLUTION INTRAVENOUS at 08:28

## 2024-04-11 RX ADMIN — MIDAZOLAM 2 MG: 1 INJECTION INTRAMUSCULAR; INTRAVENOUS at 08:58

## 2024-04-11 RX ADMIN — FENTANYL CITRATE 25 MCG: 50 INJECTION INTRAMUSCULAR; INTRAVENOUS at 11:34

## 2024-04-11 RX ADMIN — SODIUM CHLORIDE 125 ML/HR: 0.9 INJECTION, SOLUTION INTRAVENOUS at 09:37

## 2024-04-11 RX ADMIN — FENTANYL CITRATE 25 MCG: 50 INJECTION INTRAMUSCULAR; INTRAVENOUS at 11:29

## 2024-04-11 RX ADMIN — FENTANYL CITRATE 100 MCG: 50 INJECTION INTRAMUSCULAR; INTRAVENOUS at 08:58

## 2024-04-11 RX ADMIN — OXYCODONE 5 MG: 5 TABLET ORAL at 12:37

## 2024-04-11 RX ADMIN — ONDANSETRON 4 MG: 2 INJECTION INTRAMUSCULAR; INTRAVENOUS at 10:39

## 2024-04-11 RX ADMIN — ROPIVACAINE HYDROCHLORIDE 30 ML: 5 INJECTION EPIDURAL; INFILTRATION; PERINEURAL at 09:00

## 2024-04-11 RX ADMIN — EPHEDRINE SULFATE 7.5 MG: 50 INJECTION, SOLUTION INTRAVENOUS at 10:46

## 2024-04-11 RX ADMIN — PROPOFOL 200 MG: 10 INJECTION, EMULSION INTRAVENOUS at 10:23

## 2024-04-11 RX ADMIN — FENTANYL CITRATE 25 MCG: 50 INJECTION INTRAMUSCULAR; INTRAVENOUS at 11:19

## 2024-04-11 RX ADMIN — SODIUM CHLORIDE 125 ML/HR: 0.9 INJECTION, SOLUTION INTRAVENOUS at 11:28

## 2024-04-11 RX ADMIN — LIDOCAINE HYDROCHLORIDE,EPINEPHRINE BITARTRATE 20 ML: 20; .005 INJECTION, SOLUTION EPIDURAL; INFILTRATION; INTRACAUDAL; PERINEURAL at 09:00

## 2024-04-11 NOTE — OP NOTE
OPERATIVE REPORT  PATIENT NAME: Millie Moyer    :  1970  MRN: [id number]  Pt Location: [facility] ROOM 03    SURGERY DATE: 2024    Surgeons and Role:     * Jin Delong MD - Primary     * Nancy Tate PA-C - Assisting    Preop Diagnosis:  Other tear of medial meniscus, current injury, left knee, initial encounter [S83.242A]    Post-Op Diagnosis Codes:     * Other tear of medial meniscus, current injury, left knee, initial encounter [S83.242A]    Procedure(s):  Left - Knee arthroscopy for medial meniscus debridement  Chondroplasty of the medial femoral condyle, medial tibial plateau  Chondroplasty of the lateral femoral condyle, lateral tibial plateau  Chondroplasty of the patella and trochlea    Specimen(s):  * No specimens in log *    Estimated Blood Loss:   Minimal    Drains:  * No LDAs found *    Anesthesia Type:   General    Operative Indications:  Other tear of medial meniscus, current injury, left knee, initial encounter [S83.242A]      Operative Findings:  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 left knee arthroscopy, chondroplasty of the medial compartment  2.  Posterior horn medial meniscus debridement of complex posterior horn tear  3.  Chondroplasty of the patellofemoral joint  4.  chondrolasty of the lateral tibial plateau     WOUND TYPE: 1     DRAINS  None.     COMPLICATIONS  None.     FINDINGS:  See below      ANESTHESIA TYPE  Regional + LMA     IMPLANTS:  Nothing implanted    ESTIMATED BLOOD LOSS: < 25 ml    SPECIMENS: none     INDICATION:  Millie Moyer is a 54 y.o. female who was diagnosed with a posterior medial meniscus tear based on history, examination, and advanced imaging.  Accordingly, after a lengthy discussion of the risks and benefits and alternatives to both operative and non-operative treatment, she elected to proceed with operative intervention, and I am in agreement. Please see my clinical documentation for additional details on the history, exam, and discussion regarding surgical decision making.     PRE-PROCEDURE PROTOCOL:  The patient was identified in the preoperative holding area where informed consent and surgical site marking were confirmed. The patient was then transferred to the operating room where anesthesia was administered by our Anesthesia colleagues in accordance with our preoperative plan. The operative extremity was prepped and draped in the usual sterile fashion. A procedural pause was performed to verify correct patient identification, procedure to be performed, laterality, agreement of all team members, availability of all equipment, and administration of preoperative antibiotics. Afterwards, the procedure commenced.     DESCRIPTION OF PROCEDURE  Examination of the knee under anesthesia was performed. This revealed 1A lachman with 0-135 degrees of motion.     Diagnostic arthroscopy was performed by inserting the camera through an inferolateral portal. An inferomedial portal was created under needle localization. Findings were as follows:     Medial Compartment: There was a complex tear of the posterior horn medial meniscus.  This was debrided with a combination of an arthroscopic meniscal biter and a shaver to a stable margin.  There was grade II/III chondromalacia of the medial femoral condyle and grade II chondromalacia of the tibial plateau in the medial compartment.     Lateral compartment: Meniscus intact and within normal limits, grade II chondromalacia of the lateral tibial plateau, this was gently debrided with an arthroscopic shaver to a stable margin.       Trochlea: grade II chondromalacia of the trochlea, this was debrided with arthroscopic  shaver to a stable margin     Patella: Grade II chondromalacia of the trochlea that was gently debrided with arthroscopic shaver to stable margin     There was no evidence of loose bodies or other pathology in the suprapatellar pouch, medial gutter, or lateral gutter.     The arthroscopic equipment was removed and fluid drained from the knee. The wound was closed in a standard fashion. A sterile dressing was applied. The patient was transferred to the PACU where she recovered without complication.     POSTOPERATIVE PLAN  DVT PROPHYLAXIS: Mobilization and weightbearing as tolerated     POSTOPERATIVE REHABILITATION: Weightbearing as tolerated     FOLLOW-UP: We will plan on seeing her back in approximately 1-2 weeks for wound check, sutures out and advancement of physical therapy as indicated.     A physician assistant was required during the procedure for retraction, tissue handling, dissection and suturing. There was no qualified resident available to assist       Patient Disposition:  PACU      I was present for the entire procedure.    Patient Disposition:  PACU         SIGNATURE: Jin Delong MD  DATE: April 11, 2024  TIME: 11:44 AM

## 2024-04-11 NOTE — ANESTHESIA POSTPROCEDURE EVALUATION
"Post-Op Assessment Note    CV Status:  Stable    Pain management: adequate       Mental Status:  Alert and awake   Hydration Status:  Euvolemic   PONV Controlled:  Controlled   Airway Patency:  Patent     Post Op Vitals Reviewed: Yes    No anethesia notable event occurred.    Staff: Anesthesiologist               BP      Temp      Pulse     Resp      SpO2      /84   Pulse 80   Temp (!) 97.3 °F (36.3 °C) (Temporal)   Resp 16   Ht 5' 8\" (1.727 m)   Wt 87.5 kg (192 lb 14.4 oz)   LMP 12/27/2021 (Approximate)   SpO2 96%   Breastfeeding No   BMI 29.33 kg/m²     "

## 2024-04-11 NOTE — ANESTHESIA PROCEDURE NOTES
Peripheral Block    Patient location during procedure: holding area  Start time: 4/11/2024 8:57 AM  Reason for block: at surgeon's request and post-op pain management  Staffing  Performed by: Terry Valdes MD  Authorized by: Terry Valdes MD    Preanesthetic Checklist  Completed: patient identified, IV checked, site marked, risks and benefits discussed, surgical consent, monitors and equipment checked, pre-op evaluation and timeout performed  Peripheral Block  Patient position: supine  Prep: ChloraPrep  Patient monitoring: continuous pulse oximetry, frequent blood pressure checks and heart rate  Anesthesia block type: Intra-articular.  Laterality: left  Injection technique: single-shotropivacaine (NAROPIN) 0.5 % injection 20 mL - Perineural   30 mL - 4/11/2024 9:00:00 AM  fentanyl citrate (PF) 100 MCG/2ML 50 mcg - Intravenous   100 mcg - 4/11/2024 8:58:00 AM  midazolam (VERSED) injection 0.5 mg - Intravenous   2 mg - 4/11/2024 8:58:00 AM  Needle  Needle gauge: 20 G  Needle length: 3.5 in  Needle localization: anatomical landmarks  Assessment  Injection assessment: frequent aspiration, injected with ease, negative aspiration, no paresthesia on injection, incremental injection, negative for heart rate change and no symptoms of intraneural/intravenous injection  Paresthesia pain: none  Post-procedure:  site cleaned  patient tolerated the procedure well with no immediate complications

## 2024-04-11 NOTE — DISCHARGE INSTR - AVS FIRST PAGE
POSTOPERATIVE INSTRUCTIONS following KNEE MENISECTOMY    MEDICATIONS:  Resume all home medications unless otherwise instructed by your surgeon.  Pain Medication:  Oxycodone 5 mg, 1 tablets every 4 hours as needed  If you were given a regional anesthetic (nerve block), please begin taking the pain medication as soon as you get home, even if you have minimal or no pain.  DO NOT WAIT FOR THE NERVE BLOCK TO WEAR OFF.  Possible side effects include nausea, constipation, and urinary retention.  If you experience these side effects, please call our office for assistance.  Pain med refills are authorized only during office hours (8am-4pm, Mon-Fri).  Anti-Inflammatory:  Naproxen 500 mg, 1 tablet every 12 hours for 4 weeks and Tylenol 325 mg, 1-2 tablets every 6 hours for 4 weeks  Take with food.  Stop if you experience nausea, reflux, or stomach pain.  Blood Clot Prevention:  Not Necessary  Pump your foot up and down 20 times per hour while you are less mobile.  If you were previously on an anticoagulation medication (blood thinner) you may begin this the following morning    WOUND CARE:  Keep the dressing clean and dry.  Light drainage may occur the first 2 days postop.  You may remove the dressings and get the incision wet in the shower 72 hours after surgery.  Do not remove steri-strips or sutures.  Do not immerse the incision under water.  Carefully pat the incision dry.  If there is wound drainage, re-apply a fresh dry gauze dressing.  Please call our office ([phone]) if you experience either of the following:  Sudden increase in swelling, redness, or warmth at the surgical site  Excessive incisional drainage that persists beyond the 3rd day after surgery  Oral temperature greater than 101 degrees, not relieved with Tylenol  Shortness of breath, chest pain, nausea, or any other concerning symptoms    SWELLING CONTROL:  Cold Therapy:  The cold therapy device may be used either continuously or only as needed,  "according to your preference.  Do not let the pad directly touch your skin.  Alternatively, apply ice (20 min on, 20 min off) as often as you feel is necessary.  Elevation:  Elevate the entire leg above heart level.  Place pillows under your ankle to keep your knee straight.  Compression:  Apply ACE wraps or a thigh-length compression stocking as needed.    RANGE OF MOTION:  You are allowed full range of motion as tolerated.    IMMOBILIZATION:  None.  You are allowed full range of motion as tolerated.    ACTIVITY:   Bear full weight as tolerated on the operative leg. Use crutches to assist only as needed.  Using Crutches on Stairs:  Going up, lead with your \"good\" (nonoperative) leg.  Going down, lead with your \"bad\" (operative) leg.  Use a hand rail when available.  Knee Extension:  Place a rolled towel or pillow under your ankle for 20-30 minutes 3-5 times per day.  This will help to maintain full knee extension.  Quad Sets:  Sit or lie with your knee straight.  Tighten your quadriceps (front thigh) muscle.  Hold for 3 seconds, then relax.  Repeat 20 times per hour while awake.    PHYSICAL THERAPY:  Begin therapy 5 to 7 days after surgery. We have placed a referral for physical therapy after the procedure to begin knee range of motion and strengthening. Please call to get set-up with physical therapy.    FOLLOW-UP APPOINTMENT:  10-14 days after surgery with:    Dr. Jin Delong MD  [facility]  [address] [place], [address] ([facility])  [address] [place], [address] [facility])  [phone]    "

## 2024-04-11 NOTE — ANESTHESIA PREPROCEDURE EVALUATION
Procedure:  Knee arthroscopy for medial meniscus repair versus debridement (Left: Knee)    Relevant Problems   ANESTHESIA   (+) PONV (postoperative nausea and vomiting)      CARDIO (within normal limits)      GI/HEPATIC (within normal limits)      /RENAL (within normal limits)      GYN   (+) Malignant neoplasm of upper-inner quadrant of right breast in female, estrogen receptor positive (HCC)      MUSCULOSKELETAL   (+) Lateral epicondylitis of right elbow      NEURO/PSYCH (within normal limits)        Physical Exam    Airway    Mallampati score: II         Dental   No notable dental hx     Cardiovascular  Cardiovascular exam normal    Pulmonary  Pulmonary exam normal     Other Findings  post-pubertal.      Anesthesia Plan  ASA Score- 2     Anesthesia Type- general with ASA Monitors.         Additional Monitors:     Airway Plan: LMA.           Plan Factors-    Chart reviewed.   Existing labs reviewed. Patient summary reviewed.    Patient is not a current smoker.              Induction- intravenous.    Postoperative Plan-     Informed Consent- Anesthetic plan and risks discussed with patient.

## 2024-04-16 ENCOUNTER — TELEPHONE (OUTPATIENT)
Age: 54
End: 2024-04-16

## 2024-04-16 ENCOUNTER — EVALUATION (OUTPATIENT)
Dept: PHYSICAL THERAPY | Age: 54
End: 2024-04-16
Payer: COMMERCIAL

## 2024-04-16 DIAGNOSIS — S83.242A OTHER TEAR OF MEDIAL MENISCUS, CURRENT INJURY, LEFT KNEE, INITIAL ENCOUNTER: Primary | ICD-10-CM

## 2024-04-16 DIAGNOSIS — Z98.890 S/P MEDIAL MENISCECTOMY OF LEFT KNEE: Primary | ICD-10-CM

## 2024-04-16 PROCEDURE — 97110 THERAPEUTIC EXERCISES: CPT

## 2024-04-16 PROCEDURE — 97161 PT EVAL LOW COMPLEX 20 MIN: CPT

## 2024-04-16 NOTE — PROGRESS NOTES
PT Evaluation     Today's date: 2024  Patient name: Millie Moyer  : 1970  MRN: [id number]  Referring provider: Jin Delong MD  Dx:   Encounter Diagnosis     ICD-10-CM    1. Other tear of medial meniscus, current injury, left knee, initial encounter  S83.242A                      Assessment  Assessment details: Pt is a 54 y.o. female who presents to OP PT 5 days s/p Left - Knee arthroscopy for medial meniscus debridement; Chondroplasty of the medial femoral condyle, medial tibial plateau; Chondroplasty of the lateral femoral condyle, lateral tibial plateau; Chondroplasty of the patella and trochlea. DOS 24. She has primary impairments of decreased L knee ROM, L knee strength, hip/knee flexibility, abnormal gait, increased swelling, and increased pain. She is limited functionally as she has difficulty with household and community ambulation, difficulty ascending/descending stairs, difficulty completing ADL's, and is unable to participate in usual recreational activities. Pt was given exercises as part of an HEP and was able to complete exercises on IE with minimal VC's for proper form. Educate pt on proper completion of HEP, proper way to ascend/descend stairs, normal response to exercises, and POC. She verbalized understanding of all education provided. All questions answered. Pt would benefit from skilled OP PT in order to improve upon impairments and return to PLOF.  Impairments: abnormal gait, abnormal or restricted ROM, impaired physical strength, lacks appropriate home exercise program, pain with function, weight-bearing intolerance and poor posture     Symptom irritability: lowUnderstanding of Dx/Px/POC: excellent  Goals  Short term goals (4-6 weeks)  Pt will improve strength by 1/2 grade in LLE to improve tolerance to ADL's  Pt will improve ROM by 5 degrees in L knee to improve completion of ADL's  Pt will be independent with phase 1 HEP  Pt will report 50% functional improvement      Long term goals (6-8 weeks)  Pt will be independent with HEP by d/c  Pt will return to PLOF, perform normal leisure activities, and perform normal work duties with a 90% reduction in symptoms  Pt will be able to tolerate ambulating for 1 hour without pain  Pt will be able to standing for 1 hour without pain   Pt will be able to ascend/descend full flight of stairs without pain  Pt will improve FOTO >/= expected         Plan  Planned modality interventions: cryotherapy, TENS and thermotherapy: hydrocollator packs  Planned therapy interventions: patient education, therapeutic exercise, graded exercise, functional ROM exercises, flexibility, home exercise program, manual therapy, activity modification, strengthening, stretching, joint mobilization, massage, therapeutic activities, balance/weight bearing training, neuromuscular re-education and gait training  Frequency: 2x week  Duration in visits: 16  Duration in weeks: 8  Treatment plan discussed with: patient        Subjective Evaluation    History of Present Illness  Mechanism of injury: She got out of bed and the back of her knee hurt. She had just started walking more outside before this day. She tried resting, brace, medications, and ice. She got into a shower, felt a pop, she went to the ER (a month ago). She then had testing and had surgery last Thursday. Prior to injury she did have some pain but she was able to get through it. She loves walking, gardening, stays active. 2 years she has been putting ice on her knees after longer days but it would get better. Taking medications PRN (oxy, meloxicam)          Not a recurrent problem   Quality of life: excellent    Pain  Current pain ratin  At best pain ratin  At worst pain rating: 10  Quality: sharp, dull ache, discomfort and tight  Relieving factors: medications, ice and change in position  Aggravating factors: sitting, stair climbing and standing    Social Support  Stairs in house: yes (into  basement)   Lives in: one-story house  Lives with: spouse and adult children    Employment status: not working        Objective     Active Range of Motion   Left Knee   Flexion: 91 degrees with pain  Extension: -5 degrees with pain    Right Knee   Normal active range of motion    Strength/Myotome Testing     Lumbar     Right   Normal strength    Left Hip   Planes of Motion   Flexion: 3-  Extension: 3-  Abduction: 3-  Adduction: 3-    Left Knee   Flexion: 3-  Extension: 3-  Quadriceps contraction: fair    Left Ankle/Foot   Dorsiflexion: WFL.   Plantar flexion: WFL.              Precautions: S/P L medial mensicus debridement (DOS 4/11/24)      Manuals 4/11            L knee PROM             Patellar mobs                                       Neuro Re-Ed                                                                                                        Ther Ex             Bike for ROM             LLLD ext             SAQ             H/S stretch             Gastroc stretch             SLR's on plinth             LAQ             Step ups             Squats                                                                                           Ther Activity                                       Gait Training                                       Modalities

## 2024-04-16 NOTE — TELEPHONE ENCOUNTER
Caller: Patient    Doctor: Sindi    Reason for call: Patient has PT scheduled 4/16 @[facility]. Requested order for treatment be placed in chart. Naproxen is giving the patient acid reflux. Patient stopped taking. Patient has been taking Meloxicam from her PCP.     Call back#: [phone]

## 2024-04-18 ENCOUNTER — OFFICE VISIT (OUTPATIENT)
Dept: DERMATOLOGY | Facility: CLINIC | Age: 54
End: 2024-04-18
Payer: COMMERCIAL

## 2024-04-18 ENCOUNTER — OFFICE VISIT (OUTPATIENT)
Dept: PHYSICAL THERAPY | Age: 54
End: 2024-04-18
Payer: COMMERCIAL

## 2024-04-18 VITALS — WEIGHT: 185 LBS | TEMPERATURE: 98.2 F | HEIGHT: 68 IN | BODY MASS INDEX: 28.04 KG/M2

## 2024-04-18 DIAGNOSIS — S83.242A OTHER TEAR OF MEDIAL MENISCUS, CURRENT INJURY, LEFT KNEE, INITIAL ENCOUNTER: Primary | ICD-10-CM

## 2024-04-18 DIAGNOSIS — L40.9 PSORIASIS: Primary | ICD-10-CM

## 2024-04-18 DIAGNOSIS — D22.9 MULTIPLE MELANOCYTIC NEVI: ICD-10-CM

## 2024-04-18 DIAGNOSIS — L81.4 LENTIGO: ICD-10-CM

## 2024-04-18 DIAGNOSIS — L82.1 SEBORRHEIC KERATOSIS: ICD-10-CM

## 2024-04-18 DIAGNOSIS — L85.3 XEROSIS OF SKIN: ICD-10-CM

## 2024-04-18 DIAGNOSIS — D18.01 CHERRY ANGIOMA: ICD-10-CM

## 2024-04-18 PROCEDURE — 97110 THERAPEUTIC EXERCISES: CPT

## 2024-04-18 PROCEDURE — 99213 OFFICE O/P EST LOW 20 MIN: CPT | Performed by: DERMATOLOGY

## 2024-04-18 NOTE — PROGRESS NOTES
"Daily Note     Today's date: 2024  Patient name: Millie Moyer  : 1970  MRN: [id number]  Referring provider: Jin Delong MD  Dx:   Encounter Diagnosis     ICD-10-CM    1. Other tear of medial meniscus, current injury, left knee, initial encounter  S83.242A                      Subjective: Pt reports she her knee is a little sore today. She notes bruising starting around L knee.      Objective: See treatment diary below      Assessment: Pt with limited L knee ROM especially lacking L knee extension. Fair tolerance to PROM into L knee extension 2* pain and muscle guarding. Able to tolerate supplement of AROM and passive stretching to promote full ROM. She would benefit from continued OP PT in order to normalize ROM and strength of L knee. Tolerated treatment fair. Patient demonstrated fatigue post treatment, exhibited good technique with therapeutic exercises, and would benefit from continued PT      Plan: Continue per plan of care.  Progress treatment as tolerated.       Precautions: S/P L medial mensicus debridement (DOS 24)      Manuals             L knee PROM NJR (ext)            Patellar mobs                                       Neuro Re-Ed                                                                                                        Ther Ex             Bike for ROM 8'             LLLD ext 2'             SAQ 3x10             H/S stretch 30\"x3            Decline board stretch 30\"x3            SLR's on plinth 2x10 ea            LAQ 3x10            Step ups nv            Squats nv                                                                                          Ther Activity                                       Gait Training                                       Modalities                                            "

## 2024-04-18 NOTE — PATIENT INSTRUCTIONS
PSORIASIS      Assessment and Plan:  Based on a thorough discussion of this condition and the management approach to it (including a comprehensive discussion of the known risks, side effects and potential benefits of treatment), the patient (family) agrees to implement the following specific plan:  Discussed possibility of switching to a different medication if joint pain does not get better.   Patient wants to continue Cosentyx   Will follow up in October      Psoriasis is a chronic inflammatory condition that causes the body to make new skin cells in days rather than weeks. As these cells pile up on the surface of the skin, you may see thick, scaly patches of thickened skin.   Psoriasis affects 2-4% of males and females. It can start at any age including childhood, with peaks of onset at 15-25 years and 50-60 years. It tends to persist lifelong, fluctuating in extent and severity. It is particularly common in Caucasians but may affect people of any race. About one-third of patients with psoriasis have family members with psoriasis.  Psoriasis is multifactorial. It is classified as an immune-mediated inflammatory disease (IMID). Genetic factors are important and influence the type of psoriasis and response to treatment.     What are the signs and symptoms of psoriasis?    There are many different types of psoriasis that each have present uniquely. The types of psoriasis include:    Plaque psoriasis: About 80% to 90% of people who have psoriasis develop this type. When plaque psoriasis appears, you may see:  Plaque psoriasis usually presents with symmetrically distributed, red, scaly plaques with well-defined edges. The scale is typically silvery white, except in skin folds where the plaques often appear shiny and they may have a moist peeling surface. The most common sites are scalp, elbows and knees, but any part of the skin can be involved. The plaques are usually very persistent without treatment.  Itch is  mostly mild but may be severe in some patients, leading to scratching and lichenification (thickened leathery skin with increased skin markings). Painful skin cracks or fissures may occur.  When psoriatic plaques clear up, they may leave brown or pale marks that can be expected to fade over several months.    Guttate psoriasis: When someone gets this type of psoriasis, you often see tiny bumps appear on the skin quite suddenly. The bumps tend to cover much of the torso, legs, and arms. Sometimes, the bumps also develop on the face, scalp, and ears. No matter where they appear, the bumps tend to be:   Small and scaly  [place]-colored to pink  Temporary, clearing in a few weeks or months without treatment  When guttate psoriasis clears, it may never return. Why this happens is still a bit of a mystery. Guttate psoriasis tends to develop in children and young adults who've had an infection, such as strep throat. It's possible that when the infection clears so does guttate psoriasis.  It's also possible to have:  Guttate psoriasis for life  See the guttate psoriasis clear and plaque psoriasis develop later in life  Plaque psoriasis when you develop guttate psoriasis  There's no way to predict what will happen after the first flare-up of guttate psoriasis clears.    Inverse psoriasis: This type of psoriasis develops in areas where skin touches skin, such as the armpits, genitals, and crease of the buttocks. Where the inverse psoriasis appears, you're likely to notice:  Smooth, red patches of skin that look raw  Little, if any, silvery-white coating  Sore or painful skin  Other names for this type of psoriasis are intertriginous psoriasis or flexural psoriasis.  Pustular psoriasis: This type of psoriasis causes pus-filled bumps that usually appear only on the feet and hands. While the pus-filled bumps may look like an infection, the skin is not infected. The bumps don't contain bacteria or anything else that could cause an  infection.  Where pustular psoriasis appears, you tend to notice:  Red, swollen skin that is dotted with pus-filled bumps  Extremely sore or painful skin  Brown dots (and sometimes scale) appear as the pus-filled bumps dry  Pustular psoriasis can make just about any activity that requires your hands or feet, such as typing or walking, unbearably painful.    Pustular psoriasis (generalized): Serious and life-threatening, this rare type of psoriasis causes pus-filled bumps to develop on much of the skin. Also called von Zumbusch psoriasis, a flare-up causes this sequence of events:  Skin on most of the body suddenly turns dry, red, and tender.  Within hours, pus-filled bumps cover most of the skin.  Often within a day, the pus-filled bumps break open and pools of pus leak onto the skin.  As the pus dries (usually within 24 to 48 hours), the skin dries out and peels (as shown in this picture).  When the dried skin peels off, you see a smooth, glazed surface.  In a few days or weeks, you may see a new crop of pus-filled bumps covering most of the skin, as the cycle repeats itself.  Anyone with pustular psoriasis also feels very sick, and may develop a fever, headache, muscle weakness, and other symptoms. Medical care is often necessary to save the person's life.    Erythrodermic psoriasis: Serious and life-threatening, this type of psoriasis requires immediate medical care. When someone develops erythrodermic psoriasis, you may notice:  Skin on most of the body looks burnt  Chills, fever, and the person looks extremely ill  Muscle weakness, a rapid pulse, and severe itch  The person may also be unable to keep warm, so hypothermia can set in quickly.  Most people who develop this type of psoriasis already have another type of psoriasis. Before developing erythrodermic psoriasis, they often notice that their psoriasis is worsening or not improving with treatment. If you notice either of these happening, see a  board-certified dermatologist.    Nails    Nail psoriasis: With any type of psoriasis, you may see changes to your fingernails or toenails. About half of the people who have plaque psoriasis see signs of psoriasis on their fingernails at some point2.  When psoriasis affects the nails, you may notice:  Tiny dents in your nails (called “nail pits”)  White, yellow, or brown discoloration under one or more nails  Crumbling, rough nails  A nail lifting up so that it's no longer attached  Buildup of skin cells beneath one or more nails, which lifts up the nail  Treatment and proper nail care can help you control nail psoriasis.    Psoriatic arthritis: If you have psoriasis, it's important to pay attention to your joints. Some people who have psoriasis develop a type of arthritis called psoriatic arthritis. This is more likely to occur if you have severe psoriasis.  Most people notice psoriasis on their skin years before they develop psoriatic arthritis. It's also possible to get psoriatic arthritis before psoriasis, but this is less common.  When psoriatic arthritis develops, the signs can be subtle. At first, you may notice:  A swollen and tender joint, especially in a finger or toe  Heel pain  Swelling on the back of your leg, just above your heel  Stiffness in the morning that fades during the day  Like psoriasis, psoriatic arthritis cannot be cured. Treatment can prevent psoriatic arthritis from worsening, which is important. Allowed to progress, psoriatic arthritis can become disabling.    Diagnosis and treatment of psoriasis   Psoriasis is usually diagnosed by clinical features, and skin biopsy if necessary.   It is important to decrease factors that aggravate psoriasis. These include treating streptococcal infections, minimizing skin injuries, avoiding sun exposure if it exacerbates psoriasis, smoking, alcohol usage, decreasing stress, and maintaining an optimal body weight. Certain medications such as lithium,  "beta blockers, antimalarials, and NSAIDs have also been implicated. Suddenly stopping oral steroids or strong topical steroids can cause rebound disease.     There are many categories of psoriasis treatments available.     Topical therapy  Mild psoriasis is generally treated with topical agents alone. Which treatment is selected may depend on body site, extent and severity of psoriasis.  Emollients  Coal tar preparations  Dithranol  Salicylic acid  Vitamin D analogue (calcipotriol)  Topical corticosteroids  Calcineurin inhibitor (tacrolimus, pimecrolimus)  Phototherapy  Most psoriasis centres offer phototherapy with ultraviolet (UV) radiation, often in combination with topical or systemic agents. Types of phototherapy include  Narrowband UVB  Broadband UVB  Photochemotherapy (PUVA)  Targeted phototherapy  Systemic therapy  Moderate to severe psoriasis warrants treatment with a systemic agent and/or phototherapy. The most common treatments are:  Methotrexate  Ciclosporin  Acitretin  Other medicines occasionally used for psoriasis include:  Mycophenolate  Apremilast  Hydroxyurea  Azathioprine  6-mercaptopurine  Systemic corticosteroids are best avoided due to a risk of severe withdrawal flare of psoriasis and adverse effects.  Biologics or targeted therapies are reserved for conventional treatment-resistant severe psoriasis, mainly because of expense, as side effects compare favorably with other systemic agents. These include:  Anti-tumour necrosis factor-alpha antagonists (anti-TNF?) infliximab, adalimumab and etanercept  The interleukin (IL)-12/23 antagonist ustekinumab  IL-17 antagonists such as secukinumab  Many other monoclonal antibodies are under investigation in the treatment of psoriasis.     MELANOCYTIC NEVI (\"Moles\")        Assessment and Plan:  Based on a thorough discussion of this condition and the management approach to it (including a comprehensive discussion of the known risks, side effects and " "potential benefits of treatment), the patient (family) agrees to implement the following specific plan:  When outside we recommend using a wide brim hat, sunglasses, long sleeve and pants, sunscreen with SPF 30+ with reapplication every 2 hours, or SPF specific clothing   Benign, reassured  Annual skin check     Melanocytic Nevi  Melanocytic nevi (\"moles\") are tan or brown, raised or flat areas of the skin which have an increased number of melanocytes. Melanocytes are the cells in our body which make pigment and account for skin color.    Some moles are present at birth (I.e., \"congenital nevi\"), while others come up later in life (i.e., \"acquired nevi\").  The sun can stimulate the body to make more moles.  Sunburns are not the only thing that triggers more moles.  Chronic sun exposure can do it too.     Clinically distinguishing a healthy mole from melanoma may be difficult, even for experienced dermatologists. The \"ABCDE's\" of moles have been suggested as a means of helping to alert a person to a suspicious mole and the possible increased risk of melanoma.  The suggestions for raising alert are as follows:    Asymmetry: Healthy moles tend to be symmetric, while melanomas are often asymmetric.  Asymmetry means if you draw a line through the mole, the two halves do not match in color, size, shape, or surface texture. Asymmetry can be a result of rapid enlargement of a mole, the development of a raised area on a previously flat lesion, scaling, ulceration, bleeding or scabbing within the mole.  Any mole that starts to demonstrate \"asymmetry\" should be examined promptly by a board certified dermatologist.     Border: Healthy moles tend to have discrete, even borders.  The border of a melanoma often blends into the normal skin and does not sharply delineate the mole from normal skin.  Any mole that starts to demonstrate \"uneven borders\" should be examined promptly by a board certified dermatologist.     Color: Healthy " "moles tend to be one color throughout.  Melanomas tend to be made up of different colors ranging from dark black, blue, white, or red.  Any mole that demonstrates a color change should be examined promptly by a board certified dermatologist.     Diameter: Healthy moles tend to be smaller than 0.6 cm in size; an exception are \"congenital nevi\" that can be larger.  Melanomas tend to grow and can often be greater than 0.6 cm (1/4 of an inch, or the size of a pencil eraser). This is only a guideline, and many normal moles may be larger than 0.6 cm without being unhealthy.  Any mole that starts to change in size (small to bigger or bigger to smaller) should be examined promptly by a board certified dermatologist.     Evolving: Healthy moles tend to \"stay the same.\"  Melanomas may often show signs of change or evolution such as a change in size, shape, color, or elevation.  Any mole that starts to itch, bleed, crust, burn, hurt, or ulcerate or demonstrate a change or evolution should be examined promptly by a board certified dermatologist.        LENTIGO        Assessment and Plan:  Based on a thorough discussion of this condition and the management approach to it (including a comprehensive discussion of the known risks, side effects and potential benefits of treatment), the patient (family) agrees to implement the following specific plan:  When outside we recommend using a wide brim hat, sunglasses, long sleeve and pants, sunscreen with SPF 30+ with reapplication every 2 hours, or SPF specific clothing       What is a lentigo?  A lentigo is a pigmented flat or slightly raised lesion with a clearly defined edge. Unlike an ephelis (freckle), it does not fade in the winter months. There are several kinds of lentigo.  The name lentigo originally referred to its appearance resembling a small lentil. The plural of lentigo is lentigines, although “lentigos” is also in common use.    Who gets lentigines?  Lentigines can affect " "males and females of all ages and races. Solar lentigines are especially prevalent in fair skinned adults. Lentigines associated with syndromes are present at birth or arise during childhood.    What causes lentigines?  Common forms of lentigo are due to exposure to ultraviolet radiation:  Sun damage including sunburn   Indoor tanning   Phototherapy, especially photochemotherapy (PUVA)    Ionizing radiation, eg radiation therapy, can also cause lentigines.  Several familial syndromes associated with widespread lentigines originate from mutations in Panchito-MAP kinase, mTOR signaling and PTEN pathways.    What is the treatment for lentigines?  Most lentigines are left alone. Attempts to lighten them may not be successful. The following approaches are used:  SPF 50+ broad-spectrum sunscreen   Hydroquinone bleaching cream   Alpha hydroxy acids   Vitamin C   Retinoids   Azelaic acid   Chemical peels  Individual lesions can be permanently removed using:  Cryotherapy   Intense pulsed light   Pigment lasers    How can lentigines be prevented?  Lentigines associated with exposure ultraviolet radiation can be prevented by very careful sun protection. Clothing is more successful at preventing new lentigines than are sunscreens.    What is the outlook for lentigines?  Lentigines usually persist. They may increase in number with age and sun exposure. Some in sun-protected sites may fade and disappear.    SCHNEIDER ANGIOMAS        Assessment and Plan:  Based on a thorough discussion of this condition and the management approach to it (including a comprehensive discussion of the known risks, side effects and potential benefits of treatment), the patient (family) agrees to implement the following specific plan:  Monitor for changes  Benign, reassured      Assessment and Plan:    Cherry angioma, also known as Dobbins de Gautam spots, are benign vascular skin lesions. A \"cherry angioma\" is a firm red, blue or purple papule, 0.1-1 cm in " diameter. When thrombosed, they can appear black in colour until evaluated with a dermatoscope when the red or purple colour is more easily seen. Cherry angioma may develop on any part of the body but most often appear on the scalp, face, lips and trunk.  An angioma is due to proliferating endothelial cells; these are the cells that line the inside of a blood vessel.    Angiomas can arise in early life or later in life; the most common type of angioma is a cherry angioma.  Cherry angiomas are very common in males and females of any age or race. They are more noticeable in white skin than in skin of colour. They markedly increase in number from about the age of 40. There may be a family history of similar lesions. Eruptive cherry angiomas have been rarely reported to be associated with internal malignancy. The cause of angiomas is unknown. Genetic analysis of cherry angiomas has shown that they frequently carry specific somatic missense mutations in the GNAQ and GNA11 (Q209H) genes, which are involved in other vascular and melanocytic proliferations.      SEBORRHEIC KERATOSIS; NON-INFLAMED        Assessment and Plan:  Based on a thorough discussion of this condition and the management approach to it (including a comprehensive discussion of the known risks, side effects and potential benefits of treatment), the patient (family) agrees to implement the following specific plan:  Monitor for changes  Benign, reassured      Seborrheic Keratosis  A seborrheic keratosis is a harmless warty spot that appears during adult life as a common sign of skin aging.  Seborrheic keratoses can arise on any area of skin, covered or uncovered, with the usual exception of the palms and soles. They do not arise from mucous membranes. Seborrheic keratoses can have highly variable appearance.      Seborrheic keratoses are extremely common. It has been estimated that over 90% of adults over the age of 60 years have one or more of them. They  "occur in males and females of all races, typically beginning to erupt in the 30s or 40s. They are uncommon under the age of 20 years.  The precise cause of seborrhoeic keratoses is not known.  Seborrhoeic keratoses are considered degenerative in nature. As time goes by, seborrheic keratoses tend to become more numerous. Some people inherit a tendency to develop a very large number of them; some people may have hundreds of them.      There is no easy way to remove multiple lesions on a single occasion.  Unless a specific lesion is \"inflamed\" and is causing pain or stinging/burning or is bleeding, most insurance companies do not authorize treatment.    XEROSIS (\"DRY SKIN\")        Assessment and Plan:  Based on a thorough discussion of this condition and the management approach to it (including a comprehensive discussion of the known risks, side effects and potential benefits of treatment), the patient (family) agrees to implement the following specific plan:  Use moisturizer like Eucerin,Cerave or Aveeno Cream 3 times a day for the dry skin            Dry skin refers to skin that feels dry to touch. Dry skin has a dull surface with a rough, scaly quality. The skin is less pliable and cracked. When dryness is severe, the skin may become inflamed and fissured.  Although any body site can be dry, dry skin tends to affect the shins more than any other site.    Dry skin is lacking moisture in the outer horny cell layer (stratum corneum) and this results in cracks in the skin surface.  Dry skin is also called xerosis, xeroderma or asteatosis (lack of fat).  It can affect males and females of all ages. There is some racial variability in water and lipid content of the skin.  Dry skin that starts in early childhood may be one of about 20 types of ichthyosis (fish-scale skin). There is often a family history of dry skin.   Dry skin is commonly seen in people with atopic dermatitis.  Nearly everyone > 60 years has dry " skin.    Dry skin that begins later may be seen in people with certain diseases and conditions.  Postmenopausal women  Hypothyroidism  Chronic renal disease   Malnutrition and weight loss   Subclinical dermatitis   Treatment with certain drugs such as oral retinoids, diuretics and epidermal growth factor receptor inhibitors      What is the treatment for dry skin?  The mainstay of treatment of dry skin and ichthyosis is moisturisers/emollients. They should be applied liberally and often enough to:  Reduce itch   Improve the barrier function   Prevent entry of irritants, bacteria   Reduce transepidermal water loss.      How can dry skin be prevented?  Eliminate aggravating factors:  Reduce the frequency of bathing.   A humidifier in winter and air conditioner in summer   Compare having a short shower with a prolonged soak in a bath.   Use lukewarm, not hot, water.   Replace standard soap with a substitute such as a synthetic detergent cleanser, water-miscible emollient, bath oil, anti-pruritic tar oil, colloidal oatmeal etc.   Apply an emollient liberally and often, particularly shortly after bathing, and when itchy. The drier the skin, the thicker this should be, especially on the hands.    What is the outlook for dry skin?  A tendency to dry skin may persist life-long, or it may improve once contributing factors are controlled.

## 2024-04-18 NOTE — PROGRESS NOTES
"[facility] Note     Patient Name: Millie Moyer  Encounter Date: 4/18/2024    Have you been cared for by a [facility] in the last 3 years and, if so, which description applies to you?    Yes.  I have been here within the last 3 years, and my medical history has NOT changed since that time.  I am FEMALE/of child-bearing potential.    REVIEW OF SYSTEMS:  Have you recently had or currently have any of the following? No changes in my recent health.   PAST MEDICAL HISTORY:  Have you personally ever had or currently have any of the following?  If \"YES,\" then please provide more detail. No changes in my medical history.   HISTORY OF IMMUNOSUPPRESSION: Do you have a history of any of the following:  Systemic Immunosuppression such as Diabetes, Biologic or Immunotherapy, Chemotherapy, Organ Transplantation, Bone Marrow Transplantation?  No     Answering \"YES\" requires the addition of the dotphrase \"IMMUNOSUPPRESSED\" as the first diagnosis of the patient's visit.   FAMILY HISTORY:  Any \"first degree relatives\" (parent, brother, sister, or child) with the following?    No changes in my family's known health.   PATIENT EXPERIENCE:    Do you want the Dermatologist to perform a COMPLETE skin exam today including a clinical examination under the \"bra and underwear\" areas?  NO groin and buttocks not examined   If necessary, do we have your permission to call and leave a detailed message on your Preferred Phone number that includes your specific medical information?  Yes      Allergies   Allergen Reactions    Other Hives     Kait selzer    Kait-[place] Plus Cold & Cough  [Eacishlhr-Rgd-Ms-Apap] Hives    Morphine Vomiting     Vomits profusely     Prednisone Other (See Comments) and GI Intolerance     insomnia      Current Outpatient Medications:     acetaminophen (TYLENOL) 325 mg tablet, Take 1 tablet (325 mg total) by mouth every 6 (six) hours as needed for mild pain, Disp: 60 tablet, Rfl: 0    " ASHWAGANDHA PO, Take by mouth every evening, Disp: , Rfl:     Cholecalciferol (Vitamin D3) 125 MCG (5000 UT) TBDP, Take by mouth daily, Disp: , Rfl:     Magnesium 400 MG CAPS, Take 1 tablet by mouth daily at bedtime, Disp: , Rfl:     milk thistle 175 MG tablet, Take 175 mg by mouth daily, Disp: , Rfl:     Multiple Vitamin (multivitamin) capsule, Take 1 capsule by mouth daily, Disp: , Rfl:     secukinumab (Cosentyx Sensoready Pen) 150 mg/mL SOAJ injection, Inject 2 mL (300 mg total) under the skin every 30 (thirty) days, Disp: 2 mL, Rfl: 11    tamoxifen (NOLVADEX) 20 mg tablet, TAKE 1 TABLET BY MOUTH EVERY DAY, Disp: 90 tablet, Rfl: 3    traMADol (ULTRAM) 50 mg tablet, Take 1 tablet (50 mg total) by mouth every 6 (six) hours as needed for moderate pain or severe pain for up to 10 doses Continuation of therapy, Disp: 60 tablet, Rfl: 0    methocarbamol (ROBAXIN) 500 mg tablet, Take 1 tablet (500 mg total) by mouth 3 (three) times a day as needed for muscle spasms for up to 7 days, Disp: 30 tablet, Rfl: 1    naloxone (NARCAN) 4 mg/0.1 mL nasal spray, Administer 1 spray into a nostril. If no response after 2-3 minutes, give another dose in the other nostril using a new spray. (Patient not taking: Reported on 4/18/2024), Disp: 1 each, Rfl: 1    naproxen (EC NAPROSYN) 500 MG EC tablet, Take 1 tablet (500 mg total) by mouth 2 (two) times a day with meals (Patient not taking: Reported on 4/18/2024), Disp: 28 tablet, Rfl: 0    oxyCODONE (Roxicodone) 5 immediate release tablet, Take 1 tablet (5 mg total) by mouth every 4 (four) hours as needed for moderate pain for up to 10 days Max Daily Amount: 30 mg (Patient not taking: Reported on 4/18/2024), Disp: 10 tablet, Rfl: 0          Whom besides the patient is providing clinical information about today's encounter?   NO ADDITIONAL HISTORIAN (patient alone provided history)    Physical Exam and Assessment/Plan by Diagnosis:    CHIEF COMPLAINT  54 year old male or female patient  presents today for Psoriasis Follow Up.  Patient has a No history of skin cancer    PSORIASIS    Physical Exam:  Anatomic Location Affected:  Left arm, left leg   Morphological Description:  small pink papule   Severity: mild  Body Percent Affected: 1%  Pertinent Positives:  Pertinent Negatives:    Additional History of Present Condition:  Patient currently taking cosentyx, states she has been cleared of psoriasis since being on cosentyx. Patient is having joint pain. Recently did have surgery on her meniscus     Assessment and Plan:  Based on a thorough discussion of this condition and the management approach to it (including a comprehensive discussion of the known risks, side effects and potential benefits of treatment), the patient (family) agrees to implement the following specific plan:  Discussed possibility of switching to a different medication if joint pain does not get better.   Patient wants to continue Cosentyx   Will follow up in October      Psoriasis is a chronic inflammatory condition that causes the body to make new skin cells in days rather than weeks. As these cells pile up on the surface of the skin, you may see thick, scaly patches of thickened skin.   Psoriasis affects 2-4% of males and females. It can start at any age including childhood, with peaks of onset at 15-25 years and 50-60 years. It tends to persist lifelong, fluctuating in extent and severity. It is particularly common in Caucasians but may affect people of any race. About one-third of patients with psoriasis have family members with psoriasis.  Psoriasis is multifactorial. It is classified as an immune-mediated inflammatory disease (IMID). Genetic factors are important and influence the type of psoriasis and response to treatment.     What are the signs and symptoms of psoriasis?    There are many different types of psoriasis that each have present uniquely. The types of psoriasis include:    Plaque psoriasis: About 80% to 90% of  people who have psoriasis develop this type. When plaque psoriasis appears, you may see:  Plaque psoriasis usually presents with symmetrically distributed, red, scaly plaques with well-defined edges. The scale is typically silvery white, except in skin folds where the plaques often appear shiny and they may have a moist peeling surface. The most common sites are scalp, elbows and knees, but any part of the skin can be involved. The plaques are usually very persistent without treatment.  Itch is mostly mild but may be severe in some patients, leading to scratching and lichenification (thickened leathery skin with increased skin markings). Painful skin cracks or fissures may occur.  When psoriatic plaques clear up, they may leave brown or pale marks that can be expected to fade over several months.    Guttate psoriasis: When someone gets this type of psoriasis, you often see tiny bumps appear on the skin quite suddenly. The bumps tend to cover much of the torso, legs, and arms. Sometimes, the bumps also develop on the face, scalp, and ears. No matter where they appear, the bumps tend to be:   Small and scaly  Hudson-colored to pink  Temporary, clearing in a few weeks or months without treatment  When guttate psoriasis clears, it may never return. Why this happens is still a bit of a mystery. Guttate psoriasis tends to develop in children and young adults who've had an infection, such as strep throat. It's possible that when the infection clears so does guttate psoriasis.  It's also possible to have:  Guttate psoriasis for life  See the guttate psoriasis clear and plaque psoriasis develop later in life  Plaque psoriasis when you develop guttate psoriasis  There's no way to predict what will happen after the first flare-up of guttate psoriasis clears.    Inverse psoriasis: This type of psoriasis develops in areas where skin touches skin, such as the armpits, genitals, and crease of the buttocks. Where the inverse  psoriasis appears, you're likely to notice:  Smooth, red patches of skin that look raw  Little, if any, silvery-white coating  Sore or painful skin  Other names for this type of psoriasis are intertriginous psoriasis or flexural psoriasis.  Pustular psoriasis: This type of psoriasis causes pus-filled bumps that usually appear only on the feet and hands. While the pus-filled bumps may look like an infection, the skin is not infected. The bumps don't contain bacteria or anything else that could cause an infection.  Where pustular psoriasis appears, you tend to notice:  Red, swollen skin that is dotted with pus-filled bumps  Extremely sore or painful skin  Brown dots (and sometimes scale) appear as the pus-filled bumps dry  Pustular psoriasis can make just about any activity that requires your hands or feet, such as typing or walking, unbearably painful.    Pustular psoriasis (generalized): Serious and life-threatening, this rare type of psoriasis causes pus-filled bumps to develop on much of the skin. Also called von Zumbusch psoriasis, a flare-up causes this sequence of events:  Skin on most of the body suddenly turns dry, red, and tender.  Within hours, pus-filled bumps cover most of the skin.  Often within a day, the pus-filled bumps break open and pools of pus leak onto the skin.  As the pus dries (usually within 24 to 48 hours), the skin dries out and peels (as shown in this picture).  When the dried skin peels off, you see a smooth, glazed surface.  In a few days or weeks, you may see a new crop of pus-filled bumps covering most of the skin, as the cycle repeats itself.  Anyone with pustular psoriasis also feels very sick, and may develop a fever, headache, muscle weakness, and other symptoms. Medical care is often necessary to save the person's life.    Erythrodermic psoriasis: Serious and life-threatening, this type of psoriasis requires immediate medical care. When someone develops erythrodermic psoriasis,  you may notice:  Skin on most of the body looks burnt  Chills, fever, and the person looks extremely ill  Muscle weakness, a rapid pulse, and severe itch  The person may also be unable to keep warm, so hypothermia can set in quickly.  Most people who develop this type of psoriasis already have another type of psoriasis. Before developing erythrodermic psoriasis, they often notice that their psoriasis is worsening or not improving with treatment. If you notice either of these happening, see a board-certified dermatologist.    Nails    Nail psoriasis: With any type of psoriasis, you may see changes to your fingernails or toenails. About half of the people who have plaque psoriasis see signs of psoriasis on their fingernails at some point2.  When psoriasis affects the nails, you may notice:  Tiny dents in your nails (called “nail pits”)  White, yellow, or brown discoloration under one or more nails  Crumbling, rough nails  A nail lifting up so that it's no longer attached  Buildup of skin cells beneath one or more nails, which lifts up the nail  Treatment and proper nail care can help you control nail psoriasis.    Psoriatic arthritis: If you have psoriasis, it's important to pay attention to your joints. Some people who have psoriasis develop a type of arthritis called psoriatic arthritis. This is more likely to occur if you have severe psoriasis.  Most people notice psoriasis on their skin years before they develop psoriatic arthritis. It's also possible to get psoriatic arthritis before psoriasis, but this is less common.  When psoriatic arthritis develops, the signs can be subtle. At first, you may notice:  A swollen and tender joint, especially in a finger or toe  Heel pain  Swelling on the back of your leg, just above your heel  Stiffness in the morning that fades during the day  Like psoriasis, psoriatic arthritis cannot be cured. Treatment can prevent psoriatic arthritis from worsening, which is important.  Allowed to progress, psoriatic arthritis can become disabling.    Diagnosis and treatment of psoriasis   Psoriasis is usually diagnosed by clinical features, and skin biopsy if necessary.   It is important to decrease factors that aggravate psoriasis. These include treating streptococcal infections, minimizing skin injuries, avoiding sun exposure if it exacerbates psoriasis, smoking, alcohol usage, decreasing stress, and maintaining an optimal body weight. Certain medications such as lithium, beta blockers, antimalarials, and NSAIDs have also been implicated. Suddenly stopping oral steroids or strong topical steroids can cause rebound disease.     There are many categories of psoriasis treatments available.     Topical therapy  Mild psoriasis is generally treated with topical agents alone. Which treatment is selected may depend on body site, extent and severity of psoriasis.  Emollients  Coal tar preparations  Dithranol  Salicylic acid  Vitamin D analogue (calcipotriol)  Topical corticosteroids  Calcineurin inhibitor (tacrolimus, pimecrolimus)  Phototherapy  Most psoriasis centres offer phototherapy with ultraviolet (UV) radiation, often in combination with topical or systemic agents. Types of phototherapy include  Narrowband UVB  Broadband UVB  Photochemotherapy (PUVA)  Targeted phototherapy  Systemic therapy  Moderate to severe psoriasis warrants treatment with a systemic agent and/or phototherapy. The most common treatments are:  Methotrexate  Ciclosporin  Acitretin  Other medicines occasionally used for psoriasis include:  Mycophenolate  Apremilast  Hydroxyurea  Azathioprine  6-mercaptopurine  Systemic corticosteroids are best avoided due to a risk of severe withdrawal flare of psoriasis and adverse effects.  Biologics or targeted therapies are reserved for conventional treatment-resistant severe psoriasis, mainly because of expense, as side effects compare favorably with other systemic agents. These  "include:  Anti-tumour necrosis factor-alpha antagonists (anti-TNF?) infliximab, adalimumab and etanercept  The interleukin (IL)-12/23 antagonist ustekinumab  IL-17 antagonists such as secukinumab  Many other monoclonal antibodies are under investigation in the treatment of psoriasis.     MELANOCYTIC NEVI (\"Moles\")    Physical Exam:  Anatomic Location Affected:   Mostly on sun-exposed areas of the trunk and extremities  Morphological Description:  Scattered, 1-4mm round to ovoid, symmetrical-appearing, even bordered, skin colored to dark brown macules/papules, mostly in sun-exposed areas  Pertinent Positives:  Pertinent Negatives:    Additional History of Present Condition:      Assessment and Plan:  Based on a thorough discussion of this condition and the management approach to it (including a comprehensive discussion of the known risks, side effects and potential benefits of treatment), the patient (family) agrees to implement the following specific plan:  When outside we recommend using a wide brim hat, sunglasses, long sleeve and pants, sunscreen with SPF 30+ with reapplication every 2 hours, or SPF specific clothing   Benign, reassured  Annual skin check     Melanocytic Nevi  Melanocytic nevi (\"moles\") are tan or brown, raised or flat areas of the skin which have an increased number of melanocytes. Melanocytes are the cells in our body which make pigment and account for skin color.    Some moles are present at birth (I.e., \"congenital nevi\"), while others come up later in life (i.e., \"acquired nevi\").  The sun can stimulate the body to make more moles.  Sunburns are not the only thing that triggers more moles.  Chronic sun exposure can do it too.     Clinically distinguishing a healthy mole from melanoma may be difficult, even for experienced dermatologists. The \"ABCDE's\" of moles have been suggested as a means of helping to alert a person to a suspicious mole and the possible increased risk of melanoma.  The " "suggestions for raising alert are as follows:    Asymmetry: Healthy moles tend to be symmetric, while melanomas are often asymmetric.  Asymmetry means if you draw a line through the mole, the two halves do not match in color, size, shape, or surface texture. Asymmetry can be a result of rapid enlargement of a mole, the development of a raised area on a previously flat lesion, scaling, ulceration, bleeding or scabbing within the mole.  Any mole that starts to demonstrate \"asymmetry\" should be examined promptly by a board certified dermatologist.     Border: Healthy moles tend to have discrete, even borders.  The border of a melanoma often blends into the normal skin and does not sharply delineate the mole from normal skin.  Any mole that starts to demonstrate \"uneven borders\" should be examined promptly by a board certified dermatologist.     Color: Healthy moles tend to be one color throughout.  Melanomas tend to be made up of different colors ranging from dark black, blue, white, or red.  Any mole that demonstrates a color change should be examined promptly by a board certified dermatologist.     Diameter: Healthy moles tend to be smaller than 0.6 cm in size; an exception are \"congenital nevi\" that can be larger.  Melanomas tend to grow and can often be greater than 0.6 cm (1/4 of an inch, or the size of a pencil eraser). This is only a guideline, and many normal moles may be larger than 0.6 cm without being unhealthy.  Any mole that starts to change in size (small to bigger or bigger to smaller) should be examined promptly by a board certified dermatologist.     Evolving: Healthy moles tend to \"stay the same.\"  Melanomas may often show signs of change or evolution such as a change in size, shape, color, or elevation.  Any mole that starts to itch, bleed, crust, burn, hurt, or ulcerate or demonstrate a change or evolution should be examined promptly by a board certified dermatologist.        LENTIGO    Physical " Exam:  Anatomic Location Affected:  trunk, arms  Morphological Description:  Light brown macules  Pertinent Positives:  Pertinent Negatives:    Additional History of Present Condition:      Assessment and Plan:  Based on a thorough discussion of this condition and the management approach to it (including a comprehensive discussion of the known risks, side effects and potential benefits of treatment), the patient (family) agrees to implement the following specific plan:  When outside we recommend using a wide brim hat, sunglasses, long sleeve and pants, sunscreen with SPF 30+ with reapplication every 2 hours, or SPF specific clothing       What is a lentigo?  A lentigo is a pigmented flat or slightly raised lesion with a clearly defined edge. Unlike an ephelis (freckle), it does not fade in the winter months. There are several kinds of lentigo.  The name lentigo originally referred to its appearance resembling a small lentil. The plural of lentigo is lentigines, although “lentigos” is also in common use.    Who gets lentigines?  Lentigines can affect males and females of all ages and races. Solar lentigines are especially prevalent in fair skinned adults. Lentigines associated with syndromes are present at birth or arise during childhood.    What causes lentigines?  Common forms of lentigo are due to exposure to ultraviolet radiation:  Sun damage including sunburn   Indoor tanning   Phototherapy, especially photochemotherapy (PUVA)    Ionizing radiation, eg radiation therapy, can also cause lentigines.  Several familial syndromes associated with widespread lentigines originate from mutations in Panchito-MAP kinase, mTOR signaling and PTEN pathways.    What is the treatment for lentigines?  Most lentigines are left alone. Attempts to lighten them may not be successful. The following approaches are used:  SPF 50+ broad-spectrum sunscreen   Hydroquinone bleaching cream   Alpha hydroxy acids   Vitamin C   Retinoids   Azelaic  "acid   Chemical peels  Individual lesions can be permanently removed using:  Cryotherapy   Intense pulsed light   Pigment lasers    How can lentigines be prevented?  Lentigines associated with exposure ultraviolet radiation can be prevented by very careful sun protection. Clothing is more successful at preventing new lentigines than are sunscreens.    What is the outlook for lentigines?  Lentigines usually persist. They may increase in number with age and sun exposure. Some in sun-protected sites may fade and disappear.    SCHNEIDER ANGIOMAS    Physical Exam:  Anatomic Location Affected:  trunk  Morphological Description:  Scattered cherry red, 1-4 mm papules.  Pertinent Positives:  Pertinent Negatives:    Additional History of Present Condition:      Assessment and Plan:  Based on a thorough discussion of this condition and the management approach to it (including a comprehensive discussion of the known risks, side effects and potential benefits of treatment), the patient (family) agrees to implement the following specific plan:  Monitor for changes  Benign, reassured      Assessment and Plan:    Cherry angioma, also known as Dobbins de Gautam spots, are benign vascular skin lesions. A \"cherry angioma\" is a firm red, blue or purple papule, 0.1-1 cm in diameter. When thrombosed, they can appear black in colour until evaluated with a dermatoscope when the red or purple colour is more easily seen. Cherry angioma may develop on any part of the body but most often appear on the scalp, face, lips and trunk.  An angioma is due to proliferating endothelial cells; these are the cells that line the inside of a blood vessel.    Angiomas can arise in early life or later in life; the most common type of angioma is a cherry angioma.  Cherry angiomas are very common in males and females of any age or race. They are more noticeable in white skin than in skin of colour. They markedly increase in number from about the age of 40. There " "may be a family history of similar lesions. Eruptive cherry angiomas have been rarely reported to be associated with internal malignancy. The cause of angiomas is unknown. Genetic analysis of cherry angiomas has shown that they frequently carry specific somatic missense mutations in the GNAQ and GNA11 (Q209H) genes, which are involved in other vascular and melanocytic proliferations.      SEBORRHEIC KERATOSIS; NON-INFLAMED    Physical Exam:  Anatomic Location Affected:  trunk  Morphological Description:  Flat and raised, waxy, smooth to warty textured, yellow to brownish-grey to dark brown to blackish, discrete, \"stuck-on\" appearing papules.  Pertinent Positives:  Pertinent Negatives:    Additional History of Present Condition:      Assessment and Plan:  Based on a thorough discussion of this condition and the management approach to it (including a comprehensive discussion of the known risks, side effects and potential benefits of treatment), the patient (family) agrees to implement the following specific plan:  Monitor for changes  Benign, reassured      Seborrheic Keratosis  A seborrheic keratosis is a harmless warty spot that appears during adult life as a common sign of skin aging.  Seborrheic keratoses can arise on any area of skin, covered or uncovered, with the usual exception of the palms and soles. They do not arise from mucous membranes. Seborrheic keratoses can have highly variable appearance.      Seborrheic keratoses are extremely common. It has been estimated that over 90% of adults over the age of 60 years have one or more of them. They occur in males and females of all races, typically beginning to erupt in the 30s or 40s. They are uncommon under the age of 20 years.  The precise cause of seborrhoeic keratoses is not known.  Seborrhoeic keratoses are considered degenerative in nature. As time goes by, seborrheic keratoses tend to become more numerous. Some people inherit a tendency to develop a very " "large number of them; some people may have hundreds of them.      There is no easy way to remove multiple lesions on a single occasion.  Unless a specific lesion is \"inflamed\" and is causing pain or stinging/burning or is bleeding, most insurance companies do not authorize treatment.    XEROSIS (\"DRY SKIN\")    Physical Exam:  Anatomic Location Affected:  diffuse  Morphological Description:  xerosis  Pertinent Positives:  Pertinent Negatives:    Additional History of Present Condition:      Assessment and Plan:  Based on a thorough discussion of this condition and the management approach to it (including a comprehensive discussion of the known risks, side effects and potential benefits of treatment), the patient (family) agrees to implement the following specific plan:  Use moisturizer like Eucerin,Cerave or Aveeno Cream 3 times a day for the dry skin            Dry skin refers to skin that feels dry to touch. Dry skin has a dull surface with a rough, scaly quality. The skin is less pliable and cracked. When dryness is severe, the skin may become inflamed and fissured.  Although any body site can be dry, dry skin tends to affect the shins more than any other site.    Dry skin is lacking moisture in the outer horny cell layer (stratum corneum) and this results in cracks in the skin surface.  Dry skin is also called xerosis, xeroderma or asteatosis (lack of fat).  It can affect males and females of all ages. There is some racial variability in water and lipid content of the skin.  Dry skin that starts in early childhood may be one of about 20 types of ichthyosis (fish-scale skin). There is often a family history of dry skin.   Dry skin is commonly seen in people with atopic dermatitis.  Nearly everyone > 60 years has dry skin.    Dry skin that begins later may be seen in people with certain diseases and conditions.  Postmenopausal women  Hypothyroidism  Chronic renal disease   Malnutrition and weight loss   Subclinical " dermatitis   Treatment with certain drugs such as oral retinoids, diuretics and epidermal growth factor receptor inhibitors      What is the treatment for dry skin?  The mainstay of treatment of dry skin and ichthyosis is moisturisers/emollients. They should be applied liberally and often enough to:  Reduce itch   Improve the barrier function   Prevent entry of irritants, bacteria   Reduce transepidermal water loss.      How can dry skin be prevented?  Eliminate aggravating factors:  Reduce the frequency of bathing.   A humidifier in winter and air conditioner in summer   Compare having a short shower with a prolonged soak in a bath.   Use lukewarm, not hot, water.   Replace standard soap with a substitute such as a synthetic detergent cleanser, water-miscible emollient, bath oil, anti-pruritic tar oil, colloidal oatmeal etc.   Apply an emollient liberally and often, particularly shortly after bathing, and when itchy. The drier the skin, the thicker this should be, especially on the hands.    What is the outlook for dry skin?  A tendency to dry skin may persist life-long, or it may improve once contributing factors are controlled.     Scribe Attestation      I,:  Chrissy Hoyos MA am acting as a scribe while in the presence of the attending physician.:       I,:  Margot Chandler MD personally performed the services described in this documentation    as scribed in my presence.:

## 2024-04-23 ENCOUNTER — OFFICE VISIT (OUTPATIENT)
Dept: PHYSICAL THERAPY | Age: 54
End: 2024-04-23
Payer: COMMERCIAL

## 2024-04-23 DIAGNOSIS — S83.242A OTHER TEAR OF MEDIAL MENISCUS, CURRENT INJURY, LEFT KNEE, INITIAL ENCOUNTER: Primary | ICD-10-CM

## 2024-04-23 PROCEDURE — 97110 THERAPEUTIC EXERCISES: CPT

## 2024-04-23 NOTE — PROGRESS NOTES
"Daily Note     Today's date: 2024  Patient name: Millie Moyer  : 1970  MRN: [id number]  Referring provider: Jin Delong MD  Dx:   Encounter Diagnosis     ICD-10-CM    1. Other tear of medial meniscus, current injury, left knee, initial encounter  S83.242A                      Subjective: Pt reports she had some increased pain yesterday but thinks she over did it a little bit. She is feeling better today.       Objective: See treatment diary below      Assessment: Pt able to complete full revolutions on recumbent bike indicating improving knee ROM. Better tolerance to PROM into extension and LLLD extension stretching for L knee. Incorporated step ups and squats to about 30 degrees of knee flexion to improve closed chain strength of L knee. Pt initially had difficulty with squats but able to self-correct form and improved performance.  Tolerated treatment well. Patient demonstrated fatigue post treatment, exhibited good technique with therapeutic exercises, and would benefit from continued PT      Plan: Continue per plan of care.  Progress treament per protocol.      Precautions: S/P L medial mensicus debridement (DOS 24)      Manuals            L knee PROM NJR (ext) NJR (ext)           Patellar mobs                                       Neuro Re-Ed                                                                                                        Ther Ex             Bike for ROM 8'  10' s#17           LLLD ext 2'  2'           SAQ 3x10  3x10            HS w/ strap  x30           H/S stretch 30\"x3 30\"x3           Decline board stretch 30\"x3 30\"x3           SLR's on plinth 2x10 ea 2x10 ea           LAQ 3x10 3x10            Step ups nv 2x10 4\"           Squats nv X20 two foam on chair                                                                                         Ther Activity                                       Gait Training                                     "   Modalities

## 2024-04-25 ENCOUNTER — OFFICE VISIT (OUTPATIENT)
Dept: PHYSICAL THERAPY | Age: 54
End: 2024-04-25
Payer: COMMERCIAL

## 2024-04-25 DIAGNOSIS — S83.242A OTHER TEAR OF MEDIAL MENISCUS, CURRENT INJURY, LEFT KNEE, INITIAL ENCOUNTER: Primary | ICD-10-CM

## 2024-04-25 PROCEDURE — 97110 THERAPEUTIC EXERCISES: CPT

## 2024-04-25 NOTE — PROGRESS NOTES
"Daily Note     Today's date: 2024  Patient name: Millie Moyer  : 1970  MRN: [id number]  Referring provider: Jin Delong MD  Dx:   Encounter Diagnosis     ICD-10-CM    1. Other tear of medial meniscus, current injury, left knee, initial encounter  S83.242A                      Subjective: Pt reports she felt good after leaving on Tuesday but yesterday was a pretty bad day with increased symptoms in the front of her knee. Today it is mildly improved.      Objective: See treatment diary below      Assessment: Pt continues to progress with L knee ROM and strength. Limited by quadriceps weakness and decreased knee extension ROM. Incorporated standing heel raises, toe raises and knee flexion which was tolerated well. Tolerated treatment well. Patient demonstrated fatigue post treatment, exhibited good technique with therapeutic exercises, and would benefit from continued PT      Plan: Continue per plan of care.  Progress treatment as tolerated.       Precautions: S/P L medial mensicus debridement (DOS 24)      Manuals           L knee PROM NJR (ext) NJR (ext) NJR (ext)          Patellar mobs                                       Neuro Re-Ed                                                                                                        Ther Ex             Bike for ROM 8'  10' s#17 10' s#17          LLLD ext 2'  2' 2'          SAQ 3x10  3x10  3x10           HS w/ strap  x30 X30           H/S stretch 30\"x3 30\"x3 30\"x3          Decline board stretch 30\"x3 30\"x3 30\"x3          SLR's on plinth 2x10 ea 2x10 ea 2x10 ea          LAQ 3x10 3x10  3x10           Step ups nv 2x10 4\" 2x10 4\"          Squats nv X20 two foam on chair X20 two foam on chair                                                                                         Ther Activity                                       Gait Training                                       Modalities                                     "

## 2024-04-26 ENCOUNTER — OFFICE VISIT (OUTPATIENT)
Dept: OBGYN CLINIC | Facility: CLINIC | Age: 54
End: 2024-04-26

## 2024-04-26 VITALS
DIASTOLIC BLOOD PRESSURE: 116 MMHG | WEIGHT: 185 LBS | HEIGHT: 68 IN | SYSTOLIC BLOOD PRESSURE: 153 MMHG | HEART RATE: 81 BPM | BODY MASS INDEX: 28.04 KG/M2

## 2024-04-26 DIAGNOSIS — Z98.890 S/P MEDIAL MENISCECTOMY OF LEFT KNEE: Primary | ICD-10-CM

## 2024-04-26 PROCEDURE — 99024 POSTOP FOLLOW-UP VISIT: CPT | Performed by: ORTHOPAEDIC SURGERY

## 2024-04-26 NOTE — PROGRESS NOTES
Subjective   CHIEF COMPLAINT/REASON FOR VISIT  Millie Moyer is a 54 y.o. female who presents for 2 week post op follow-up after Knee arthroscopy for medial meniscus debridement - Left on 4/11/2024     HISTORY OF PRESENT ILLNESS  Overall, she feels things are going well and progressing appropriately. Pain has been well controlled. She has been following the post-operative rehabilitation regimen without difficultly. Currently, she is doing well. She has been doing PT which has gone well.    Objective   PHYSICAL EXAMINATION  Left  Knee  Surgical incisions are healed.   ROM 0-110  NVID  Skin warm and perfused    Assessment/Plan   1. Status Post Knee arthroscopy for medial meniscus debridement - Left    She is doing well after surgery and making appropriate progress. Encouraged to continue to work with PT. Advised patient she did have significant arthritis found during the surgery and a knee replacement may be necessary in the future.    We will plan to see her back at the  3.5 month post-operative sherrie. If any issues, questions, or concerns arise between now and the next appointment, we have encouraged the patient contact our team.    Scribe Attestation      I,:  Sarmad Moon PA-C am acting as a scribe while in the presence of the attending physician.:       I,:  Jin Delong MD personally performed the services described in this documentation    as scribed in my presence.:

## 2024-04-30 ENCOUNTER — OFFICE VISIT (OUTPATIENT)
Dept: PHYSICAL THERAPY | Age: 54
End: 2024-04-30
Payer: COMMERCIAL

## 2024-04-30 ENCOUNTER — TELEPHONE (OUTPATIENT)
Dept: HEMATOLOGY ONCOLOGY | Facility: CLINIC | Age: 54
End: 2024-04-30

## 2024-04-30 DIAGNOSIS — S83.242A OTHER TEAR OF MEDIAL MENISCUS, CURRENT INJURY, LEFT KNEE, INITIAL ENCOUNTER: Primary | ICD-10-CM

## 2024-04-30 PROCEDURE — 97140 MANUAL THERAPY 1/> REGIONS: CPT

## 2024-04-30 PROCEDURE — 97110 THERAPEUTIC EXERCISES: CPT

## 2024-04-30 NOTE — PROGRESS NOTES
"Daily Note     Today's date: 2024  Patient name: Millie Moyer  : 1970  MRN: [id number]  Referring provider: Jin Delong MD  Dx:   Encounter Diagnosis     ICD-10-CM    1. Other tear of medial meniscus, current injury, left knee, initial encounter  S83.242A                      Subjective: Pt reports her knee was pretty sore yesterday because she did more walking and was sitting for a while. Notes some increased swelling during this. Today it is feeling better.       Objective: See treatment diary below      Assessment: Pt initially limited into knee extension but after manual joint mobilization and PROM she was able to achieve full extension. Knee flexion ROM continues to progress without issue. Incorporated ankle weights for SAQ and LAQ exercises to promote quadriceps strengthening. Also increased height of step for forward step ups. Pt able to tolerate these progressions with adequate fatigue.  Tolerated treatment well. Patient demonstrated fatigue post treatment, exhibited good technique with therapeutic exercises, and would benefit from continued PT      Plan: Continue per plan of care.  Progress treatment as tolerated.       Precautions: S/P L medial mensicus debridement (DOS 24)      Manuals          L knee PROM NJR (ext) NJR (ext) NJR (ext) NJR (ext)         Patellar mobs                                       Neuro Re-Ed                                                                                                        Ther Ex             Bike for ROM 8'  10' s#17 10' s#17 10' s#17         LLLD ext 2'  2' 2'          SAQ 3x10  3x10  3x10  3x10 2#         HS w/ strap  x30 X30  x30         H/S stretch 30\"x3 30\"x3 30\"x3 30\"x3         Decline board stretch 30\"x3 30\"x3 30\"x3 30\"x3         SLR's on plinth 2x10 ea 2x10 ea 2x10 ea 2x10 ea         LAQ 3x10 3x10  3x10  3x10          Step ups nv 2x10 4\" 2x10 4\" 2x10 6\"          Squats nv X20 two foam on chair X20 two foam " on chair  X20 two foam on chair                                                                                        Ther Activity                                       Gait Training                                       Modalities

## 2024-04-30 NOTE — TELEPHONE ENCOUNTER
Patient to email paperwork.  She requests we send completed copies to both the fax number on the form and her email.

## 2024-05-02 ENCOUNTER — TELEPHONE (OUTPATIENT)
Dept: FAMILY MEDICINE CLINIC | Facility: CLINIC | Age: 54
End: 2024-05-02

## 2024-05-02 ENCOUNTER — OFFICE VISIT (OUTPATIENT)
Dept: PHYSICAL THERAPY | Age: 54
End: 2024-05-02
Payer: COMMERCIAL

## 2024-05-02 DIAGNOSIS — S83.242A OTHER TEAR OF MEDIAL MENISCUS, CURRENT INJURY, LEFT KNEE, INITIAL ENCOUNTER: Primary | ICD-10-CM

## 2024-05-02 PROCEDURE — 97110 THERAPEUTIC EXERCISES: CPT

## 2024-05-02 PROCEDURE — 97140 MANUAL THERAPY 1/> REGIONS: CPT

## 2024-05-02 NOTE — PROGRESS NOTES
"Daily Note     Today's date: 2024  Patient name: Millie Moyer  : 1970  MRN: [id number]  Referring provider: Jin Delong MD  Dx:   Encounter Diagnosis     ICD-10-CM    1. Other tear of medial meniscus, current injury, left knee, initial encounter  S83.242A                      Subjective: Pt reports she is feeling pretty good today. She was able to walk up two stairs reciprocally today but didn't try to do the whole stair case      Objective: See treatment diary below      Assessment: Pt is improving with quadriceps contraction and strength as she was able to tolerate ankle weight with LAQ and is achieving close to full extension with SAQ and LAQ. Able to incorporate static balance activity with single limb balance on compliant surface. Pt was able to complete this without minimal use of Ue's for support. Tolerated treatment well. Patient demonstrated fatigue post treatment, exhibited good technique with therapeutic exercises, and would benefit from continued PT      Plan: Continue per plan of care.  Progress treatment as tolerated.       Precautions: S/P L medial mensicus debridement (DOS 24)      Manuals  5/        L knee PROM NJR (ext) NJR (ext) NJR (ext) NJR (ext) NJR (ext)        Patellar mobs                                       Neuro Re-Ed             SL balance on foam      10\"x10                                                                                      Ther Ex             Bike for ROM 8'  10' s#17 10' s#17 10' s#17 10' s#17        LLLD ext 2'  2' 2'  2' 3#        SAQ 3x10  3x10  3x10  3x10 2# 3x10 2#        HS w/ strap  x30 X30  x30 x20        H/S stretch 30\"x3 30\"x3 30\"x3 30\"x3 30\"x3        Decline board stretch 30\"x3 30\"x3 30\"x3 30\"x3 30\"x3        SLR's on plinth 2x10 ea 2x10 ea 2x10 ea 2x10 ea 2x10 ea        LAQ 3x10 3x10  3x10  3x10  3x10 2#        Step ups nv 2x10 4\" 2x10 4\" 2x10 6\"  2x10 6\"        Squats nv X20 two foam on chair X20 two foam on " chair  X20 two foam on chair  X20 two foam on chair                                                                                      Ther Activity                                       Gait Training                                       Modalities

## 2024-05-07 ENCOUNTER — OFFICE VISIT (OUTPATIENT)
Dept: PHYSICAL THERAPY | Age: 54
End: 2024-05-07
Payer: COMMERCIAL

## 2024-05-07 DIAGNOSIS — S83.242A OTHER TEAR OF MEDIAL MENISCUS, CURRENT INJURY, LEFT KNEE, INITIAL ENCOUNTER: Primary | ICD-10-CM

## 2024-05-07 PROCEDURE — 97140 MANUAL THERAPY 1/> REGIONS: CPT

## 2024-05-07 PROCEDURE — 97110 THERAPEUTIC EXERCISES: CPT

## 2024-05-07 NOTE — PROGRESS NOTES
"Daily Note     Today's date: 2024  Patient name: Millie Moyer  : 1970  MRN: [id number]  Referring provider: Jin Delong MD  Dx:   Encounter Diagnosis     ICD-10-CM    1. Other tear of medial meniscus, current injury, left knee, initial encounter  S83.242A                      Subjective: Pt reports she was able to drive to and from [place] with mild soreness. Also able to go up a full flight of stairs in reciprocal pattern. Still has some difficulty going down stairs but will do the last few steps in a reciprocal fashion.        Objective: See treatment diary below      Assessment: Pt improving strength but does lack terminal knee extension quadriceps strength. Incorporated resisted TKE exercise to promote this which she tolerated well. Also incorporated double limb leg press for additional closed chain strengthening. She would benefit from continued OP PT in order to normalize knee ROM, strength, and gait pattern. Tolerated treatment well. Patient demonstrated fatigue post treatment, exhibited good technique with therapeutic exercises, and would benefit from continued PT      Plan: Continue per plan of care.  Progress treatment as tolerated.       Precautions: S/P L medial mensicus debridement (DOS 24)      Manuals        L knee PROM NJR (ext) NJR (ext) NJR (ext) NJR (ext) NJR (ext) NJR (ext)       Patellar mobs                                       Neuro Re-Ed             SL balance on foam      10\"x10 10\"x10                                                                                     Ther Ex             Bike for ROM 8'  10' s#17 10' s#17 10' s#17 10' s#17 10' s #17       LLLD ext 2'  2' 2'  2' 3# 2' 3#       SAQ 3x10  3x10  3x10  3x10 2# 3x10 2# 3x10 3#       HS w/ strap  x30 X30  x30 x20 X20        H/S stretch 30\"x3 30\"x3 30\"x3 30\"x3 30\"x3 30\"x3       Decline board stretch 30\"x3 30\"x3 30\"x3 30\"x3 30\"x3 30\"x3       SLR's on plinth 2x10 ea 2x10 ea 2x10 " "ea 2x10 ea 2x10 ea 3x10 ea       LAQ 3x10 3x10  3x10  3x10  3x10 2# 3x10 3#       Step ups nv 2x10 4\" 2x10 4\" 2x10 6\"  2x10 6\" 2x10 6\"       Squats nv X20 two foam on chair X20 two foam on chair  X20 two foam on chair  X20 two foam on chair X30 two foam on chair        TKE      3x10 5#       Leg press      2x10 60#                                                           Ther Activity                                       Gait Training                                       Modalities                                                      "

## 2024-05-08 NOTE — TELEPHONE ENCOUNTER
PA for Tramadol 50 mg Approved     Date(s) approved 5-8-2024 - 11-8-2024        Patient advised by          [x] uGifthart Message  [] Phone call   []LMOM  []L/M to call office as no active Communication consent on file  []Unable to leave detailed message as VM not approved on Communication consent       Pharmacy advised by    [x]Fax  []Phone call    Approval letter scanned into Media Yes

## 2024-05-09 ENCOUNTER — OFFICE VISIT (OUTPATIENT)
Dept: PHYSICAL THERAPY | Age: 54
End: 2024-05-09
Payer: COMMERCIAL

## 2024-05-09 DIAGNOSIS — S83.242A OTHER TEAR OF MEDIAL MENISCUS, CURRENT INJURY, LEFT KNEE, INITIAL ENCOUNTER: Primary | ICD-10-CM

## 2024-05-09 PROCEDURE — 97140 MANUAL THERAPY 1/> REGIONS: CPT

## 2024-05-09 PROCEDURE — 97110 THERAPEUTIC EXERCISES: CPT

## 2024-05-09 NOTE — PROGRESS NOTES
"Daily Note     Today's date: 2024  Patient name: Millie Moyer  : 1970  MRN: [id number]  Referring provider: Jin Delong MD  Dx:   Encounter Diagnosis     ICD-10-CM    1. Other tear of medial meniscus, current injury, left knee, initial encounter  S83.242A                      Subjective: Pt reports that she had to take her  to the hospital and was sitting a lot in his room but did have an ice pack on her knee. She notes increased swelling, warmth and soreness today.       Objective: See treatment diary below      Assessment: Performed gentle patellar mobilizations for symptom relief which were tolerated well. No significant hypomobility detected. Cued pt to increased toe-off on LLE during gait which decreased antalgic gait pattern. She would benefit from progression of strengthening in order to improve function and achieve goals. Tolerated treatment well. Patient demonstrated fatigue post treatment, exhibited good technique with therapeutic exercises, and would benefit from continued PT      Plan: Continue per plan of care.  Progress treatment as tolerated.       Precautions: S/P L medial mensicus debridement (DOS 24)      Manuals  5 5      L knee PROM NJR (ext) NJR (ext) NJR (ext) NJR (ext) NJR (ext) NJR (ext) NJR (ext)      Patellar mobs       NJR                                Neuro Re-Ed             SL balance on foam      10\"x10 10\"x10 10\"x10                                                                                    Ther Ex             Bike for ROM 8'  10' s#17 10' s#17 10' s#17 10' s#17 10' s #17 10' s#17      LLLD ext 2'  2' 2'  2' 3# 2' 3# 2'      SAQ 3x10  3x10  3x10  3x10 2# 3x10 2# 3x10 3# 3x10 3#      HS w/ strap  x30 X30  x30 x20 X20  X30 no strap      H/S stretch 30\"x3 30\"x3 30\"x3 30\"x3 30\"x3 30\"x3 30\"x3      Decline board stretch 30\"x3 30\"x3 30\"x3 30\"x3 30\"x3 30\"x3 30\"x3      SLR's on plinth 2x10 ea 2x10 ea 2x10 ea 2x10 ea 2x10 ea " "3x10 ea 3x10 ea      LAQ 3x10 3x10  3x10  3x10  3x10 2# 3x10 3# 3x10 3#      Step ups nv 2x10 4\" 2x10 4\" 2x10 6\"  2x10 6\" 2x10 6\" nv      Squats nv X20 two foam on chair X20 two foam on chair  X20 two foam on chair  X20 two foam on chair X30 two foam on chair  X30 two foam on chair      TKE      3x10 5# 3x10 5#      Leg press      2x10 60# 3x10 60#                                                          Ther Activity                                       Gait Training                                       Modalities                                                        "

## 2024-05-13 ENCOUNTER — OFFICE VISIT (OUTPATIENT)
Dept: PHYSICAL THERAPY | Age: 54
End: 2024-05-13
Payer: COMMERCIAL

## 2024-05-13 DIAGNOSIS — S83.242A OTHER TEAR OF MEDIAL MENISCUS, CURRENT INJURY, LEFT KNEE, INITIAL ENCOUNTER: Primary | ICD-10-CM

## 2024-05-13 PROCEDURE — 97110 THERAPEUTIC EXERCISES: CPT

## 2024-05-13 PROCEDURE — 97140 MANUAL THERAPY 1/> REGIONS: CPT

## 2024-05-13 NOTE — PROGRESS NOTES
"Daily Note     Today's date: 2024  Patient name: Millie Moyer  : 1970  MRN: [id number]  Referring provider: Jin Delong MD  Dx:   Encounter Diagnosis     ICD-10-CM    1. Other tear of medial meniscus, current injury, left knee, initial encounter  S83.242A                      Subjective: Pt reports that her knee pain woke her up from sleeping last night. Notes medial sided knee pain. She was able to go up and down her stairs \"normally\" yesterday. She does catch herself still limping but is trying to focus on her form.       Objective: See treatment diary below      Assessment: Pt continues to lack quadriceps strength as she exhibits quadriceps lag during straight leg raise and lacks full knee extension during SAQ and LAQ. TKE exercise is promoting full knee extension in standing. She would benefit from continued OP PT to normalize knee strength and achieve all goals. Tolerated treatment well. Patient demonstrated fatigue post treatment, exhibited good technique with therapeutic exercises, and would benefit from continued PT      Plan: Continue per plan of care.  Progress treatment as tolerated.       Precautions: S/P L medial mensicus debridement (DOS 24)      Manuals      L knee PROM NJR (ext) NJR (ext) NJR (ext) NJR (ext) NJR (ext) NJR (ext) NJR (ext) NJR (ext)     Patellar mobs       NJR NJR                               Neuro Re-Ed             SL balance on foam      10\"x10 10\"x10 10\"x10 10\"x10                                                                                   Ther Ex             Bike for ROM 8'  10' s#17 10' s#17 10' s#17 10' s#17 10' s #17 10' s#17 10' s#17     LLLD ext 2'  2' 2'  2' 3# 2' 3# 2' 2' 3#     SAQ 3x10  3x10  3x10  3x10 2# 3x10 2# 3x10 3# 3x10 3# 3x10 3#     HS w/ strap  x30 X30  x30 x20 X20  X30 no strap D/c     H/S stretch 30\"x3 30\"x3 30\"x3 30\"x3 30\"x3 30\"x3 30\"x3 30\"x3     Decline board stretch 30\"x3 30\"x3 30\"x3 30\"x3 " "30\"x3 30\"x3 30\"x3 30\"x3     SLR's on plinth 2x10 ea 2x10 ea 2x10 ea 2x10 ea 2x10 ea 3x10 ea 3x10 ea 3x10 ea      LAQ 3x10 3x10  3x10  3x10  3x10 2# 3x10 3# 3x10 3# 3x10 3#     Step ups nv 2x10 4\" 2x10 4\" 2x10 6\"  2x10 6\" 2x10 6\" nv nv     Squats nv X20 two foam on chair X20 two foam on chair  X20 two foam on chair  X20 two foam on chair X30 two foam on chair  X30 two foam on chair nv     TKE      3x10 5# 3x10 5# 3x10 7#     Leg press      2x10 60# 3x10 60# 3x10 60#                                                         Ther Activity                                       Gait Training                                       Modalities                                                          "

## 2024-05-15 ENCOUNTER — OFFICE VISIT (OUTPATIENT)
Dept: PHYSICAL THERAPY | Age: 54
End: 2024-05-15
Payer: COMMERCIAL

## 2024-05-15 DIAGNOSIS — S83.242A OTHER TEAR OF MEDIAL MENISCUS, CURRENT INJURY, LEFT KNEE, INITIAL ENCOUNTER: Primary | ICD-10-CM

## 2024-05-15 PROCEDURE — 97140 MANUAL THERAPY 1/> REGIONS: CPT

## 2024-05-15 PROCEDURE — 97110 THERAPEUTIC EXERCISES: CPT

## 2024-05-15 NOTE — PROGRESS NOTES
"Daily Note     Today's date: 5/15/2024  Patient name: Millie Moyer  : 1970  MRN: [id number]  Referring provider: Jin Delong MD  Dx:   Encounter Diagnosis     ICD-10-CM    1. Other tear of medial meniscus, current injury, left knee, initial encounter  S83.242A                      Subjective: Pt reports that her knee has been feeling pretty good for the last two days. She was able to cut the grass with ride-on mower and [place].       Objective: See treatment diary below      Assessment: Progressed LE strengthening exercises today. Incorporated lateral eccentric step downs for quadriceps strengthening. Pt required cueing and demonstration for proper form but was able to self-correct form with subsequent repetitions. Tolerated treatment well. Patient demonstrated fatigue post treatment, exhibited good technique with therapeutic exercises, and would benefit from continued PT      Plan: Continue per plan of care.  Progress treatment as tolerated.       Precautions: S/P L medial mensicus debridement (DOS 24)      Manuals 4/18 4/23 4/25 4/30 5/2 5/7 5/9 5/13 5/15    L knee PROM NJR (ext) NJR (ext) NJR (ext) NJR (ext) NJR (ext) NJR (ext) NJR (ext) NJR (ext) NJR (ext)    Patellar mobs       NJR NJR nv                              Neuro Re-Ed             SL balance on foam      10\"x10 10\"x10 10\"x10 10\"x10 10\"x10                                                                                  Ther Ex             Bike for ROM 8'  10' s#17 10' s#17 10' s#17 10' s#17 10' s #17 10' s#17 10' s#17 10' s#17    LLLD ext 2'  2' 2'  2' 3# 2' 3# 2' 2' 3# 2' 3#    SAQ 3x10  3x10  3x10  3x10 2# 3x10 2# 3x10 3# 3x10 3# 3x10 3# 3x10 4#    HS w/ strap  x30 X30  x30 x20 X20  X30 no strap D/c     H/S stretch 30\"x3 30\"x3 30\"x3 30\"x3 30\"x3 30\"x3 30\"x3 30\"x3 30\"x3    Decline board stretch 30\"x3 30\"x3 30\"x3 30\"x3 30\"x3 30\"x3 30\"x3 30\"x3 nv    SLR's on plinth 2x10 ea 2x10 ea 2x10 ea 2x10 ea " "2x10 ea 3x10 ea 3x10 ea 3x10 ea  3x10 ea    LAQ 3x10 3x10  3x10  3x10  3x10 2# 3x10 3# 3x10 3# 3x10 3# 3x10 4#    Step ups nv 2x10 4\" 2x10 4\" 2x10 6\"  2x10 6\" 2x10 6\" nv nv nv    Squats nv X20 two foam on chair X20 two foam on chair  X20 two foam on chair  X20 two foam on chair X30 two foam on chair  X30 two foam on chair nv X30 two foam    TKE      3x10 5# 3x10 5# 3x10 7# 3x10 7.5#    Leg press      2x10 60# 3x10 60# 3x10 60# 3x10 65#    Lat ecc step downs         2x10 4\"                                           Ther Activity                                       Gait Training                                       Modalities                                                            "

## 2024-05-21 ENCOUNTER — OFFICE VISIT (OUTPATIENT)
Dept: PHYSICAL THERAPY | Age: 54
End: 2024-05-21
Payer: COMMERCIAL

## 2024-05-21 DIAGNOSIS — S83.242A OTHER TEAR OF MEDIAL MENISCUS, CURRENT INJURY, LEFT KNEE, INITIAL ENCOUNTER: Primary | ICD-10-CM

## 2024-05-21 PROCEDURE — 97110 THERAPEUTIC EXERCISES: CPT

## 2024-05-21 PROCEDURE — 97140 MANUAL THERAPY 1/> REGIONS: CPT

## 2024-05-21 NOTE — PROGRESS NOTES
"Daily Note     Today's date: 2024  Patient name: Millie Moyer  : 1970  MRN: [id number]  Referring provider: Jin Delong MD  Dx:   Encounter Diagnosis     ICD-10-CM    1. Other tear of medial meniscus, current injury, left knee, initial encounter  S83.242A                      Subjective: Pt reports she was able to walk around [employer] on Friday. She did 9,200 steps but was sore the whole rest of the weekend.       Objective: See treatment diary below      Assessment: Pt continues to have difficulty with terminal knee extension which is affecting gait pattern. Does exhibit muscle guarding when mobilizing into knee extension but this does improve her overall ROM following. Encouraged focusing on gait form while ambulating which she demonstrated understanding. She would benefit from progression of strengthening particularly quadriceps strengthening in order to return to PLOF. Tolerated treatment well. Patient demonstrated fatigue post treatment, exhibited good technique with therapeutic exercises, and would benefit from continued PT      Plan: Continue per plan of care.  Progress treatment as tolerated.       Precautions: S/P L medial mensicus debridement (DOS 24)      Manuals  5/ 5 5/9 5/13 5/15 5/21   L knee PROM NJR (ext) NJR (ext) NJR (ext) NJR (ext) NJR (ext) NJR (ext) NJR (ext) NJR (ext) NJR (ext) NJR   Patellar mobs       N[facility] NJR nv NJR                             Neuro Re-Ed             SL balance on foam      10\"x10 10\"x10 10\"x10 10\"x10 10\"x10 10\"x10                                                                                 Ther Ex             Bike for ROM 8'  10' s#17 10' s#17 10' s#17 10' s#17 10' s #17 10' s#17 10' s#17 10' s#17 10' s#17   LLLD ext 2'  2' 2'  2' 3# 2' 3# 2' 2' 3# 2' 3# 2' 4#   SAQ 3x10  3x10  3x10  3x10 2# 3x10 2# 3x10 3# 3x10 3# 3x10 3# 3x10 4# 3x10 4#   HS w/ strap  x30 X30  x30 x20 X20  X30 no strap D/c     H/S stretch 30\"x3 30\"x3 " "30\"x3 30\"x3 30\"x3 30\"x3 30\"x3 30\"x3 30\"x3 30\"x3   Decline board stretch 30\"x3 30\"x3 30\"x3 30\"x3 30\"x3 30\"x3 30\"x3 30\"x3 nv    SLR's on plinth 2x10 ea 2x10 ea 2x10 ea 2x10 ea 2x10 ea 3x10 ea 3x10 ea 3x10 ea  3x10 ea 3x10 ea   LAQ 3x10 3x10  3x10  3x10  3x10 2# 3x10 3# 3x10 3# 3x10 3# 3x10 4# 3x10 4#   Step ups nv 2x10 4\" 2x10 4\" 2x10 6\"  2x10 6\" 2x10 6\" nv nv nv    Squats nv X20 two foam on chair X20 two foam on chair  X20 two foam on chair  X20 two foam on chair X30 two foam on chair  X30 two foam on chair nv X30 two foam X30 two foam    TKE      3x10 5# 3x10 5# 3x10 7# 3x10 7.5# 3x10 7.5#   Leg press      2x10 60# 3x10 60# 3x10 60# 3x10 65# 3x10 65#   Lat ecc step downs         2x10 4\" 2x10 4\"   Cybex ham curl           2x10 1x5 10# LLE                             Ther Activity                                       Gait Training                                       Modalities                                                              "

## 2024-05-22 ENCOUNTER — HOSPITAL ENCOUNTER (OUTPATIENT)
Dept: RADIOLOGY | Facility: HOSPITAL | Age: 54
Discharge: HOME | End: 2024-05-22
Payer: COMMERCIAL

## 2024-05-22 DIAGNOSIS — C50.211 MALIGNANT NEOPLASM OF UPPER-INNER QUADRANT OF RIGHT BREAST IN FEMALE, ESTROGEN RECEPTOR POSITIVE (HCC): ICD-10-CM

## 2024-05-22 DIAGNOSIS — R92.8 ABNORMAL MRI, BREAST: ICD-10-CM

## 2024-05-22 DIAGNOSIS — Z17.0 MALIGNANT NEOPLASM OF UPPER-INNER QUADRANT OF RIGHT BREAST IN FEMALE, ESTROGEN RECEPTOR POSITIVE (HCC): ICD-10-CM

## 2024-05-22 PROCEDURE — C8937 CAD BREAST MRI: HCPCS

## 2024-05-22 PROCEDURE — G1004 CDSM NDSC: HCPCS

## 2024-05-22 PROCEDURE — A9585 GADOBUTROL INJECTION: HCPCS | Performed by: NURSE PRACTITIONER

## 2024-05-22 PROCEDURE — C8908 MRI W/O FOL W/CONT, BREAST,: HCPCS

## 2024-05-22 RX ORDER — GADOBUTROL 604.72 MG/ML
8 INJECTION INTRAVENOUS
Status: COMPLETED | OUTPATIENT
Start: 2024-05-22 | End: 2024-05-22

## 2024-05-22 RX ADMIN — GADOBUTROL 8 ML: 604.72 INJECTION INTRAVENOUS at 17:55

## 2024-05-23 ENCOUNTER — OFFICE VISIT (OUTPATIENT)
Dept: PHYSICAL THERAPY | Age: 54
End: 2024-05-23
Payer: COMMERCIAL

## 2024-05-23 DIAGNOSIS — S83.242A OTHER TEAR OF MEDIAL MENISCUS, CURRENT INJURY, LEFT KNEE, INITIAL ENCOUNTER: Primary | ICD-10-CM

## 2024-05-23 PROCEDURE — 97110 THERAPEUTIC EXERCISES: CPT

## 2024-05-23 PROCEDURE — 97140 MANUAL THERAPY 1/> REGIONS: CPT

## 2024-05-23 NOTE — PROGRESS NOTES
"Daily Note     Today's date: 2024  Patient name: Millie Moyer  : 1970  MRN: [id number]  Referring provider: Jin Delong MD  Dx:   Encounter Diagnosis     ICD-10-CM    1. Other tear of medial meniscus, current injury, left knee, initial encounter  S83.242A                      Subjective: Pt reports he knee is very swollen and sore today. She had to lay in an MRI machine yesterday for about 45 minutes which was very difficult    Objective: See treatment diary below      Assessment: Pt with slightly increased swelling along medial joint line today. Pt limits exetension actively likely 2* experiencing symptoms but is able to achieve full extension with PROM and mobilizations. Did not progress tx today 2* increase in symptoms. Tolerated treatment well. Patient demonstrated fatigue post treatment, exhibited good technique with therapeutic exercises, and would benefit from continued PT      Plan: Continue per plan of care.  Progress treatment as tolerated.       Precautions: S/P L medial mensicus debridement (DOS 24)      Manuals 5/23 4/23 4/25 4/30 5/2 5/7 5/9 5/13 5/15 5/21   L knee PROM NJR (ext) NJR (ext) NJR (ext) NJR (ext) NJR (ext) NJR (ext) NJR (ext) NJR (ext) NJR (ext) NJR   Patellar mobs NJR      NJR NJR nv NJR                             Neuro Re-Ed             SL balance on foam      10\"x10 10\"x10 10\"x10 10\"x10 10\"x10 10\"x10                                                                                 Ther Ex             Bike for ROM 10' s#17 10' s#17 10' s#17 10' s#17 10' s#17 10' s #17 10' s#17 10' s#17 10' s#17 10' s#17   LLLD ext 2' 4# 2' 2'  2' 3# 2' 3# 2' 2' 3# 2' 3# 2' 4#   SAQ 3x10 4# 3x10  3x10  3x10 2# 3x10 2# 3x10 3# 3x10 3# 3x10 3# 3x10 4# 3x10 4#   HS w/ strap  x30 X30  x30 x20 X20  X30 no strap D/c     H/S stretch 30\"x3 30\"x3 30\"x3 30\"x3 30\"x3 30\"x3 30\"x3 30\"x3 30\"x3 30\"x3   Decline board stretch  30\"x3 30\"x3 30\"x3 30\"x3 30\"x3 30\"x3 30\"x3 nv    SLR's on plinth 3x10 " "ea 2x10 ea 2x10 ea 2x10 ea 2x10 ea 3x10 ea 3x10 ea 3x10 ea  3x10 ea 3x10 ea   LAQ 3x10 3x10  3x10  3x10  3x10 2# 3x10 3# 3x10 3# 3x10 3# 3x10 4# 3x10 4#   Step ups nv 2x10 4\" 2x10 4\" 2x10 6\"  2x10 6\" 2x10 6\" nv nv nv    Squats nv X20 two foam on chair X20 two foam on chair  X20 two foam on chair  X20 two foam on chair X30 two foam on chair  X30 two foam on chair nv X30 two foam X30 two foam    TKE 3x10  8#     3x10 5# 3x10 5# 3x10 7# 3x10 7.5# 3x10 7.5#   Leg press 3x10 65#     2x10 60# 3x10 60# 3x10 60# 3x10 65# 3x10 65#   Lat ecc step downs 3x10 4\"        2x10 4\" 2x10 4\"   Cybex ham curl  3x10 10#         2x10 1x5 10# LLE                             Ther Activity                                       Gait Training                                       Modalities                                                                "

## 2024-05-28 ENCOUNTER — OFFICE VISIT (OUTPATIENT)
Dept: PHYSICAL THERAPY | Age: 54
End: 2024-05-28
Payer: COMMERCIAL

## 2024-05-28 DIAGNOSIS — S83.242A OTHER TEAR OF MEDIAL MENISCUS, CURRENT INJURY, LEFT KNEE, INITIAL ENCOUNTER: Primary | ICD-10-CM

## 2024-05-28 PROCEDURE — 97110 THERAPEUTIC EXERCISES: CPT

## 2024-05-28 NOTE — PROGRESS NOTES
"Daily Note     Today's date: 2024  Patient name: Millie Moyer  : 1970  MRN: [id number]  Referring provider: Jin Delong MD  Dx:   Encounter Diagnosis     ICD-10-CM    1. Other tear of medial meniscus, current injury, left knee, initial encounter  S83.242A                      Subjective: Pt reports that on Thursday she had a really bad day but by the weekend it started to feel better. Today her knee is feeling better.       Objective: See treatment diary below      Assessment: L quadriceps weakness remains which is limiting full knee extension. Crepitus noted in patellofemoral joint during extension as well. Tolerated treatment fair. Patient demonstrated fatigue post treatment, exhibited good technique with therapeutic exercises, and would benefit from continued PT      Plan: Continue per plan of care.  Progress treatment as tolerated.       Precautions: S/P L medial mensicus debridement (DOS 24)      Manuals 5/23 4/23 4/25 4/30 5/2 5/7 5/9 5/13 5/15 5/21   L knee PROM NJR (ext) NJR (ext) NJR (ext) NJR (ext) NJR (ext) NJR (ext) NJR (ext) NJR (ext) NJR (ext) NJR   Patellar mobs NJR      NJR NJR nv NJR                             Neuro Re-Ed             SL balance on foam      10\"x10 10\"x10 10\"x10 10\"x10 10\"x10 10\"x10                                                                                 Ther Ex             Bike for ROM 10' s#17 10' s#17 10' s#17 10' s#17 10' s#17 10' s #17 10' s#17 10' s#17 10' s#17 10' s#17   LLLD ext 2' 4# 2' 2'  2' 3# 2' 3# 2' 2' 3# 2' 3# 2' 4#   SAQ 3x10 4# 3x10  3x10  3x10 2# 3x10 2# 3x10 3# 3x10 3# 3x10 3# 3x10 4# 3x10 4#   HS w/ strap  x30 X30  x30 x20 X20  X30 no strap D/c     H/S stretch 30\"x3 30\"x3 30\"x3 30\"x3 30\"x3 30\"x3 30\"x3 30\"x3 30\"x3 30\"x3   Decline board stretch  30\"x3 30\"x3 30\"x3 30\"x3 30\"x3 30\"x3 30\"x3 nv    SLR's on plinth 3x10 ea 2x10 ea 2x10 ea 2x10 ea 2x10 ea 3x10 ea 3x10 ea 3x10 ea  3x10 ea 3x10 ea   LAQ 3x10 3x10  3x10  3x10  3x10 2# 3x10 " "3# 3x10 3# 3x10 3# 3x10 4# 3x10 4#   Step ups nv 2x10 4\" 2x10 4\" 2x10 6\"  2x10 6\" 2x10 6\" nv nv nv    Squats nv X20 two foam on chair X20 two foam on chair  X20 two foam on chair  X20 two foam on chair X30 two foam on chair  X30 two foam on chair nv X30 two foam X30 two foam    TKE 3x10  8#     3x10 5# 3x10 5# 3x10 7# 3x10 7.5# 3x10 7.5#   Leg press 3x10 65#     2x10 60# 3x10 60# 3x10 60# 3x10 65# 3x10 65#   Lat ecc step downs 3x10 4\"        2x10 4\" 2x10 4\"   Cybex ham curl  3x10 10#         2x10 1x5 10# LLE                             Ther Activity                                       Gait Training                                       Modalities                                                                  "

## 2024-05-30 ENCOUNTER — OFFICE VISIT (OUTPATIENT)
Dept: PHYSICAL THERAPY | Age: 54
End: 2024-05-30
Payer: COMMERCIAL

## 2024-05-30 DIAGNOSIS — S83.242A OTHER TEAR OF MEDIAL MENISCUS, CURRENT INJURY, LEFT KNEE, INITIAL ENCOUNTER: Primary | ICD-10-CM

## 2024-05-30 PROCEDURE — 97110 THERAPEUTIC EXERCISES: CPT

## 2024-05-30 NOTE — PROGRESS NOTES
"Daily Note     Today's date: 2024  Patient name: Millie Moyer  : 1970  MRN: [id number]  Referring provider: Jin Delong MD  Dx: No diagnosis found.               Subjective: Pt reports her knee is a little sore but overall not bad.       Objective: See treatment diary below      Assessment: Progressing LE strengthening as tolerated with increase in weight for LAQ, ham curl, and leg press. Pt continues to have difficulty with terminal knee extension 2* guarding and symptoms. Able to achieve full passive knee extension after completing manual therapy but has difficulty maintaining this. May incorporate backwards walking to promote full knee extension. Tolerated treatment well. Patient demonstrated fatigue post treatment, exhibited good technique with therapeutic exercises, and would benefit from continued PT      Plan: Continue per plan of care.  Progress treatment as tolerated.       Precautions: S/P L medial mensicus debridement (DOS 24)      Manuals             L knee PROM NJR (ext) NJR (ext)           Patellar mobs NJR NJR                                     Neuro Re-Ed             SL balance on foam   10\"x10                                                                                         Ther Ex             Bike for ROM 10' s#17 10' s#17           LLLD ext 2' 4# 2' 4#           SAQ 3x10 4# 3x10 4#           HS w/ strap             H/S stretch 30\"x3 30\"x3           Decline board stretch             SLR's on plinth 3x10 ea 3x10 ea           LAQ 3x10 3x10 4#           Step ups nv            Squats nv            TKE 3x10  8# 3x10 9#           Leg press 3x10 65# 3x10 70#           Lat ecc step downs 3x10 4\" 3x10 4\"           Cybex ham curl  3x10 10# 3x10 15#                                     Ther Activity                                       Gait Training                                       Modalities                                                                    "

## 2024-05-31 ENCOUNTER — OFFICE VISIT (OUTPATIENT)
Dept: SURGICAL ONCOLOGY | Facility: CLINIC | Age: 54
End: 2024-05-31
Payer: COMMERCIAL

## 2024-05-31 VITALS
OXYGEN SATURATION: 98 % | HEART RATE: 75 BPM | SYSTOLIC BLOOD PRESSURE: 122 MMHG | DIASTOLIC BLOOD PRESSURE: 86 MMHG | BODY MASS INDEX: 29.52 KG/M2 | RESPIRATION RATE: 14 BRPM | WEIGHT: 194.8 LBS | TEMPERATURE: 98 F | HEIGHT: 68 IN

## 2024-05-31 DIAGNOSIS — C50.211 MALIGNANT NEOPLASM OF UPPER-INNER QUADRANT OF RIGHT BREAST IN FEMALE, ESTROGEN RECEPTOR POSITIVE (HCC): Primary | ICD-10-CM

## 2024-05-31 DIAGNOSIS — Z79.810 USE OF TAMOXIFEN (NOLVADEX): ICD-10-CM

## 2024-05-31 DIAGNOSIS — Z17.0 MALIGNANT NEOPLASM OF UPPER-INNER QUADRANT OF RIGHT BREAST IN FEMALE, ESTROGEN RECEPTOR POSITIVE (HCC): Primary | ICD-10-CM

## 2024-05-31 PROCEDURE — 99213 OFFICE O/P EST LOW 20 MIN: CPT | Performed by: NURSE PRACTITIONER

## 2024-05-31 NOTE — PROGRESS NOTES
Surgical Oncology Follow Up       [address]  [facility]COLOG[facility] [place]  [address]  [facility] [id number]    Millie Moyer  1970  [id number]  [address]  [facility] [place]  [address] [place] [address]    Chief Complaint   Patient presents with    Follow-up       Assessment/Plan:  1. Malignant neoplasm of upper-inner quadrant of right breast in female, estrogen receptor positive (HCC)  - 6 mo f/u visit    2. Use of tamoxifen (Nolvadex)  - Continue use per medical oncology       Discussion/Summary: Patient is a 54-year-old female that was diagnosed with a right-sided breast cancer in 2015.  Her pathology revealed invasive ductal carcinoma, ER/WY positive, HER2 negative.  She underwent bilateral mastectomies and a right sentinel lymph node biopsy with Dr. Olvera.  She had reconstruction with Dr. Wilson.  She is currently maintained on tamoxifen.  She carries an MUTYH genetic mutation.   She had a bilateral breast MRI in September of 2023  which revealed a focal area of enhancement of the skin and subcutaneous tissue in the upper outer quadrant of the right breast.  She underwent diagnostic ultrasound which revealed no worrisome findings.  There were no worrisome findings on her clinical exam.  I recommended observation with a short-term follow-up MRI.  This MRI was performed last week and revealed no suspicious findings, BI-RADS 2.  No worrisome findings on her clinical exam today and she offers no new complaints today.  We will see her back in 6 months for a follow-up visit or sooner should the need arise.  She was instructed to contact us with any changes or concerns in the interim.  All of her questions were answered today.    History of Present Illness:     Oncology History   Malignant neoplasm of upper-inner quadrant of right breast in female, estrogen receptor positive (HCC)   5/14/2015 Biopsy    Right US guided  breast biopsy     Invasive ductal carcinoma  %  SC 90-95%  HER-2 negative     6/2015 Genetic Testing    6/3/15: [facility] BRCAplus (5 genes): BRCA1, BRCA2, CDH1, PTEN, TP53      6/30/2015 Surgery    Bilateral mastectomies, Right sentinel lymph node biopsy    Bilateral reconstruction with expanders.  Dr Wilson      Genomic Testing    Oncotype DX  14     7/8/2015 -  Cancer Staged    Staging form: Breast, AJCC 7th Edition  - Pathologic stage from 7/8/2015: Stage IIA (T2, N0, cM0) - Signed by AGUSTINA Turpin on 10/17/2022  Stage prefix: Initial diagnosis  Method of lymph node assessment: [place] lymph node biopsy       8/12/2015 -  Hormone Therapy    Tamoxifen     10/23/2015 Surgery    Expanders removed, implants placed.     3/9/2016 Surgery    Bilateral breast mound revisions, fat grafting     10/13/2016 Surgery    Nipple reconstruction     6/2021 Genetic Testing    Test(s): [employer] Core Cancer Panel (36 genes): APC, DADA, AXIN2 BARD1, BRCA1, BRCA2, BRIP1, BMPR1A, CDH1, CDK4, CDKN2A, CHEK2, DICER1, EPCAM, GREM1, HOXB13, MLH1, MSH2, MSH3, MSH6, MUTYH, NBN, NF1, NTHL1, PALB2, PMS2, POLD1, POLE, PTEN, RAD51C, RAD51D, RECQL SMAD4, SMARCA4, STK11, TP53     Result: Positive - MUTYH c.1187G>A ([id number]), Heterozygous, Pathogenic              -Interval History: Patient presents today for follow-up visit.  She has not appreciated any changes on self breast exam.  She reports recent shoulder surgery and then left knee surgery and is still undergoing PT.  Otherwise she offers no new complaints today.  She continues on tamoxifen.  She had a recent short-term follow-up breast MRI which revealed no worrisome findings, BI-RADS 2.    Review of Systems:  Review of Systems   Constitutional:  Negative for activity change, appetite change, chills, fatigue, fever and unexpected weight change.   Respiratory:  Negative for cough and shortness of breath.    Cardiovascular:  Negative for chest pain.   Gastrointestinal:  Negative for  abdominal pain, constipation, diarrhea, nausea and vomiting.   Musculoskeletal:  Positive for arthralgias (recent right shoulder, left knee surgery). Negative for back pain, gait problem and myalgias.   Skin:  Negative for color change and rash.   Neurological:  Negative for dizziness and headaches.   Hematological:  Negative for adenopathy.   Psychiatric/Behavioral:  Negative for agitation and confusion.    All other systems reviewed and are negative.      Patient Active Problem List   Diagnosis    Irritable bowel syndrome (IBS)    Malignant neoplasm of upper-inner quadrant of right breast in female, estrogen receptor positive (HCC)    Psoriasis    Seasonal allergies    BRCA negative    Use of tamoxifen (Nolvadex)    Seborrheic keratosis    Bilateral plantar fasciitis    Dyspepsia    Monoallelic mutation of MUTYH gene    COVID-19 vaccination not done    Abnormal MRI, breast    Carpal tunnel syndrome on right    Lateral epicondylitis of right elbow    Medial epicondylitis of elbow, right    PONV (postoperative nausea and vomiting)    Inability to bear weight    Acute pain of right knee     Past Medical History:   Diagnosis Date    Arthritis     BRCA negative     Cancer (HCC) 2015    Breast right    Colon polyp     COVID     x 2 -last time     Endometriosis     H/O bilateral breast implants     Headache     occ    IBS (irritable bowel syndrome)     Insomnia     Night sweats     Pain in left knee     PONV (postoperative nausea and vomiting) 2024    Psoriasis     Skin tag     Uses crutches     Wears glasses      Past Surgical History:   Procedure Laterality Date    BREAST BIOPSY Right     IDC     SECTION      COLONOSCOPY      LAPAROSCOPY      exploratory, endometriosis    LYMPHADENECTOMY Right     x2    MASTECTOMY      bilateral mastectomy    NJ ARTHRS KNE SURG W/MENISCECTOMY MED/LAT W/SHVG Left 2024    Procedure: Knee arthroscopy for medial meniscus debridement;  Surgeon: Jin  MD Sindi;  Location: [facility];  Service: Orthopedics    KS NDSC WRST SURG W/RLS TRANSVRS CARPL LIGM Right 02/07/2024    Procedure: RELEASE CARPAL TUNNEL ENDOSCOPIC;  Surgeon: Kennedy Garcia MD;  [facility];  Service: Orthopedics    KS NEUROPLASTY &/TRANSPOSITION ULNAR NERVE ELBOW Right 02/07/2024    Procedure: RELEASE CUBITAL TUNNEL;  Surgeon: Kennedy Garcia MD;  [facility];  Service: Orthopedics    KS NIPPLE/AREOLA RECONSTRUCTION Bilateral 10/13/2016    Procedure: NIPPLE RECONSTRUCTION ;  Surgeon: Tulio Wilson MD;  [facility];  Service: Plastics    KS REVISION OF RECONSTRUCTED BREAST Bilateral 03/09/2016    Procedure: RECONSTRUCTION REVISION  BREAST MOUND ,FAT GRAFTS ;  Surgeon: Tulio Wilson MD;  Location: [facility];  Service: Plastics    KS TENDON SHEATH INCISION Right 02/07/2024    Procedure: RELEASE TRIGGER FINGER - RIGHT MIDDLE;  Surgeon: Kennedy Garcia MD;  [facility];  Service: Orthopedics    SENTINEL LYMPH NODE BIOPSY Right      Family History   Problem Relation Age of Onset    Lung cancer Paternal Grandfather 50    Breast cancer Other     Thyroid cancer Mother 40    Breast cancer Mother 74    Asthma Mother     Psoriasis Father     Basal cell carcinoma Father     Arthritis Father         Psoriatic    Heart disease Other     Prostate cancer Other      Social History     Socioeconomic History    Marital status: /Civil Union     Spouse name: Not on file    Number of children: 1    Years of education: Not on file    Highest education level: Not on file   Occupational History    Occupation: employed   Tobacco Use    Smoking status: Never     Passive exposure: Never    Smokeless tobacco: Never   Vaping Use    Vaping status: Never Used   Substance and Sexual Activity    Alcohol use: Yes     Comment: social    Drug use: No    Sexual activity: Yes     Partners: Male     Birth control/protection: None   Other Topics Concern    Not on file   Social  History Narrative    Caffeine use- coffee     Social Determinants of Health     Financial Resource Strain: Not on file   Food Insecurity: Not on file   Transportation Needs: Not on file   Physical Activity: Not on file   Stress: Not on file   Social Connections: Not on file   Intimate Partner Violence: Not on file   Housing Stability: Not on file       Current Outpatient Medications:     acetaminophen (TYLENOL) 325 mg tablet, Take 1 tablet (325 mg total) by mouth every 6 (six) hours as needed for mild pain, Disp: 60 tablet, Rfl: 0    ASHWAGANDHA PO, Take by mouth every evening, Disp: , Rfl:     Cholecalciferol (Vitamin D3) 125 MCG (5000 UT) TBDP, Take by mouth daily, Disp: , Rfl:     Magnesium 400 MG CAPS, Take 1 tablet by mouth daily at bedtime, Disp: , Rfl:     methocarbamol (ROBAXIN) 500 mg tablet, Take 1 tablet (500 mg total) by mouth 3 (three) times a day as needed for muscle spasms for up to 7 days, Disp: 30 tablet, Rfl: 1    milk thistle 175 MG tablet, Take 175 mg by mouth daily, Disp: , Rfl:     Multiple Vitamin (multivitamin) capsule, Take 1 capsule by mouth daily, Disp: , Rfl:     naloxone (NARCAN) 4 mg/0.1 mL nasal spray, Administer 1 spray into a nostril. If no response after 2-3 minutes, give another dose in the other nostril using a new spray., Disp: 1 each, Rfl: 1    secukinumab (Cosentyx Sensoready Pen) 150 mg/mL SOAJ injection, Inject 2 mL (300 mg total) under the skin every 30 (thirty) days, Disp: 2 mL, Rfl: 11    tamoxifen (NOLVADEX) 20 mg tablet, TAKE 1 TABLET BY MOUTH EVERY DAY, Disp: 90 tablet, Rfl: 3    traMADol (ULTRAM) 50 mg tablet, Take 1 tablet (50 mg total) by mouth every 6 (six) hours as needed for moderate pain or severe pain for up to 10 doses Continuation of therapy, Disp: 60 tablet, Rfl: 0    naproxen (EC NAPROSYN) 500 MG EC tablet, Take 1 tablet (500 mg total) by mouth 2 (two) times a day with meals (Patient not taking: Reported on 4/18/2024), Disp: 28 tablet, Rfl: 0  Allergies    Allergen Reactions    Other Hives     Kait selzer    Kait-Freeman Spur Plus Cold & Cough  [Lwwcjtloj-Htz-Oy-Apap] Hives    Morphine Vomiting     Vomits profusely     Prednisone Other (See Comments) and GI Intolerance     insomnia     Vitals:    05/31/24 0857   BP: 122/86   Pulse: 75   Resp: 14   Temp: 98 °F (36.7 °C)   SpO2: 98%       Physical Exam  Vitals reviewed.   Constitutional:       General: She is not in acute distress.     Appearance: Normal appearance. She is well-developed. She is not diaphoretic.   HENT:      Head: Normocephalic and atraumatic.   Cardiovascular:      Rate and Rhythm: Normal rate and regular rhythm.      Heart sounds: Normal heart sounds.   Pulmonary:      Effort: Pulmonary effort is normal.      Breath sounds: Normal breath sounds.   Chest:      Comments: Bilateral reconstructed breasts with implants present.  No masses, skin changes or nodules.  Abdominal:      Palpations: Abdomen is soft. There is no mass.      Tenderness: There is no abdominal tenderness.   Musculoskeletal:         General: Normal range of motion.      Cervical back: Normal range of motion.   Lymphadenopathy:      Upper Body:      Right upper body: No supraclavicular or axillary adenopathy.      Left upper body: No supraclavicular or axillary adenopathy.   Skin:     General: Skin is warm and dry.      Findings: No rash.   Neurological:      Mental Status: She is alert and oriented to person, place, and time.   Psychiatric:         Speech: Speech normal.           Results:    Imaging  MRI breast bilateral w and wo contrast w cad    Result Date: 5/23/2024  Narrative: DIAGNOSIS: Malignant neoplasm of upper-inner quadrant of right breast in female, estrogen receptor positive (HCC); Abnormal MRI, breast TECHNIQUE: MRI of both breasts was performed in axial planes utilizing a combination of localizer, axial T2 STIR, Axial T1 FSPGR in phase, axial dynamic 3D fat suppressed gradient-echo T1 sequences (VIBRANT) before and after  contrast administration at multiple time points, and sagittal 3D fat suppressed gradient-echo T1 sequences (VIBRANT) post-contrast. This exam was read in conjunction with [employer] software. MEDICATIONS ADMINISTERED: Gadobutrol injection (SINGLE-DOSE) SOLN 8 mL, Total Given: 8 mL (1 dose) Intravenous contrast was administered at 2cc/sec, without documented contrast reaction. COMPARISONS: Multiple prior breast MRIs, most recently 9/5/2023. RELEVANT HISTORY: Family Breast Cancer History: History of breast cancer in Other, Mother. Family Medical History: Family medical history includes breast cancer in 2 relatives (mother, other). Personal History: Hormone history includes tamoxifen. Surgical history includes breast biopsy and mastectomy. No known relevant medical history. INDICATION: Millie Moyer is a 54 y.o. female presenting for high risk screening breast MRI history of bilateral mastectomy. History of therapeutic right and prophylactic left mastectomies in 2015.  Known pathogenic mutation of MUTYH gene.  FINDINGS: Patient has undergone bilateral total mastectomy.  There is essentially no residual fibroglandular tissue and no background parenchymal enhancement. There are no suspicious enhancing masses or suspicious areas of non-mass enhancement in either breast. Bilateral subpectoral silicone implants are grossly unremarkable, though study is not tailored for evaluation of implant integrity. There is no axillary or internal mammary adenopathy.      Impression:  No MR evidence of malignancy in either breast. ASSESSMENT/BI-RADS CATEGORY: Left: 2 - Benign Right: 2 - Benign Overall: 2 - Benign RECOMMENDATION:      - Clinical management for both breasts. Workstation ID: [id number]      I reviewed the above imaging data.      Advance Care Planning/Advance Directives:  Discussed disease status, cancer treatment plans and/or cancer treatment goals with the patient.

## 2024-06-04 ENCOUNTER — OFFICE VISIT (OUTPATIENT)
Dept: PHYSICAL THERAPY | Age: 54
End: 2024-06-04
Payer: COMMERCIAL

## 2024-06-04 DIAGNOSIS — S83.242A OTHER TEAR OF MEDIAL MENISCUS, CURRENT INJURY, LEFT KNEE, INITIAL ENCOUNTER: Primary | ICD-10-CM

## 2024-06-04 PROCEDURE — 97110 THERAPEUTIC EXERCISES: CPT

## 2024-06-04 NOTE — PROGRESS NOTES
"Daily Note     Today's date: 2024  Patient name: Millie Moyer  : 1970  MRN: [id number]  Referring provider: Jin Delong MD  Dx: No diagnosis found.               Subjective: Pt reports improvement in L knee symptoms. She was pretty active over the weekend as well.       Objective: See treatment diary below      Assessment: Able to add weight for straight leg raises on plinth to progress hip and knee strengthening. Able to increase weight by 5 lb on leg press as well. She is progressing well with LE strengthening. Attempting to normalize knee extension ROM actively as this is affecting gait pattern.  Tolerated treatment well. Patient demonstrated fatigue post treatment, exhibited good technique with therapeutic exercises, and would benefit from continued PT      Plan: Continue per plan of care.  Progress treatment as tolerated.       Precautions: S/P L medial mensicus debridement (DOS 24)      Manuals           L knee PROM NJR (ext) NJR (ext)           Patellar mobs NJR NJR                                     Neuro Re-Ed             SL balance on foam   10\"x10 10\"x10                                                                                        Ther Ex             Bike for ROM 10' s#17 10' s#17 10' s#17          LLLD ext 2' 4# 2' 4# np          SAQ 3x10 4# 3x10 4# 3x10 5#          HS w/ strap             H/S stretch 30\"x3 30\"x3 30\"x3          Decline board stretch             SLR's on plinth 3x10 ea 3x10 ea 3x10 ea 1.5#          LAQ 3x10 3x10 4# 3x10 5#          Step ups nv            Squats nv  X30 to two foam on chair          TKE 3x10  8# 3x10 9# 3x10 9#          Leg press 3x10 65# 3x10 70# 3x10 75# s#3          Lat ecc step downs 3x10 4\" 3x10 4\" 3          Cybex ham curl  3x10 10# 3x10 15# 3x10 15#                                    Ther Activity                                       Gait Training                                       Modalities                     "

## 2024-06-06 ENCOUNTER — OFFICE VISIT (OUTPATIENT)
Dept: PHYSICAL THERAPY | Age: 54
End: 2024-06-06
Payer: COMMERCIAL

## 2024-06-06 DIAGNOSIS — S83.242A OTHER TEAR OF MEDIAL MENISCUS, CURRENT INJURY, LEFT KNEE, INITIAL ENCOUNTER: Primary | ICD-10-CM

## 2024-06-06 PROCEDURE — 97110 THERAPEUTIC EXERCISES: CPT

## 2024-06-06 NOTE — PROGRESS NOTES
"Daily Note     Today's date: 2024  Patient name: Millie Moyer  : 1970  MRN: [id number]  Referring provider: Jin Delong MD  Dx:   Encounter Diagnosis     ICD-10-CM    1. Other tear of medial meniscus, current injury, left knee, initial encounter  S83.242A                      Subjective: Pt reports her knee is a little more stiff today but overall she is doing well.       Objective: See treatment diary below      Assessment: Able to perform squat to one foam on chair instead of two foam indicating better control of descent and improving tolerance to squatting. Increased weight for leg press for closed chain strengthening. Pt continues to have hesitancy into full extension and would benefit from continued OP PT in order to improve this. Tolerated treatment well. Patient demonstrated fatigue post treatment, exhibited good technique with therapeutic exercises, and would benefit from continued PT      Plan: Continue per plan of care.  Progress treatment as tolerated.       Precautions: S/P L medial mensicus debridement (DOS 24)      Manuals          L knee PROM NJR (ext) NJR (ext)  NJR         Patellar mobs NJR NJR                                     Neuro Re-Ed             SL balance on foam   10\"x10 10\"x10 10\"x10                                                                                       Ther Ex             Bike for ROM 10' s#17 10' s#17 10' s#17 10' s#17         LLLD ext 2' 4# 2' 4# np 2' 5#         SAQ 3x10 4# 3x10 4# 3x10 5# 3x10 5#         HS w/ strap             H/S stretch 30\"x3 30\"x3 30\"x3 30\"x3         Decline board stretch             SLR's on plinth 3x10 ea 3x10 ea 3x10 ea 1.5# 3x10 1.5# ea         LAQ 3x10 3x10 4# 3x10 5# 3x10 5#         Step ups nv            Squats nv  X30 to two foam on chair X30 on foam          TKE 3x10  8# 3x10 9# 3x10 9# 3x10 9.5#         Leg press 3x10 65# 3x10 70# 3x10 75# s#3 3x10 85# s#3         Lat ecc step downs 3x10 4\" " "3x10 4\" 3 3x10 4\"         Cybex ham curl  3x10 10# 3x10 15# 3x10 15# 3x10 15#                                   Ther Activity                                       Gait Training                                       Modalities                                                                        "

## 2024-06-11 ENCOUNTER — OFFICE VISIT (OUTPATIENT)
Dept: PHYSICAL THERAPY | Age: 54
End: 2024-06-11
Payer: COMMERCIAL

## 2024-06-11 DIAGNOSIS — S83.242A OTHER TEAR OF MEDIAL MENISCUS, CURRENT INJURY, LEFT KNEE, INITIAL ENCOUNTER: Primary | ICD-10-CM

## 2024-06-11 PROCEDURE — 97110 THERAPEUTIC EXERCISES: CPT

## 2024-06-11 PROCEDURE — 97140 MANUAL THERAPY 1/> REGIONS: CPT

## 2024-06-11 NOTE — PROGRESS NOTES
"Daily Note     Today's date: 2024  Patient name: Millie Moyer  : 1970  MRN: [id number]  Referring provider: Jin Delong MD  Dx: No diagnosis found.               Subjective: Pt reports she is feeling pretty good. She did trip over a shoe on her R foot and was able to brace herself on her L leg.       Objective: See treatment diary below      Assessment: Pt maintaining adequate PROM of L knee. Progressing with open chain and closed chain strengthening. Still exhibits quadriceps lag with straight leg raise but this is likely self-limiting and not due to significant weakness. Tolerated treatment well. Patient demonstrated fatigue post treatment, exhibited good technique with therapeutic exercises, and would benefit from continued PT      Plan: Continue per plan of care.  Progress treatment as tolerated.       Precautions: S/P L medial mensicus debridement (DOS 24)      Manuals         L knee PROM NJR (ext) NJR (ext)  NJR NJR (ext)        Patellar mobs NJR NJR                                     Neuro Re-Ed             SL balance on foam   10\"x10 10\"x10 10\"x10 10\"x10                                                                                      Ther Ex             Bike for ROM 10' s#17 10' s#17 10' s#17 10' s#17 10' s#17        LLLD ext 2' 4# 2' 4# np 2' 5#         SAQ 3x10 4# 3x10 4# 3x10 5# 3x10 5# 3x10 5#        HS w/ strap             H/S stretch 30\"x3 30\"x3 30\"x3 30\"x3 30\"x3        Decline board stretch             SLR's on plinth 3x10 ea 3x10 ea 3x10 ea 1.5# 3x10 1.5# ea 3x10 1.5#        LAQ 3x10 3x10 4# 3x10 5# 3x10 5# 3x10 5#        Step ups nv            Squats nv  X30 to two foam on chair X30 one foam  X30 one foam         TKE 3x10  8# 3x10 9# 3x10 9# 3x10 9.5# 3x10 9.5#        Leg press 3x10 65# 3x10 70# 3x10 75# s#3 3x10 85# s#3 3x10 85#        Lat ecc step downs 3x10 4\" 3x10 4\" 3 3x10 4\" 3x10 4\"        Cybex ham curl  3x10 10# 3x10 15# 3x10 15# 3x10 " 15# 3x10 30#                                  Ther Activity                                       Gait Training                                       Modalities

## 2024-06-12 ENCOUNTER — OFFICE VISIT (OUTPATIENT)
Dept: FAMILY MEDICINE CLINIC | Facility: CLINIC | Age: 54
End: 2024-06-12
Payer: COMMERCIAL

## 2024-06-12 VITALS
DIASTOLIC BLOOD PRESSURE: 84 MMHG | BODY MASS INDEX: 29.37 KG/M2 | TEMPERATURE: 97.5 F | HEART RATE: 82 BPM | SYSTOLIC BLOOD PRESSURE: 124 MMHG | WEIGHT: 193.8 LBS | HEIGHT: 68 IN | OXYGEN SATURATION: 97 %

## 2024-06-12 DIAGNOSIS — Z00.00 ANNUAL PHYSICAL EXAM: Primary | ICD-10-CM

## 2024-06-12 DIAGNOSIS — L40.9 PSORIASIS: ICD-10-CM

## 2024-06-12 DIAGNOSIS — E78.00 HYPERCHOLESTEROLEMIA: ICD-10-CM

## 2024-06-12 PROCEDURE — 99396 PREV VISIT EST AGE 40-64: CPT | Performed by: FAMILY MEDICINE

## 2024-06-12 NOTE — PROGRESS NOTES
Adult Annual Physical  Name: Millie Moyer      : 1970      MRN: [id number]  Encounter Provider: Margaret Becerra DO  Encounter Date: 2024   Encounter department: [facility]    Assessment & Plan   1. Annual physical exam  2. Psoriasis  Comments:  Continue Cosentyx therapy  3. Hypercholesterolemia  Comments:  Continue low fat diet.  Orders:  -     Lipid panel; Future    Immunizations and preventive care screenings were discussed with patient today. Appropriate education was printed on patient's after visit summary.    Counseling:  Exercise: the importance of regular exercise/physical activity was discussed. Recommend exercise 3-5 times per week for at least 30 minutes.          History of Present Illness     Adult Annual Physical:  Patient presents for annual physical.     Diet and Physical Activity:  - Diet/Nutrition: well balanced diet.  - Exercise: moderate cardiovascular exercise.    General Health:  - Sleep: sleeps well.  - Hearing: normal hearing bilateral ears.  - Vision: no vision problems.  - Dental: regular dental visits.    /GYN Health:  - Follows with GYN: no.   - Menopause: postmenopausal.   - History of STDs: no    Advanced Care Planning:  - Has an advanced directive?: no    - Has a durable medical POA?: no    - ACP document given to patient?: yes      Review of Systems   Constitutional:  Negative for appetite change, chills and fever.   HENT:  Negative for ear pain, facial swelling, rhinorrhea, sinus pain, sore throat and trouble swallowing.    Eyes:  Negative for discharge and redness.   Respiratory:  Negative for chest tightness, shortness of breath and wheezing.    Cardiovascular:  Negative for chest pain and palpitations.   Gastrointestinal:  Negative for abdominal pain, diarrhea, nausea and vomiting.   Endocrine: Negative for polyuria.   Genitourinary:  Negative for dysuria and urgency.   Musculoskeletal:  Negative for arthralgias and back pain.  "  Skin:  Negative for rash.   Neurological:  Negative for dizziness, weakness and headaches.   Hematological:  Negative for adenopathy.   Psychiatric/Behavioral:  Negative for behavioral problems, confusion and sleep disturbance.    All other systems reviewed and are negative.    Pertinent Medical History   Psoriasis    Objective     /84   Pulse 82   Temp 97.5 °F (36.4 °C)   Ht 5' 8\" (1.727 m)   Wt 87.9 kg (193 lb 12.8 oz)   SpO2 97%   BMI 29.47 kg/m²     Physical Exam  Constitutional:       General: She is not in acute distress.     Appearance: She is well-developed.   HENT:      Head: Normocephalic and atraumatic.      Right Ear: External ear normal.      Left Ear: External ear normal.      Nose: Nose normal.      Mouth/Throat:      Pharynx: No oropharyngeal exudate.   Eyes:      General: No scleral icterus.        Right eye: No discharge.         Left eye: No discharge.      Conjunctiva/sclera: Conjunctivae normal.      Pupils: Pupils are equal, round, and reactive to light.   Neck:      Thyroid: No thyromegaly.      Vascular: No JVD.      Trachea: No tracheal deviation.   Cardiovascular:      Rate and Rhythm: Normal rate and regular rhythm.      Heart sounds: Normal heart sounds. No murmur heard.  Pulmonary:      Effort: No respiratory distress.      Breath sounds: Normal breath sounds. No stridor. No wheezing or rales.   Abdominal:      General: Bowel sounds are normal. There is no distension.      Palpations: Abdomen is soft. There is no mass.      Tenderness: There is no abdominal tenderness. There is no guarding or rebound.   Musculoskeletal:         General: No tenderness. Normal range of motion.      Cervical back: Normal range of motion and neck supple.   Lymphadenopathy:      Cervical: No cervical adenopathy.   Skin:     General: Skin is warm.      Findings: No erythema or rash.   Neurological:      Mental Status: She is alert and oriented to person, place, and time.      Cranial Nerves: No " cranial nerve deficit.      Motor: No abnormal muscle tone.      Coordination: Coordination normal.      Deep Tendon Reflexes: Reflexes are normal and symmetric. Reflexes normal.   Psychiatric:         Behavior: Behavior normal.         Thought Content: Thought content normal.         Judgment: Judgment normal.       Administrative Statements   I have spent a total time of 20 minutes on 06/13/24 In caring for this patient including Instructions for management, Patient and family education, and Documenting in the medical record.

## 2024-06-13 ENCOUNTER — OFFICE VISIT (OUTPATIENT)
Dept: PHYSICAL THERAPY | Age: 54
End: 2024-06-13
Payer: COMMERCIAL

## 2024-06-13 DIAGNOSIS — S83.242A OTHER TEAR OF MEDIAL MENISCUS, CURRENT INJURY, LEFT KNEE, INITIAL ENCOUNTER: Primary | ICD-10-CM

## 2024-06-13 PROCEDURE — 97140 MANUAL THERAPY 1/> REGIONS: CPT

## 2024-06-13 PROCEDURE — 97110 THERAPEUTIC EXERCISES: CPT

## 2024-06-13 NOTE — PROGRESS NOTES
"Daily Note     Today's date: 2024  Patient name: Millie Moyer  : 1970  MRN: [id number]  Referring provider: Jin Delong MD  Dx:   Encounter Diagnosis     ICD-10-CM    1. Other tear of medial meniscus, current injury, left knee, initial encounter  S83.242A                      Subjective: Pt reports that her knee is a little sore today but she was able to do a lot of yard work yesterday.       Objective: See treatment diary below      Assessment: Pt has regain normal functional L knee ROM and strength. She is independent with TE and symptom management as demonstrated today. Provided pt with updated HEP to maintain progress which she demonstrated understanding of completion. She is appropriate for d/c to HEP at this time. She is agreeable with this POC. Tolerated treatment well. Patient demonstrated fatigue post treatment and exhibited good technique with therapeutic exercises      Plan:  D/C to HEP     Precautions: S/P L medial mensicus debridement (DOS 24)      Manuals        L knee PROM NJR (ext) NJR (ext)  NJR NJR (ext) NJR (ext)       Patellar mobs NJR NJR                                     Neuro Re-Ed             SL balance on foam   10\"x10 10\"x10 10\"x10 10\"x10 10\"x10                                                                                     Ther Ex             Bike for ROM 10' s#17 10' s#17 10' s#17 10' s#17 10' s#17 10' s#17       LLLD ext 2' 4# 2' 4# np 2' 5#         SAQ 3x10 4# 3x10 4# 3x10 5# 3x10 5# 3x10 5# 3x10 5#       HS w/ strap             H/S stretch 30\"x3 30\"x3 30\"x3 30\"x3 30\"x3 30\"x3       Decline board stretch             SLR's on plinth 3x10 ea 3x10 ea 3x10 ea 1.5# 3x10 1.5# ea 3x10 1.5# 3x10 2#       LAQ 3x10 3x10 4# 3x10 5# 3x10 5# 3x10 5# 3x10 5#       Step ups nv            Squats nv  X30 to two foam on chair X30 one foam  X30 one foam  nv       TKE 3x10  8# 3x10 9# 3x10 9# 3x10 9.5# 3x10 9.5# 3x10 9.5#       Leg press 3x10 65# " "3x10 70# 3x10 75# s#3 3x10 85# s#3 3x10 85# 3x10 85#       Lat ecc step downs 3x10 4\" 3x10 4\" 3 3x10 4\" 3x10 4\" 3x10 4\"       Cybex ham curl  3x10 10# 3x10 15# 3x10 15# 3x10 15# 3x10 30# 3x10 15#                                 Ther Activity                                       Gait Training                                       Modalities                                                                            "

## 2024-06-18 ENCOUNTER — NURSE TRIAGE (OUTPATIENT)
Age: 54
End: 2024-06-18

## 2024-06-18 ENCOUNTER — APPOINTMENT (OUTPATIENT)
Dept: LAB | Facility: CLINIC | Age: 54
End: 2024-06-18
Payer: COMMERCIAL

## 2024-06-18 DIAGNOSIS — R35.0 FREQUENCY OF URINATION: Primary | ICD-10-CM

## 2024-06-18 LAB
BILIRUB UR QL STRIP: NEGATIVE
CLARITY UR: CLEAR
COLOR UR: COLORLESS
GLUCOSE UR STRIP-MCNC: NEGATIVE MG/DL
HGB UR QL STRIP.AUTO: NEGATIVE
KETONES UR STRIP-MCNC: NEGATIVE MG/DL
LEUKOCYTE ESTERASE UR QL STRIP: NEGATIVE
NITRITE UR QL STRIP: NEGATIVE
PH UR STRIP.AUTO: 7 [PH]
PROT UR STRIP-MCNC: NEGATIVE MG/DL
SP GR UR STRIP.AUTO: 1 (ref 1–1.03)
UROBILINOGEN UR STRIP-ACNC: <2 MG/DL

## 2024-06-18 PROCEDURE — 81003 URINALYSIS AUTO W/O SCOPE: CPT

## 2024-06-18 NOTE — TELEPHONE ENCOUNTER
"Pt called in stating that she is having burning and frequency, bad smelling urine. Pt reporting this started on 6/15/24. Pt reporting the burning is 4/10 with urinating, pt reporting lower back pain but doesn't think that its associated with the UTI.       Please order Urinalysis and C&S. (As per protocol)   Orders placed for UA and C&S as per protocol.     Answer Assessment - Initial Assessment Questions  1. SYMPTOM: \"What's the main symptom you're concerned about?\" (e.g., frequency, incontinence)      Pt called in stating that she is having burning and frequency, bad smelling urine.  2. ONSET: \"When did the  symptoms  start?\"      6/15/24  3. PAIN: \"Is there any pain?\" If Yes, ask: \"How bad is it?\" (Scale: 1-10; mild, moderate, severe)      4/10 burning when urinating   4. CAUSE: \"What do you think is causing the symptoms?\"      Pt thinks possible uti  5. OTHER SYMPTOMS: \"Do you have any other symptoms?\" (e.g., fever, flank pain, blood in urine, pain with urination)      Pt reporting pain with urination, pt reporting lower back pain but doesn't think that its associated with the UTI  6. PREGNANCY: \"Is there any chance you are pregnant?\" \"When was your last menstrual period?\"      Jan 2024 LMP    Protocols used: Urinary Symptoms-ADULT-[place]    "

## 2024-06-20 NOTE — TELEPHONE ENCOUNTER
Patient Call    Who are you speaking with? Patient    If it is not the patient, are they listed on an active communication consent form? N/A   What is the reason for this call? The patient needs to have Dr. Meza fill out paperwork for her BCCPT program.     The patient states Dr. Olvera filled this out last years paperwork but she is requesting to have Dr. Meza fill it out this year.     The patient would like a call back to discuss and find out where she can send this paperwork.    Does this require a call back? Yes   If a call back is required, please list best call back number [phone]   If a call back is required, advise that a message will be forwarded to their care team and someone will return their call as soon as possible.   Did you relay this information to the patient? Yes      hide

## 2024-07-29 ENCOUNTER — TELEPHONE (OUTPATIENT)
Dept: FAMILY MEDICINE CLINIC | Facility: CLINIC | Age: 54
End: 2024-07-29

## 2024-07-29 ENCOUNTER — NURSE TRIAGE (OUTPATIENT)
Age: 54
End: 2024-07-29

## 2024-07-29 ENCOUNTER — CLINICAL SUPPORT (OUTPATIENT)
Dept: FAMILY MEDICINE CLINIC | Facility: CLINIC | Age: 54
End: 2024-07-29
Payer: COMMERCIAL

## 2024-07-29 ENCOUNTER — OFFICE VISIT (OUTPATIENT)
Dept: OBGYN CLINIC | Facility: CLINIC | Age: 54
End: 2024-07-29
Payer: COMMERCIAL

## 2024-07-29 VITALS
OXYGEN SATURATION: 98 % | DIASTOLIC BLOOD PRESSURE: 90 MMHG | HEART RATE: 81 BPM | SYSTOLIC BLOOD PRESSURE: 172 MMHG | TEMPERATURE: 97.4 F

## 2024-07-29 VITALS
DIASTOLIC BLOOD PRESSURE: 124 MMHG | BODY MASS INDEX: 29.4 KG/M2 | WEIGHT: 194 LBS | SYSTOLIC BLOOD PRESSURE: 170 MMHG | HEIGHT: 68 IN | HEART RATE: 88 BPM

## 2024-07-29 DIAGNOSIS — R03.0 ELEVATED BP WITHOUT DIAGNOSIS OF HYPERTENSION: Primary | ICD-10-CM

## 2024-07-29 DIAGNOSIS — F41.9 ANXIETY: Primary | ICD-10-CM

## 2024-07-29 DIAGNOSIS — Z98.890 STATUS POST ARTHROSCOPIC KNEE SURGERY: Primary | ICD-10-CM

## 2024-07-29 DIAGNOSIS — I10 ESSENTIAL HYPERTENSION: ICD-10-CM

## 2024-07-29 PROCEDURE — 99213 OFFICE O/P EST LOW 20 MIN: CPT | Performed by: ORTHOPAEDIC SURGERY

## 2024-07-29 PROCEDURE — 99211 OFF/OP EST MAY X REQ PHY/QHP: CPT

## 2024-07-29 RX ORDER — AMLODIPINE BESYLATE 5 MG/1
5 TABLET ORAL DAILY
Qty: 30 TABLET | Refills: 5 | Status: SHIPPED | OUTPATIENT
Start: 2024-07-29

## 2024-07-29 RX ORDER — SECUKINUMAB 150 MG/ML
INJECTION SUBCUTANEOUS
COMMUNITY
Start: 2024-07-03

## 2024-07-29 RX ORDER — ALPRAZOLAM 0.25 MG/1
0.25 TABLET ORAL 3 TIMES DAILY PRN
Qty: 40 TABLET | Refills: 0 | Status: SHIPPED | OUTPATIENT
Start: 2024-07-29

## 2024-07-29 NOTE — TELEPHONE ENCOUNTER
Pt was here today for a BP check per provider. Pt was at a different appt and had an elevated BP. Initial BP check was 172/98 after 10 minutes BP was 172/90. Pt is experiencing high levels of stress due to pts mother recently passing. Per Dr. Becerra pt was advised to schedule a BP check in 1 week and start amlodipine 5mg. Pt is agreeable to low dose xanax. Please send to [facility] pharmacy in [place].

## 2024-07-29 NOTE — TELEPHONE ENCOUNTER
"  Reason for Disposition   Systolic BP >= 180 OR Diastolic >= 110    Answer Assessment - Initial Assessment Questions  1. BLOOD PRESSURE: \"What is the blood pressure?\" \"Did you take at least two measurements 5 minutes apart?\"        Not able to take check while at home but at orthopedic office it was 170/124 and 170 /118    Denies all symptoms      2. ONSET: \"When did you take your blood pressure?\"        Was taken today at orthopedic visit        3. HOW: \"How did you obtain the blood pressure?\" (e.g., visiting nurse, automatic home BP monitor)          Not available at this time    4. HISTORY: \"Do you have a history of high blood pressure?\"       Denies        5. MEDICATIONS: \"Are you taking any medications for blood pressure?\" \"Have you missed any doses recently?\"          NA    6. OTHER SYMPTOMS: \"Do you have any symptoms?\" (e.g., headache, chest pain, blurred vision, difficulty breathing, weakness)      Denies all    Protocols used: High Blood Pressure-ADULT-[place]    "

## 2024-07-29 NOTE — TELEPHONE ENCOUNTER
Patient calls in stating she was in the Orthopedic office and her BP was 170/124 and 170/118.  Patient denies all symptoms.  Patient does not have a BP cuff available at this time.  Denies headache, vision changes, dizziness, lightheadedness, weakness, N/V, chest pain, SOB at this time.  Patient states recent stress within her family could contribute to elevated BP.  Protocol recommends evaluation in the office now.  No appointments available.      I recommended UC evaluation now for BP check. Patient declines this recommendation and requesting doctor office appointment.    Please review and and call the patient back asap.

## 2024-07-29 NOTE — PROGRESS NOTES
Subjective   CHIEF COMPLAINT/REASON FOR VISIT  Millie Moyer is a 54 y.o. female who presents for  3.5 month post op follow-up after Knee arthroscopy for medial meniscus debridement - Left on 4/11/2024     HISTORY OF PRESENT ILLNESS  Overall, she feels things are going well and progressing appropriately.  Patient said that her pain is better from when we last saw her.  She does mention 9 days ago she fell onto her left knee after her dog tripped her but this has been improving over the past 3 days.  She still endorses a little bit of medial sided pain but again this is improved.  She has tried RICE and compression which helps a little bit.  She has also been trying to be more active doing physical therapy exercises, exercises in the pool, and walking/elliptical for 15 minutes.    Patient does mention that unfortunately her mother passed away recently.  She mentions that she had a couple coffee prior to coming this appointment.  She said that her blood pressure typically runs at a normal level.  She denies chest pain, shortness of breath, dizziness, lightheadedness, and vision changes.    Objective   PHYSICAL EXAMINATION  Musculoskeletal: left Knee Examination:  General: The patient is alert, oriented, and pleasant to interact with.  Patient ambulates with Normal gait pattern  Assistive Device: No  Alignment: normal  Skin is warm and dry to touch with no signs of erythema, ecchymosis, or infection   Effusion: none  ROM: 0° - 135°   MMT: deferred  TTP: very mild medial joint line  Flexor and extensor mechanisms are intact   Knee is stable to varus and valgus stress  Jaleel's Test Negative    Lachman's Test - 1A  2+ DP and PT pulses with brisk capillary refill to the toes  Sural, saphenous, tibial, superficial, and deep peroneal motor and sensory distributions intact  Sensation light touch intact distally    Assessment & Plan   1. Status Post Knee arthroscopy for medial meniscus debridement - Left    She is  doing well after surgery and making appropriate progress.  It appears patient suffered a some minor setback recently but she appears to be improving well from this.  Discussion of the patient that some of her residual pain is likely related to arthritis and encouraged her to continue with strengthening of the knee joint.    Reviewed patient's most recent PCP note which showed normal blood pressure.  However, patient was educated on the importance of adequate blood pressure control.  We did discuss the risk of severely elevated blood pressure including severe disability and death.  Likely, patient's recent caffeine use as well as the death of her mother are playing a part in her elevated blood pressure.  Patient was encouraged to go to the ER should she develop any symptoms including but not limited to chest pain, shortness of breath, dizziness, lightheadedness, and vision changes.  Patient is understanding and agreeable to above information.    We will plan to see her back as needed. If any issues, questions, or concerns arise between now and the next appointment, we have encouraged the patient contact our team.      Scribe Attestation      I,:  Sarmad Moon PA-C am acting as a scribe while in the presence of the attending physician.:       I,:  Jin Delong MD personally performed the services described in this documentation    as scribed in my presence.:

## 2024-07-29 NOTE — PROGRESS NOTES
Pt was here today for a BP check per provider. Pt was at a different appt and had an elevated BP. Initial BP check was 172/98 after 10 minutes BP was 172/90. Pt is experiencing high levels of stress due to pts mother recently passing. Per Dr. Becerra pt was advised to schedule a BP check in 1 week and start amlodipine 5mg.

## 2024-08-05 ENCOUNTER — CLINICAL SUPPORT (OUTPATIENT)
Dept: FAMILY MEDICINE CLINIC | Facility: CLINIC | Age: 54
End: 2024-08-05
Payer: COMMERCIAL

## 2024-08-05 ENCOUNTER — TELEPHONE (OUTPATIENT)
Dept: FAMILY MEDICINE CLINIC | Facility: CLINIC | Age: 54
End: 2024-08-05

## 2024-08-05 VITALS
DIASTOLIC BLOOD PRESSURE: 82 MMHG | HEART RATE: 95 BPM | SYSTOLIC BLOOD PRESSURE: 148 MMHG | TEMPERATURE: 97.3 F | OXYGEN SATURATION: 97 %

## 2024-08-05 DIAGNOSIS — I10 ESSENTIAL HYPERTENSION: Primary | ICD-10-CM

## 2024-08-05 DIAGNOSIS — Z01.30 BP CHECK: Primary | ICD-10-CM

## 2024-08-05 PROCEDURE — 99211 OFF/OP EST MAY X REQ PHY/QHP: CPT

## 2024-08-05 RX ORDER — LISINOPRIL 5 MG/1
5 TABLET ORAL DAILY
Qty: 30 TABLET | Refills: 3 | Status: SHIPPED | OUTPATIENT
Start: 2024-08-05

## 2024-08-05 NOTE — TELEPHONE ENCOUNTER
Patient returned phone call and was given Dr. Becerra's orders.  Patient aware of new prescription for lisinopril.  Patient also made 2 week follow up appointment at this time.

## 2024-08-23 ENCOUNTER — OFFICE VISIT (OUTPATIENT)
Dept: FAMILY MEDICINE CLINIC | Facility: CLINIC | Age: 54
End: 2024-08-23
Payer: COMMERCIAL

## 2024-08-23 VITALS
BODY MASS INDEX: 28.22 KG/M2 | HEIGHT: 68 IN | TEMPERATURE: 97.7 F | OXYGEN SATURATION: 99 % | DIASTOLIC BLOOD PRESSURE: 80 MMHG | SYSTOLIC BLOOD PRESSURE: 118 MMHG | WEIGHT: 186.2 LBS | HEART RATE: 89 BPM

## 2024-08-23 DIAGNOSIS — I10 ESSENTIAL HYPERTENSION: ICD-10-CM

## 2024-08-23 PROCEDURE — 99213 OFFICE O/P EST LOW 20 MIN: CPT | Performed by: FAMILY MEDICINE

## 2024-08-23 RX ORDER — AMLODIPINE BESYLATE 5 MG/1
5 TABLET ORAL DAILY
Qty: 30 TABLET | Refills: 5 | Status: SHIPPED | OUTPATIENT
Start: 2024-08-23

## 2024-08-26 NOTE — PROGRESS NOTES
Patient ID: Millie Moyer is a 54 y.o. female.    HPI: 54 y.o.female presents for follow up hypertension.   Pt denies any dizziness,  chest pain, palpitations, or dypsnea with exertion.      SUBJECTIVE    Family History   Problem Relation Age of Onset    Thyroid cancer Mother 40    Breast cancer Mother 74    Asthma Mother     Psoriasis Father     Basal cell carcinoma Father     Arthritis Father         Psoriatic    Lung cancer Paternal Grandfather 50    Breast cancer Other     Heart disease Other     Prostate cancer Other      Social History     Socioeconomic History    Marital status: /Civil Union     Spouse name: Not on file    Number of children: 1    Years of education: Not on file    Highest education level: Not on file   Occupational History    Occupation: employed   Tobacco Use    Smoking status: Never     Passive exposure: Never    Smokeless tobacco: Never   Vaping Use    Vaping status: Never Used   Substance and Sexual Activity    Alcohol use: Yes     Comment: social    Drug use: No    Sexual activity: Yes     Partners: Male     Birth control/protection: None   Other Topics Concern    Not on file   Social History Narrative    Caffeine use- coffee     Social Determinants of Health     Financial Resource Strain: Not on file   Food Insecurity: Not on file   Transportation Needs: Not on file   Physical Activity: Not on file   Stress: Not on file   Social Connections: Not on file   Intimate Partner Violence: Not on file   Housing Stability: Not on file     Past Medical History:   Diagnosis Date    Arthritis     BRCA negative     Cancer (HCC) 2015    Breast right    Colon polyp     COVID     x 2 -last time 2022    Endometriosis     H/O bilateral breast implants     Headache     occ    IBS (irritable bowel syndrome)     Insomnia     Night sweats     Pain in left knee     PONV (postoperative nausea and vomiting) 02/07/2024    Psoriasis     Skin tag     Uses crutches     Wears glasses      Past Surgical  History:   Procedure Laterality Date    BREAST BIOPSY Right     IDC     SECTION      COLONOSCOPY      LAPAROSCOPY      exploratory, endometriosis    LYMPHADENECTOMY Right     x2    MASTECTOMY      bilateral mastectomy    MT ARTHRS KNE SURG W/MENISCECTOMY MED/LAT W/SHVG Left 2024    Procedure: Knee arthroscopy for medial meniscus debridement;  Surgeon: Jin Delong MD;  [facility];  Service: Orthopedics    MT NDSC WRST SURG W/RLS TRANSVRS CARPL LIGM Right 2024    Procedure: RELEASE CARPAL TUNNEL ENDOSCOPIC;  Surgeon: Kennedy Garcia MD;  [facility];  Service: Orthopedics    MT NEUROPLASTY &/TRANSPOSITION ULNAR NERVE ELBOW Right 2024    Procedure: RELEASE CUBITAL TUNNEL;  Surgeon: Kennedy Garcia MD;  [facility];  Service: Orthopedics    MT NIPPLE/AREOLA RECONSTRUCTION Bilateral 10/13/2016    Procedure: NIPPLE RECONSTRUCTION ;  Surgeon: Tulio Wilson MD;  [facility];  Service: Plastics    MT REVISION OF RECONSTRUCTED BREAST Bilateral 2016    Procedure: RECONSTRUCTION REVISION  BREAST MOUND ,FAT GRAFTS ;  Surgeon: Tulio Wilson MD;  [facility];  Service: Plastics    MT TENDON SHEATH INCISION Right 2024    Procedure: RELEASE TRIGGER FINGER - RIGHT MIDDLE;  Surgeon: Kennedy Garcia MD;  [facility];  Service: Orthopedics    SENTINEL LYMPH NODE BIOPSY Right      Allergies   Allergen Reactions    Other Hives     Kait selzer    Kait-Cypress Plus Cold & Cough  [[id number]-Apap] Hives    Morphine Vomiting     Vomits profusely     Prednisone Other (See Comments) and GI Intolerance     insomnia       Current Outpatient Medications:     amLODIPine (NORVASC) 5 mg tablet, Take 1 tablet (5 mg total) by mouth daily, Disp: 30 tablet, Rfl: 5    ASHWAGANDHA PO, Take by mouth every evening, Disp: , Rfl:     Cholecalciferol (Vitamin D3) 125 MCG (5000 UT) TBDP, Take by mouth daily, Disp: , Rfl:     Cosentyx  Sensoready, 300 MG, 150 MG/ML SOAJ injection, , Disp: , Rfl:     lisinopril (ZESTRIL) 5 mg tablet, Take 1 tablet (5 mg total) by mouth daily, Disp: 30 tablet, Rfl: 3    Magnesium 400 MG CAPS, Take 1 tablet by mouth daily at bedtime, Disp: , Rfl:     milk thistle 175 MG tablet, Take 175 mg by mouth daily, Disp: , Rfl:     Multiple Vitamin (multivitamin) capsule, Take 1 capsule by mouth daily, Disp: , Rfl:     tamoxifen (NOLVADEX) 20 mg tablet, TAKE 1 TABLET BY MOUTH EVERY DAY, Disp: 90 tablet, Rfl: 3    acetaminophen (TYLENOL) 325 mg tablet, Take 1 tablet (325 mg total) by mouth every 6 (six) hours as needed for mild pain (Patient not taking: Reported on 8/23/2024), Disp: 60 tablet, Rfl: 0    ALPRAZolam (XANAX) 0.25 mg tablet, Take 1 tablet (0.25 mg total) by mouth 3 (three) times a day as needed for anxiety (Patient not taking: Reported on 8/23/2024), Disp: 40 tablet, Rfl: 0    naloxone (NARCAN) 4 mg/0.1 mL nasal spray, Administer 1 spray into a nostril. If no response after 2-3 minutes, give another dose in the other nostril using a new spray. (Patient not taking: Reported on 8/23/2024), Disp: 1 each, Rfl: 1    secukinumab (Cosentyx Sensoready Pen) 150 mg/mL SOAJ injection, Inject 2 mL (300 mg total) under the skin every 30 (thirty) days (Patient not taking: Reported on 7/29/2024), Disp: 2 mL, Rfl: 11    traMADol (ULTRAM) 50 mg tablet, Take 1 tablet (50 mg total) by mouth every 6 (six) hours as needed for moderate pain or severe pain for up to 10 doses Continuation of therapy (Patient not taking: Reported on 8/23/2024), Disp: 60 tablet, Rfl: 0    Review of Systems  Constitutional:     Denies fever, chills ,fatigue ,weakness ,weight loss, weight gain     ENT: Denies earache ,loss of hearing ,nosebleed, nasal discharge,nasal congestion ,sore throat ,hoarseness  Pulmonary: Denies shortness of breath ,cough  ,dyspnea on exertion, orthopnea  ,PND   Cardiovascular:  Denies bradycardia , tachycardia  ,palpations, lower  "extremity edema leg, claudication  Breast:  Denies new or changing breast lumps ,nipple discharge ,nipple changes  Abdomen:  Denies abdominal pain , anorexia , indigestion, nausea, vomiting, constipation, diarrhea  Musculoskeletal: Denies myalgias, arthralgias, joint swelling, joint stiffness , limb pain, limb swelling  Gu: denies dysuria, polyuria  Skin: Denies skin rash, skin lesion, skin wound, itching, dry skin  Neuro: Denies headache, numbness, tingling, confusion, loss of consciousness, dizziness, vertigo  Psychiatric: Denies feelings of depression, suicidal ideation, anxiety, sleep disturbances    OBJECTIVE  /80   Pulse 89   Temp 97.7 °F (36.5 °C)   Ht 5' 8\" (1.727 m)   Wt 84.5 kg (186 lb 3.2 oz)   SpO2 99%   BMI 28.31 kg/m²   Constitutional:   NAD, well appearing and well nourished      ENT:   Conjunctiva and lids: no injection, edema, or discharge     Pupils and iris: JULIET bilaterally    External inspection of ears and nose: normal without deformities or discharge.      Otoscopic exam: Canals patent without erythema.       Nasal mucosa, septum and turbinates: Normal or edema or discharge         Oropharynx:  Moist mucosa, normal tongue and tonsils without lesions. No erythema        Pulmonary:Respiratory effort normal rate and rhythm, no increased work of breathing. Auscultation of lungs:  Clear bilaterally with no adventitious breath sounds       Cardiovascular: regular rate and rhythm, S1 and S2, no murmur, no edema and/or varicosities of LE      Abdomen: Soft and non-distended     Positive bowel sounds      No heptomegaly or splenomegaly      Gu: no suprapubic tenderness or CVA tenderness, no urethral discharge  Lymphatic:  No anterior or posterior cervical lymphadenopathy         Musculoskeletal:  Gait and station: Normal gait      Digits and nails normal without clubbing or cyanosis       Inspection/palpation of joints, bones, and muscles:  No joint tenderness, swelling, full active and " passive range of motion       Skin: Normal skin turgor and no rashes      Neuro:       Psych:   alert and oriented to person, place and time     normal mood and affect       Assessment/Plan:Diagnoses and all orders for this visit:    Essential hypertension  -     amLODIPine (NORVASC) 5 mg tablet; Take 1 tablet (5 mg total) by mouth daily        Reviewed with patient plan to treat with above plan.    Patient instructed to call in 72 hours if not feeling better or if symptoms worse

## 2024-09-12 ENCOUNTER — TELEPHONE (OUTPATIENT)
Age: 54
End: 2024-09-12

## 2024-09-12 NOTE — TELEPHONE ENCOUNTER
Patient called states is in [place] for wake of her mother and is having sinus congestion, sorethroat, pain in teeth for 1 day. Would like to know if something can be sent to [facility] at [id number] [place] [id number]. Phone is [phone]. Would like a call when sent in or not

## 2024-09-18 NOTE — TELEPHONE ENCOUNTER
PA for Tramadol 50 mg    Submitted via    [x][employer]-KEY [id number]  [][employer]-Case ID #   []Faxed to plan   []Other website   []Phone call Case ID #     Office notes sent, clinical questions answered. Awaiting determination    Turnaround time for your insurance to make a decision on your Prior Authorization can take 7-21 business days.              show

## 2024-10-03 DIAGNOSIS — L40.9 PSORIASIS: ICD-10-CM

## 2024-10-03 RX ORDER — SECUKINUMAB 150 MG/ML
300 INJECTION SUBCUTANEOUS
Qty: 2 ML | Refills: 5 | Status: SHIPPED | OUTPATIENT
Start: 2024-10-03

## 2024-10-03 NOTE — TELEPHONE ENCOUNTER
Reason for call: [facility] requesting refill ([place])    [x] Refill   [] Prior Auth  [] Other:     Office:   [] PCP/Provider -   [x] Specialty/Provider - Derm Dr Margot Chandler     Medication: secukinumab (Cosentyx Sensoready Pen) 150 mg/mL SOAJ injection     Dose/Frequency: see above     Quantity: 2 pens with refills     Pharmacy: [facility]     Does the patient have enough for 3 days? Not sure of remaining qty  [] Yes   [] No - Send as HP to POD

## 2024-10-16 ENCOUNTER — OFFICE VISIT (OUTPATIENT)
Dept: DERMATOLOGY | Facility: CLINIC | Age: 54
End: 2024-10-16
Payer: COMMERCIAL

## 2024-10-16 VITALS — WEIGHT: 188 LBS | BODY MASS INDEX: 28.49 KG/M2 | HEIGHT: 68 IN | TEMPERATURE: 97.6 F

## 2024-10-16 DIAGNOSIS — L40.9 PSORIASIS: Primary | ICD-10-CM

## 2024-10-16 PROCEDURE — 99213 OFFICE O/P EST LOW 20 MIN: CPT | Performed by: DERMATOLOGY

## 2024-10-16 NOTE — PROGRESS NOTES
"[facility] Note     Patient Name: Millie Moyer  Encounter Date: 10/16/2024     Have you been cared for by a [facility]ologist in the last 3 years and, if so, which description applies to you?    Yes.  I have been here within the last 3 years, and my medical history has NOT changed since that time.  I am FEMALE/of child-bearing potential.    REVIEW OF SYSTEMS:  Have you recently had or currently have any of the following? No changes in my recent health.   PAST MEDICAL HISTORY:  Have you personally ever had or currently have any of the following?  If \"YES,\" then please provide more detail. No changes in my medical history.   HISTORY OF IMMUNOSUPPRESSION: Do you have a history of any of the following:  Systemic Immunosuppression such as Diabetes, Biologic or Immunotherapy, Chemotherapy, Organ Transplantation, Bone Marrow Transplantation or Prednisone?  YES, taking Cosentyx     Answering \"YES\" requires the addition of the dotphrase \"IMMUNOSUPPRESSED\" as the first diagnosis of the patient's visit.   FAMILY HISTORY:  Any \"first degree relatives\" (parent, brother, sister, or child) with the following?    No changes in my family's known health.   PATIENT EXPERIENCE:    Do you want the Dermatologist to perform a COMPLETE skin exam today including a clinical examination under the \"bra and underwear\" areas?  NO  If necessary, do we have your permission to call and leave a detailed message on your Preferred Phone number that includes your specific medical information?  Yes      Allergies   Allergen Reactions    Other Hives     Kait selzer    Kait-[place] Plus Cold & Cough  [[id number]-[id number]-Apap] Hives    Morphine Vomiting     Vomits profusely     Prednisone Other (See Comments) and GI Intolerance     insomnia      Current Outpatient Medications:     acetaminophen (TYLENOL) 325 mg tablet, Take 1 tablet (325 mg total) by mouth every 6 (six) hours as needed for mild pain (Patient not taking: " Reported on 8/23/2024), Disp: 60 tablet, Rfl: 0    ALPRAZolam (XANAX) 0.25 mg tablet, Take 1 tablet (0.25 mg total) by mouth 3 (three) times a day as needed for anxiety (Patient not taking: Reported on 8/23/2024), Disp: 40 tablet, Rfl: 0    amLODIPine (NORVASC) 5 mg tablet, Take 1 tablet (5 mg total) by mouth daily, Disp: 30 tablet, Rfl: 5    ASHWAGANDHA PO, Take by mouth every evening, Disp: , Rfl:     Cholecalciferol (Vitamin D3) 125 MCG (5000 UT) TBDP, Take by mouth daily, Disp: , Rfl:     Cosentyx Sensoready, 300 MG, 150 MG/ML SOAJ injection, , Disp: , Rfl:     lisinopril (ZESTRIL) 5 mg tablet, Take 1 tablet (5 mg total) by mouth daily, Disp: 30 tablet, Rfl: 3    Magnesium 400 MG CAPS, Take 1 tablet by mouth daily at bedtime, Disp: , Rfl:     milk thistle 175 MG tablet, Take 175 mg by mouth daily, Disp: , Rfl:     Multiple Vitamin (multivitamin) capsule, Take 1 capsule by mouth daily, Disp: , Rfl:     naloxone (NARCAN) 4 mg/0.1 mL nasal spray, Administer 1 spray into a nostril. If no response after 2-3 minutes, give another dose in the other nostril using a new spray. (Patient not taking: Reported on 8/23/2024), Disp: 1 each, Rfl: 1    secukinumab (Cosentyx Sensoready Pen) 150 mg/mL SOAJ injection, Inject 2 mL (300 mg total) under the skin every 30 (thirty) days, Disp: 2 mL, Rfl: 5    tamoxifen (NOLVADEX) 20 mg tablet, TAKE 1 TABLET BY MOUTH EVERY DAY, Disp: 90 tablet, Rfl: 3    traMADol (ULTRAM) 50 mg tablet, Take 1 tablet (50 mg total) by mouth every 6 (six) hours as needed for moderate pain or severe pain for up to 10 doses Continuation of therapy (Patient not taking: Reported on 8/23/2024), Disp: 60 tablet, Rfl: 0          Whom besides the patient is providing clinical information about today's encounter?   NO ADDITIONAL HISTORIAN (patient alone provided history)    Physical Exam and Assessment/Plan by Diagnosis:    PSORIASIS FOLLOW UP    Physical Exam:  Anatomic Location Affected:  left elbow  Morphological  Description:  small pink scaly papule  Severity: moderate  Body Percent Affected:1%  Pertinent Positives:  Pertinent Negatives:    Additional History of Present Condition:  patient is currently on Cosentyx injection and doing great. Psoriasis has cleared up with one small patch on left elbow. She uses Clobetasol if needed. Pt states joint pain is most likely from recent meniscus surgery.     Assessment and Plan:  Based on a thorough discussion of this condition and the management approach to it (including a comprehensive discussion of the known risks, side effects and potential benefits of treatment), the patient (family) agrees to implement the following specific plan:  Continue Cosentyx - will send for renewal of prior authorization      Psoriasis is a chronic inflammatory condition that causes the body to make new skin cells in days rather than weeks. As these cells pile up on the surface of the skin, you may see thick, scaly patches of thickened skin.   Psoriasis affects 2-4% of males and females. It can start at any age including childhood, with peaks of onset at 15-25 years and 50-60 years. It tends to persist lifelong, fluctuating in extent and severity. It is particularly common in Caucasians but may affect people of any race. About one-third of patients with psoriasis have family members with psoriasis.  Psoriasis is multifactorial. It is classified as an immune-mediated inflammatory disease (IMID). Genetic factors are important and influence the type of psoriasis and response to treatment.     What are the signs and symptoms of psoriasis?    There are many different types of psoriasis that each have present uniquely. The types of psoriasis include:    Plaque psoriasis: About 80% to 90% of people who have psoriasis develop this type. When plaque psoriasis appears, you may see:  Plaque psoriasis usually presents with symmetrically distributed, red, scaly plaques with well-defined edges. The scale is  typically silvery white, except in skin folds where the plaques often appear shiny and they may have a moist peeling surface. The most common sites are scalp, elbows and knees, but any part of the skin can be involved. The plaques are usually very persistent without treatment.  Itch is mostly mild but may be severe in some patients, leading to scratching and lichenification (thickened leathery skin with increased skin markings). Painful skin cracks or fissures may occur.  When psoriatic plaques clear up, they may leave brown or pale marks that can be expected to fade over several months.    Guttate psoriasis: When someone gets this type of psoriasis, you often see tiny bumps appear on the skin quite suddenly. The bumps tend to cover much of the torso, legs, and arms. Sometimes, the bumps also develop on the face, scalp, and ears. No matter where they appear, the bumps tend to be:   Small and scaly  [place]-colored to pink  Temporary, clearing in a few weeks or months without treatment  When guttate psoriasis clears, it may never return. Why this happens is still a bit of a mystery. Guttate psoriasis tends to develop in children and young adults who've had an infection, such as strep throat. It's possible that when the infection clears so does guttate psoriasis.  It's also possible to have:  Guttate psoriasis for life  See the guttate psoriasis clear and plaque psoriasis develop later in life  Plaque psoriasis when you develop guttate psoriasis  There's no way to predict what will happen after the first flare-up of guttate psoriasis clears.    Inverse psoriasis: This type of psoriasis develops in areas where skin touches skin, such as the armpits, genitals, and crease of the buttocks. Where the inverse psoriasis appears, you're likely to notice:  Smooth, red patches of skin that look raw  Little, if any, silvery-white coating  Sore or painful skin  Other names for this type of psoriasis are intertriginous psoriasis or  flexural psoriasis.  Pustular psoriasis: This type of psoriasis causes pus-filled bumps that usually appear only on the feet and hands. While the pus-filled bumps may look like an infection, the skin is not infected. The bumps don't contain bacteria or anything else that could cause an infection.  Where pustular psoriasis appears, you tend to notice:  Red, swollen skin that is dotted with pus-filled bumps  Extremely sore or painful skin  Brown dots (and sometimes scale) appear as the pus-filled bumps dry  Pustular psoriasis can make just about any activity that requires your hands or feet, such as typing or walking, unbearably painful.    Pustular psoriasis (generalized): Serious and life-threatening, this rare type of psoriasis causes pus-filled bumps to develop on much of the skin. Also called von Zumbusch psoriasis, a flare-up causes this sequence of events:  Skin on most of the body suddenly turns dry, red, and tender.  Within hours, pus-filled bumps cover most of the skin.  Often within a day, the pus-filled bumps break open and pools of pus leak onto the skin.  As the pus dries (usually within 24 to 48 hours), the skin dries out and peels (as shown in this picture).  When the dried skin peels off, you see a smooth, glazed surface.  In a few days or weeks, you may see a new crop of pus-filled bumps covering most of the skin, as the cycle repeats itself.  Anyone with pustular psoriasis also feels very sick, and may develop a fever, headache, muscle weakness, and other symptoms. Medical care is often necessary to save the person's life.    Erythrodermic psoriasis: Serious and life-threatening, this type of psoriasis requires immediate medical care. When someone develops erythrodermic psoriasis, you may notice:  Skin on most of the body looks burnt  Chills, fever, and the person looks extremely ill  Muscle weakness, a rapid pulse, and severe itch  The person may also be unable to keep warm, so hypothermia can set  in quickly.  Most people who develop this type of psoriasis already have another type of psoriasis. Before developing erythrodermic psoriasis, they often notice that their psoriasis is worsening or not improving with treatment. If you notice either of these happening, see a board-certified dermatologist.    Nails    Nail psoriasis: With any type of psoriasis, you may see changes to your fingernails or toenails. About half of the people who have plaque psoriasis see signs of psoriasis on their fingernails at some point2.  When psoriasis affects the nails, you may notice:  Tiny dents in your nails (called “nail pits”)  White, yellow, or brown discoloration under one or more nails  Crumbling, rough nails  A nail lifting up so that it's no longer attached  Buildup of skin cells beneath one or more nails, which lifts up the nail  Treatment and proper nail care can help you control nail psoriasis.    Psoriatic arthritis: If you have psoriasis, it's important to pay attention to your joints. Some people who have psoriasis develop a type of arthritis called psoriatic arthritis. This is more likely to occur if you have severe psoriasis.  Most people notice psoriasis on their skin years before they develop psoriatic arthritis. It's also possible to get psoriatic arthritis before psoriasis, but this is less common.  When psoriatic arthritis develops, the signs can be subtle. At first, you may notice:  A swollen and tender joint, especially in a finger or toe  Heel pain  Swelling on the back of your leg, just above your heel  Stiffness in the morning that fades during the day  Like psoriasis, psoriatic arthritis cannot be cured. Treatment can prevent psoriatic arthritis from worsening, which is important. Allowed to progress, psoriatic arthritis can become disabling.    Diagnosis and treatment of psoriasis   Psoriasis is usually diagnosed by clinical features, and skin biopsy if necessary.   It is important to decrease factors  that aggravate psoriasis. These include treating streptococcal infections, minimizing skin injuries, avoiding sun exposure if it exacerbates psoriasis, smoking, alcohol usage, decreasing stress, and maintaining an optimal body weight. Certain medications such as lithium, beta blockers, antimalarials, and NSAIDs have also been implicated. Suddenly stopping oral steroids or strong topical steroids can cause rebound disease.     There are many categories of psoriasis treatments available.     Topical therapy  Mild psoriasis is generally treated with topical agents alone. Which treatment is selected may depend on body site, extent and severity of psoriasis.  Emollients  Coal tar preparations  Dithranol  Salicylic acid  Vitamin D analogue (calcipotriol)  Topical corticosteroids  Calcineurin inhibitor (tacrolimus, pimecrolimus)  Phototherapy  Most psoriasis centres offer phototherapy with ultraviolet (UV) radiation, often in combination with topical or systemic agents. Types of phototherapy include  Narrowband UVB  Broadband UVB  Photochemotherapy (PUVA)  Targeted phototherapy  Systemic therapy  Moderate to severe psoriasis warrants treatment with a systemic agent and/or phototherapy. The most common treatments are:  Methotrexate  Ciclosporin  Acitretin  Other medicines occasionally used for psoriasis include:  Mycophenolate  Apremilast  Hydroxyurea  Azathioprine  6-mercaptopurine  Systemic corticosteroids are best avoided due to a risk of severe withdrawal flare of psoriasis and adverse effects.  Biologics or targeted therapies are reserved for conventional treatment-resistant severe psoriasis, mainly because of expense, as side effects compare favorably with other systemic agents. These include:  Anti-tumour necrosis factor-alpha antagonists (anti-TNF?) infliximab, adalimumab and etanercept  The interleukin (IL)-12/23 antagonist ustekinumab  IL-17 antagonists such as secukinumab  Many other monoclonal antibodies are  under investigation in the treatment of psoriasis.     Scribe Attestation      I,:  Dory Hicks MA am acting as a scribe while in the presence of the attending physician.:       I,:  Margot Chandler MD personally performed the services described in this documentation    as scribed in my presence.:

## 2024-10-16 NOTE — PATIENT INSTRUCTIONS
PSORIASIS FOLLOW UP    Assessment and Plan:  Based on a thorough discussion of this condition and the management approach to it (including a comprehensive discussion of the known risks, side effects and potential benefits of treatment), the patient (family) agrees to implement the following specific plan:  Continue Cosentyx - will send for renewal of prior authorization      Psoriasis is a chronic inflammatory condition that causes the body to make new skin cells in days rather than weeks. As these cells pile up on the surface of the skin, you may see thick, scaly patches of thickened skin.   Psoriasis affects 2-4% of males and females. It can start at any age including childhood, with peaks of onset at 15-25 years and 50-60 years. It tends to persist lifelong, fluctuating in extent and severity. It is particularly common in Caucasians but may affect people of any race. About one-third of patients with psoriasis have family members with psoriasis.  Psoriasis is multifactorial. It is classified as an immune-mediated inflammatory disease (IMID). Genetic factors are important and influence the type of psoriasis and response to treatment.     What are the signs and symptoms of psoriasis?    There are many different types of psoriasis that each have present uniquely. The types of psoriasis include:    Plaque psoriasis: About 80% to 90% of people who have psoriasis develop this type. When plaque psoriasis appears, you may see:  Plaque psoriasis usually presents with symmetrically distributed, red, scaly plaques with well-defined edges. The scale is typically silvery white, except in skin folds where the plaques often appear shiny and they may have a moist peeling surface. The most common sites are scalp, elbows and knees, but any part of the skin can be involved. The plaques are usually very persistent without treatment.  Itch is mostly mild but may be severe in some patients, leading to scratching and lichenification  (thickened leathery skin with increased skin markings). Painful skin cracks or fissures may occur.  When psoriatic plaques clear up, they may leave brown or pale marks that can be expected to fade over several months.    Guttate psoriasis: When someone gets this type of psoriasis, you often see tiny bumps appear on the skin quite suddenly. The bumps tend to cover much of the torso, legs, and arms. Sometimes, the bumps also develop on the face, scalp, and ears. No matter where they appear, the bumps tend to be:   Small and scaly  [place]-colored to pink  Temporary, clearing in a few weeks or months without treatment  When guttate psoriasis clears, it may never return. Why this happens is still a bit of a mystery. Guttate psoriasis tends to develop in children and young adults who've had an infection, such as strep throat. It's possible that when the infection clears so does guttate psoriasis.  It's also possible to have:  Guttate psoriasis for life  See the guttate psoriasis clear and plaque psoriasis develop later in life  Plaque psoriasis when you develop guttate psoriasis  There's no way to predict what will happen after the first flare-up of guttate psoriasis clears.    Inverse psoriasis: This type of psoriasis develops in areas where skin touches skin, such as the armpits, genitals, and crease of the buttocks. Where the inverse psoriasis appears, you're likely to notice:  Smooth, red patches of skin that look raw  Little, if any, silvery-white coating  Sore or painful skin  Other names for this type of psoriasis are intertriginous psoriasis or flexural psoriasis.  Pustular psoriasis: This type of psoriasis causes pus-filled bumps that usually appear only on the feet and hands. While the pus-filled bumps may look like an infection, the skin is not infected. The bumps don't contain bacteria or anything else that could cause an infection.  Where pustular psoriasis appears, you tend to notice:  Red, swollen skin that  is dotted with pus-filled bumps  Extremely sore or painful skin  Brown dots (and sometimes scale) appear as the pus-filled bumps dry  Pustular psoriasis can make just about any activity that requires your hands or feet, such as typing or walking, unbearably painful.    Pustular psoriasis (generalized): Serious and life-threatening, this rare type of psoriasis causes pus-filled bumps to develop on much of the skin. Also called von Zumbusch psoriasis, a flare-up causes this sequence of events:  Skin on most of the body suddenly turns dry, red, and tender.  Within hours, pus-filled bumps cover most of the skin.  Often within a day, the pus-filled bumps break open and pools of pus leak onto the skin.  As the pus dries (usually within 24 to 48 hours), the skin dries out and peels (as shown in this picture).  When the dried skin peels off, you see a smooth, glazed surface.  In a few days or weeks, you may see a new crop of pus-filled bumps covering most of the skin, as the cycle repeats itself.  Anyone with pustular psoriasis also feels very sick, and may develop a fever, headache, muscle weakness, and other symptoms. Medical care is often necessary to save the person's life.    Erythrodermic psoriasis: Serious and life-threatening, this type of psoriasis requires immediate medical care. When someone develops erythrodermic psoriasis, you may notice:  Skin on most of the body looks burnt  Chills, fever, and the person looks extremely ill  Muscle weakness, a rapid pulse, and severe itch  The person may also be unable to keep warm, so hypothermia can set in quickly.  Most people who develop this type of psoriasis already have another type of psoriasis. Before developing erythrodermic psoriasis, they often notice that their psoriasis is worsening or not improving with treatment. If you notice either of these happening, see a board-certified dermatologist.    Nails    Nail psoriasis: With any type of psoriasis, you may see  changes to your fingernails or toenails. About half of the people who have plaque psoriasis see signs of psoriasis on their fingernails at some point2.  When psoriasis affects the nails, you may notice:  Tiny dents in your nails (called “nail pits”)  White, yellow, or brown discoloration under one or more nails  Crumbling, rough nails  A nail lifting up so that it's no longer attached  Buildup of skin cells beneath one or more nails, which lifts up the nail  Treatment and proper nail care can help you control nail psoriasis.    Psoriatic arthritis: If you have psoriasis, it's important to pay attention to your joints. Some people who have psoriasis develop a type of arthritis called psoriatic arthritis. This is more likely to occur if you have severe psoriasis.  Most people notice psoriasis on their skin years before they develop psoriatic arthritis. It's also possible to get psoriatic arthritis before psoriasis, but this is less common.  When psoriatic arthritis develops, the signs can be subtle. At first, you may notice:  A swollen and tender joint, especially in a finger or toe  Heel pain  Swelling on the back of your leg, just above your heel  Stiffness in the morning that fades during the day  Like psoriasis, psoriatic arthritis cannot be cured. Treatment can prevent psoriatic arthritis from worsening, which is important. Allowed to progress, psoriatic arthritis can become disabling.    Diagnosis and treatment of psoriasis   Psoriasis is usually diagnosed by clinical features, and skin biopsy if necessary.   It is important to decrease factors that aggravate psoriasis. These include treating streptococcal infections, minimizing skin injuries, avoiding sun exposure if it exacerbates psoriasis, smoking, alcohol usage, decreasing stress, and maintaining an optimal body weight. Certain medications such as lithium, beta blockers, antimalarials, and NSAIDs have also been implicated. Suddenly stopping oral steroids or  strong topical steroids can cause rebound disease.     There are many categories of psoriasis treatments available.     Topical therapy  Mild psoriasis is generally treated with topical agents alone. Which treatment is selected may depend on body site, extent and severity of psoriasis.  Emollients  [place] tar preparations  Dithranol  Salicylic acid  Vitamin D analogue (calcipotriol)  Topical corticosteroids  Calcineurin inhibitor (tacrolimus, pimecrolimus)  Phototherapy  Most psoriasis centres offer phototherapy with ultraviolet (UV) radiation, often in combination with topical or systemic agents. Types of phototherapy include  Narrowband UVB  Broadband UVB  Photochemotherapy (PUVA)  Targeted phototherapy  Systemic therapy  Moderate to severe psoriasis warrants treatment with a systemic agent and/or phototherapy. The most common treatments are:  Methotrexate  Ciclosporin  Acitretin  Other medicines occasionally used for psoriasis include:  Mycophenolate  Apremilast  Hydroxyurea  Azathioprine  6-mercaptopurine  Systemic corticosteroids are best avoided due to a risk of severe withdrawal flare of psoriasis and adverse effects.  Biologics or targeted therapies are reserved for conventional treatment-resistant severe psoriasis, mainly because of expense, as side effects compare favorably with other systemic agents. These include:  Anti-tumour necrosis factor-alpha antagonists (anti-TNF?) infliximab, adalimumab and etanercept  The interleukin (IL)-12/23 antagonist ustekinumab  IL-17 antagonists such as secukinumab  Many other monoclonal antibodies are under investigation in the treatment of psoriasis.

## 2024-10-17 ENCOUNTER — TELEPHONE (OUTPATIENT)
Age: 54
End: 2024-10-17

## 2024-10-17 DIAGNOSIS — L40.9 PSORIASIS: Primary | ICD-10-CM

## 2024-10-17 NOTE — TELEPHONE ENCOUNTER
PA for Cosentyx 300mg pen SUBMITTED     via    []CMM-KEY:   []Surescripts-Case ID #   []Availity-Auth ID # NDC #   [x]Faxed to plan - form scanned into chart.  []Other website   []Phone call Case ID #     Office notes sent, clinical questions answered. Awaiting determination    Turnaround time for your insurance to make a decision on your Prior Authorization can take 7-21 business days.

## 2024-10-21 NOTE — TELEPHONE ENCOUNTER
PA for Cosentyx 300mg pen  APPROVED     Date(s) approved 10/17/2024 - 10/17/2025      Patient advised by          []MyChart Message  [x]Phone call   []LMOM  []L/M to call office as no active Communication consent on file  []Unable to leave detailed message as VM not approved on Communication consent       Pharmacy advised by    [x]Fax  []Phone call    Approval letter scanned into Media Yes

## 2024-10-22 ENCOUNTER — OFFICE VISIT (OUTPATIENT)
Dept: FAMILY MEDICINE CLINIC | Facility: CLINIC | Age: 54
End: 2024-10-22
Payer: COMMERCIAL

## 2024-10-22 VITALS
TEMPERATURE: 97.7 F | HEART RATE: 94 BPM | WEIGHT: 188 LBS | DIASTOLIC BLOOD PRESSURE: 80 MMHG | HEIGHT: 68 IN | SYSTOLIC BLOOD PRESSURE: 132 MMHG | BODY MASS INDEX: 28.49 KG/M2 | OXYGEN SATURATION: 98 %

## 2024-10-22 DIAGNOSIS — J01.00 ACUTE NON-RECURRENT MAXILLARY SINUSITIS: Primary | ICD-10-CM

## 2024-10-22 PROCEDURE — 99214 OFFICE O/P EST MOD 30 MIN: CPT | Performed by: NURSE PRACTITIONER

## 2024-10-22 RX ORDER — CEFDINIR 300 MG/1
300 CAPSULE ORAL EVERY 12 HOURS SCHEDULED
Qty: 20 CAPSULE | Refills: 0 | Status: SHIPPED | OUTPATIENT
Start: 2024-10-22 | End: 2024-11-01

## 2024-10-22 RX ORDER — BENZONATATE 200 MG/1
200 CAPSULE ORAL 3 TIMES DAILY PRN
Qty: 20 CAPSULE | Refills: 0 | Status: SHIPPED | OUTPATIENT
Start: 2024-10-22

## 2024-10-22 NOTE — PROGRESS NOTES
Ambulatory Visit  Name: Millie Moyer      : 1970      MRN: [id number]  Encounter Provider: AGUSTINA Brumfield  Encounter Date: 10/22/2024   Encounter department: [facility]    Assessment & Plan  Acute non-recurrent maxillary sinusitis    Orders:    cefdinir (OMNICEF) 300 mg capsule; Take 1 capsule (300 mg total) by mouth every 12 (twelve) hours for 10 days    benzonatate (TESSALON) 200 MG capsule; Take 1 capsule (200 mg total) by mouth 3 (three) times a day as needed for cough      Depression Screening and Follow-up Plan: Patient was screened for depression during today's encounter. They screened negative with a PHQ-2 score of 0.      History of Present Illness     54 y.o.female presenting with facial pressure, upper teeth hurt, headache, chills and coughing yellow thick mucus for the past few weeks. She denies having a fever and states that she asked her PCP for an antibiotic prior to going to [place] for a memorial service for her mother and she was asked to do a COVID test. She reports that she did not have the time to do a COVID test so was not treated at that time. She reports that her symptoms have continued with no one elese around her getting sick.       History obtained from : patient    Review of Systems   Constitutional:  Positive for chills. Negative for fatigue and fever.   HENT:  Positive for congestion, postnasal drip, sinus pressure and sinus pain. Negative for ear pain, rhinorrhea and sore throat.    Respiratory:  Positive for cough.    Cardiovascular: Negative.    Gastrointestinal: Negative.    Musculoskeletal: Negative.    Neurological: Negative.    Psychiatric/Behavioral: Negative.       Current Outpatient Medications on File Prior to Visit   Medication Sig Dispense Refill    amLODIPine (NORVASC) 5 mg tablet Take 1 tablet (5 mg total) by mouth daily 30 tablet 5    ASHWAGANDHA PO Take by mouth every evening      Cholecalciferol (Vitamin D3) 125 MCG (5000  "UT) TBDP Take by mouth daily      Cosentyx Sensoready, 300 MG, 150 MG/ML SOAJ injection       lisinopril (ZESTRIL) 5 mg tablet Take 1 tablet (5 mg total) by mouth daily 30 tablet 3    Magnesium 400 MG CAPS Take 1 tablet by mouth daily at bedtime      milk thistle 175 MG tablet Take 175 mg by mouth daily      Multiple Vitamin (multivitamin) capsule Take 1 capsule by mouth daily      tamoxifen (NOLVADEX) 20 mg tablet TAKE 1 TABLET BY MOUTH EVERY DAY 90 tablet 3    naloxone (NARCAN) 4 mg/0.1 mL nasal spray Administer 1 spray into a nostril. If no response after 2-3 minutes, give another dose in the other nostril using a new spray. (Patient not taking: Reported on 8/23/2024) 1 each 1    secukinumab (Cosentyx Sensoready Pen) 150 mg/mL SOAJ injection Inject 2 mL (300 mg total) under the skin every 30 (thirty) days (Patient not taking: Reported on 10/22/2024) 2 mL 5    [DISCONTINUED] acetaminophen (TYLENOL) 325 mg tablet Take 1 tablet (325 mg total) by mouth every 6 (six) hours as needed for mild pain (Patient not taking: Reported on 8/23/2024) 60 tablet 0    [DISCONTINUED] ALPRAZolam (XANAX) 0.25 mg tablet Take 1 tablet (0.25 mg total) by mouth 3 (three) times a day as needed for anxiety (Patient not taking: Reported on 8/23/2024) 40 tablet 0    [DISCONTINUED] traMADol (ULTRAM) 50 mg tablet Take 1 tablet (50 mg total) by mouth every 6 (six) hours as needed for moderate pain or severe pain for up to 10 doses Continuation of therapy (Patient not taking: Reported on 8/23/2024) 60 tablet 0     No current facility-administered medications on file prior to visit.          Objective     /80 (BP Location: Left arm, Patient Position: Sitting, Cuff Size: Large)   Pulse 94   Temp 97.7 °F (36.5 °C)   Ht 5' 8\" (1.727 m)   Wt 85.3 kg (188 lb)   LMP  (LMP Unknown)   SpO2 98%   BMI 28.59 kg/m² (Reviewed)    Physical Exam  Vitals reviewed.   Constitutional:       General: She is not in acute distress.     Appearance: She is " not ill-appearing.   HENT:      Head: Normocephalic and atraumatic.      Right Ear: Tympanic membrane, ear canal and external ear normal.      Left Ear: Tympanic membrane, ear canal and external ear normal.      Nose: Nasal tenderness, mucosal edema and congestion present.      Right Sinus: Maxillary sinus tenderness present.      Left Sinus: Maxillary sinus tenderness present.      Mouth/Throat:      Lips: Pink.      Mouth: Mucous membranes are moist.      Pharynx: Oropharynx is clear.   Eyes:      Extraocular Movements: Extraocular movements intact.      Conjunctiva/sclera: Conjunctivae normal.      Pupils: Pupils are equal, round, and reactive to light.   Cardiovascular:      Rate and Rhythm: Normal rate and regular rhythm.      Heart sounds: Normal heart sounds.   Pulmonary:      Effort: Pulmonary effort is normal.      Breath sounds: Normal breath sounds.   Musculoskeletal:      Cervical back: Neck supple.   Skin:     General: Skin is warm and dry.      Capillary Refill: Capillary refill takes less than 2 seconds.   Neurological:      Mental Status: She is alert and oriented to person, place, and time.   Psychiatric:         Mood and Affect: Mood normal.         Behavior: Behavior normal.

## 2024-10-24 ENCOUNTER — ANNUAL EXAM (OUTPATIENT)
Dept: OBGYN CLINIC | Facility: CLINIC | Age: 54
End: 2024-10-24
Payer: COMMERCIAL

## 2024-10-24 VITALS
DIASTOLIC BLOOD PRESSURE: 72 MMHG | SYSTOLIC BLOOD PRESSURE: 134 MMHG | BODY MASS INDEX: 28.49 KG/M2 | HEIGHT: 68 IN | WEIGHT: 188 LBS

## 2024-10-24 DIAGNOSIS — Z11.51 SCREENING FOR HPV (HUMAN PAPILLOMAVIRUS): ICD-10-CM

## 2024-10-24 DIAGNOSIS — Z01.419 WELL WOMAN EXAM WITH ROUTINE GYNECOLOGICAL EXAM: Primary | ICD-10-CM

## 2024-10-24 DIAGNOSIS — Z12.31 ENCOUNTER FOR SCREENING MAMMOGRAM FOR MALIGNANT NEOPLASM OF BREAST: ICD-10-CM

## 2024-10-24 DIAGNOSIS — Z12.4 ENCOUNTER FOR SCREENING FOR MALIGNANT NEOPLASM OF CERVIX: ICD-10-CM

## 2024-10-24 PROCEDURE — G0476 HPV COMBO ASSAY CA SCREEN: HCPCS | Performed by: OBSTETRICS & GYNECOLOGY

## 2024-10-24 PROCEDURE — 99396 PREV VISIT EST AGE 40-64: CPT | Performed by: OBSTETRICS & GYNECOLOGY

## 2024-10-24 PROCEDURE — G0145 SCR C/V CYTO,THINLAYER,RESCR: HCPCS | Performed by: OBSTETRICS & GYNECOLOGY

## 2024-10-24 NOTE — PROGRESS NOTES
ASSESSMENT & PLAN:   Diagnoses and all orders for this visit:    Well woman exam with routine gynecological exam  -     Liquid-based pap, screening    Encounter for screening mammogram for malignant neoplasm of breast  -     Mammo screening bilateral w 3d and cad; Future    Encounter for screening for malignant neoplasm of cervix  -     Liquid-based pap, screening    Screening for HPV (human papillomavirus)  -     Liquid-based pap, screening          The following were reviewed in today's visit: ASCCP guidelines, Gardisil vaccination, STD testing breast self exam, menopause, exercise, and healthy diet.    Patient to return to office in yearly for annual exam.     All questions have been answered to her satisfaction.        CC:  Annual Gynecologic Examination  Chief Complaint   Patient presents with    Gynecologic Exam     PAP EVERY YEAR--CG  Last pap 2021 neg/hpv neg   Breast MRI 2024- No malignancy  DXA not indicated   colonoscopy 10/19/22 repeat 5 years        HPI: Millie Moyer is a 54 y.o.  who presents for annual gynecologic examination.  She has the following concerns:  none, would like yearly paps      Health Maintenance:    Exercise: intermittently  Breast exams/breast awareness: yes  Colorectal cancer screenin    Past Medical History:   Diagnosis Date    Arthritis     BRCA negative     Cancer (HCC)     Breast right    Colon polyp     COVID     x 2 -last time     Endometriosis     H/O bilateral breast implants     Headache     occ    IBS (irritable bowel syndrome)     Insomnia     Night sweats     Pain in left knee     PONV (postoperative nausea and vomiting) 2024    Psoriasis     Skin tag     Uses crutches     Wears glasses        Past Surgical History:   Procedure Laterality Date    BREAST BIOPSY Right     IDC     SECTION      COLONOSCOPY      LAPAROSCOPY      exploratory, endometriosis    LYMPHADENECTOMY Right     x2    MASTECTOMY       bilateral mastectomy    AL ARTHRS KNE SURG W/MENISCECTOMY MED/LAT W/SHVG Left 4/11/2024    Procedure: Knee arthroscopy for medial meniscus debridement;  Surgeon: Jin Delong MD;  [facility];  Service: Orthopedics    AL NDSC WRST SURG W/RLS TRANSVRS CARPL LIGM Right 02/07/2024    Procedure: RELEASE CARPAL TUNNEL ENDOSCOPIC;  Surgeon: Kennedy Garcia MD;  [facility];  Service: Orthopedics    AL NEUROPLASTY &/TRANSPOSITION ULNAR NERVE ELBOW Right 02/07/2024    Procedure: RELEASE CUBITAL TUNNEL;  Surgeon: Kennedy Garcia MD;  [facility];  Service: Orthopedics    AL NIPPLE/AREOLA RECONSTRUCTION Bilateral 10/13/2016    Procedure: NIPPLE RECONSTRUCTION ;  Surgeon: Tulio Wilson MD;  [facility];  Service: Plastics    AL REVISION OF RECONSTRUCTED BREAST Bilateral 03/09/2016    Procedure: RECONSTRUCTION REVISION  BREAST MOUND ,FAT GRAFTS ;  Surgeon: Tulio Wilson MD;  [facility];  Service: Plastics    AL TENDON SHEATH INCISION Right 02/07/2024    Procedure: RELEASE TRIGGER FINGER - RIGHT MIDDLE;  Surgeon: Kennedy Garcia MD;  [facility];  Service: Orthopedics    SENTINEL LYMPH NODE BIOPSY Right        Past OB/Gyn History:   Patient's last menstrual period was 01/01/2024.    Menopausal status: postmenopausal  Menopausal symptoms: None    Last Pap: 2021 : no abnormalities  History of abnormal Pap smear: no    Patient is currently sexually active.   STD testing: no  Current contraception: none      Family History  Family History   Problem Relation Age of Onset    Thyroid cancer Mother 40    Breast cancer Mother 74    Asthma Mother     Psoriasis Father     Basal cell carcinoma Father     Arthritis Father         Psoriatic    Lung cancer Paternal Grandfather 50    Breast cancer Other     Heart disease Other     Prostate cancer Other        Family history of uterine or ovarian cancer: no  Family history of breast cancer: yes  Family history of colon  cancer: no    Social History:  Social History     Socioeconomic History    Marital status: /Civil Union     Spouse name: Not on file    Number of children: 1    Years of education: Not on file    Highest education level: Not on file   Occupational History    Occupation: employed   Tobacco Use    Smoking status: Never     Passive exposure: Never    Smokeless tobacco: Never   Vaping Use    Vaping status: Never Used   Substance and Sexual Activity    Alcohol use: Yes     Comment: social    Drug use: No    Sexual activity: Yes     Partners: Male     Birth control/protection: None   Other Topics Concern    Not on file   Social History Narrative    Caffeine use- coffee     Social Determinants of Health     Financial Resource Strain: Not on file   Food Insecurity: Not on file   Transportation Needs: Not on file   Physical Activity: Not on file   Stress: Not on file   Social Connections: Not on file   Intimate Partner Violence: Not on file   Housing Stability: Not on file     Domestic violence screen: negative    Allergies:  Allergies   Allergen Reactions    Other Hives     Kait selzer    Kait-[place] Plus Cold & Cough  [[id number]-Apap] Hives    Morphine Vomiting     Vomits profusely     Prednisone Other (See Comments) and GI Intolerance     insomnia       Medications:    Current Outpatient Medications:     amLODIPine (NORVASC) 5 mg tablet, Take 1 tablet (5 mg total) by mouth daily, Disp: 30 tablet, Rfl: 5    ASHWAGANDHA PO, Take by mouth every evening, Disp: , Rfl:     benzonatate (TESSALON) 200 MG capsule, Take 1 capsule (200 mg total) by mouth 3 (three) times a day as needed for cough, Disp: 20 capsule, Rfl: 0    cefdinir (OMNICEF) 300 mg capsule, Take 1 capsule (300 mg total) by mouth every 12 (twelve) hours for 10 days, Disp: 20 capsule, Rfl: 0    Cholecalciferol (Vitamin D3) 125 MCG (5000 UT) TBDP, Take by mouth daily, Disp: , Rfl:     Cosentyx Sensoready, 300 MG, 150 MG/ML SOAJ injection, , Disp: , Rfl:  "    lisinopril (ZESTRIL) 5 mg tablet, Take 1 tablet (5 mg total) by mouth daily, Disp: 30 tablet, Rfl: 3    Magnesium 400 MG CAPS, Take 1 tablet by mouth daily at bedtime, Disp: , Rfl:     milk thistle 175 MG tablet, Take 175 mg by mouth daily, Disp: , Rfl:     Multiple Vitamin (multivitamin) capsule, Take 1 capsule by mouth daily, Disp: , Rfl:     tamoxifen (NOLVADEX) 20 mg tablet, TAKE 1 TABLET BY MOUTH EVERY DAY, Disp: 90 tablet, Rfl: 3    Review of Systems:  Review of Systems   Constitutional: Negative.    HENT: Negative.     Respiratory: Negative.     Cardiovascular: Negative.    Gastrointestinal: Negative.    Genitourinary: Negative.    Skin: Negative.    Psychiatric/Behavioral: Negative.           Physical Exam:  /72 (BP Location: Right arm, Patient Position: Sitting, Cuff Size: Standard)   Ht 5' 8\" (1.727 m)   Wt 85.3 kg (188 lb)   LMP 01/01/2024   BMI 28.59 kg/m²    Physical Exam  Constitutional:       Appearance: Normal appearance.   Genitourinary:      Bladder and urethral meatus normal.      No lesions in the vagina.      Genitourinary Comments: S/p reconstruction      Right Labia: No rash, tenderness, lesions, skin changes or Bartholin's cyst.     Left Labia: No tenderness, lesions, skin changes, Bartholin's cyst or rash.     No vaginal erythema, tenderness or bleeding.        Right Adnexa: not tender, not full and no mass present.     Left Adnexa: not tender, not full and no mass present.     Cervix is parous.      No cervical motion tenderness, discharge, lesion or polyp.      Uterus is not enlarged, fixed or tender.      No uterine mass detected.     Urethral meatus caruncle not present.     No urethral tenderness or mass present.   Breasts:     Right: Absent.      Left: Absent.   HENT:      Head: Normocephalic and atraumatic.   Eyes:      Extraocular Movements: Extraocular movements intact.      Conjunctiva/sclera: Conjunctivae normal.      Pupils: Pupils are equal, round, and reactive to " light.   Cardiovascular:      Rate and Rhythm: Normal rate and regular rhythm.      Heart sounds: Normal heart sounds. No murmur heard.  Pulmonary:      Effort: Pulmonary effort is normal. No respiratory distress.      Breath sounds: Normal breath sounds. No wheezing or rales.   Abdominal:      General: There is no distension.      Palpations: Abdomen is soft.      Tenderness: There is no abdominal tenderness. There is no guarding.   Neurological:      General: No focal deficit present.      Mental Status: She is alert and oriented to person, place, and time.   Skin:     General: Skin is warm and dry.   Psychiatric:         Mood and Affect: Mood normal.         Behavior: Behavior normal.   Vitals and nursing note reviewed.

## 2024-10-25 RX ORDER — SECUKINUMAB 150 MG/ML
300 INJECTION SUBCUTANEOUS
Qty: 2 ML | Refills: 5 | OUTPATIENT
Start: 2024-10-25

## 2024-10-25 NOTE — TELEPHONE ENCOUNTER
Pt calling in as she was trying to obtain cosentyx rx that was just approved but perform specialty advised it had been cancelled.     Reviewed chart and determined rx was cancelled at ob appt yesterday. Pt notes must have been cancelled in error as there were two cosentyx rxs on file. Noted will request rx be refilled. No other questions at this time.

## 2024-10-30 LAB
LAB AP GYN PRIMARY INTERPRETATION: NORMAL
Lab: NORMAL

## 2024-11-29 DIAGNOSIS — I10 ESSENTIAL HYPERTENSION: ICD-10-CM

## 2024-11-29 RX ORDER — LISINOPRIL 5 MG/1
5 TABLET ORAL DAILY
Qty: 30 TABLET | Refills: 3 | Status: SHIPPED | OUTPATIENT
Start: 2024-11-29

## 2024-11-29 NOTE — TELEPHONE ENCOUNTER
Refill must be reviewed and completed by the office or provider. The refill is unable to be approved or denied by the medication management team.    Lisinopril first ordered 08.2024 - Please review to see if the refill is appropriate.

## 2024-12-02 ENCOUNTER — ONCOLOGY SURVIVORSHIP (OUTPATIENT)
Dept: SURGICAL ONCOLOGY | Facility: CLINIC | Age: 54
End: 2024-12-02
Payer: COMMERCIAL

## 2024-12-02 VITALS
BODY MASS INDEX: 28.49 KG/M2 | SYSTOLIC BLOOD PRESSURE: 138 MMHG | DIASTOLIC BLOOD PRESSURE: 80 MMHG | WEIGHT: 188 LBS | HEART RATE: 80 BPM | OXYGEN SATURATION: 99 % | HEIGHT: 68 IN | TEMPERATURE: 97.2 F

## 2024-12-02 DIAGNOSIS — Z17.0 MALIGNANT NEOPLASM OF UPPER-INNER QUADRANT OF RIGHT BREAST IN FEMALE, ESTROGEN RECEPTOR POSITIVE (HCC): Primary | ICD-10-CM

## 2024-12-02 DIAGNOSIS — Z79.810 USE OF TAMOXIFEN (NOLVADEX): ICD-10-CM

## 2024-12-02 DIAGNOSIS — C50.211 MALIGNANT NEOPLASM OF UPPER-INNER QUADRANT OF RIGHT BREAST IN FEMALE, ESTROGEN RECEPTOR POSITIVE (HCC): Primary | ICD-10-CM

## 2024-12-02 PROCEDURE — 99213 OFFICE O/P EST LOW 20 MIN: CPT | Performed by: NURSE PRACTITIONER

## 2024-12-02 NOTE — PROGRESS NOTES
Assessment/Plan:    Diagnoses and all orders for this visit:    Malignant neoplasm of upper-inner quadrant of right breast in female, estrogen receptor positive (HCC)    Use of tamoxifen (Nolvadex)    Patient is a 54-year-old female that was diagnosed with a right-sided breast cancer in 2015.  Her pathology revealed invasive ductal carcinoma, ER/MA positive, HER2 negative.  She underwent bilateral mastectomies and a right sentinel lymph node biopsy with Dr. Olvera.  She had reconstruction with Dr. Wilson.  She is currently maintained on tamoxifen.  She carries an MUTYH genetic mutation.  She offers no new complaints today and there are no worrisome findings on today's clinical exam.  She is up-to-date on colorectal and cervical cancer screenings.  We will see her back in 6 months or sooner should the need arise.  She was instructed to contact us with any changes or concerns in the interim.  All of her questions were answered today.    REASON FOR VISIT:   Survivorship      Previous therapy:  Oncology History   Malignant neoplasm of upper-inner quadrant of right breast in female, estrogen receptor positive (HCC)   5/14/2015 Biopsy    Right US guided breast biopsy     Invasive ductal carcinoma  %  MA 90-95%  HER-2 negative     6/2015 Genetic Testing    6/3/15: [employer] BRCAplus (5 genes): BRCA1, BRCA2, CDH1, PTEN, TP53      6/30/2015 Surgery    Bilateral mastectomies, Right sentinel lymph node biopsy    Bilateral reconstruction with expanders.  Dr Wilson      Genomic Testing    Oncotype DX  14     7/8/2015 -  Cancer Staged    Staging form: Breast, AJCC 7th Edition  - Pathologic stage from 7/8/2015: Stage IIA (T2, N0, cM0) - Signed by AGUSTINA Turpin on 10/17/2022  Stage prefix: Initial diagnosis  Method of lymph node assessment: [place] lymph node biopsy       8/12/2015 -  Hormone Therapy    Tamoxifen     10/23/2015 Surgery    Expanders removed, implants placed.     3/9/2016 Surgery    Bilateral breast mound  "revisions, fat grafting     10/13/2016 Surgery    Nipple reconstruction     6/2021 Genetic Testing    Test(s): [employer] Core Cancer Panel (36 genes): APC, DADA, AXIN2 BARD1, BRCA1, BRCA2, BRIP1, BMPR1A, CDH1, CDK4, CDKN2A, CHEK2, DICER1, EPCAM, GREM1, HOXB13, MLH1, MSH2, MSH3, MSH6, MUTYH, NBN, NF1, NTHL1, PALB2, PMS2, POLD1, POLE, PTEN, RAD51C, RAD51D, RECQL SMAD4, SMARCA4, STK11, TP53     Result: Positive - MUTYH c.1187G>A ([id number]), Heterozygous, Pathogenic               Patient ID: Millie Moyer is a 54 y.o. female  Presenting today for a follow-up breast cancer survivorship visit.  She has not appreciated any changes on self-exam.  She denies persistent headaches, back pain or bone pain, cough or shortness of breath, abdominal pain.  She had right arm surgery and left knee surgery but has recovered well.          Review of Systems   Constitutional:  Negative for activity change, appetite change, chills, fatigue, fever and unexpected weight change.   Respiratory:  Negative for cough and shortness of breath.    Cardiovascular:  Negative for chest pain.   Gastrointestinal:  Negative for abdominal pain, constipation, diarrhea, nausea and vomiting.   Musculoskeletal:  Negative for arthralgias, back pain, gait problem and myalgias.   Skin:  Negative for color change and rash.   Neurological:  Negative for dizziness and headaches.   Hematological:  Negative for adenopathy.   Psychiatric/Behavioral:  Negative for agitation and confusion.    All other systems reviewed and are negative.      Objective:    Blood pressure 138/80, pulse 80, temperature (!) 97.2 °F (36.2 °C), temperature source Temporal, height 5' 8\" (1.727 m), weight 85.3 kg (188 lb), SpO2 99%, not currently breastfeeding.  Body mass index is 28.59 kg/m².      Physical Exam  Vitals reviewed.   Constitutional:       General: She is not in acute distress.     Appearance: Normal appearance. She is well-developed. She is not diaphoretic.   HENT:      " Head: Normocephalic and atraumatic.   Cardiovascular:      Rate and Rhythm: Normal rate and regular rhythm.      Heart sounds: Normal heart sounds.   Pulmonary:      Effort: Pulmonary effort is normal.      Breath sounds: Normal breath sounds.   Chest:      Comments: Bilateral reconstructed breasts with implants present.  No masses, skin changes or nodules.  Abdominal:      Palpations: Abdomen is soft. There is no mass.      Tenderness: There is no abdominal tenderness.   Musculoskeletal:         General: Normal range of motion.      Cervical back: Normal range of motion.   Lymphadenopathy:      Upper Body:      Right upper body: No supraclavicular or axillary adenopathy.      Left upper body: No supraclavicular or axillary adenopathy.   Skin:     General: Skin is warm and dry.      Findings: No rash.   Neurological:      Mental Status: She is alert and oriented to person, place, and time.   Psychiatric:         Speech: Speech normal.         Discussed symptoms related to disease recurrence, Yes    Evaluated for late effects related to cancer treatment, Yes    Screening current for cervical cancer, Yes, describe: recent pap    Screening current for colon cancer, Yes, describe: up to date    Cancer rehabilitation services addressed, No    Screening current for osteoporosis, Yes, describe: n/a

## 2025-02-13 DIAGNOSIS — I10 ESSENTIAL HYPERTENSION: ICD-10-CM

## 2025-02-13 RX ORDER — AMLODIPINE BESYLATE 5 MG/1
5 TABLET ORAL DAILY
Qty: 90 TABLET | Refills: 1 | Status: SHIPPED | OUTPATIENT
Start: 2025-02-13

## 2025-03-11 ENCOUNTER — TELEPHONE (OUTPATIENT)
Dept: HEMATOLOGY ONCOLOGY | Facility: CLINIC | Age: 55
End: 2025-03-11

## 2025-03-13 DIAGNOSIS — C50.919 MALIGNANT NEOPLASM OF BREAST IN FEMALE, ESTROGEN RECEPTOR POSITIVE, UNSPECIFIED LATERALITY, UNSPECIFIED SITE OF BREAST (HCC): ICD-10-CM

## 2025-03-13 DIAGNOSIS — Z17.0 MALIGNANT NEOPLASM OF BREAST IN FEMALE, ESTROGEN RECEPTOR POSITIVE, UNSPECIFIED LATERALITY, UNSPECIFIED SITE OF BREAST (HCC): ICD-10-CM

## 2025-03-13 RX ORDER — TAMOXIFEN CITRATE 20 MG/1
20 TABLET ORAL DAILY
Qty: 90 TABLET | Refills: 3 | Status: SHIPPED | OUTPATIENT
Start: 2025-03-13

## 2025-04-14 DIAGNOSIS — I10 ESSENTIAL HYPERTENSION: ICD-10-CM

## 2025-04-15 RX ORDER — LISINOPRIL 5 MG/1
5 TABLET ORAL DAILY
Qty: 30 TABLET | Refills: 0 | Status: SHIPPED | OUTPATIENT
Start: 2025-04-15

## 2025-04-18 ENCOUNTER — OFFICE VISIT (OUTPATIENT)
Dept: URGENT CARE | Age: 55
End: 2025-04-18
Payer: COMMERCIAL

## 2025-04-18 VITALS
DIASTOLIC BLOOD PRESSURE: 81 MMHG | HEART RATE: 91 BPM | TEMPERATURE: 99.1 F | WEIGHT: 191.9 LBS | SYSTOLIC BLOOD PRESSURE: 141 MMHG | BODY MASS INDEX: 29.18 KG/M2 | OXYGEN SATURATION: 98 % | RESPIRATION RATE: 16 BRPM

## 2025-04-18 DIAGNOSIS — R10.9 ABDOMINAL PAIN, UNSPECIFIED ABDOMINAL LOCATION: Primary | ICD-10-CM

## 2025-04-18 LAB
SL AMB  POCT GLUCOSE, UA: NEGATIVE
SL AMB LEUKOCYTE ESTERASE,UA: NEGATIVE
SL AMB POCT BILIRUBIN,UA: NEGATIVE
SL AMB POCT BLOOD,UA: NEGATIVE
SL AMB POCT CLARITY,UA: CLEAR
SL AMB POCT COLOR,UA: NORMAL
SL AMB POCT KETONES,UA: NEGATIVE
SL AMB POCT NITRITE,UA: NEGATIVE
SL AMB POCT PH,UA: 5
SL AMB POCT SPECIFIC GRAVITY,UA: 1.02
SL AMB POCT URINE PROTEIN: NORMAL
SL AMB POCT UROBILINOGEN: 0.2

## 2025-04-18 PROCEDURE — 81002 URINALYSIS NONAUTO W/O SCOPE: CPT

## 2025-04-18 PROCEDURE — 87086 URINE CULTURE/COLONY COUNT: CPT

## 2025-04-18 PROCEDURE — 99214 OFFICE O/P EST MOD 30 MIN: CPT

## 2025-04-18 RX ORDER — METRONIDAZOLE 500 MG/1
500 TABLET ORAL EVERY 12 HOURS SCHEDULED
Qty: 14 TABLET | Refills: 0 | Status: SHIPPED | OUTPATIENT
Start: 2025-04-18 | End: 2025-04-25

## 2025-04-18 NOTE — PROGRESS NOTES
[facility]        NAME: Millie Moyer is a 55 y.o. female  : 1970    MRN: [id number]  DATE: 2025  TIME: 8:13 PM    Assessment and Plan   Abdominal pain, unspecified abdominal location [R10.9]  1. Abdominal pain, unspecified abdominal location  POCT urine dip    metroNIDAZOLE (FLAGYL) 500 mg tablet    Urine culture    Urine culture    CANCELED: Urine culture      Urine HCG negative  Urine dip negative  Urine sent for culture.   Concern for Bacterial Vaginosis.  Start Flagyl 2xs a day for 7 days.  Do not drink EtOH while on abx.  Follow up with GYN in 1 week.  Strict ER precaution.      Patient Instructions       Follow up with PCP in 3-5 days.  Proceed to  ER if symptoms worsen.    If tests have been performed at [facility], our office will contact you with results if changes need to be made to the care plan discussed with you at the visit.  You can review your full results on [facility] MyChart.    Chief Complaint     Chief Complaint   Patient presents with   • Abdominal Pain     Patient reports started on Tuesday with b/l lower abdominal pain. Reports clear discharge and chills. Reports frequency , and dysuria started today.          History of Present Illness       Pt is a 55 year old female presenting with 2 days of lower abd pressure, white fishy vaginal discharge, intermittent burning with urination, and low grade fever.  Pt denies taking anything for her symptoms.  She reports eating and drinking well, normal BM, no n/v/d, no vaginal bleeding.  She reports having similar lower abd pressure and fever with BV dx several years ago. She denies changes in sexual practices or hygiene practices.  LMP irregular 2025, denies being sexually active.     Abdominal Pain  Associated symptoms include a fever. Pertinent negatives include no diarrhea, dysuria, hematuria, nausea or vomiting.       Review of Systems   Review of Systems   Constitutional:  Positive for fatigue and fever.    Cardiovascular:  Negative for chest pain.   Gastrointestinal:  Positive for abdominal pain. Negative for abdominal distention, blood in stool, diarrhea, nausea and vomiting.   Genitourinary:  Positive for pelvic pain and vaginal discharge. Negative for difficulty urinating, dysuria, flank pain, hematuria, urgency, vaginal bleeding and vaginal pain.         Current Medications       Current Outpatient Medications:   •  amLODIPine (NORVASC) 5 mg tablet, TAKE 1 TABLET BY MOUTH EVERY DAY, Disp: 90 tablet, Rfl: 1  •  ASHWAGANDHA PO, Take by mouth every evening, Disp: , Rfl:   •  Cholecalciferol (Vitamin D3) 125 MCG (5000 UT) TBDP, Take by mouth daily, Disp: , Rfl:   •  Cosentyx Sensoready, 300 MG, 150 MG/ML SOAJ injection, Inject 2 mL (300 mg total) under the skin every 30 (thirty) days, Disp: 2 mL, Rfl: 11  •  lisinopril (ZESTRIL) 5 mg tablet, TAKE 1 TABLET BY MOUTH EVERY DAY, Disp: 30 tablet, Rfl: 0  •  Magnesium 400 MG CAPS, Take 1 tablet by mouth daily at bedtime, Disp: , Rfl:   •  metroNIDAZOLE (FLAGYL) 500 mg tablet, Take 1 tablet (500 mg total) by mouth every 12 (twelve) hours for 7 days, Disp: 14 tablet, Rfl: 0  •  milk thistle 175 MG tablet, Take 175 mg by mouth daily, Disp: , Rfl:   •  Multiple Vitamin (multivitamin) capsule, Take 1 capsule by mouth daily, Disp: , Rfl:   •  tamoxifen (NOLVADEX) 20 mg tablet, TAKE 1 TABLET BY MOUTH EVERY DAY, Disp: 90 tablet, Rfl: 3  •  benzonatate (TESSALON) 200 MG capsule, Take 1 capsule (200 mg total) by mouth 3 (three) times a day as needed for cough (Patient not taking: Reported on 4/18/2025), Disp: 20 capsule, Rfl: 0    Current Allergies     Allergies as of 04/18/2025 - Reviewed 04/18/2025   Allergen Reaction Noted   • Other Hives 03/09/2016   • Kait-seltzer plus cold & cough  [ifybjjrck-bze-rn-apap] Hives 12/15/2015   • Morphine Vomiting 01/30/2024   • Prednisone Other (See Comments) and GI Intolerance 07/03/2023            The following portions of the patient's  history were reviewed and updated as appropriate: allergies, current medications, past family history, past medical history, past social history, past surgical history and problem list.     Past Medical History:   Diagnosis Date   • Arthritis    • BRCA negative    • Cancer (HCC) 2015    Breast right   • Colon polyp    • COVID     x 2 -last time    • Endometriosis    • H/O bilateral breast implants    • Headache     occ   • IBS (irritable bowel syndrome)    • Insomnia    • Night sweats    • Pain in left knee    • PONV (postoperative nausea and vomiting) 2024   • Psoriasis    • Skin tag    • Uses crutches    • Wears glasses        Past Surgical History:   Procedure Laterality Date   • BREAST BIOPSY Right     IDC   •  SECTION     • COLONOSCOPY     • LAPAROSCOPY      exploratory, endometriosis   • LYMPHADENECTOMY Right     x2   • MASTECTOMY      bilateral mastectomy   • MT ARTHRS KNE SURG W/MENISCECTOMY MED/LAT W/SHVG Left 2024    Procedure: Knee arthroscopy for medial meniscus debridement;  Surgeon: Jin Delong MD;  [facility];  Service: Orthopedics   • MT NDSC WRST SURG W/RLS TRANSVRS CARPL LIGM Right 2024    Procedure: RELEASE CARPAL TUNNEL ENDOSCOPIC;  Surgeon: Kennedy Garcia MD;  [facility];  Service: Orthopedics   • MT NEUROPLASTY &/TRANSPOSITION ULNAR NERVE ELBOW Right 2024    Procedure: RELEASE CUBITAL TUNNEL;  Surgeon: Kennedy Garcia MD;  [facility];  Service: Orthopedics   • MT NIPPLE/AREOLA RECONSTRUCTION Bilateral 10/13/2016    Procedure: NIPPLE RECONSTRUCTION ;  Surgeon: Tulio Wilson MD;  [facility];  Service: Plastics   • MT REVISION OF RECONSTRUCTED BREAST Bilateral 2016    Procedure: RECONSTRUCTION REVISION  BREAST MOUND ,FAT GRAFTS ;  Surgeon: Tulio Wilson MD;  [facility];  Service: Plastics   • MT TENDON SHEATH INCISION Right 2024    Procedure: RELEASE TRIGGER FINGER - RIGHT  MIDDLE;  Surgeon: Kennedy Garcia MD;  [facility];  Service: Orthopedics   • SENTINEL LYMPH NODE BIOPSY Right        Family History   Problem Relation Age of Onset   • Thyroid cancer Mother 40   • Breast cancer Mother 74   • Asthma Mother    • Psoriasis Father    • Basal cell carcinoma Father    • Arthritis Father         Psoriatic   • Lung cancer Paternal Grandfather 50   • Breast cancer Other    • Heart disease Other    • Prostate cancer Other          Medications have been verified.        Objective   /81   Pulse 91   Temp 99.1 °F (37.3 °C) (Temporal)   Resp 16   Wt 87 kg (191 lb 14.4 oz)   LMP 01/08/2025   SpO2 98%   BMI 29.18 kg/m²   Patient's last menstrual period was 01/08/2025.       Physical Exam     Physical Exam  Vitals and nursing note reviewed.   Constitutional:       General: She is not in acute distress.     Appearance: She is well-developed and normal weight. She is not ill-appearing.   Cardiovascular:      Rate and Rhythm: Normal rate.      Heart sounds: Normal heart sounds.   Pulmonary:      Effort: Pulmonary effort is normal. No respiratory distress.   Abdominal:      General: Abdomen is flat. Bowel sounds are normal. There is no distension.      Palpations: Abdomen is soft.      Tenderness: There is abdominal tenderness in the right lower quadrant, suprapubic area, left upper quadrant and left lower quadrant. There is no right CVA tenderness, left CVA tenderness, guarding or rebound. Negative signs include Goncalves's sign, Rovsing's sign, McBurney's sign, psoas sign and obturator sign.   Neurological:      Mental Status: She is alert.

## 2025-04-18 NOTE — PATIENT INSTRUCTIONS
Concern for Bacterial Vaginosis.  Start Flagyl 2xs a day for 7 days.  Do not drink EtOH while on abx.    Follow up with GYN in 1 week.    Please monitor yourself for worsening symptoms.  If you continue with pain for 2 more days, develop any nausea, vomiting, diarrhea, or fever, PLEASE GO TO THE EMERGENCY DEPARTMENT FOR FURTHER EVALUATION OF YOUR SYMPTOMS.

## 2025-04-20 ENCOUNTER — NURSE TRIAGE (OUTPATIENT)
Dept: OBGYN CLINIC | Facility: CLINIC | Age: 55
End: 2025-04-20

## 2025-04-20 LAB — BACTERIA UR CULT: NORMAL

## 2025-04-21 ENCOUNTER — DOCUMENTATION (OUTPATIENT)
Dept: ADMINISTRATIVE | Facility: OTHER | Age: 55
End: 2025-04-21

## 2025-04-21 ENCOUNTER — PROCEDURE VISIT (OUTPATIENT)
Dept: OBGYN CLINIC | Facility: CLINIC | Age: 55
End: 2025-04-21
Payer: COMMERCIAL

## 2025-04-21 VITALS
SYSTOLIC BLOOD PRESSURE: 142 MMHG | BODY MASS INDEX: 28.95 KG/M2 | DIASTOLIC BLOOD PRESSURE: 86 MMHG | HEIGHT: 68 IN | WEIGHT: 191 LBS

## 2025-04-21 DIAGNOSIS — Z79.810 USE OF TAMOXIFEN (NOLVADEX): Primary | ICD-10-CM

## 2025-04-21 DIAGNOSIS — B37.31 VAGINA, CANDIDIASIS: ICD-10-CM

## 2025-04-21 PROCEDURE — 58100 BIOPSY OF UTERUS LINING: CPT | Performed by: OBSTETRICS & GYNECOLOGY

## 2025-04-21 PROCEDURE — 88305 TISSUE EXAM BY PATHOLOGIST: CPT | Performed by: PATHOLOGY

## 2025-04-21 PROCEDURE — 99214 OFFICE O/P EST MOD 30 MIN: CPT | Performed by: OBSTETRICS & GYNECOLOGY

## 2025-04-21 RX ORDER — FLUCONAZOLE 150 MG/1
150 TABLET ORAL ONCE
Qty: 1 TABLET | Refills: 0 | Status: SHIPPED | OUTPATIENT
Start: 2025-04-21 | End: 2025-04-21

## 2025-04-21 NOTE — PROGRESS NOTES
Name: Millie Moyer      : 1970      MRN: [id number]  Encounter Provider: Mayra Garcia MD  Encounter Date: 2025   Encounter department: [facility]  :  Assessment & Plan  Vagina, candidiasis    Orders:    fluconazole (DIFLUCAN) 150 mg tablet; Take 1 tablet (150 mg total) by mouth once for 1 dose    Use of tamoxifen (Nolvadex)    Orders:    Tissue Exam  - 1 year without bleeding and then period like bleeding  - recommend emb to ensure no hyperplasia or malignancy  - collected without complication      History of Present Illness   HPI  Millie Moyer is a 55 y.o. female who presents with the complaint of pelvic pressure, and urinary freq and urgency.  She was seen in urgent care and was treated for BV.  She had a urine cx that showed mixed contaminants.  She does feel better by about 50%.  She did have period like bleeding in , but LMP before that was 2024.  No bleeding since 2025.  She does take tamoxifen for a hx of breast Ca.    History obtained from: patient    Review of Systems   Constitutional:  Positive for fever.   Respiratory: Negative.     Cardiovascular: Negative.    Gastrointestinal: Negative.    Genitourinary:  Positive for frequency, pelvic pain, urgency and vaginal discharge. Negative for vaginal bleeding and vaginal pain.   Neurological: Negative.      Past Medical History   Past Medical History:   Diagnosis Date    Arthritis     BRCA negative     Cancer (HCC)     Breast right    Colon polyp     COVID     x 2 -last time     Endometriosis     H/O bilateral breast implants     Headache     occ    IBS (irritable bowel syndrome)     Insomnia     Night sweats     Pain in left knee     PONV (postoperative nausea and vomiting) 2024    Psoriasis     Skin tag     Uses crutches     Wears glasses      Past Surgical History:   Procedure Laterality Date    BREAST BIOPSY Right     IDC     SECTION       COLONOSCOPY      LAPAROSCOPY  1996    exploratory, endometriosis    LYMPHADENECTOMY Right     x2    MASTECTOMY      bilateral mastectomy    MT ARTHRS KNE SURG W/MENISCECTOMY MED/LAT W/SHVG Left 4/11/2024    Procedure: Knee arthroscopy for medial meniscus debridement;  Surgeon: Jin Delong MD;  [facility];  Service: Orthopedics    MT NDSC WRST SURG W/RLS TRANSVRS CARPL LIGM Right 02/07/2024    Procedure: RELEASE CARPAL TUNNEL ENDOSCOPIC;  Surgeon: Kennedy Garcia MD;  [facility];  Service: Orthopedics    MT NEUROPLASTY &/TRANSPOSITION ULNAR NERVE ELBOW Right 02/07/2024    Procedure: RELEASE CUBITAL TUNNEL;  Surgeon: Kennedy Garcia MD;  [facility];  Service: Orthopedics    MT NIPPLE/AREOLA RECONSTRUCTION Bilateral 10/13/2016    Procedure: NIPPLE RECONSTRUCTION ;  Surgeon: Tulio Wilson MD;  [facility];  Service: Plastics    MT REVISION OF RECONSTRUCTED BREAST Bilateral 03/09/2016    Procedure: RECONSTRUCTION REVISION  BREAST MOUND ,FAT GRAFTS ;  Surgeon: Tulio Wilson MD;  [facility];  Service: Plastics    MT TENDON SHEATH INCISION Right 02/07/2024    Procedure: RELEASE TRIGGER FINGER - RIGHT MIDDLE;  Surgeon: Kennedy Garcia MD;  [facility];  Service: Orthopedics    SENTINEL LYMPH NODE BIOPSY Right      Family History   Problem Relation Age of Onset    Thyroid cancer Mother 40    Breast cancer Mother 74    Asthma Mother     Psoriasis Father     Basal cell carcinoma Father     Arthritis Father         Psoriatic    Lung cancer Paternal Grandfather 50    Breast cancer Other     Heart disease Other     Prostate cancer Other       reports that she has never smoked. She has never been exposed to tobacco smoke. She has never used smokeless tobacco. She reports current alcohol use. She reports that she does not use drugs.  Current Outpatient Medications   Medication Instructions    amLODIPine (NORVASC) 5 mg, Oral, Daily    ASHWAGANDHA PO Every  "evening    benzonatate (TESSALON) 200 mg, Oral, 3 times daily PRN    Cholecalciferol (Vitamin D3) 125 MCG (5000 UT) TBDP Daily    Cosentyx Sensoready (300 MG) 300 mg, Subcutaneous, Every 30 days    fluconazole (DIFLUCAN) 150 mg, Oral, Once    lisinopril (ZESTRIL) 5 mg, Oral, Daily    Magnesium 400 MG CAPS 1 tablet, Daily at bedtime    metroNIDAZOLE (FLAGYL) 500 mg, Oral, Every 12 hours scheduled    milk thistle 175 mg, Daily    Multiple Vitamin (multivitamin) capsule 1 capsule, Daily    tamoxifen (NOLVADEX) 20 mg, Oral, Daily     Allergies   Allergen Reactions    Other Hives     Kait selzer    Kait-[place] Plus Cold & Cough  [Fomrqenvk-Pwr-Na-Apap] Hives    Morphine Vomiting     Vomits profusely     Prednisone Other (See Comments) and GI Intolerance     insomnia         Objective   /86 (BP Location: Right arm, Patient Position: Standing, Cuff Size: Standard)   Ht 5' 8\" (1.727 m)   Wt 86.6 kg (191 lb)   LMP 01/08/2025   BMI 29.04 kg/m²      Physical Exam  Vitals and nursing note reviewed.   Constitutional:       Appearance: Normal appearance.   HENT:      Mouth/Throat:      Mouth: Mucous membranes are dry.      Pharynx: Oropharynx is clear.   Pulmonary:      Effort: Pulmonary effort is normal.   Genitourinary:     Labia:         Right: No rash, tenderness or lesion.         Left: No rash, tenderness or lesion.       Vagina: Vaginal discharge (consistent with yeast) present. No bleeding.      Cervix: Normal.      Uterus: Enlarged (10wk).       Adnexa: Right adnexa normal and left adnexa normal.   Skin:     General: Skin is warm and dry.   Neurological:      General: No focal deficit present.      Mental Status: She is alert and oriented to person, place, and time.       Endometrial biopsy    Date/Time: 4/21/2025 3:45 PM    Performed by: Mayra Garcia MD  Authorized by: Mayra Garcia MD  Universal Protocol:  Consent: Verbal consent obtained.  Risks and benefits: risks, benefits and " alternatives were discussed  Consent given by: patient  Patient understanding: patient states understanding of the procedure being performed  Patient identity confirmed: verbally with patient    Indication:     Indications: Other disorder of menstruation and other abnormal bleeding from female genital tract    Procedure:     Procedure: endometrial biopsy with Pipelle      A bivalve speculum was placed in the vagina: yes      Cervix cleaned and prepped: yes      The cervix was dilated: yes      Uterus sounded: yes      Uterus sound depth (cm):  10    Curettes used:  1    Specimen collected: specimen collected and sent to pathology      Patient tolerated procedure well with no complications: yes    Findings:     Uterus size:  9-10 weeks    Cervix: normal      Adnexa: normal

## 2025-04-21 NOTE — ASSESSMENT & PLAN NOTE
Orders:    Tissue Exam  - 1 year without bleeding and then period like bleeding  - recommend emb to ensure no hyperplasia or malignancy  - collected without complication

## 2025-04-21 NOTE — TELEPHONE ENCOUNTER
"Reason for Disposition  • Postmenopausal vaginal bleeding  • Patient wants to be seen    Answer Assessment - Initial Assessment Questions  1. LOCATION: \"Where does it hurt?\"       pelvis  2. RADIATION: \"Does the pain shoot anywhere else?\" (e.g., lower back, groin, thighs)      denies  3. ONSET: \"When did the pain begin?\" (e.g., minutes, hours or days ago)       Days ago  4. SUDDEN: \"Gradual or sudden onset?\"      gradual  5. PATTERN \"Does the pain come and go, or is it constant?\"      *No Answer*  6. SEVERITY: \"How bad is the pain?\"  (e.g., Scale 1-10; mild, moderate, or severe)      *No Answer*  7. RECURRENT SYMPTOM: \"Have you ever had this type of pelvic pain before?\" If Yes, ask: \"When was the last time?\" and \"What happened that time?\"       *No Answer*  8. CAUSE: \"What do you think is causing the pelvic pain?\"      *No Answer*  9. RELIEVING/AGGRAVATING FACTORS: \"What makes it better or worse?\" (e.g., activity/rest, sexual intercourse, voiding, passing stool)      *No Answer*  10. OTHER SYMPTOMS: \"Has there been any other symptoms?\" (e.g., fever, constipation, diarrhea, urine problems, vaginal bleeding, vaginal discharge, or vomiting?\"        *No Answer*  11. PREGNANCY: \"Is there any chance you are pregnant?\" \"When was your last menstrual period?\"        *No Answer*    Answer Assessment - Initial Assessment Questions  1. BLEEDING SEVERITY: \"Describe the bleeding that you are having.\" \"How much bleeding is there?\"       Period in January  2. ONSET: \"When did the bleeding begin?\" \"Is it continuing now?\"      no  3. MENOPAUSE: \"When was your last menstrual period?\"       January 2024  4. ABDOMEN PAIN: \"Do you have any pain?\" \"How bad is the pain?\"  (e.g., Scale 0-10; none, mild, moderate, or severe)      Yes, moderate  5. BLOOD THINNERS: \"Do you take any blood thinners?\" (e.g., Coumadin/warfarin, Pradaxa/dabigatran, aspirin)      denies  6. HORMONE MEDICINES: \"Are you taking any hormone medicines, prescription or " "OTC?\" (e.g., birth control pills, estrogen)      denies  7. CAUSE: \"What do you think is causing the bleeding?\" (e.g., recent gyn surgery, recent gyn procedure; known bleeding disorder, uterine cancer)        unsure  8. HEMODYNAMIC STATUS: \"Are you weak or feeling lightheaded?\" If Yes, ask: \"Can you stand and walk normally?\"        no  9. OTHER SYMPTOMS: \"What other symptoms are you having with the bleeding?\" (e.g., back pain, burning with urination, fever)      Pelvic pain and pressure. Frequent urination    Protocols used: Pelvic Pain - Female-Adult-[place], Vaginal Bleeding - Postmenopausal-Adult-[place]    "

## 2025-04-21 NOTE — PATIENT COMMUNICATION
Patient calling with complains of pelvic pressure and pain. States she thought she had BV starting this past Friday--went to urgent care and they ran a urine culture which showed mixed contaminants. Advised of this result. Upon completing triage patient mentions she had a period in January 2025 after not having one since January of 2024. Scheduled in office for eval of symptoms and PMB.

## 2025-04-21 NOTE — PROGRESS NOTES
Urgent Care BP  Received: 3 days ago  Ana Mckeon RN  P Patient Reported Team         Blood pressure elevated  Appointment department: [facility]  Appointment provider: * No providers found *  Blood pressure  04/18/25 1912 141/81  04/18/25 1908 164/83  Inactive  Date User Comment   04/18/25 1912 Ana Mckeon RN [[id number]] None

## 2025-04-21 NOTE — PROGRESS NOTES
04/21/25 3:20 PM    Patient was called after the Urgent Care visit ; a message was left for the patient to return the call    Thank you.  Sandy Klein  [facility]

## 2025-04-22 NOTE — PROGRESS NOTES
04/22/25 1:49 PM    Patient was called after the Urgent Care visit Patient agreed to schedule appointment.    Thank you.  Sandy Klein  [facility]

## 2025-04-22 NOTE — PROGRESS NOTES
04/22/25 12:26 PM    Patient was called after the Urgent Care visit Patient agreed to schedule appointment.    Thank you.  Sandy Klein  [facility]

## 2025-04-24 ENCOUNTER — RESULTS FOLLOW-UP (OUTPATIENT)
Dept: OBGYN CLINIC | Facility: CLINIC | Age: 55
End: 2025-04-24

## 2025-04-24 DIAGNOSIS — R10.2 PELVIC PRESSURE IN FEMALE: Primary | ICD-10-CM

## 2025-04-24 PROCEDURE — 88305 TISSUE EXAM BY PATHOLOGIST: CPT | Performed by: PATHOLOGY

## 2025-04-29 ENCOUNTER — CLINICAL SUPPORT (OUTPATIENT)
Dept: FAMILY MEDICINE CLINIC | Facility: CLINIC | Age: 55
End: 2025-04-29
Payer: COMMERCIAL

## 2025-04-29 VITALS — HEIGHT: 68 IN | SYSTOLIC BLOOD PRESSURE: 126 MMHG | BODY MASS INDEX: 29.04 KG/M2 | DIASTOLIC BLOOD PRESSURE: 80 MMHG

## 2025-04-29 DIAGNOSIS — I10 ESSENTIAL HYPERTENSION: Primary | ICD-10-CM

## 2025-04-29 PROCEDURE — 99211 OFF/OP EST MAY X REQ PHY/QHP: CPT

## 2025-05-01 NOTE — PROGRESS NOTES
Patient presents to office for blood pressure check after receiving elevated reading at urgent care. She was in a significant amount of pain at this time and also automatic cuff was used. Manual reading normal today per Dr. Becerra. Patient's home cuff was not working at visit today and she will return for no charge visit to check efficacy.

## 2025-05-06 DIAGNOSIS — C50.211 MALIGNANT NEOPLASM OF UPPER-INNER QUADRANT OF RIGHT BREAST IN FEMALE, ESTROGEN RECEPTOR POSITIVE (HCC): Primary | ICD-10-CM

## 2025-05-06 DIAGNOSIS — Z17.0 MALIGNANT NEOPLASM OF UPPER-INNER QUADRANT OF RIGHT BREAST IN FEMALE, ESTROGEN RECEPTOR POSITIVE (HCC): Primary | ICD-10-CM

## 2025-05-07 ENCOUNTER — RESULTS FOLLOW-UP (OUTPATIENT)
Dept: FAMILY MEDICINE CLINIC | Facility: CLINIC | Age: 55
End: 2025-05-07

## 2025-05-07 ENCOUNTER — APPOINTMENT (OUTPATIENT)
Dept: LAB | Age: 55
End: 2025-05-07
Payer: COMMERCIAL

## 2025-05-07 DIAGNOSIS — Z17.0 MALIGNANT NEOPLASM OF UPPER-INNER QUADRANT OF RIGHT BREAST IN FEMALE, ESTROGEN RECEPTOR POSITIVE (HCC): ICD-10-CM

## 2025-05-07 DIAGNOSIS — E78.00 HYPERCHOLESTEROLEMIA: ICD-10-CM

## 2025-05-07 DIAGNOSIS — C50.211 MALIGNANT NEOPLASM OF UPPER-INNER QUADRANT OF RIGHT BREAST IN FEMALE, ESTROGEN RECEPTOR POSITIVE (HCC): ICD-10-CM

## 2025-05-07 LAB
ALBUMIN SERPL BCG-MCNC: 4.2 G/DL (ref 3.5–5)
ALP SERPL-CCNC: 82 U/L (ref 34–104)
ALT SERPL W P-5'-P-CCNC: 19 U/L (ref 7–52)
ANION GAP SERPL CALCULATED.3IONS-SCNC: 7 MMOL/L (ref 4–13)
AST SERPL W P-5'-P-CCNC: 20 U/L (ref 13–39)
BASOPHILS # BLD AUTO: 0.02 THOUSANDS/ÂΜL (ref 0–0.1)
BASOPHILS NFR BLD AUTO: 0 % (ref 0–1)
BILIRUB SERPL-MCNC: 0.66 MG/DL (ref 0.2–1)
BUN SERPL-MCNC: 12 MG/DL (ref 5–25)
CALCIUM SERPL-MCNC: 9 MG/DL (ref 8.4–10.2)
CHLORIDE SERPL-SCNC: 104 MMOL/L (ref 96–108)
CHOLEST SERPL-MCNC: 193 MG/DL (ref ?–200)
CO2 SERPL-SCNC: 29 MMOL/L (ref 21–32)
CREAT SERPL-MCNC: 0.7 MG/DL (ref 0.6–1.3)
EOSINOPHIL # BLD AUTO: 0.11 THOUSAND/ÂΜL (ref 0–0.61)
EOSINOPHIL NFR BLD AUTO: 2 % (ref 0–6)
ERYTHROCYTE [DISTWIDTH] IN BLOOD BY AUTOMATED COUNT: 13.9 % (ref 11.6–15.1)
GFR SERPL CREATININE-BSD FRML MDRD: 98 ML/MIN/1.73SQ M
GLUCOSE P FAST SERPL-MCNC: 83 MG/DL (ref 65–99)
HCT VFR BLD AUTO: 38.5 % (ref 34.8–46.1)
HDLC SERPL-MCNC: 56 MG/DL
HGB BLD-MCNC: 12.3 G/DL (ref 11.5–15.4)
IMM GRANULOCYTES # BLD AUTO: 0.01 THOUSAND/UL (ref 0–0.2)
IMM GRANULOCYTES NFR BLD AUTO: 0 % (ref 0–2)
LDLC SERPL CALC-MCNC: 124 MG/DL (ref 0–100)
LYMPHOCYTES # BLD AUTO: 1.19 THOUSANDS/ÂΜL (ref 0.6–4.47)
LYMPHOCYTES NFR BLD AUTO: 27 % (ref 14–44)
MCH RBC QN AUTO: 29.4 PG (ref 26.8–34.3)
MCHC RBC AUTO-ENTMCNC: 31.9 G/DL (ref 31.4–37.4)
MCV RBC AUTO: 92 FL (ref 82–98)
MONOCYTES # BLD AUTO: 0.26 THOUSAND/ÂΜL (ref 0.17–1.22)
MONOCYTES NFR BLD AUTO: 6 % (ref 4–12)
NEUTROPHILS # BLD AUTO: 2.9 THOUSANDS/ÂΜL (ref 1.85–7.62)
NEUTS SEG NFR BLD AUTO: 65 % (ref 43–75)
NONHDLC SERPL-MCNC: 137 MG/DL
NRBC BLD AUTO-RTO: 0 /100 WBCS
PLATELET # BLD AUTO: 242 THOUSANDS/UL (ref 149–390)
PMV BLD AUTO: 10.1 FL (ref 8.9–12.7)
POTASSIUM SERPL-SCNC: 4.1 MMOL/L (ref 3.5–5.3)
PROT SERPL-MCNC: 6.7 G/DL (ref 6.4–8.4)
RBC # BLD AUTO: 4.18 MILLION/UL (ref 3.81–5.12)
SODIUM SERPL-SCNC: 140 MMOL/L (ref 135–147)
TRIGL SERPL-MCNC: 65 MG/DL (ref ?–150)
WBC # BLD AUTO: 4.49 THOUSAND/UL (ref 4.31–10.16)

## 2025-05-07 PROCEDURE — 36415 COLL VENOUS BLD VENIPUNCTURE: CPT

## 2025-05-07 PROCEDURE — 85025 COMPLETE CBC W/AUTO DIFF WBC: CPT

## 2025-05-07 PROCEDURE — 80061 LIPID PANEL: CPT

## 2025-05-07 PROCEDURE — 80053 COMPREHEN METABOLIC PANEL: CPT

## 2025-05-08 ENCOUNTER — EVALUATION (OUTPATIENT)
Dept: PHYSICAL THERAPY | Facility: REHABILITATION | Age: 55
End: 2025-05-08
Attending: OBSTETRICS & GYNECOLOGY
Payer: COMMERCIAL

## 2025-05-08 DIAGNOSIS — M62.89 PELVIC FLOOR DYSFUNCTION: ICD-10-CM

## 2025-05-08 DIAGNOSIS — R10.2 PELVIC PRESSURE IN FEMALE: ICD-10-CM

## 2025-05-08 DIAGNOSIS — N39.3 STRESS INCONTINENCE: Primary | ICD-10-CM

## 2025-05-08 PROCEDURE — 97162 PT EVAL MOD COMPLEX 30 MIN: CPT

## 2025-05-08 NOTE — PROGRESS NOTES
PT Evaluation     Today's Date: 2025  Patient name: Millie Moyer  : 1970  MRN: [id number]  Referring provider: Mayra Garcia*  Dx:  Encounter Diagnosis     ICD-10-CM    1. Stress incontinence  N39.3       2. Pelvic pressure in female  R10.2 Ambulatory Referral to Physical Therapy      3. Pelvic floor dysfunction  M62.89           Start Time: 1105  Stop Time: 1155  Total time in clinic (min): 50 minutes       Assessment  Impairments: abnormal coordination, abnormal muscle firing, abnormal muscle tone, abnormal or restricted ROM and activity intolerance  Symptom irritability: moderate    Assessment details:  Millie Moyer is a 55 y.o. year old female presenting to outpatient pelvic health physical therapy with a referral from provider for pelvic pressure in female. Patient reports symptoms beginning 6 months ago.   Patient notes CC is lumbar back pain, and urinary leakage with laughing/coughing and lifting, urgency, and nocturia. PMH is relevant for hx of breast cancer (10 years remission).       Internal Assessment performed today with patient verbal and written consent. Examination notes reduced PFM power, endurance and fast twitch muscles. Next session, PT to assess scar mobility and appropriate diaphragm and tra activation. S/S consistent with PFM dysfunction influencing patients symptoms. Patient will benefit from skilled PT services to address these deficits and improve function. Specifically; PFM concentric, endurance, and urge deferral strategy.      Based on age and motivation, patient is a positive candidate to respond favorably to physical therapy with a good prognosis.   Patient was educated on examination and techniques, plan of care, goals of therapy, and HEP. Patient was provided an opportunity to ask any questions. Provided Pt initial HEP. Patient demonstrated and verbalized understanding. Verbally reported to hold exercises if increased pain or discomfort.  Pt will be  seen 1-2x/week for 6-12 weeks. Patient will be re-assessed regularly in order to document progress and limit any regression. The goal of PT is to progress patient towards independence of self care management.      Understanding of Dx/Px/POC: good     Prognosis: good    Goals  Dysfunction:   In 4 weeks, patient will demonstrate improved PFM strength to at least 3/5.  In 6 weeks, patient will demonstrate improved PFM endurance to 8 sec of 3/5 strength or greater.   In 8 weeks, patient will demonstrate improved PFM relaxation to at least 100% or greater.     Bladder:  In 6 weeks, patient will report improvement in stress incontinence as measured by 25% reduction in leakage  leaks per week.  In 8 weeks, patient will report improvement in stress incontinence as measured by decreased use of pads to 1 per day.   In 8 weeks, patient will demonstrate normalized daytime void interval of 2-4 hours with adequate fluid intake.   In 10 weeks, patient will reduce nocturia to 1 per night.    Function:  In 6 weeks, patient will report >13.5 improvement on PFDI  In 6 weeks, patient will lift #20 lb without urinary leakage  In 8 weeks, patient will report >70% improvement in symptoms        Plan  Patient would benefit from: skilled physical therapy  Planned modality interventions: biofeedback and shortwave diathermy    Planned therapy interventions: neuromuscular re-education, manual therapy, massage, patient/caregiver education, postural training, strengthening, stretching, therapeutic activities, therapeutic exercise and home exercise program    Frequency: 1x week  Duration in weeks: 12  Plan of Care beginning date: 5/8/2025  Plan of Care expiration date: 7/8/2025  Plan details:  POC: urge deferral, PFM coordination, PFM strengthening, manual cueing, breathing mechanics, functional strengthening, abdominal strengthening, and labor and delivery training.      Subjective      Chief Complaint: Stress incontinence, lumbar back pain,  "nocturia (urgency).   HPI: Pt presents to PT w/reports of increased urinary symptoms and urgency. Patient also notes an increased onset of back pain.   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   Social History: Lives with her  and daughter  Preferred language/communication style:  English.   PMH: Patient was on antibiotics for sinus infections (3 different points in the past year). B/L mastectomy (10 years ago in ),  Incision (16 years ago).         Specific symptom history:    Urinary:     Onset: 6 months ago - noting coughing and leakage onset.   Symptoms Present:     Stress Incontinence, Urge Frequency, and Frequent UTI  Nocturia: 1-2x per night   Denies:           Incomplete emptying and Dysuria  Daytime void interval: Hx of \"Just in case.\" 2-3 hours intervals.   Uses of liners/pads/diapers? 3-4   Frequency of leakage:   Fluid intake:     Water 9 cups ++   Coffee 1 cup a day   Rajat Intermittent           Bowel:     Defecates Regular 2-3x per day .  Reports type: [place] Stool Chart 4.  Constipation/Straining?: Patient denies  Use of Squatty Potty?: Patient denies     Nutrition:   Patient consumes good fiber with fruits and vegetables.      GYN:      with  deliveries   Sees GYN regularly? Yes  Menstruation: Perimenopausal. Tamoxifen (10 years). Menstruation last in May 2025.    Sexual Function:     Currently sexually active? Not currently - patient had back surgery.   Sexual dysfunction? Patient denies  Number of partners: .   History of sexual trauma/abuse? No    MSK:      History of abdominal surgery? Yes  Current exercise: Walking. Hx of meniscus of LLE                       Systems Review History:  Cardiovascular/pulmonary     Integumentary    Musculoskeletal Location: Hx of back pain after pregnancy, left knee pain and meniscus repair.      Neuromuscular System Any recent falls? None noted       Surgical history and/or abdominal surgeries:         Goal: Reduce urinary " leakage         Objective       Abdominal Assessment:      Abdominal Assessment: Breathing Mechanics:  Will assess next visit. Time Constraints limited full assessment    Skin inspection:   scars present.     Visual Inspection of Perineum:   Excursion of perineal body in cephalad direction with contraction of pelvic floor muscles (PFM): good  Excursion of perineal body in caudal direction with relaxation of pelvic floor muscles (PFM): weak  Involuntary contraction with coughing: no  Involuntary relaxation with bearing down: no  Cotton swab test: non-tender  Sensation: intact  Tenderness: unprovoked    Pelvic Organ Prolapse   Position: hook-lying  At rest: none  With bearing down: mild (>1cm from hymenal remnants)  Location: anterior  Perineal body inspection: within normal limits        Pelvic Floor Muscle Exam:     Muscle Contraction: overflow   Breathing pattern with contraction: apical   75% pelvic floor relaxation        PERFECT Score   Power right: 2+/5   Power left: 2+/5   Endurance (seconds to max): 3   Repetitions (before fatigue): 3    Perfect Score: Patient verbally consented to internal examination:    Left PFM Tone:  OI: low tone, no pain  IC: low tone, no pain  AR/PC: low tone, no pain    Right PFM Tone:  OI: low tone, no pain  IC: low tone, no pain  AR/PC: low tone no pain    Noted palpable tenderness and pain: low tone, no pain    PFM Relaxation/Eccentric lengthening;  To Baseline: Fair  Eccentric lengthening beyond baseline: Fair              pelvic floor exam consent given by patient    Pelvic exam completed: vaginally            Precautions: Standard   Patient Active Problem List   Diagnosis    Irritable bowel syndrome (IBS)    Malignant neoplasm of upper-inner quadrant of right breast in female, estrogen receptor positive (HCC)    Psoriasis    Seasonal allergies    BRCA negative    Use of tamoxifen (Nolvadex)    Seborrheic keratosis    Bilateral plantar fasciitis    Dyspepsia    Monoallelic  mutation of MUTYH gene    COVID-19 vaccination not done    Abnormal MRI, breast    Carpal tunnel syndrome on right    Lateral epicondylitis of right elbow    Medial epicondylitis of elbow, right    PONV (postoperative nausea and vomiting)    Inability to bear weight    Acute pain of right knee       Chief Complaint    Patient Subjective includes:  urinary leakage with standing from sitting, urinary leakage with laughing, coughing, sneezing, urinary leakage with exercise, and urinary frequency   Patient Goals        Objective Impairments to address   Pelvic Floor Strength    Power 2+/5   Endurance  3   Fast Twitch  4   % relaxation 75   External Hip Strength        Lumbar Screen          PLAN OF CARE EXPIRATION:     AUTHORIZATION EXPIRATION:     Date Authorization     Number of visits provided            Date of Service 05/08/2025        Visits Used 1        Visits Remaining         Medbridge Created                  Neuro Re-Ed         Urge deferral         Defecation mechanics         Breathing Mechanics         PFM Coordination         PFM Down Training         Internal Cueing                   Ther Ex         PFM Strengthening         Hip strengthening         Functional Strengthening         Abdominal Strengthening         Dilator Training         Aerobic         Therapeutic Rest Breaks         Mobility          High Impact         UE Strengthening                   Ther Activity         Voiding Diaries         Review of sxs         Fluid Intake         Education Squattmaria isabel potty, Pelvic floor PT pelvic floor model           Manual Ther         PFM exam         Ortho exam         Dynamometer Testing         Fascial Decompression                  Modalities                           Outcome Measure                               Therapeutic Rest Breaks         Mobility          High Impact         UE Strengthening                   Ther Activity         Voiding Diaries         Review of sxs         Fluid Intake         Education Trini vera, Pelvic floor PT pelvic floor model           Manual Ther         PFM exam         Ortho exam         Dynamometer Testing         Fascial Decompression                  Modalities                           Outcome Measure

## 2025-05-09 ENCOUNTER — OFFICE VISIT (OUTPATIENT)
Dept: HEMATOLOGY ONCOLOGY | Facility: CLINIC | Age: 55
End: 2025-05-09
Payer: COMMERCIAL

## 2025-05-09 VITALS
HEIGHT: 68 IN | TEMPERATURE: 98.3 F | OXYGEN SATURATION: 98 % | HEART RATE: 76 BPM | BODY MASS INDEX: 28.64 KG/M2 | SYSTOLIC BLOOD PRESSURE: 128 MMHG | WEIGHT: 189 LBS | RESPIRATION RATE: 18 BRPM | DIASTOLIC BLOOD PRESSURE: 76 MMHG

## 2025-05-09 DIAGNOSIS — Z17.0 MALIGNANT NEOPLASM OF UPPER-INNER QUADRANT OF RIGHT BREAST IN FEMALE, ESTROGEN RECEPTOR POSITIVE (HCC): Primary | ICD-10-CM

## 2025-05-09 DIAGNOSIS — C50.211 MALIGNANT NEOPLASM OF UPPER-INNER QUADRANT OF RIGHT BREAST IN FEMALE, ESTROGEN RECEPTOR POSITIVE (HCC): Primary | ICD-10-CM

## 2025-05-09 PROCEDURE — 99213 OFFICE O/P EST LOW 20 MIN: CPT | Performed by: INTERNAL MEDICINE

## 2025-05-09 NOTE — PROGRESS NOTES
Name: Millie Moyer      : 1970      MRN: [id number]  Encounter Provider: Zeinab Meza MD  Encounter Date: 2025   Encounter department: [facility]  :  Assessment & Plan  Malignant neoplasm of upper-inner quadrant of right breast in female, estrogen receptor positive (HCC)  She is a 54-year-old  female premenopausal with history of stage IIa right-sided breast cancer grade 1 ER/NE positive HER2 negative status post bilateral mastectomy with sentinel lymph node biopsy resulting in GINI Oncotype DX recurrence score of 14 she had been on tamoxifen 20 mg p.o. daily since 2015    1. Right breast cancer, stage IIA (pT2, pN0,M0). Grade 1, ER/NE positive, HER-2 negative disease. Oncotype DX recurrence score 14. Diagnosed in 2015.  2. BRCA gene mutation negative.  Carrier for MUTYH mutation     Mri or breasts on 24 showed no evidence of malignancy    2025- CBC and cmp grossly normal     Patient to complete 10 years of tamoxifen in 2025. She Should continue to see surgical oncology and receive surveillance imagining (MRI/mammograms) per surgical oncology.  Given her history of tamoxifen she should continue to recive yearly Pap smears and continue to follow with OB/Gyn.    Pt can rerun to medical oncology PRN.    Pt is understand and is in agreement with the current plan                   No follow-ups on file.    History of Present Illness   No chief complaint on file.    Oncology History   Cancer Staging   Malignant neoplasm of upper-inner quadrant of right breast in female, estrogen receptor positive (HCC)  Staging form: Breast, AJCC 7th Edition  - Pathologic stage from 2015: Stage IIA (T2, N0, cM0) - Signed by AGUSTINA Turpin on 10/17/2022  Stage prefix: Initial diagnosis  Method of lymph node assessment: [place] lymph node biopsy  Oncology History   Malignant neoplasm of upper-inner quadrant of right breast in  female, estrogen receptor positive (HCC)   5/14/2015 Biopsy    Right US guided breast biopsy     Invasive ductal carcinoma  %  AL 90-95%  HER-2 negative     6/2015 Genetic Testing    6/3/15: [facility] BRCAplus (5 genes): BRCA1, BRCA2, CDH1, PTEN, TP53      6/30/2015 Surgery    Bilateral mastectomies, Right sentinel lymph node biopsy    Bilateral reconstruction with expanders.  Dr Wilson      Genomic Testing    Oncotype DX  14     7/8/2015 -  Cancer Staged    Staging form: Breast, AJCC 7th Edition  - Pathologic stage from 7/8/2015: Stage IIA (T2, N0, cM0) - Signed by AGUSTINA Turpin on 10/17/2022  Stage prefix: Initial diagnosis  Method of lymph node assessment: [place] lymph node biopsy       8/12/2015 -  Hormone Therapy    Tamoxifen     10/23/2015 Surgery    Expanders removed, implants placed.     3/9/2016 Surgery    Bilateral breast mound revisions, fat grafting     10/13/2016 Surgery    Nipple reconstruction     6/2021 Genetic Testing    Test(s): [employer] Core Cancer Panel (36 genes): APC, DADA, AXIN2 BARD1, BRCA1, BRCA2, BRIP1, BMPR1A, CDH1, CDK4, CDKN2A, CHEK2, DICER1, EPCAM, GREM1, HOXB13, MLH1, MSH2, MSH3, MSH6, MUTYH, NBN, NF1, NTHL1, PALB2, PMS2, POLD1, POLE, PTEN, RAD51C, RAD51D, RECQL SMAD4, SMARCA4, STK11, TP53     Result: Positive - MUTYH c.1187G>A ([id number]), Heterozygous, Pathogenic            Pertinent Medical History     05/09/25: Estrellita is a 55 year old premenopausal woman, who is found to have abnormality, based on the screening mammography in her right breast. Subsequently, she underwent biopsy from the right breast lesion, which showed invasive ductal carcinoma. She was seen and evaluated by Dr. Olvera. She was found to have no evidence of BRCA gene mutation. Despite this, she elected to have mastectomy as well as prophylactic left mastectomy, which was done in June 30, 2015. She has no evidence of carcinoma in the left mastectomy specimen. Right mastectomy specimen showed 2.1x1.8x1.0 cm  of invasive ductal carcinoma, grade 1. There was no evidence of lymphovascular invasion. 2 sentinel lymph node were negative for metastatic disease. This was % positive, AR 95% positive, HER-2 negative disease. Dr. Olvera. That her tumor tissue for Oncotype DX testing which came back 14 as of recurrence score. She had immediate reconstruction with tissue expander.     Interval assessment.    April of 2025 pt had breakthrough vaginal bleeding after 1 year without menstruation. Due to her use of tamoxifen endometrial biopsy was performed. Biopsy was negative for hyperplasia or malignancy.  Pt Is feeling well at this time, she has no complaints or issues.      Review of Systems   Constitutional:  Negative for activity change, appetite change, chills, diaphoresis, fatigue and fever.   Respiratory:  Negative for apnea, cough and shortness of breath.    Cardiovascular: Negative.  Negative for chest pain, palpitations and leg swelling.   Gastrointestinal:  Negative for abdominal pain, blood in stool and diarrhea.   Genitourinary:  Negative for dysuria and menstrual problem.   Musculoskeletal: Negative.    Skin:  Positive for rash (psoriasis). Negative for color change and pallor.   Hematological:  Negative for adenopathy. Does not bruise/bleed easily.   Psychiatric/Behavioral: Negative.             Objective   LMP 01/08/2025     Pain Screening:     ECOG   0  Physical Exam  Vitals reviewed.   Constitutional:       General: She is not in acute distress.     Appearance: Normal appearance. She is normal weight. She is not toxic-appearing.   HENT:      Mouth/Throat:      Mouth: Mucous membranes are moist.      Pharynx: Oropharynx is clear.   Eyes:      Conjunctiva/sclera: Conjunctivae normal.      Pupils: Pupils are equal, round, and reactive to light.   Cardiovascular:      Rate and Rhythm: Normal rate and regular rhythm.      Pulses: Normal pulses.      Heart sounds: Normal heart sounds. No murmur heard.  Pulmonary:       Effort: Pulmonary effort is normal. No respiratory distress.      Breath sounds: No wheezing.   Musculoskeletal:      Cervical back: Normal range of motion.      Right lower leg: No edema.      Left lower leg: No edema.   Lymphadenopathy:      Cervical: No cervical adenopathy.   Skin:     General: Skin is warm.      Capillary Refill: Capillary refill takes less than 2 seconds.      Coloration: Skin is not jaundiced or pale.   Neurological:      General: No focal deficit present.      Mental Status: She is alert and oriented to person, place, and time.   Psychiatric:         Mood and Affect: Mood normal.         Labs: I have reviewed the following labs:  Lab Results   Component Value Date/Time    WBC 4.49 05/07/2025 08:28 AM    RBC 4.18 05/07/2025 08:28 AM    Hemoglobin 12.3 05/07/2025 08:28 AM    Hematocrit 38.5 05/07/2025 08:28 AM    MCV 92 05/07/2025 08:28 AM    MCH 29.4 05/07/2025 08:28 AM    RDW 13.9 05/07/2025 08:28 AM    Platelets 242 05/07/2025 08:28 AM    Segmented % 65 05/07/2025 08:28 AM    Lymphocytes % 27 05/07/2025 08:28 AM    Monocytes % 6 05/07/2025 08:28 AM    Eosinophils Relative 2 05/07/2025 08:28 AM    Basophils Relative 0 05/07/2025 08:28 AM    Immature Grans % 0 05/07/2025 08:28 AM    Absolute Neutrophils 2.90 05/07/2025 08:28 AM     Lab Results   Component Value Date/Time    Potassium 4.1 05/07/2025 08:28 AM    Chloride 104 05/07/2025 08:28 AM    CO2 29 05/07/2025 08:28 AM    BUN 12 05/07/2025 08:28 AM    Creatinine 0.70 05/07/2025 08:28 AM    Glucose, Fasting 83 05/07/2025 08:28 AM    Calcium 9.0 05/07/2025 08:28 AM    AST 20 05/07/2025 08:28 AM    ALT 19 05/07/2025 08:28 AM    Alkaline Phosphatase 82 05/07/2025 08:28 AM    Total Protein 6.7 05/07/2025 08:28 AM    Albumin 4.2 05/07/2025 08:28 AM    Total Bilirubin 0.66 05/07/2025 08:28 AM    eGFR 98 05/07/2025 08:28 AM

## 2025-05-09 NOTE — ASSESSMENT & PLAN NOTE
She is a 54-year-old  female premenopausal with history of stage IIa right-sided breast cancer grade 1 ER/MT positive HER2 negative status post bilateral mastectomy with sentinel lymph node biopsy resulting in GINI Oncotype DX recurrence score of 14 she had been on tamoxifen 20 mg p.o. daily since August 2015    1. Right breast cancer, stage IIA (pT2, pN0,M0). Grade 1, ER/MT positive, HER-2 negative disease. Oncotype DX recurrence score 14. Diagnosed in June 2015.  2. BRCA gene mutation negative.  Carrier for MUTYH mutation     Mri or breasts on 5/22/24 showed no evidence of malignancy    5/7/2025- CBC and cmp grossly normal     Patient to complete 10 years of tamoxifen in August of 2025. She Should continue to see surgical oncology and receive surveillance imagining (MRI/mammograms) per surgical oncology.  Given her history of tamoxifen she should continue to recive yearly Pap smears and continue to follow with OB/Gyn.    Pt can rerun to medical oncology PRN.    Pt is understand and is in agreement with the current plan

## 2025-05-12 ENCOUNTER — DOCUMENTATION (OUTPATIENT)
Dept: HEMATOLOGY ONCOLOGY | Facility: CLINIC | Age: 55
End: 2025-05-12

## 2025-05-14 ENCOUNTER — OFFICE VISIT (OUTPATIENT)
Dept: PHYSICAL THERAPY | Facility: REHABILITATION | Age: 55
End: 2025-05-14
Attending: OBSTETRICS & GYNECOLOGY
Payer: COMMERCIAL

## 2025-05-14 DIAGNOSIS — N39.3 STRESS INCONTINENCE: ICD-10-CM

## 2025-05-14 DIAGNOSIS — R10.2 PELVIC PRESSURE IN FEMALE: Primary | ICD-10-CM

## 2025-05-14 DIAGNOSIS — M62.89 PELVIC FLOOR DYSFUNCTION: ICD-10-CM

## 2025-05-14 PROCEDURE — 97110 THERAPEUTIC EXERCISES: CPT

## 2025-05-14 PROCEDURE — 97140 MANUAL THERAPY 1/> REGIONS: CPT

## 2025-05-14 NOTE — PROGRESS NOTES
Daily Note     Today's date: 2025  Patient name: Millie Moyer  : 1970  MRN: [id number]  Referring provider: Mayra Garcia*  Dx:   Encounter Diagnosis     ICD-10-CM    1. Pelvic pressure in female  R10.2       2. Stress incontinence  N39.3       3. Pelvic floor dysfunction  M62.89           Start Time: 1305  Stop Time: 1550  Total time in clinic (min): 165 minutes    Subjective: Patient notes some increased back pain today with the weather.      Objective: See treatment diary below. Back assessment.     Hip abduction LLE: 3+/5  Hip abduction RLE: 4-/5   See addendum created with back evaluation       Assessment: Tolerated treatment well. Progressed PFM concentric engagement. Noted bridge triggered back pain, however, (+) response to clamshells. Further progressed appropriate core and breath synchrony. Instructed patient on appropriate  scar mobility per notes of recent discomfort near the incision. Further assessed lumbar back pain. Addendum created to initial evaluation to assist with symptoms. Overall, noted B/L hip weakness including hip abductors ans extensors that can be influencing patient symptoms.  Patient would benefit from continued PT to progress above deficits and improve function.       Plan: Continue per plan of care.      Precautions: Standard   Patient Active Problem List   Diagnosis    Irritable bowel syndrome (IBS)    Malignant neoplasm of upper-inner quadrant of right breast in female, estrogen receptor positive (HCC)    Psoriasis    Seasonal allergies    BRCA negative    Use of tamoxifen (Nolvadex)    Seborrheic keratosis    Bilateral plantar fasciitis    Dyspepsia    Monoallelic mutation of MUTYH gene    COVID-19 vaccination not done    Abnormal MRI, breast    Carpal tunnel syndrome on right    Lateral epicondylitis of right elbow    Medial epicondylitis of elbow, right    PONV (postoperative nausea and vomiting)    Inability to bear weight    Acute pain of  right knee       Chief Complaint    Patient Subjective includes:  urinary leakage with standing from sitting, urinary leakage with laughing, coughing, sneezing, urinary leakage with exercise, and urinary frequency   Patient Goals        Objective Impairments to address   Pelvic Floor Strength    Power 2+/5   Endurance  3   Fast Twitch  4   % relaxation 75   External Hip Strength        Lumbar Screen          PLAN OF CARE EXPIRATION: 2025    AUTHORIZATION EXPIRATION:     Date Authorization     Number of visits provided            Date of Service 2025       Visits Used 1 2       Visits Remaining         Medbridge Created                  Neuro Re-Ed         Urge deferral         Defecation mechanics         Breathing Mechanics         PFM Coordination         PFM Down Training         Internal Cueing                   Ther Ex  Back measurements       PFM Strengthening  Sidelying clamshell with PFM engagement   1 x 8 B/L        Hip strengthening  Sidelying hip abduction   1 x 10 B/L        Functional Strengthening         Abdominal Strengthening  Hooklying tra w/shoulder extension   1 x 10     Hooklying with hip abduction isometric   1 x 10       Hooklying marching   1 x 10 #1 lb        Dilator Training         Aerobic         Therapeutic Rest Breaks         Mobility          High Impact         UE Strengthening                   Ther Activity         Voiding Diaries         Review of sxs         Fluid Intake         Education Trini vera, Pelvic floor PT pelvic floor model           Manual Ther         PFM exam         Ortho exam         Dynamometer Testing         Fascial Decompression          Scar Mobility  Performed ELISA        Modalities                           Outcome Measure

## 2025-05-20 ENCOUNTER — OFFICE VISIT (OUTPATIENT)
Dept: DERMATOLOGY | Facility: CLINIC | Age: 55
End: 2025-05-20
Payer: COMMERCIAL

## 2025-05-20 VITALS — TEMPERATURE: 98.2 F | WEIGHT: 180 LBS | BODY MASS INDEX: 27.28 KG/M2 | HEIGHT: 68 IN

## 2025-05-20 DIAGNOSIS — L85.3 XEROSIS OF SKIN: ICD-10-CM

## 2025-05-20 DIAGNOSIS — L40.9 PSORIASIS: ICD-10-CM

## 2025-05-20 DIAGNOSIS — D22.5 MULTIPLE BENIGN MELANOCYTIC NEVI OF UPPER AND LOWER EXTREMITIES AND TRUNK: ICD-10-CM

## 2025-05-20 DIAGNOSIS — D22.62 MULTIPLE BENIGN MELANOCYTIC NEVI OF UPPER AND LOWER EXTREMITIES AND TRUNK: ICD-10-CM

## 2025-05-20 DIAGNOSIS — Z12.83 SKIN CANCER SCREENING: Primary | ICD-10-CM

## 2025-05-20 DIAGNOSIS — D22.61 MULTIPLE BENIGN MELANOCYTIC NEVI OF UPPER AND LOWER EXTREMITIES AND TRUNK: ICD-10-CM

## 2025-05-20 DIAGNOSIS — L81.4 LENTIGINES: ICD-10-CM

## 2025-05-20 DIAGNOSIS — D22.72 MULTIPLE BENIGN MELANOCYTIC NEVI OF UPPER AND LOWER EXTREMITIES AND TRUNK: ICD-10-CM

## 2025-05-20 DIAGNOSIS — L82.1 SK (SEBORRHEIC KERATOSIS): ICD-10-CM

## 2025-05-20 DIAGNOSIS — D22.71 MULTIPLE BENIGN MELANOCYTIC NEVI OF UPPER AND LOWER EXTREMITIES AND TRUNK: ICD-10-CM

## 2025-05-20 DIAGNOSIS — D18.01 CHERRY ANGIOMA: ICD-10-CM

## 2025-05-20 PROCEDURE — 99213 OFFICE O/P EST LOW 20 MIN: CPT | Performed by: DERMATOLOGY

## 2025-05-20 NOTE — PROGRESS NOTES
"[facility] Note     Patient Name: Millie Moyer  Encounter Date: 05/20/2025       Have you been cared for by a [facility] Dermatologist in the last 3 years and, if so, which description applies to you? Yes. I have been here within the last 3 years, and my medical history has NOT changed since that time. I am of child-bearing potential.     REVIEW OF SYSTEMS:  Have you recently had or currently have any of the following? No changes in my recent health.   PAST MEDICAL HISTORY:  Have you personally ever had or currently have any of the following?  If \"YES,\" then please provide more detail. No changes in my medical history.   HISTORY OF IMMUNOSUPPRESSION: Do you have a history of any of the following:  Systemic Immunosuppression such as Diabetes, Biologic or Immunotherapy, Chemotherapy, Organ Transplantation, Bone Marrow Transplantation or Prednisone?  No     Answering \"YES\" requires the addition of the dotphrase \"IMMUNOSUPPRESSED\" as the first diagnosis of the patient's visit.   FAMILY HISTORY:  Any \"first degree relatives\" (parent, brother, sister, or child) with the following?    No changes in my family's known health.   PATIENT EXPERIENCE:    Do you want the Dermatologist to perform a COMPLETE skin exam today including a clinical examination under the \"bra and underwear\" areas?  Yes- did not check under bra or underwear   If necessary, do we have your permission to call and leave a detailed message on your Preferred Phone number that includes your specific medical information?  Yes      Allergies[1] Current Medications[2]          Whom besides the patient is providing clinical information about today's encounter?   NO ADDITIONAL HISTORIAN (patient alone provided history)    Physical Exam and Assessment/Plan by Diagnosis:    PSORIASIS    Physical Exam:  Anatomic Location Affected:  right lower leg  Morphological Description:  scaly pink papules  Severity: mild  Body Percent Affected: " 1%  Pertinent Positives:  Pertinent Negatives:    Additional History of Present Condition:  patient thinks breakouts are stress related    Assessment and Plan:  Based on a thorough discussion of this condition and the management approach to it (including a comprehensive discussion of the known risks, side effects and potential benefits of treatment), the patient (family) agrees to implement the following specific plan:  Continue with Cosentyx      Psoriasis is a chronic inflammatory condition that causes the body to make new skin cells in days rather than weeks. As these cells pile up on the surface of the skin, you may see thick, scaly patches of thickened skin.   Psoriasis affects 2-4% of males and females. It can start at any age including childhood, with peaks of onset at 15-25 years and 50-60 years. It tends to persist lifelong, fluctuating in extent and severity. It is particularly common in Caucasians but may affect people of any race. About one-third of patients with psoriasis have family members with psoriasis.  Psoriasis is multifactorial. It is classified as an immune-mediated inflammatory disease (IMID). Genetic factors are important and influence the type of psoriasis and response to treatment.     What are the signs and symptoms of psoriasis?    There are many different types of psoriasis that each have present uniquely. The types of psoriasis include:    Plaque psoriasis: About 80% to 90% of people who have psoriasis develop this type. When plaque psoriasis appears, you may see:  Plaque psoriasis usually presents with symmetrically distributed, red, scaly plaques with well-defined edges. The scale is typically silvery white, except in skin folds where the plaques often appear shiny and they may have a moist peeling surface. The most common sites are scalp, elbows and knees, but any part of the skin can be involved. The plaques are usually very persistent without treatment.  Itch is mostly mild but may  be severe in some patients, leading to scratching and lichenification (thickened leathery skin with increased skin markings). Painful skin cracks or fissures may occur.  When psoriatic plaques clear up, they may leave brown or pale marks that can be expected to fade over several months.    Guttate psoriasis: When someone gets this type of psoriasis, you often see tiny bumps appear on the skin quite suddenly. The bumps tend to cover much of the torso, legs, and arms. Sometimes, the bumps also develop on the face, scalp, and ears. No matter where they appear, the bumps tend to be:   Small and scaly  Ralston-colored to pink  Temporary, clearing in a few weeks or months without treatment  When guttate psoriasis clears, it may never return. Why this happens is still a bit of a mystery. Guttate psoriasis tends to develop in children and young adults who've had an infection, such as strep throat. It's possible that when the infection clears so does guttate psoriasis.  It's also possible to have:  Guttate psoriasis for life  See the guttate psoriasis clear and plaque psoriasis develop later in life  Plaque psoriasis when you develop guttate psoriasis  There's no way to predict what will happen after the first flare-up of guttate psoriasis clears.    Inverse psoriasis: This type of psoriasis develops in areas where skin touches skin, such as the armpits, genitals, and crease of the buttocks. Where the inverse psoriasis appears, you're likely to notice:  Smooth, red patches of skin that look raw  Little, if any, silvery-white coating  Sore or painful skin  Other names for this type of psoriasis are intertriginous psoriasis or flexural psoriasis.  Pustular psoriasis: This type of psoriasis causes pus-filled bumps that usually appear only on the feet and hands. While the pus-filled bumps may look like an infection, the skin is not infected. The bumps don't contain bacteria or anything else that could cause an infection.  Where  pustular psoriasis appears, you tend to notice:  Red, swollen skin that is dotted with pus-filled bumps  Extremely sore or painful skin  Brown dots (and sometimes scale) appear as the pus-filled bumps dry  Pustular psoriasis can make just about any activity that requires your hands or feet, such as typing or walking, unbearably painful.    Pustular psoriasis (generalized): Serious and life-threatening, this rare type of psoriasis causes pus-filled bumps to develop on much of the skin. Also called von Zumbusch psoriasis, a flare-up causes this sequence of events:  Skin on most of the body suddenly turns dry, red, and tender.  Within hours, pus-filled bumps cover most of the skin.  Often within a day, the pus-filled bumps break open and pools of pus leak onto the skin.  As the pus dries (usually within 24 to 48 hours), the skin dries out and peels (as shown in this picture).  When the dried skin peels off, you see a smooth, glazed surface.  In a few days or weeks, you may see a new crop of pus-filled bumps covering most of the skin, as the cycle repeats itself.  Anyone with pustular psoriasis also feels very sick, and may develop a fever, headache, muscle weakness, and other symptoms. Medical care is often necessary to save the person's life.    Erythrodermic psoriasis: Serious and life-threatening, this type of psoriasis requires immediate medical care. When someone develops erythrodermic psoriasis, you may notice:  Skin on most of the body looks burnt  Chills, fever, and the person looks extremely ill  Muscle weakness, a rapid pulse, and severe itch  The person may also be unable to keep warm, so hypothermia can set in quickly.  Most people who develop this type of psoriasis already have another type of psoriasis. Before developing erythrodermic psoriasis, they often notice that their psoriasis is worsening or not improving with treatment. If you notice either of these happening, see a board-certified  dermatologist.    Nails    Nail psoriasis: With any type of psoriasis, you may see changes to your fingernails or toenails. About half of the people who have plaque psoriasis see signs of psoriasis on their fingernails at some point2.  When psoriasis affects the nails, you may notice:  Tiny dents in your nails (called “nail pits”)  White, yellow, or brown discoloration under one or more nails  Crumbling, rough nails  A nail lifting up so that it's no longer attached  Buildup of skin cells beneath one or more nails, which lifts up the nail  Treatment and proper nail care can help you control nail psoriasis.    Psoriatic arthritis: If you have psoriasis, it's important to pay attention to your joints. Some people who have psoriasis develop a type of arthritis called psoriatic arthritis. This is more likely to occur if you have severe psoriasis.  Most people notice psoriasis on their skin years before they develop psoriatic arthritis. It's also possible to get psoriatic arthritis before psoriasis, but this is less common.  When psoriatic arthritis develops, the signs can be subtle. At first, you may notice:  A swollen and tender joint, especially in a finger or toe  Heel pain  Swelling on the back of your leg, just above your heel  Stiffness in the morning that fades during the day  Like psoriasis, psoriatic arthritis cannot be cured. Treatment can prevent psoriatic arthritis from worsening, which is important. Allowed to progress, psoriatic arthritis can become disabling.    Diagnosis and treatment of psoriasis   Psoriasis is usually diagnosed by clinical features, and skin biopsy if necessary.   It is important to decrease factors that aggravate psoriasis. These include treating streptococcal infections, minimizing skin injuries, avoiding sun exposure if it exacerbates psoriasis, smoking, alcohol usage, decreasing stress, and maintaining an optimal body weight. Certain medications such as lithium, beta blockers,  "antimalarials, and NSAIDs have also been implicated. Suddenly stopping oral steroids or strong topical steroids can cause rebound disease.     There are many categories of psoriasis treatments available.     Topical therapy  Mild psoriasis is generally treated with topical agents alone. Which treatment is selected may depend on body site, extent and severity of psoriasis.  Emollients  Coal tar preparations  Dithranol  Salicylic acid  Vitamin D analogue (calcipotriol)  Topical corticosteroids  Calcineurin inhibitor (tacrolimus, pimecrolimus)  Phototherapy  Most psoriasis centres offer phototherapy with ultraviolet (UV) radiation, often in combination with topical or systemic agents. Types of phototherapy include  Narrowband UVB  Broadband UVB  Photochemotherapy (PUVA)  Targeted phototherapy  Systemic therapy  Moderate to severe psoriasis warrants treatment with a systemic agent and/or phototherapy. The most common treatments are:  Methotrexate  Ciclosporin  Acitretin  Other medicines occasionally used for psoriasis include:  Mycophenolate  Apremilast  Hydroxyurea  Azathioprine  6-mercaptopurine  Systemic corticosteroids are best avoided due to a risk of severe withdrawal flare of psoriasis and adverse effects.  Biologics or targeted therapies are reserved for conventional treatment-resistant severe psoriasis, mainly because of expense, as side effects compare favorably with other systemic agents. These include:  Anti-tumour necrosis factor-alpha antagonists (anti-TNF?) infliximab, adalimumab and etanercept  The interleukin (IL)-12/23 antagonist ustekinumab  IL-17 antagonists such as secukinumab  Many other monoclonal antibodies are under investigation in the treatment of psoriasis.     MELANOCYTIC NEVI (\"Moles\")    Physical Exam:  Anatomic Location Affected:   Mostly on sun-exposed areas of the trunk and extremities  Morphological Description:  Scattered, 1-4mm round to ovoid, symmetrical-appearing, even bordered, " "skin colored to dark brown macules/papules, mostly in sun-exposed areas  Pertinent Positives:  Pertinent Negatives:    Additional History of Present Condition:      Assessment and Plan:  Based on a thorough discussion of this condition and the management approach to it (including a comprehensive discussion of the known risks, side effects and potential benefits of treatment), the patient (family) agrees to implement the following specific plan:  When outside we recommend using a wide brim hat, sunglasses, long sleeve and pants, sunscreen with SPF 30+ with reapplication every 2 hours, or SPF specific clothing   Benign, reassured  Annual skin check     Melanocytic Nevi  Melanocytic nevi (\"moles\") are tan or brown, raised or flat areas of the skin which have an increased number of melanocytes. Melanocytes are the cells in our body which make pigment and account for skin color.    Some moles are present at birth (I.e., \"congenital nevi\"), while others come up later in life (i.e., \"acquired nevi\").  The sun can stimulate the body to make more moles.  Sunburns are not the only thing that triggers more moles.  Chronic sun exposure can do it too.     Clinically distinguishing a healthy mole from melanoma may be difficult, even for experienced dermatologists. The \"ABCDE's\" of moles have been suggested as a means of helping to alert a person to a suspicious mole and the possible increased risk of melanoma.  The suggestions for raising alert are as follows:    Asymmetry: Healthy moles tend to be symmetric, while melanomas are often asymmetric.  Asymmetry means if you draw a line through the mole, the two halves do not match in color, size, shape, or surface texture. Asymmetry can be a result of rapid enlargement of a mole, the development of a raised area on a previously flat lesion, scaling, ulceration, bleeding or scabbing within the mole.  Any mole that starts to demonstrate \"asymmetry\" should be examined promptly by a " "board certified dermatologist.     Border: Healthy moles tend to have discrete, even borders.  The border of a melanoma often blends into the normal skin and does not sharply delineate the mole from normal skin.  Any mole that starts to demonstrate \"uneven borders\" should be examined promptly by a board certified dermatologist.     Color: Healthy moles tend to be one color throughout.  Melanomas tend to be made up of different colors ranging from dark black, blue, white, or red.  Any mole that demonstrates a color change should be examined promptly by a board certified dermatologist.     Diameter: Healthy moles tend to be smaller than 0.6 cm in size; an exception are \"congenital nevi\" that can be larger.  Melanomas tend to grow and can often be greater than 0.6 cm (1/4 of an inch, or the size of a pencil eraser). This is only a guideline, and many normal moles may be larger than 0.6 cm without being unhealthy.  Any mole that starts to change in size (small to bigger or bigger to smaller) should be examined promptly by a board certified dermatologist.     Evolving: Healthy moles tend to \"stay the same.\"  Melanomas may often show signs of change or evolution such as a change in size, shape, color, or elevation.  Any mole that starts to itch, bleed, crust, burn, hurt, or ulcerate or demonstrate a change or evolution should be examined promptly by a board certified dermatologist.        LENTIGO    Physical Exam:  Anatomic Location Affected:  trunk, arms  Morphological Description:  Light brown macules  Pertinent Positives:  Pertinent Negatives:    Additional History of Present Condition:      Assessment and Plan:  Based on a thorough discussion of this condition and the management approach to it (including a comprehensive discussion of the known risks, side effects and potential benefits of treatment), the patient (family) agrees to implement the following specific plan:  When outside we recommend using a wide brim hat, " sunglasses, long sleeve and pants, sunscreen with SPF 30+ with reapplication every 2 hours, or SPF specific clothing       What is a lentigo?  A lentigo is a pigmented flat or slightly raised lesion with a clearly defined edge. Unlike an ephelis (freckle), it does not fade in the winter months. There are several kinds of lentigo.  The name lentigo originally referred to its appearance resembling a small lentil. The plural of lentigo is lentigines, although “lentigos” is also in common use.    Who gets lentigines?  Lentigines can affect males and females of all ages and races. Solar lentigines are especially prevalent in fair skinned adults. Lentigines associated with syndromes are present at birth or arise during childhood.    What causes lentigines?  Common forms of lentigo are due to exposure to ultraviolet radiation:  Sun damage including sunburn   Indoor tanning   Phototherapy, especially photochemotherapy (PUVA)    Ionizing radiation, eg radiation therapy, can also cause lentigines.  Several familial syndromes associated with widespread lentigines originate from mutations in Panchito-MAP kinase, mTOR signaling and PTEN pathways.    What is the treatment for lentigines?  Most lentigines are left alone. Attempts to lighten them may not be successful. The following approaches are used:  SPF 50+ broad-spectrum sunscreen   Hydroquinone bleaching cream   Alpha hydroxy acids   Vitamin C   Retinoids   Azelaic acid   Chemical peels  Individual lesions can be permanently removed using:  Cryotherapy   Intense pulsed light   Pigment lasers    How can lentigines be prevented?  Lentigines associated with exposure ultraviolet radiation can be prevented by very careful sun protection. Clothing is more successful at preventing new lentigines than are sunscreens.    What is the outlook for lentigines?  Lentigines usually persist. They may increase in number with age and sun exposure. Some in sun-protected sites may fade and  "disappear.    SCHNEIDER ANGIOMAS    Physical Exam:  Anatomic Location Affected:  trunk  Morphological Description:  Scattered cherry red, 1-4 mm papules.  Pertinent Positives:  Pertinent Negatives:    Additional History of Present Condition:      Assessment and Plan:  Based on a thorough discussion of this condition and the management approach to it (including a comprehensive discussion of the known risks, side effects and potential benefits of treatment), the patient (family) agrees to implement the following specific plan:  Monitor for changes  Benign, reassured      Assessment and Plan:    Cherry angioma, also known as Dobbins de Gautam spots, are benign vascular skin lesions. A \"cherry angioma\" is a firm red, blue or purple papule, 0.1-1 cm in diameter. When thrombosed, they can appear black in colour until evaluated with a dermatoscope when the red or purple colour is more easily seen. Cherry angioma may develop on any part of the body but most often appear on the scalp, face, lips and trunk.  An angioma is due to proliferating endothelial cells; these are the cells that line the inside of a blood vessel.    Angiomas can arise in early life or later in life; the most common type of angioma is a cherry angioma.  Cherry angiomas are very common in males and females of any age or race. They are more noticeable in white skin than in skin of colour. They markedly increase in number from about the age of 40. There may be a family history of similar lesions. Eruptive cherry angiomas have been rarely reported to be associated with internal malignancy. The cause of angiomas is unknown. Genetic analysis of cherry angiomas has shown that they frequently carry specific somatic missense mutations in the GNAQ and GNA11 (Q209H) genes, which are involved in other vascular and melanocytic proliferations.      SEBORRHEIC KERATOSIS; NON-INFLAMED    Physical Exam:  Anatomic Location Affected:  trunk  Morphological Description:  Flat " "and raised, waxy, smooth to warty textured, yellow to brownish-grey to dark brown to blackish, discrete, \"stuck-on\" appearing papules.  Pertinent Positives:  Pertinent Negatives:    Additional History of Present Condition:      Assessment and Plan:  Based on a thorough discussion of this condition and the management approach to it (including a comprehensive discussion of the known risks, side effects and potential benefits of treatment), the patient (family) agrees to implement the following specific plan:  Monitor for changes  Benign, reassured      Seborrheic Keratosis  A seborrheic keratosis is a harmless warty spot that appears during adult life as a common sign of skin aging.  Seborrheic keratoses can arise on any area of skin, covered or uncovered, with the usual exception of the palms and soles. They do not arise from mucous membranes. Seborrheic keratoses can have highly variable appearance.      Seborrheic keratoses are extremely common. It has been estimated that over 90% of adults over the age of 60 years have one or more of them. They occur in males and females of all races, typically beginning to erupt in the 30s or 40s. They are uncommon under the age of 20 years.  The precise cause of seborrhoeic keratoses is not known.  Seborrhoeic keratoses are considered degenerative in nature. As time goes by, seborrheic keratoses tend to become more numerous. Some people inherit a tendency to develop a very large number of them; some people may have hundreds of them.      There is no easy way to remove multiple lesions on a single occasion.  Unless a specific lesion is \"inflamed\" and is causing pain or stinging/burning or is bleeding, most insurance companies do not authorize treatment.    XEROSIS (\"DRY SKIN\")    Physical Exam:  Anatomic Location Affected:  diffuse  Morphological Description:  xerosis  Pertinent Positives:  Pertinent Negatives:    Additional History of Present Condition:      Assessment and " Plan:  Based on a thorough discussion of this condition and the management approach to it (including a comprehensive discussion of the known risks, side effects and potential benefits of treatment), the patient (family) agrees to implement the following specific plan:  Use moisturizer like Eucerin,Cerave or Aveeno Cream 3 times a day for the dry skin            Dry skin refers to skin that feels dry to touch. Dry skin has a dull surface with a rough, scaly quality. The skin is less pliable and cracked. When dryness is severe, the skin may become inflamed and fissured.  Although any body site can be dry, dry skin tends to affect the shins more than any other site.    Dry skin is lacking moisture in the outer horny cell layer (stratum corneum) and this results in cracks in the skin surface.  Dry skin is also called xerosis, xeroderma or asteatosis (lack of fat).  It can affect males and females of all ages. There is some racial variability in water and lipid content of the skin.  Dry skin that starts in early childhood may be one of about 20 types of ichthyosis (fish-scale skin). There is often a family history of dry skin.   Dry skin is commonly seen in people with atopic dermatitis.  Nearly everyone > 60 years has dry skin.    Dry skin that begins later may be seen in people with certain diseases and conditions.  Postmenopausal women  Hypothyroidism  Chronic renal disease   Malnutrition and weight loss   Subclinical dermatitis   Treatment with certain drugs such as oral retinoids, diuretics and epidermal growth factor receptor inhibitors      What is the treatment for dry skin?  The mainstay of treatment of dry skin and ichthyosis is moisturisers/emollients. They should be applied liberally and often enough to:  Reduce itch   Improve the barrier function   Prevent entry of irritants, bacteria   Reduce transepidermal water loss.      How can dry skin be prevented?  Eliminate aggravating factors:  Reduce the frequency  of bathing.   A humidifier in winter and air conditioner in summer   Compare having a short shower with a prolonged soak in a bath.   Use lukewarm, not hot, water.   Replace standard soap with a substitute such as a synthetic detergent cleanser, water-miscible emollient, bath oil, anti-pruritic tar oil, colloidal oatmeal etc.   Apply an emollient liberally and often, particularly shortly after bathing, and when itchy. The drier the skin, the thicker this should be, especially on the hands.    What is the outlook for dry skin?  A tendency to dry skin may persist life-long, or it may improve once contributing factors are controlled.         Scribe Attestation      I,:  Dory Hicks MA am acting as a scribe while in the presence of the attending physician.:       I,:  Margot Chandler MD personally performed the services described in this documentation    as scribed in my presence.:                [1]   Allergies  Allergen Reactions    Other Hives     Kait selzer    Kait-Tolar Plus Cold & Cough  [Zerwsyskf-Ecy-Yo-Apap] Hives    Morphine Vomiting     Vomits profusely     Prednisone Other (See Comments) and GI Intolerance     insomnia   [2]   Current Outpatient Medications:     amLODIPine (NORVASC) 5 mg tablet, TAKE 1 TABLET BY MOUTH EVERY DAY, Disp: 90 tablet, Rfl: 1    ASHWAGANDHA PO, Take by mouth every evening (Patient not taking: Reported on 4/21/2025), Disp: , Rfl:     benzonatate (TESSALON) 200 MG capsule, Take 1 capsule (200 mg total) by mouth 3 (three) times a day as needed for cough (Patient not taking: Reported on 4/18/2025), Disp: 20 capsule, Rfl: 0    Cholecalciferol (Vitamin D3) 125 MCG (5000 UT) TBDP, Take by mouth daily, Disp: , Rfl:     Cosentyx Sensoready, 300 MG, 150 MG/ML SOAJ injection, Inject 2 mL (300 mg total) under the skin every 30 (thirty) days, Disp: 2 mL, Rfl: 11    lisinopril (ZESTRIL) 5 mg tablet, TAKE 1 TABLET BY MOUTH EVERY DAY, Disp: 30 tablet, Rfl: 0    Magnesium 400 MG CAPS, Take 1  tablet by mouth daily at bedtime, Disp: , Rfl:     milk thistle 175 MG tablet, Take 175 mg by mouth daily, Disp: , Rfl:     Multiple Vitamin (multivitamin) capsule, Take 1 capsule by mouth daily, Disp: , Rfl:     tamoxifen (NOLVADEX) 20 mg tablet, TAKE 1 TABLET BY MOUTH EVERY DAY, Disp: 90 tablet, Rfl: 3

## 2025-05-21 ENCOUNTER — OFFICE VISIT (OUTPATIENT)
Dept: PHYSICAL THERAPY | Facility: REHABILITATION | Age: 55
End: 2025-05-21
Attending: OBSTETRICS & GYNECOLOGY
Payer: COMMERCIAL

## 2025-05-21 DIAGNOSIS — I10 ESSENTIAL HYPERTENSION: ICD-10-CM

## 2025-05-21 DIAGNOSIS — N39.3 STRESS INCONTINENCE: ICD-10-CM

## 2025-05-21 DIAGNOSIS — M62.89 PELVIC FLOOR DYSFUNCTION: ICD-10-CM

## 2025-05-21 DIAGNOSIS — R10.2 PELVIC PRESSURE IN FEMALE: Primary | ICD-10-CM

## 2025-05-21 PROCEDURE — 97110 THERAPEUTIC EXERCISES: CPT

## 2025-05-21 RX ORDER — LISINOPRIL 5 MG/1
5 TABLET ORAL DAILY
Qty: 30 TABLET | Refills: 5 | Status: SHIPPED | OUTPATIENT
Start: 2025-05-21

## 2025-05-21 NOTE — PROGRESS NOTES
Daily Note     Today's date: 2025  Patient name: Millie Moyer  : 1970  MRN: [id number]  Referring provider: Mayra Garcia*  Dx:   Encounter Diagnosis     ICD-10-CM    1. Pelvic pressure in female  R10.2       2. Stress incontinence  N39.3       3. Pelvic floor dysfunction  M62.89           Start Time: 1030  Stop Time: 1115  Total time in clinic (min): 45 minutes    Subjective: Patient notes improvement post previous session. Patient notes a reduction in back pain as well as notes improved ambulation tolerance. Patient notes reduced nocturia. At this time, continues to note urinary leakage with laughing, coughing and sneezing.       Objective: See treatment diary below      Assessment: Tolerated treatment well. Progressed PFM concentric activation with use of gravity assist to improve PFM strengthening. Progressed proximal hip strengthening to aid in the reduction of back pain and improved PFM engagement. Verbal cueing provided for appropriate PFM activation, and cueing with diaphragm breath work. Mod # of cueing for synchrony of breath with timing of contraction. Patient would benefit from continued PT to further progress PFM concentric strengthening, proximal hip strength, and pressure management to aid in the regression of urinary leakage with laughing, coughing, and sneezing.       Plan: Continue per plan of care.      Precautions: Standard   Patient Active Problem List   Diagnosis    Irritable bowel syndrome (IBS)    Malignant neoplasm of upper-inner quadrant of right breast in female, estrogen receptor positive (HCC)    Psoriasis    Seasonal allergies    BRCA negative    Use of tamoxifen (Nolvadex)    Seborrheic keratosis    Bilateral plantar fasciitis    Dyspepsia    Monoallelic mutation of MUTYH gene    COVID-19 vaccination not done    Abnormal MRI, breast    Carpal tunnel syndrome on right    Lateral epicondylitis of right elbow    Medial epicondylitis of elbow, right    PONV  (postoperative nausea and vomiting)    Inability to bear weight    Acute pain of right knee       Chief Complaint    Patient Subjective includes:  urinary leakage with standing from sitting, urinary leakage with laughing, coughing, sneezing, urinary leakage with exercise, and urinary frequency   Patient Goals        Objective Impairments to address   Pelvic Floor Strength    Power 2+/5   Endurance  3   Fast Twitch  4   % relaxation 75   External Hip Strength        Lumbar Screen          PLAN OF CARE EXPIRATION: 07/14/2025    AUTHORIZATION EXPIRATION:     Date Authorization     Number of visits provided            Date of Service 05/08/2025 5/14/2025 5/21/2025      Visits Used 1 2 3      Visits Remaining         Medbridge Created                  Neuro Re-Ed         Urge deferral         Defecation mechanics         Breathing Mechanics         PFM Coordination         PFM Down Training         Internal Cueing                   Ther Ex  Back measurements       PFM Strengthening  Sidelying clamshell with PFM engagement   1 x 8 B/L  Sidelying clamshell with PFM engagement   1 x 10 B/L     Hooklying PFM engaement with tra   1 x 10 feedfoward activation.     Hooklying with wedge   1 x 10 PFM concentric engagement.       Hip strengthening  Sidelying hip abduction   1 x 10 B/L  Single leg bridge   2 x 10 B/L           Thoracic mobility   Open Book   1 x 10 B/L       Functional Strengthening   Sit to stand wih PFM engagement #7 lb weight   1 x 12         Abdominal Strengthening  Hooklying tra w/shoulder extension   1 x 10     Hooklying with hip abduction isometric   1 x 10       Hooklying marching   1 x 10 #1 lb  Hooklying tra with shoulder extension   2 x 10     90-90 heel taps   1 x 15 with #5 lb         Dilator Training         Aerobic         Therapeutic Rest Breaks         Mobility          High Impact         UE Strengthening                   Ther Activity         Voiding Diaries         Review of sxs         Fluid  Intake         Education Squatty potty, Pelvic floor PT pelvic floor model           Manual Ther         PFM exam         Ortho exam         Dynamometer Testing         Fascial Decompression          Scar Mobility  Performed ELISA        Modalities                           Outcome Measure

## 2025-05-21 NOTE — HOME EXERCISE EDUCATION
Program_ID:[id number]   Access Code: [email or web address]  URL: [email or web address]  Date: 05-  Prepared By: Olivia Mccall    Program Notes    clamshell: Exhale and perform kegal at the topSit to stand: Exhale and kegal prior to standingDying bug: heel taps. Legs at 90 degrees and taping downStrengthening the Pelvic Floor: Lay on back, pillows under your hips. Let gravity help! Exhale and pull toward you the pelvic floor.   Exercises      - Sidelying Hip Abduction - 1 x daily - 7 x weekly - 2 sets - 10 reps      - Clamshell with Resistance - 1 x daily - 7 x weekly - 1 sets - 8 reps      - Hooklying Isometric Hip Abduction with Belt - 1 x daily - 7 x weekly - 2 sets - 10 reps      - Supine March - 1 x daily - 7 x weekly - 2 sets - 10 reps      - Supine Transversus Abdominis Bracing - Hands on Ground - 1 x daily - 7 x weekly - 1 sets - 15 reps      - Seated Thoracic Lumbar Extension with Pectoralis Stretch - 1 x daily - 7 x weekly - 2 sets - 10 reps      - Dead Bug - 1 x daily - 7 x weekly - 3 sets - 10 reps      - Sidelying Thoracic Rotation with Open Book - 1 x daily - 7 x weekly - 1 sets - 10 reps      - Sit to Stand - 1 x daily - 7 x weekly - 3 sets - 10 reps      - Bird Dog - 1 x daily - 7 x weekly - 1 sets - 10 reps

## 2025-06-05 NOTE — PROGRESS NOTES
Daily Note     Today's date: 2025  Patient name: Millie Moyer  : 1970  MRN: [id number]  Referring provider: Mayra Garcia*  Dx:   Encounter Diagnosis     ICD-10-CM    1. Pelvic pressure in female  R10.2       2. Stress incontinence  N39.3       3. Pelvic floor dysfunction  M62.89                      Chief Complaint: Stress incontinence, lumbar back pain, nocturia (urgency).   HPI: Pt presents to PT w/reports of increased urinary symptoms and urgency. Patient also notes an increased onset of back pain.   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   Social History: Lives with her  and daughter  Preferred language/communication style:  English.   PMH: Patient was on antibiotics for sinus infections (3 different points in the past year). B/L mastectomy (10 years ago in ),  Incision (16 years ago).         Specific symptom history:      Subjective: Notes great improvement in pain since start of PT. Was able to lift grandchild over past weeks in [place] w/ no pain. Mild exacerbation of UI w/ coughs over this week 2/2 sickness.       Objective: See treatment diary below      Assessment: Tolerated treatment well. Incorporated DB for PFM relaxation prior to bed - verbalized proper execution of PFM. Reviewed STS exercises w/ proper breathing mechanics w/ min cues required. Added functional strengthening w/ good performance. Denied adverse reactions to exercises. Next visit, continued exercises as tolerated and continued mental practice for nocturia.   Patient would benefit from continued PT.      Plan: Continue per plan of care       Precautions: Standard   Patient Active Problem List   Diagnosis    Irritable bowel syndrome (IBS)    Malignant neoplasm of upper-inner quadrant of right breast in female, estrogen receptor positive (HCC)    Psoriasis    Seasonal allergies    BRCA negative    Use of tamoxifen (Nolvadex)    Seborrheic keratosis    Bilateral plantar fasciitis    Dyspepsia     Monoallelic mutation of MUTYH gene    COVID-19 vaccination not done    Abnormal MRI, breast    Carpal tunnel syndrome on right    Lateral epicondylitis of right elbow    Medial epicondylitis of elbow, right    PONV (postoperative nausea and vomiting)    Inability to bear weight    Acute pain of right knee       Chief Complaint    Patient Subjective includes:  urinary leakage with standing from sitting, urinary leakage with laughing, coughing, sneezing, urinary leakage with exercise, and urinary frequency   Patient Goals        Objective Impairments to address   Pelvic Floor Strength    Power 2+/5   Endurance  3   Fast Twitch  4   % relaxation 75   External Hip Strength        Lumbar Screen          PLAN OF CARE EXPIRATION: 07/14/2025    AUTHORIZATION EXPIRATION:     Date Authorization     Number of visits provided            Date of Service 05/08/2025 5/14/2025 5/21/2025 6/6     Visits Used 1 2 3 4     Visits Remaining         Medbridge Created                  Neuro Re-Ed         Urge deferral         Defecation mechanics         Breathing Mechanics         PFM Coordination         PFM Down Training         Internal Cueing                   Ther Ex  Back measurements       PFM Strengthening  Sidelying clamshell with PFM engagement   1 x 8 B/L  Sidelying clamshell with PFM engagement   1 x 10 B/L     Hooklying PFM engaement with tra   1 x 10 feedfoward activation.     Hooklying with wedge   1 x 10 PFM concentric engagement.  Standing CS x8     STS x10   STS x10  OTB  STS x10 OTB + 10#      Seated PFMC w/ forced exhale x15      Hip strengthening  Sidelying hip abduction   1 x 10 B/L  Single leg bridge   2 x 10 B/L           Thoracic mobility   Open Book   1 x 10 B/L       Functional Strengthening   Sit to stand wih PFM engagement #7 lb weight   1 x 12    Australian pull up 2x10    Chest press Y spring 2x10           Abdominal Strengthening  Hooklying tra w/shoulder extension   1 x 10     Hooklying with hip abduction  isometric   1 x 10       Hooklying marching   1 x 10 #1 lb  Hooklying tra with shoulder extension   2 x 10     90-90 heel taps   1 x 15 with #5 lb         Dilator Training         Aerobic         Therapeutic Rest Breaks         Mobility     DB for PFM relaxation pror to bed 5'   HEP      High Impact         UE Strengthening                   Ther Activity         Voiding Diaries         Review of sxs         Fluid Intake         Education Trini vera, Pelvic floor PT pelvic floor model           Manual Ther         PFM exam         Ortho exam         Dynamometer Testing         Fascial Decompression          Scar Mobility  Performed ELISA        Modalities                           Outcome Measure

## 2025-06-06 ENCOUNTER — OFFICE VISIT (OUTPATIENT)
Dept: PHYSICAL THERAPY | Facility: REHABILITATION | Age: 55
End: 2025-06-06
Attending: OBSTETRICS & GYNECOLOGY
Payer: COMMERCIAL

## 2025-06-06 DIAGNOSIS — N39.3 STRESS INCONTINENCE: ICD-10-CM

## 2025-06-06 DIAGNOSIS — M62.89 PELVIC FLOOR DYSFUNCTION: ICD-10-CM

## 2025-06-06 DIAGNOSIS — R10.2 PELVIC PRESSURE IN FEMALE: Primary | ICD-10-CM

## 2025-06-06 PROCEDURE — 97110 THERAPEUTIC EXERCISES: CPT

## 2025-06-06 NOTE — HOME EXERCISE EDUCATION
Program_ID:[id number]   Access Code: [email or web address]  URL: [email or web address]  Date: 06-  Prepared By: Olivia Mccall    Program Notes    clamshell: Exhale and perform kegal at the topSit to stand: Exhale and kegal prior to standingDying bug: heel taps. Legs at 90 degrees and taping downStrengthening the Pelvic Floor: Lay on back, pillows under your hips. Let gravity help! Exhale and pull toward you the pelvic floor.   Exercises      - Sidelying Hip Abduction - 1 x daily - 7 x weekly - 2 sets - 10 reps      - Clamshell with Resistance - 1 x daily - 7 x weekly - 1 sets - 8 reps      - Hooklying Isometric Hip Abduction with Belt - 1 x daily - 7 x weekly - 2 sets - 10 reps      - Supine March - 1 x daily - 7 x weekly - 2 sets - 10 reps      - Supine Transversus Abdominis Bracing - Hands on Ground - 1 x daily - 7 x weekly - 1 sets - 15 reps      - Seated Thoracic Lumbar Extension with Pectoralis Stretch - 1 x daily - 7 x weekly - 2 sets - 10 reps      - Dead Bug - 1 x daily - 7 x weekly - 3 sets - 10 reps      - Sidelying Thoracic Rotation with Open Book - 1 x daily - 7 x weekly - 1 sets - 10 reps      - Sit to Stand - 1 x daily - 7 x weekly - 3 sets - 10 reps      - Bird Dog - 1 x daily - 7 x weekly - 1 sets - 10 reps      - Supine Diaphragmatic Breathing - 1 x daily - 7 x weekly -  sets -  reps      - Seated Throat Clear with Pelvic Floor Contraction - 1 x daily - 7 x weekly - 3 sets - 10 reps

## 2025-06-07 ENCOUNTER — OFFICE VISIT (OUTPATIENT)
Dept: URGENT CARE | Facility: MEDICAL CENTER | Age: 55
End: 2025-06-07
Payer: COMMERCIAL

## 2025-06-07 ENCOUNTER — APPOINTMENT (OUTPATIENT)
Dept: LAB | Facility: MEDICAL CENTER | Age: 55
End: 2025-06-07
Payer: COMMERCIAL

## 2025-06-07 VITALS
TEMPERATURE: 97.4 F | SYSTOLIC BLOOD PRESSURE: 124 MMHG | DIASTOLIC BLOOD PRESSURE: 84 MMHG | HEART RATE: 71 BPM | OXYGEN SATURATION: 99 % | RESPIRATION RATE: 18 BRPM

## 2025-06-07 DIAGNOSIS — J03.90 EXUDATIVE TONSILLITIS: Primary | ICD-10-CM

## 2025-06-07 DIAGNOSIS — J03.90 EXUDATIVE TONSILLITIS: ICD-10-CM

## 2025-06-07 LAB
BASOPHILS # BLD AUTO: 0.04 THOUSANDS/ÂΜL (ref 0–0.1)
BASOPHILS NFR BLD AUTO: 1 % (ref 0–1)
EOSINOPHIL # BLD AUTO: 0.17 THOUSAND/ÂΜL (ref 0–0.61)
EOSINOPHIL NFR BLD AUTO: 3 % (ref 0–6)
ERYTHROCYTE [DISTWIDTH] IN BLOOD BY AUTOMATED COUNT: 13.6 % (ref 11.6–15.1)
HCT VFR BLD AUTO: 42.6 % (ref 34.8–46.1)
HGB BLD-MCNC: 13.6 G/DL (ref 11.5–15.4)
IMM GRANULOCYTES # BLD AUTO: 0.03 THOUSAND/UL (ref 0–0.2)
IMM GRANULOCYTES NFR BLD AUTO: 0 % (ref 0–2)
LYMPHOCYTES # BLD AUTO: 1.56 THOUSANDS/ÂΜL (ref 0.6–4.47)
LYMPHOCYTES NFR BLD AUTO: 23 % (ref 14–44)
MCH RBC QN AUTO: 29.8 PG (ref 26.8–34.3)
MCHC RBC AUTO-ENTMCNC: 31.9 G/DL (ref 31.4–37.4)
MCV RBC AUTO: 93 FL (ref 82–98)
MONOCYTES # BLD AUTO: 0.34 THOUSAND/ÂΜL (ref 0.17–1.22)
MONOCYTES NFR BLD AUTO: 5 % (ref 4–12)
NEUTROPHILS # BLD AUTO: 4.72 THOUSANDS/ÂΜL (ref 1.85–7.62)
NEUTS SEG NFR BLD AUTO: 68 % (ref 43–75)
NRBC BLD AUTO-RTO: 0 /100 WBCS
PLATELET # BLD AUTO: 255 THOUSANDS/UL (ref 149–390)
PMV BLD AUTO: 9.7 FL (ref 8.9–12.7)
RBC # BLD AUTO: 4.57 MILLION/UL (ref 3.81–5.12)
S PYO AG THROAT QL: NEGATIVE
WBC # BLD AUTO: 6.86 THOUSAND/UL (ref 4.31–10.16)

## 2025-06-07 PROCEDURE — 36415 COLL VENOUS BLD VENIPUNCTURE: CPT

## 2025-06-07 PROCEDURE — 99213 OFFICE O/P EST LOW 20 MIN: CPT

## 2025-06-07 PROCEDURE — 87070 CULTURE OTHR SPECIMN AEROBIC: CPT

## 2025-06-07 PROCEDURE — 86663 EPSTEIN-BARR ANTIBODY: CPT

## 2025-06-07 PROCEDURE — 87880 STREP A ASSAY W/OPTIC: CPT

## 2025-06-07 PROCEDURE — 86664 EPSTEIN-BARR NUCLEAR ANTIGEN: CPT

## 2025-06-07 PROCEDURE — 86665 EPSTEIN-BARR CAPSID VCA: CPT

## 2025-06-07 PROCEDURE — 85025 COMPLETE CBC W/AUTO DIFF WBC: CPT

## 2025-06-07 NOTE — PROGRESS NOTES
[facility]        NAME: Millie Moyer is a 55 y.o. female  : 1970    MRN: [id number]  DATE: 2025  TIME: 1:06 AM    Assessment and Plan   Exudative tonsillitis [J03.90]  1. Exudative tonsillitis  POCT rapid ANTIGEN strepA    Throat culture    CBC and differential    EBV Acute Panel        POCT rapid strepA: -    Will send out for culture. If positive will treat with antibiotics.      Differential diagnoses discussed with patient.      Would like to have lab order for Mono placed to obtain today, will place orders, Mono precautions advised.     Patient Instructions   Rest   Encourage fluids  Warm salt water gargles  May use chloraseptic spray  Throat lozenges  Throat Coat Tea  Tsp of honey  Warm tea with honey. May use lemon    Wash hands frequently  Don't share drinks    Tylenol/Ibuprofen for pain/fever    If you develop a prolonged high fever, difficulty breathing, trouble swallowing, worsening pain, difficulty managing secretions, feel like your throat is closing, decreased fluid intake, or urination, any new or concerning symptoms please proceed ER.    Follow up with PCP in 3-5 days.  Proceed to ER if symptoms worsen.    If tests are performed, our office will contact you with results only if changes need to made to the care plan discussed with you at the visit. [facility]    Chief Complaint     Chief Complaint   Patient presents with   • Sore Throat     Started last Saturday after watching 16 month old child    • Chills     History of Present Illness       Sore Throat   This is a new problem. The current episode started in the past 7 days. The problem has been gradually worsening. The pain is worse on the right side. Maximum temperature: felt feverish. Pertinent negatives include no abdominal pain, congestion, coughing, diarrhea, ear discharge, ear pain, headaches, hoarse voice, plugged ear sensation, shortness of breath, swollen glands,  trouble swallowing or vomiting. Exposure to: Grandchild who was sick, traveled via airplane.       Review of Systems   Review of Systems   Constitutional:  Positive for chills, fatigue and fever (felt feverish). Negative for diaphoresis.   HENT:  Positive for sore throat. Negative for congestion, ear discharge, ear pain, hoarse voice, postnasal drip, rhinorrhea, sinus pressure, sinus pain and trouble swallowing.    Respiratory:  Negative for cough, chest tightness, shortness of breath and wheezing.    Cardiovascular: Negative.  Negative for chest pain and palpitations.   Gastrointestinal:  Negative for abdominal pain, constipation, diarrhea, nausea and vomiting.   Musculoskeletal:  Negative for arthralgias and myalgias.   Skin:  Negative for color change, rash and wound.   Neurological:  Negative for headaches.         Current Medications     Current Medications[1]    Current Allergies     Allergies as of 06/07/2025 - Reviewed 06/07/2025   Allergen Reaction Noted   • Other Hives 03/09/2016   • Kait-seltzer plus cold & cough  [uguldgnlo-flg-ov-apap] Hives 12/15/2015   • Morphine Vomiting 01/30/2024   • Prednisone Other (See Comments) and GI Intolerance 07/03/2023            The following portions of the patient's history were reviewed and updated as appropriate: allergies, current medications, past family history, past medical history, past social history, past surgical history and problem list.     Past Medical History[2]    Past Surgical History[3]    Family History[4]      Medications have been verified.        Objective   /84   Pulse 71   Temp (!) 97.4 °F (36.3 °C) (Temporal)   Resp 18   SpO2 99%        Physical Exam     Physical Exam  Vitals and nursing note reviewed.   Constitutional:       General: She is not in acute distress.     Appearance: She is not ill-appearing, toxic-appearing or diaphoretic.   HENT:      Head: Normocephalic.      Right Ear: Tympanic membrane, ear canal and external ear  normal. There is no impacted cerumen.      Left Ear: Ear canal and external ear normal. There is no impacted cerumen.      Nose: Nose normal. No congestion or rhinorrhea.      Mouth/Throat:      Mouth: Mucous membranes are moist.      Pharynx: Posterior oropharyngeal erythema present.      Tonsils: Tonsillar exudate (scant amount on R tonsil) present. 2+ on the right. 1+ on the left.     Cardiovascular:      Rate and Rhythm: Normal rate and regular rhythm.      Pulses: Normal pulses.      Heart sounds: Normal heart sounds. No murmur heard.  Pulmonary:      Effort: Pulmonary effort is normal. No respiratory distress.      Breath sounds: Normal breath sounds. No stridor. No wheezing, rhonchi or rales.     Musculoskeletal:         General: Normal range of motion.   Lymphadenopathy:      Cervical: Cervical adenopathy present.      Right cervical: Superficial cervical adenopathy present.      Left cervical: Superficial cervical adenopathy present.     Skin:     General: Skin is warm.     Neurological:      Mental Status: She is alert.                          [1]    Current Outpatient Medications:   •  amLODIPine (NORVASC) 5 mg tablet, TAKE 1 TABLET BY MOUTH EVERY DAY, Disp: 90 tablet, Rfl: 1  •  ASHWAGANDHA PO, Take by mouth every evening, Disp: , Rfl:   •  benzonatate (TESSALON) 200 MG capsule, Take 1 capsule (200 mg total) by mouth 3 (three) times a day as needed for cough (Patient not taking: Reported on 4/18/2025), Disp: 20 capsule, Rfl: 0  •  Cholecalciferol (Vitamin D3) 125 MCG (5000 UT) TBDP, Take by mouth in the morning., Disp: , Rfl:   •  Cosentyx Sensoready, 300 MG, 150 MG/ML SOAJ injection, Inject 2 mL (300 mg total) under the skin every 30 (thirty) days, Disp: 2 mL, Rfl: 11  •  lisinopril (ZESTRIL) 5 mg tablet, TAKE 1 TABLET BY MOUTH EVERY DAY, Disp: 30 tablet, Rfl: 5  •  Magnesium 400 MG CAPS, Take 1 tablet by mouth daily at bedtime, Disp: , Rfl:   •  milk thistle 175 MG tablet, Take 175 mg by mouth in the  morning., Disp: , Rfl:   •  Multiple Vitamin (multivitamin) capsule, Take 1 capsule by mouth in the morning., Disp: , Rfl:   •  tamoxifen (NOLVADEX) 20 mg tablet, TAKE 1 TABLET BY MOUTH EVERY DAY (Patient not taking: Reported on 2025), Disp: 90 tablet, Rfl: 3[2]  Past Medical History:  Diagnosis Date   • Arthritis    • BRCA negative    • Cancer (HCC) 2015    Breast right   • Colon polyp    • COVID     x 2 -last time    • Endometriosis    • H/O bilateral breast implants    • Headache     occ   • IBS (irritable bowel syndrome)    • Insomnia    • Night sweats    • Pain in left knee    • PONV (postoperative nausea and vomiting) 2024   • Psoriasis    • Skin tag    • Uses crutches    • Wears glasses    [3]  Past Surgical History:  Procedure Laterality Date   • BREAST BIOPSY Right     IDC   •  SECTION     • COLONOSCOPY     • LAPAROSCOPY      exploratory, endometriosis   • LYMPHADENECTOMY Right     x2   • MASTECTOMY      bilateral mastectomy   • MN ARTHRS KNE SURG W/MENISCECTOMY MED/LAT W/SHVG Left 2024    Procedure: Knee arthroscopy for medial meniscus debridement;  Surgeon: Jin Delong MD;  [facility];  Service: Orthopedics   • MN NDSC WRST SURG W/RLS TRANSVRS CARPL LIGM Right 2024    Procedure: RELEASE CARPAL TUNNEL ENDOSCOPIC;  Surgeon: Kennedy Garcia MD;  [facility];  Service: Orthopedics   • MN NEUROPLASTY &/TRANSPOSITION ULNAR NERVE ELBOW Right 2024    Procedure: RELEASE CUBITAL TUNNEL;  Surgeon: Kennedy Garcia MD;  [facility];  Service: Orthopedics   • MN NIPPLE/AREOLA RECONSTRUCTION Bilateral 10/13/2016    Procedure: NIPPLE RECONSTRUCTION ;  Surgeon: Tulio Wilson MD;  [facility];  Service: Plastics   • MN REVISION OF RECONSTRUCTED BREAST Bilateral 2016    Procedure: RECONSTRUCTION REVISION  BREAST MOUND ,FAT GRAFTS ;  Surgeon: Tulio Wilson MD;  [facility];  Service: Plastics   • MN TENDON  SHEATH INCISION Right 02/07/2024    Procedure: RELEASE TRIGGER FINGER - RIGHT MIDDLE;  Surgeon: Kennedy Garcia MD;  [facility];  Service: Orthopedics   • SENTINEL LYMPH NODE BIOPSY Right    [4]  Family History  Problem Relation Name Age of Onset   • Thyroid cancer Mother mom 40   • Breast cancer Mother mom 74   • Asthma Mother mom    • Psoriasis Father Dad    • Basal cell carcinoma Father Dad    • Arthritis Father Dad         Psoriatic   • Lung cancer Paternal Grandfather  50   • Breast cancer Other MGGM    • Heart disease Other uncle    • Prostate cancer Other uncle

## 2025-06-09 ENCOUNTER — RESULTS FOLLOW-UP (OUTPATIENT)
Dept: URGENT CARE | Facility: MEDICAL CENTER | Age: 55
End: 2025-06-09

## 2025-06-09 LAB
BACTERIA THROAT CULT: NORMAL
EBV EA IGG SER QL IA: NEGATIVE
EBV NA IGG SER QL IA: POSITIVE
EBV VCA IGG SER QL IA: POSITIVE
EBV VCA IGM SER QL IA: NEGATIVE

## 2025-06-11 ENCOUNTER — OFFICE VISIT (OUTPATIENT)
Dept: PHYSICAL THERAPY | Facility: REHABILITATION | Age: 55
End: 2025-06-11
Attending: OBSTETRICS & GYNECOLOGY
Payer: COMMERCIAL

## 2025-06-11 DIAGNOSIS — N39.3 STRESS INCONTINENCE: ICD-10-CM

## 2025-06-11 DIAGNOSIS — R10.2 PELVIC PRESSURE IN FEMALE: Primary | ICD-10-CM

## 2025-06-11 DIAGNOSIS — M62.89 PELVIC FLOOR DYSFUNCTION: ICD-10-CM

## 2025-06-11 PROCEDURE — 97140 MANUAL THERAPY 1/> REGIONS: CPT

## 2025-06-11 PROCEDURE — 97110 THERAPEUTIC EXERCISES: CPT

## 2025-06-11 NOTE — PROGRESS NOTES
Daily Note     Today's date: 2025  Patient name: Millie Moyer  : 1970  MRN: [id number]  Referring provider: Mayra Garcia*  Dx:   No diagnosis found.                 Chief Complaint: Stress incontinence, lumbar back pain, nocturia (urgency).   HPI: Pt presents to PT w/reports of increased urinary symptoms and urgency. Patient also notes an increased onset of back pain.   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   Social History: Lives with her  and daughter  Preferred language/communication style:  English.   PMH: Patient was on antibiotics for sinus infections (3 different points in the past year). B/L mastectomy (10 years ago in ),  Incision (16 years ago).         Specific symptom history:      Subjective: Notes continues nocturia x1 a night.       Objective: See treatment diary below      Assessment: Tolerated treatment well. Reviewed PFM relaxation prior to bed - verbalized proper execution of PFM. Incorporated reformer exercises with good tolerance. Reinforced proper TA activation w/ some difficulty but improved w/ repetition. Today, performed KT taping over suprapubic region w/ cephalic tensioning. Educated pt on precautions for removal. Denied previous reactions to adhesives. Skin unremarkable pre and post.      Patient would benefit from continued PT.      Plan: Continue per plan of care       Precautions: Standard   Patient Active Problem List   Diagnosis    Irritable bowel syndrome (IBS)    Malignant neoplasm of upper-inner quadrant of right breast in female, estrogen receptor positive (HCC)    Psoriasis    Seasonal allergies    BRCA negative    Use of tamoxifen (Nolvadex)    Seborrheic keratosis    Bilateral plantar fasciitis    Dyspepsia    Monoallelic mutation of MUTYH gene    COVID-19 vaccination not done    Abnormal MRI, breast    Carpal tunnel syndrome on right    Lateral epicondylitis of right elbow    Medial epicondylitis of elbow, right    PONV  (postoperative nausea and vomiting)    Inability to bear weight    Acute pain of right knee       Chief Complaint    Patient Subjective includes:  urinary leakage with standing from sitting, urinary leakage with laughing, coughing, sneezing, urinary leakage with exercise, and urinary frequency   Patient Goals        Objective Impairments to address   Pelvic Floor Strength    Power 2+/5   Endurance  3   Fast Twitch  4   % relaxation 75   External Hip Strength        Lumbar Screen          PLAN OF CARE EXPIRATION: 07/14/2025    AUTHORIZATION EXPIRATION:     Date Authorization     Number of visits provided            Date of Service 05/08/2025 5/14/2025 5/21/2025 6/6 6/11    Visits Used 1 2 3 4 5    Visits Remaining         Medbridge Created                  Neuro Re-Ed         Urge deferral         Defecation mechanics         Breathing Mechanics         PFM Coordination         PFM Down Training         Internal Cueing                   Ther Ex  Back measurements       PFM Strengthening  Sidelying clamshell with PFM engagement   1 x 8 B/L  Sidelying clamshell with PFM engagement   1 x 10 B/L     Hooklying PFM engaement with tra   1 x 10 feedfoward activation.     Hooklying with wedge   1 x 10 PFM concentric engagement.  Standing CS x8     STS x10   STS x10  OTB  STS x10 OTB + 10#      Seated PFMC w/ forced exhale x15  Reformer: 1' ea    Lat pull down   Tricep ex   SL IR/ER/neutral leg press   Supine ball w/ UE press       Hip strengthening  Sidelying hip abduction   1 x 10 B/L  Single leg bridge   2 x 10 B/L           Thoracic mobility   Open Book   1 x 10 B/L       Functional Strengthening   Sit to stand wih PFM engagement #7 lb weight   1 x 12    Australian pull up 2x10    Chest press Y spring 2x10       Australian pull up 2x10    Abdominal Strengthening  Hooklying tra w/shoulder extension   1 x 10     Hooklying with hip abduction isometric   1 x 10       Hooklying marching   1 x 10 #1 lb  Hooklying tra with  shoulder extension   2 x 10     90-90 heel taps   1 x 15 with #5 lb         Dilator Training         Aerobic         Therapeutic Rest Breaks         Mobility     DB for PFM relaxation pror to bed 5'   HEP  DB w/ PFM 5'     High Impact         UE Strengthening                   Ther Activity         Voiding Diaries         Review of sxs         Fluid Intake         Education Squatty potty, Pelvic floor PT pelvic floor model           Manual Ther         PFM exam         Ortho exam         Dynamometer Testing              KT Taping supra pubic 10'     Fascial Decompression          Scar Mobility  Performed ELISA        Modalities                           Outcome Measure

## 2025-06-13 ENCOUNTER — OFFICE VISIT (OUTPATIENT)
Dept: FAMILY MEDICINE CLINIC | Facility: CLINIC | Age: 55
End: 2025-06-13
Payer: COMMERCIAL

## 2025-06-13 VITALS
TEMPERATURE: 96.4 F | OXYGEN SATURATION: 99 % | WEIGHT: 188 LBS | HEART RATE: 77 BPM | HEIGHT: 68 IN | SYSTOLIC BLOOD PRESSURE: 120 MMHG | DIASTOLIC BLOOD PRESSURE: 82 MMHG | BODY MASS INDEX: 28.49 KG/M2

## 2025-06-13 DIAGNOSIS — I10 ESSENTIAL HYPERTENSION: ICD-10-CM

## 2025-06-13 DIAGNOSIS — M20.42 HAMMERTOE OF LEFT FOOT: ICD-10-CM

## 2025-06-13 DIAGNOSIS — Z00.00 ANNUAL PHYSICAL EXAM: Primary | ICD-10-CM

## 2025-06-13 DIAGNOSIS — L40.9 PSORIASIS: ICD-10-CM

## 2025-06-13 PROCEDURE — 99214 OFFICE O/P EST MOD 30 MIN: CPT | Performed by: FAMILY MEDICINE

## 2025-06-13 PROCEDURE — 99396 PREV VISIT EST AGE 40-64: CPT | Performed by: FAMILY MEDICINE

## 2025-06-13 NOTE — PROGRESS NOTES
Adult Annual Physical  Name: Millie Moyer      : 1970      MRN: [id number]  Encounter Provider: Margaret Becerra DO  Encounter Date: 2025   Encounter department: [facility]    :  Assessment & Plan  Annual physical exam         Hammertoe of left foot  Await podiatry evaluation  Orders:    Ambulatory Referral to Orthopedic Surgery; Future    Essential hypertension  Continue with ACE and amlodipine therapy.       Psoriasis  Stable on Cosentyx therapy; management as per dermatology           Preventive Screenings:  - Diabetes Screening: screening up-to-date  - Cholesterol Screening: screening not indicated and has hyperlipidemia   - Hepatitis C screening: screening up-to-date   - HIV screening: screening not indicated   - Cervical cancer screening: screening up-to-date   - Breast cancer screening: has history of breast cancer and screening up-to-date   - Colon cancer screening: screening up-to-date   - Lung cancer screening: screening not indicated     Immunizations:  - Immunizations due: Prevnar 20 and Zoster (Shingrix)    Counseling/Anticipatory Guidance:    - Diet: discussed recommendations for a healthy/well-balanced diet.   - Exercise: the importance of regular exercise/physical activity was discussed. Recommend exercise 3-5 times per week for at least 30 minutes.          History of Present Illness     Adult Annual Physical:  Patient presents for annual physical. The patient presents for annual physical and she complains of a hammertoe deformity of the left second toe that she would like to have evaluated by podiatry.  She has pain with weightbearing.  Her hypertension is well-controlled as is her psoriasis..     Diet and Physical Activity:  - Diet/Nutrition: well balanced diet.  - Exercise: walking.    Depression Screening:  - PHQ-2 Score: 0    General Health:  - Sleep: sleeps well.  - Hearing: normal hearing bilateral ears.  - Vision: no vision problems.  -  Understanding Dizziness, Balance Problems, and Fainting     The eyes, inner ear, joints, and muscles send signals to the brain to achieve balance. Balance is a group effort of the eyes, inner ear, joints, and muscles.  They each send signals to the br Contact your healthcare provider right away if you have side effects from your medicines. How medicines can help  · Treat infection or inflammation. If you have an infection caused by bacteria, your doctor can prescribe antibiotics.   · Limit conflicting Dental: regular dental visits.    /GYN Health:  - Follows with GYN: no.   - Menopause: perimenopausal.   - History of STDs: no  - Contraception: barrier methods.      Advanced Care Planning:  - Has an advanced directive?: no    - Has a durable medical POA?: no    - ACP document given to patient?: yes      Review of Systems   Constitutional:  Negative for appetite change, chills and fever.   HENT:  Negative for ear pain, facial swelling, rhinorrhea, sinus pain, sore throat and trouble swallowing.    Eyes:  Negative for discharge and redness.   Respiratory:  Negative for chest tightness, shortness of breath and wheezing.    Cardiovascular:  Negative for chest pain and palpitations.   Gastrointestinal:  Negative for abdominal pain, diarrhea, nausea and vomiting.   Endocrine: Negative for polyuria.   Genitourinary:  Negative for dysuria and urgency.   Musculoskeletal:  Negative for arthralgias and back pain.        Second left toe with hammertoe deformity and tenderness with weightbearing   Skin:  Negative for rash.   Neurological:  Negative for dizziness, weakness and headaches.   Hematological:  Negative for adenopathy.   Psychiatric/Behavioral:  Negative for behavioral problems, confusion and sleep disturbance.    All other systems reviewed and are negative.    Pertinent Medical History   Hypertension-well controlled.      Medical History Reviewed by provider this encounter:     .  Past Medical History   Past Medical History[1]  Past Surgical History[2]  Family History[3]   reports that she has never smoked. She has never been exposed to tobacco smoke. She has never used smokeless tobacco. She reports current alcohol use. She reports that she does not use drugs.  Current Outpatient Medications   Medication Instructions    amLODIPine (NORVASC) 5 mg, Oral, Daily    ASHWAGANDHA PO Every evening    Cholecalciferol (Vitamin D3) 125 MCG (5000 UT) TBDP Daily    Cosentyx Sensoready (300 MG) 300 mg, Subcutaneous, Every 30  days    lisinopril (ZESTRIL) 5 mg, Oral, Daily    Magnesium 400 MG CAPS 1 tablet, Daily at bedtime    milk thistle 175 mg, Daily    Multiple Vitamin (multivitamin) capsule 1 capsule, Daily   Allergies[4]   Medications Ordered Prior to Encounter[5]   Social History[6]    Objective   There were no vitals taken for this visit.    Physical Exam  Vitals and nursing note reviewed.   Constitutional:       General: She is not in acute distress.     Appearance: Normal appearance. She is well-developed. She is not ill-appearing or diaphoretic.   HENT:      Head: Normocephalic and atraumatic.      Right Ear: Tympanic membrane, ear canal and external ear normal.      Left Ear: Tympanic membrane, ear canal and external ear normal.      Nose: Nose normal. No congestion or rhinorrhea.      Mouth/Throat:      Mouth: Mucous membranes are moist.      Pharynx: Oropharynx is clear. No oropharyngeal exudate or posterior oropharyngeal erythema.     Eyes:      General: No scleral icterus.        Right eye: No discharge.         Left eye: No discharge.      Extraocular Movements: Extraocular movements intact.      Conjunctiva/sclera: Conjunctivae normal.      Pupils: Pupils are equal, round, and reactive to light.     Neck:      Thyroid: No thyromegaly.      Vascular: No carotid bruit or JVD.      Trachea: No tracheal deviation.     Cardiovascular:      Rate and Rhythm: Normal rate and regular rhythm.      Pulses: Normal pulses.      Heart sounds: Normal heart sounds. No murmur heard.  Pulmonary:      Effort: Pulmonary effort is normal. No respiratory distress.      Breath sounds: Normal breath sounds. No stridor. No wheezing, rhonchi or rales.   Abdominal:      General: Abdomen is flat. Bowel sounds are normal. There is no distension.      Palpations: Abdomen is soft. There is no mass.      Tenderness: There is no abdominal tenderness. There is no guarding or rebound.     Musculoskeletal:         General: Tenderness and deformity present.  No swelling. Normal range of motion.      Cervical back: Normal range of motion and neck supple. No rigidity.      Right lower leg: No edema.      Left lower leg: No edema.      Comments: Second left toe with hammertoe deformity and tenderness on palpation   Lymphadenopathy:      Cervical: No cervical adenopathy.     Skin:     General: Skin is warm and dry.      Capillary Refill: Capillary refill takes less than 2 seconds.      Coloration: Skin is not jaundiced.      Findings: No bruising, erythema or rash.     Neurological:      General: No focal deficit present.      Mental Status: She is alert and oriented to person, place, and time.      Cranial Nerves: No cranial nerve deficit.      Sensory: No sensory deficit.      Motor: No abnormal muscle tone.      Coordination: Coordination normal.      Gait: Gait normal.      Deep Tendon Reflexes: Reflexes are normal and symmetric. Reflexes normal.     Psychiatric:         Mood and Affect: Mood normal.         Behavior: Behavior normal.         Thought Content: Thought content normal.         Judgment: Judgment normal.       Administrative Statements   I have spent a total time of 20 minutes in caring for this patient on the day of the visit/encounter including Diagnostic results, Prognosis, Risks and benefits of tx options, Instructions for management, Patient and family education, Importance of tx compliance, Risk factor reductions, and Impressions.         [1]   Past Medical History:  Diagnosis Date    Allergic most of my life, seasonal    Arthritis     BRCA negative     Breast cancer (HCC) 5/5/2015    Cancer (HCC) 2015    Breast right    Colon polyp     COVID     x 2 -last time 2022    Endometriosis     GERD (gastroesophageal reflux disease) 2/1/20    Need eval for this    H/O bilateral breast implants     Headache     occ    IBS (irritable bowel syndrome)     Insomnia     Night sweats     Osteoarthritis 2002    Pain in left knee     PONV (postoperative nausea and  vomiting) 2024    Psoriasis     Skin disorder     Psoriasis    Skin tag     Uses crutches     Wears glasses    [2]   Past Surgical History:  Procedure Laterality Date    BREAST BIOPSY Right     IDC     SECTION      COLONOSCOPY      LAPAROSCOPY      exploratory, endometriosis    LYMPHADENECTOMY Right     x2    MASTECTOMY      bilateral mastectomy    CA ARTHRS KNE SURG W/MENISCECTOMY MED/LAT W/SHVG Left 2024    Procedure: Knee arthroscopy for medial meniscus debridement;  Surgeon: Jin Delong MD;  [facility];  Service: Orthopedics    CA NDSC WRST SURG W/RLS TRANSVRS CARPL LIGM Right 2024    Procedure: RELEASE CARPAL TUNNEL ENDOSCOPIC;  Surgeon: Kennedy Garcia MD;  [facility];  Service: Orthopedics    CA NEUROPLASTY &/TRANSPOSITION ULNAR NERVE ELBOW Right 2024    Procedure: RELEASE CUBITAL TUNNEL;  Surgeon: Kennedy Garcia MD;  [facility];  Service: Orthopedics    CA NIPPLE/AREOLA RECONSTRUCTION Bilateral 10/13/2016    Procedure: NIPPLE RECONSTRUCTION ;  Surgeon: Tulio Wilson MD;  [facility];  Service: Plastics    CA REVISION OF RECONSTRUCTED BREAST Bilateral 2016    Procedure: RECONSTRUCTION REVISION  BREAST MOUND ,FAT GRAFTS ;  Surgeon: Tulio Wilson MD;  [facility];  Service: Plastics    CA TENDON SHEATH INCISION Right 2024    Procedure: RELEASE TRIGGER FINGER - RIGHT MIDDLE;  Surgeon: Kennedy Garcia MD;  [facility];  Service: Orthopedics    SENTINEL LYMPH NODE BIOPSY Right    [3]   Family History  Problem Relation Name Age of Onset    Thyroid cancer Mother mom 40    Breast cancer Mother mom 74    Asthma Mother mom     Cancer Mother mom         Thyroid    Irritable bowel syndrome Mother mom     Psoriasis Father Dad     Basal cell carcinoma Father Dad     Arthritis Father Dad         Psoriatic    Lung cancer Paternal Grandfather  50    Breast cancer Other MGGM     Heart disease  Other uncle     Prostate cancer Other uncle     Depression Sister Sister     Depression Paternal Uncle Uncle    [4]   Allergies  Allergen Reactions    Other Hives     Kait selzer    Kait-[place] Plus Cold & Cough  [Dngnhxhet-Sph-Sy-Apap] Hives    Morphine Vomiting     Vomits profusely     Prednisone Other (See Comments) and GI Intolerance     insomnia   [5]   Current Outpatient Medications on File Prior to Visit   Medication Sig Dispense Refill    amLODIPine (NORVASC) 5 mg tablet TAKE 1 TABLET BY MOUTH EVERY DAY 90 tablet 1    ASHWAGANDHA PO Take by mouth every evening      Cholecalciferol (Vitamin D3) 125 MCG (5000 UT) TBDP Take by mouth in the morning.      Cosentyx Sensoready, 300 MG, 150 MG/ML SOAJ injection Inject 2 mL (300 mg total) under the skin every 30 (thirty) days 2 mL 11    lisinopril (ZESTRIL) 5 mg tablet TAKE 1 TABLET BY MOUTH EVERY DAY 30 tablet 5    Magnesium 400 MG CAPS Take 1 tablet by mouth daily at bedtime      milk thistle 175 MG tablet Take 175 mg by mouth in the morning.      Multiple Vitamin (multivitamin) capsule Take 1 capsule by mouth in the morning.       No current facility-administered medications on file prior to visit.   [6]   Social History  Tobacco Use    Smoking status: Never     Passive exposure: Never    Smokeless tobacco: Never   Vaping Use    Vaping status: Never Used   Substance and Sexual Activity    Alcohol use: Yes     Comment: social    Drug use: No    Sexual activity: Yes     Partners: Male     Birth control/protection: None

## 2025-06-17 ENCOUNTER — OFFICE VISIT (OUTPATIENT)
Dept: OBGYN CLINIC | Facility: CLINIC | Age: 55
End: 2025-06-17
Attending: FAMILY MEDICINE
Payer: COMMERCIAL

## 2025-06-17 ENCOUNTER — APPOINTMENT (OUTPATIENT)
Dept: RADIOLOGY | Facility: AMBULARY SURGERY CENTER | Age: 55
End: 2025-06-17
Attending: ORTHOPAEDIC SURGERY
Payer: COMMERCIAL

## 2025-06-17 VITALS — BODY MASS INDEX: 28.26 KG/M2 | HEIGHT: 69 IN | WEIGHT: 190.8 LBS

## 2025-06-17 DIAGNOSIS — M79.672 PAIN IN LEFT FOOT: ICD-10-CM

## 2025-06-17 DIAGNOSIS — M79.672 PAIN IN LEFT FOOT: Primary | ICD-10-CM

## 2025-06-17 DIAGNOSIS — M20.42 HAMMERTOE OF LEFT FOOT: ICD-10-CM

## 2025-06-17 DIAGNOSIS — Z01.89 ENCOUNTER FOR LOWER EXTREMITY COMPARISON IMAGING STUDY: ICD-10-CM

## 2025-06-17 PROBLEM — I10 ESSENTIAL HYPERTENSION: Status: ACTIVE | Noted: 2025-06-17

## 2025-06-17 PROCEDURE — 73620 X-RAY EXAM OF FOOT: CPT

## 2025-06-17 PROCEDURE — 99214 OFFICE O/P EST MOD 30 MIN: CPT | Performed by: ORTHOPAEDIC SURGERY

## 2025-06-17 PROCEDURE — 73630 X-RAY EXAM OF FOOT: CPT

## 2025-06-17 NOTE — PROGRESS NOTES
James R Lachman, M.D.  Attending, Orthopaedic Surgery  Foot and Ankle  [facility]      ORTHOPAEDIC FOOT AND ANKLE CLINIC VISIT     Assessment & Plan  Pain in left foot    Orders:    XR foot 3+ vw left; Future    Encounter for lower extremity comparison imaging study    Orders:    XR foot 2 vw right; Future    Hammertoe of left foot  Physical examination and image findings reviewed with the patient which are consistent with a left second hammer toe  Patient was counseled on non-operative versus operative options to treat her hammer toe. She was given phalanx pads to attempt and was instructed to call the office if they are not providing relief and further discussion regarding surgical correction of her hammer toe will take place   Continue activities to tolerance with rest as needed   NSAIDs as needed   Follow up PRN   Orders:    Ambulatory Referral to Orthopedic Surgery        The patient verbalized understanding of exam findings and treatment plan. We engaged in the shared decision-making process and treatment options were discussed at length with the patient. Surgical and conservative management discussed today along with risks and benefits.  Follow up PRN       History of Present Illness:   Chief Complaint:   Chief Complaint   Patient presents with    Left Foot - Pain     Has a hammer toe. Affecting her walking.      Millie Moyer is a 55 y.o. female who is being seen for left second toe deformity.  Pain is localized at second MTP and PIP joints with minimal radiating and described as sharp and severe. Patient denies numbness, tingling or radicular pain.  Denies history of neuropathy.  Patient does not smoke, does not have diabetes and does not take blood thinners.  Patient denies family history of anesthesia complications and has not had any complications with anesthesia.     Pain/symptom timing:  Worse during the day when active  Pain/symptom context:  Worse with activites and  work  Pain/symptom modifying factors:  Rest makes better, activities make worse  Pain/symptom associated signs/symptoms: none    Prior treatment   NSAIDsYes   Injections No   Bracing/Orthotics Yes    Physical Therapy No     Orthopedic Surgical History:   Left knee chondroplasty and medial meniscus debridement  2024  Right cubital tunnel release, middle finger trigger finger release, carpal tunnel release 2024    Past Medical, Surgical and Social History:  Past Medical History:  has a past medical history of Allergic (most of my life, seasonal), Arthritis, BRCA negative, Breast cancer ([facility]) (2015), Cancer ([facility]) (), Colon polyp, COVID, Endometriosis, GERD (gastroesophageal reflux disease) (20), H/O bilateral breast implants, Headache, IBS (irritable bowel syndrome), Insomnia, Night sweats, Osteoarthritis (), Pain in left knee, PONV (postoperative nausea and vomiting) (2024), Psoriasis, Skin disorder (), Skin tag, Uses crutches, and Wears glasses.  Problem List: does not have any pertinent problems on file.  Past Surgical History:  has a past surgical history that includes  section; Mastectomy; pr nipple/areola reconstruction (Bilateral, 10/13/2016); pr revision of reconstructed breast (Bilateral, 2016); Lymphadenectomy (Right); [place] lymph node biopsy (Right); Breast biopsy (Right, [id number]); LAPAROSCOPY (); pr neuroplasty &/transposition ulnar nerve elbow (Right, 2024); pr tendon sheath incision (Right, 2024); pr ndsc wrst surg w/rls transvrs carpl ligm (Right, 2024); Colonoscopy; and pr arthrs kne surg w/meniscectomy med/lat w/shvg (Left, 2024).  Family History: family history includes Arthritis in her father; Asthma in her mother; Basal cell carcinoma in her father; Breast cancer in an other family member; Breast cancer (age of onset: 74) in her mother; Cancer in her mother; Depression in her paternal uncle and sister; Heart disease in  "an other family member; Irritable bowel syndrome in her mother; Lung cancer (age of onset: 50) in her paternal grandfather; Prostate cancer in an other family member; Psoriasis in her father; Thyroid cancer (age of onset: 40) in her mother.  Social History:  reports that she has never smoked. She has never been exposed to tobacco smoke. She has never used smokeless tobacco. She reports current alcohol use. She reports that she does not use drugs.  Current Medications: has a current medication list which includes the following prescription(s): amlodipine, ashwagandha, vitamin d3, cosentyx sensoready (300 mg), lisinopril, magnesium, milk thistle, and multivitamin.  Allergies: is allergic to other, dixon-seltzer plus cold & cough  [[id number]-vok-bx-apap], morphine, and prednisone.     Review of Systems:  General- denies fever/chills  HEENT- denies hearing loss or sore throat  Eyes- denies eye pain or visual disturbances, denies red eyes  Respiratory- denies cough or SOB  Cardio- denies chest pain or palpitations  GI- denies abdominal pain  Endocrine- denies urinary frequency  Urinary- denies pain with urination  Musculoskeletal- Negative except noted above  Skin- denies rashes or wounds  Neurological- denies dizziness or headache  Psychiatric- denies anxiety or difficulty concentrating    Physical Exam:   Ht 5' 9\" (1.753 m)   Wt 86.5 kg (190 lb 12.8 oz)   BMI 28.18 kg/m²   General/Constitutional: No apparent distress: well-nourished and well developed.  Eyes: normal ocular motion  Cardio: RRR, Normal S1S2, No m/r/g  Lymphatic: No appreciable lymphadenopathy  Respiratory: Non-labored breathing, CTA b/l no w/c/r  Vascular: No edema, swelling or tenderness, except as noted in detailed exam.  Integumentary: No impressive skin lesions present, except as noted in detailed exam.  Neuro: No ataxia or tremors noted  Psych: Normal mood and affect, oriented to person, place and time. Appropriate affect.  Musculoskeletal: Normal, " except as noted in detailed exam and in HPI.    Examination    Left    Gait Antalgic   Musculoskeletal Tender to palpation at second MTP joint plantar and dorsally    Skin Normal.      Nails Normal    Range of Motion  30 degrees dorsiflexion, 30 degrees plantarflexion  Subtalar motion: normal     Stability Stable    Muscle Strength 5/5 tibialis anterior  5/5 gastrocnemius-soleus  5/5 posterior tibialis  5/5 peroneal/eversion strength  5/5 EHL  5/5 FHL    Neurologic Normal    Sensation Intact to light touch throughout sural, saphenous, superficial peroneal, deep peroneal and medial/lateral plantar nerve distributions.  [place] 5.07 filament (10g) testing  deferred.    Cardiovascular Brisk capillary refill < 2 seconds,intact DP and PT pulses    Special Tests None      Imaging Studies:   3 views of the left foot were taken, reviewed and interpreted independently that demonstrate mild extension at the second MTP joint, flexion at the PIP joint and extension of the DIP joint. There is a dorsal ossicle in the MTP joint. Reviewed by me personally.        James R. Lachman, MD  Foot & Ankle Surgery   Department of Orthopaedic Surgery  [facility]      I personally performed the service.    James R. Lachman, MD

## 2025-06-19 ENCOUNTER — OFFICE VISIT (OUTPATIENT)
Dept: SURGICAL ONCOLOGY | Facility: CLINIC | Age: 55
End: 2025-06-19
Payer: COMMERCIAL

## 2025-06-19 VITALS
DIASTOLIC BLOOD PRESSURE: 98 MMHG | OXYGEN SATURATION: 98 % | TEMPERATURE: 98 F | RESPIRATION RATE: 15 BRPM | SYSTOLIC BLOOD PRESSURE: 140 MMHG | WEIGHT: 188 LBS | HEART RATE: 84 BPM | HEIGHT: 69 IN | BODY MASS INDEX: 27.85 KG/M2

## 2025-06-19 DIAGNOSIS — Z15.89 MONOALLELIC MUTATION OF MUTYH GENE: ICD-10-CM

## 2025-06-19 DIAGNOSIS — Z17.0 MALIGNANT NEOPLASM OF UPPER-INNER QUADRANT OF RIGHT BREAST IN FEMALE, ESTROGEN RECEPTOR POSITIVE (HCC): ICD-10-CM

## 2025-06-19 DIAGNOSIS — Z08 ENCOUNTER FOR FOLLOW-UP EXAMINATION AFTER COMPLETED TREATMENT FOR MALIGNANT NEOPLASM: Primary | ICD-10-CM

## 2025-06-19 DIAGNOSIS — C50.211 MALIGNANT NEOPLASM OF UPPER-INNER QUADRANT OF RIGHT BREAST IN FEMALE, ESTROGEN RECEPTOR POSITIVE (HCC): ICD-10-CM

## 2025-06-19 PROCEDURE — 99213 OFFICE O/P EST LOW 20 MIN: CPT

## 2025-06-19 NOTE — PROGRESS NOTES
Name: Millie Moyer      : 1970      MRN: [id number]  Encounter Provider: AGUSTINA Duff  Encounter Date: 2025   Encounter department: [facility] [place]  :  Assessment & Plan  Encounter for follow-up examination after completed treatment for malignant neoplasm  - 1 year f/u  Malignant neoplasm of upper-inner quadrant of right breast in female, estrogen receptor positive (HCC)  Patient is a 55-year-old female that was diagnosed with a right-sided breast cancer in . Her pathology revealed invasive ductal carcinoma, ER/MA positive, HER2 negative. She underwent bilateral mastectomies and a right sentinel lymph node biopsy with Dr. Olvera. She had reconstruction with Dr. Wislon. She completed 10 years of tamoxifen. She carries an MUTYH genetic mutation. To ensure she remains GINI, she would like breast MRIs every other year. I think this is reasonable. Order was placed for . There were no concerns on her cbe. Patient denies changes on her breast exam. She denies persistent headaches, bone pain, back pain, shortness of breath, or abdominal pain. She is going to contact plastics to ensure her implants are intact. We will see the patient back in 1 year or sooner should the need arise. She was instructed to call with any questions or concerns prior to this time. All questions were answered today.  Monoallelic mutation of MUTYH gene      Oncology History   Cancer Staging   Malignant neoplasm of upper-inner quadrant of right breast in female, estrogen receptor positive (HCC)  Staging form: Breast, AJCC 7th Edition  - Pathologic stage from 2015: Stage IIA (T2, N0, cM0) - Signed by AGUSTINA Turpin on 10/17/2022  Stage prefix: Initial diagnosis  Method of lymph node assessment: [place] lymph node biopsy  Oncology History   Malignant neoplasm of upper-inner quadrant of right breast in female, estrogen receptor positive (HCC)   2015 Biopsy     Right US guided breast biopsy     Invasive ductal carcinoma  %  ID 90-95%  HER-2 negative     6/2015 Genetic Testing    6/3/15: [facility] BRCAplus (5 genes): BRCA1, BRCA2, CDH1, PTEN, TP53      6/30/2015 Surgery    Bilateral mastectomies, Right sentinel lymph node biopsy    Bilateral reconstruction with expanders.  Dr Wilson      Genomic Testing    Oncotype DX  14     7/8/2015 -  Cancer Staged    Staging form: Breast, AJCC 7th Edition  - Pathologic stage from 7/8/2015: Stage IIA (T2, N0, cM0) - Signed by AGUSTINA Turpin on 10/17/2022  Stage prefix: Initial diagnosis  Method of lymph node assessment: [place] lymph node biopsy       8/12/2015 -  Hormone Therapy    Tamoxifen     10/23/2015 Surgery    Expanders removed, implants placed.     3/9/2016 Surgery    Bilateral breast mound revisions, fat grafting     10/13/2016 Surgery    Nipple reconstruction     6/2021 Genetic Testing    Test(s): [employer] Core Cancer Panel (36 genes): APC, DADA, AXIN2 BARD1, BRCA1, BRCA2, BRIP1, BMPR1A, CDH1, CDK4, CDKN2A, CHEK2, DICER1, EPCAM, GREM1, HOXB13, MLH1, MSH2, MSH3, MSH6, MUTYH, NBN, NF1, NTHL1, PALB2, PMS2, POLD1, POLE, PTEN, RAD51C, RAD51D, RECQL SMAD4, SMARCA4, STK11, TP53     Result: Positive - MUTYH c.1187G>A ([id number]), Heterozygous, Pathogenic            Review of Systems   Constitutional:  Negative for activity change, appetite change, fatigue and unexpected weight change.   Respiratory:  Negative for cough and shortness of breath.    Cardiovascular:  Negative for chest pain.   Gastrointestinal:  Negative for abdominal pain, diarrhea, nausea and vomiting.   Endocrine: Negative for heat intolerance.   Musculoskeletal:  Negative for arthralgias, back pain and myalgias.   Skin:  Negative for rash.   Neurological:  Negative for weakness and headaches.   Hematological:  Negative for adenopathy.    A complete review of systems is negative other than that noted above in the HPI.    Objective   /98 (BP Location:  "Left arm, Patient Position: Sitting, Cuff Size: Standard)   Pulse 84   Temp 98 °F (36.7 °C) (Temporal)   Resp 15   Ht 5' 9\" (1.753 m)   Wt 85.3 kg (188 lb)   SpO2 98%   BMI 27.76 kg/m²     Pain Screening:  Pain Score: 0-No pain  ECOG    Physical Exam  Constitutional:       General: She is not in acute distress.     Appearance: Normal appearance.     Cardiovascular:      Rate and Rhythm: Normal rate and regular rhythm.      Pulses: Normal pulses.      Heart sounds: Normal heart sounds.   Pulmonary:      Effort: Pulmonary effort is normal.      Breath sounds: Normal breath sounds.   Chest:      Chest wall: No mass.   Breasts:     Right: No swelling, bleeding, mass, skin change or tenderness.      Left: No swelling, bleeding, mass, skin change or tenderness.     Abdominal:      General: Abdomen is flat.      Palpations: Abdomen is soft.   Lymphadenopathy:      Upper Body:      Right upper body: No supraclavicular, axillary or pectoral adenopathy.      Left upper body: No supraclavicular, axillary or pectoral adenopathy.     Skin:     General: Skin is warm.     Neurological:      General: No focal deficit present.      Mental Status: She is alert and oriented to person, place, and time.     Psychiatric:         Mood and Affect: Mood normal.         Behavior: Behavior normal.        "

## 2025-06-19 NOTE — ASSESSMENT & PLAN NOTE
Patient is a 55-year-old female that was diagnosed with a right-sided breast cancer in 2015. Her pathology revealed invasive ductal carcinoma, ER/GA positive, HER2 negative. She underwent bilateral mastectomies and a right sentinel lymph node biopsy with Dr. Olvera. She had reconstruction with Dr. Wilson. She completed 10 years of tamoxifen. She carries an MUTYH genetic mutation. To ensure she remains GINI, she would like breast MRIs every other year. I think this is reasonable. Order was placed for 2026. There were no concerns on her cbe. Patient denies changes on her breast exam. She denies persistent headaches, bone pain, back pain, shortness of breath, or abdominal pain. She is going to contact plastics to ensure her implants are intact. We will see the patient back in 1 year or sooner should the need arise. She was instructed to call with any questions or concerns prior to this time. All questions were answered today.

## 2025-06-20 ENCOUNTER — OFFICE VISIT (OUTPATIENT)
Dept: PHYSICAL THERAPY | Facility: REHABILITATION | Age: 55
End: 2025-06-20
Attending: OBSTETRICS & GYNECOLOGY
Payer: COMMERCIAL

## 2025-06-20 DIAGNOSIS — R10.2 PELVIC PRESSURE IN FEMALE: Primary | ICD-10-CM

## 2025-06-20 DIAGNOSIS — M62.89 PELVIC FLOOR DYSFUNCTION: ICD-10-CM

## 2025-06-20 DIAGNOSIS — N39.3 STRESS INCONTINENCE: ICD-10-CM

## 2025-06-20 PROCEDURE — 97110 THERAPEUTIC EXERCISES: CPT

## 2025-06-20 NOTE — PROGRESS NOTES
Daily Note     Today's date: 2025  Patient name: Millie Moyer  : 1970  MRN: [id number]  Referring provider: Mayra Garcia*  Dx:   Encounter Diagnosis     ICD-10-CM    1. Pelvic pressure in female  R10.2       2. Stress incontinence  N39.3       3. Pelvic floor dysfunction  M62.89                        Chief Complaint: Stress incontinence, lumbar back pain, nocturia (urgency).   HPI: Pt presents to PT w/reports of increased urinary symptoms and urgency. Patient also notes an increased onset of back pain.   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   Social History: Lives with her  and daughter  Preferred language/communication style:  English.   PMH: Patient was on antibiotics for sinus infections (3 different points in the past year). B/L mastectomy (10 years ago in ),  Incision (16 years ago).         Specific symptom history:      Subjective: Notes continues nocturia x1 a night. Felt she was able to hold in sudden sneezes. Able to use smaller pad.       Objective: See treatment diary below      Assessment: Tolerated treatment well. Continued w/ reformer strengthening w/ good tolerance. Updated HEP as marches were too easy, replaces w/ SLR. Educated on prelief.  Patient would benefit from continued PT.      Plan: Continue per plan of care       Precautions: Standard   Patient Active Problem List   Diagnosis    Irritable bowel syndrome (IBS)    Malignant neoplasm of upper-inner quadrant of right breast in female, estrogen receptor positive (HCC)    Psoriasis    Seasonal allergies    BRCA negative    Use of tamoxifen (Nolvadex)    Seborrheic keratosis    Bilateral plantar fasciitis    Dyspepsia    Monoallelic mutation of MUTYH gene    COVID-19 vaccination not done    Abnormal MRI, breast    Carpal tunnel syndrome on right    Lateral epicondylitis of right elbow    Medial epicondylitis of elbow, right    PONV (postoperative nausea and vomiting)    Inability to bear weight     Acute pain of right knee       Chief Complaint    Patient Subjective includes:  urinary leakage with standing from sitting, urinary leakage with laughing, coughing, sneezing, urinary leakage with exercise, and urinary frequency   Patient Goals        Objective Impairments to address   Pelvic Floor Strength    Power 2+/5   Endurance  3   Fast Twitch  4   % relaxation 75   External Hip Strength        Lumbar Screen          PLAN OF CARE EXPIRATION: 07/14/2025    AUTHORIZATION EXPIRATION:     Date Authorization     Number of visits provided            Date of Service 05/08/2025 5/14/2025 5/21/2025 6/6 6/11 6/20   Visits Used 1 2 3 4 5 6   Visits Remaining         [employer] Created                  Neuro Re-Ed         Urge deferral         Defecation mechanics         Breathing Mechanics         PFM Coordination         PFM Down Training         Internal Cueing                   Ther Ex  Back measurements       PFM Strengthening  Sidelying clamshell with PFM engagement   1 x 8 B/L  Sidelying clamshell with PFM engagement   1 x 10 B/L     Hooklying PFM engaement with tra   1 x 10 feedfoward activation.     Hooklying with wedge   1 x 10 PFM concentric engagement.  Standing CS x8     STS x10   STS x10  OTB  STS x10 OTB + 10#      Seated PFMC w/ forced exhale x15  Reformer: 1' ea    Lat pull down   Tricep ex   SL IR/ER/neutral leg press   Supine ball w/ UE press    Reformer:   Leg press  SL Press   Bridge  Lat pull down   Tricep ex   SL IR/ER/neutral leg press   Supine ball w/ UE press   Seated resisted trunk twists x1'    Hip strengthening  Sidelying hip abduction   1 x 10 B/L  Single leg bridge   2 x 10 B/L           Thoracic mobility   Open Book   1 x 10 B/L       Functional Strengthening   Sit to stand wih PFM engagement #7 lb weight   1 x 12    Australian pull up 2x10    Chest press Y spring 2x10       Australian pull up 2x10 Australian pull ups 2x10    Abdominal Strengthening  Hooklying tra w/shoulder extension   1 x  10     Hooklying with hip abduction isometric   1 x 10       Hooklying marching   1 x 10 #1 lb  Hooklying tra with shoulder extension   2 x 10     90-90 heel taps   1 x 15 with #5 lb         Dilator Training         Aerobic         Therapeutic Rest Breaks         Mobility     DB for PFM relaxation pror to bed 5'   HEP  DB w/ PFM 5'     High Impact         UE Strengthening                   Ther Activity         Voiding Diaries         Review of sxs         Fluid Intake         Education Squatty potty, Pelvic floor PT pelvic floor model           Manual Ther         PFM exam         Ortho exam         Dynamometer Testing              KT Taping supra pubic 10'     Fascial Decompression          Scar Mobility  Performed ELISA        Modalities                           Outcome Measure

## 2025-06-23 ENCOUNTER — TELEPHONE (OUTPATIENT)
Dept: OBGYN CLINIC | Facility: CLINIC | Age: 55
End: 2025-06-23

## 2025-06-23 ENCOUNTER — TELEPHONE (OUTPATIENT)
Age: 55
End: 2025-06-23

## 2025-06-23 NOTE — TELEPHONE ENCOUNTER
Dr. Lachman, Cindy called to schedule surgery. I told her she would need an office visit & she thought she could do it over the phone? She needs an appt correct?

## 2025-06-23 NOTE — TELEPHONE ENCOUNTER
Caller: Patient     Doctor: Dr. Lachman     Reason for call: Patient would like to schedule surgery for left foot. She discussed the sx with the doctor on 6/17/25    Call back#: [phone]

## 2025-06-24 ENCOUNTER — OFFICE VISIT (OUTPATIENT)
Dept: OBGYN CLINIC | Facility: CLINIC | Age: 55
End: 2025-06-24
Payer: COMMERCIAL

## 2025-06-24 VITALS — HEIGHT: 69 IN | BODY MASS INDEX: 27.85 KG/M2 | WEIGHT: 188 LBS

## 2025-06-24 DIAGNOSIS — M20.42 HAMMERTOE OF SECOND TOE OF LEFT FOOT: Primary | ICD-10-CM

## 2025-06-24 PROCEDURE — 99214 OFFICE O/P EST MOD 30 MIN: CPT | Performed by: ORTHOPAEDIC SURGERY

## 2025-06-24 RX ORDER — CHLORHEXIDINE GLUCONATE ORAL RINSE 1.2 MG/ML
15 SOLUTION DENTAL ONCE
OUTPATIENT
Start: 2025-06-24 | End: 2025-06-24

## 2025-06-24 RX ORDER — SODIUM CHLORIDE, SODIUM LACTATE, POTASSIUM CHLORIDE, CALCIUM CHLORIDE 600; 310; 30; 20 MG/100ML; MG/100ML; MG/100ML; MG/100ML
20 INJECTION, SOLUTION INTRAVENOUS CONTINUOUS
OUTPATIENT
Start: 2025-06-24

## 2025-06-24 RX ORDER — CHLORHEXIDINE GLUCONATE 40 MG/ML
SOLUTION TOPICAL DAILY PRN
OUTPATIENT
Start: 2025-06-24

## 2025-06-24 NOTE — PATIENT INSTRUCTIONS
James R Lachman, M.D.  Attending, Orthopaedic Surgery  [facility]  [place] Office Phone: [phone] ? Fax: [phone]  [place] Office Phone: [phone] ? Fax:[phone]    : Yennifer Shannon MA     Surgery Coordinators [place]: Greta Cortez, [phone]  Esther Cronin, [phone]  Surgery Coordinator [place]:  Zakia Tomsa, [phone]                                                       Nuzhat Mendoza, [phone]                                                            [email or web address]   PRE-OPERATIVE AND POST-OPERATIVE INSTRUCTIONS    General Information:  Your surgery is with Dr. Lachman.  Dates can change (although rare) depending on emergencies.  Typical post operative visits are at the following intervals:  3 weeks post surgery(except 1 week for bunions and wound monitoring), 6 weeks post surgery, 3 months post surgery, 6 months post surgery, and then on a yearly basis.  However, this may change based on Dr. Lachmans’ recommendation.  #1 post-operative rule for foot/ankle surgery:  ONCE YOU ARE OUT OF YOUR CAST AND/OR REMOVABLE BOOT, SWELLING MAY PERSIST FOR MANY MONTHS.  YOU MIGHT ALSO EXPERIENCE A BLUISH DISCOLORATION OF YOUR LEG.  THIS IS NORMAL AND PART OF THE USUAL POSTOPERATIVE EXPERIENCE.    SMOKING:  Smoking results in incomplete healing of fractures (broken bones) and joints that my have been fused.  Smoking and nicotine also prevents the growth of bone into ankle replacements and bone healing.  It also slows the healing of muscles and skin (soft tissue).  Therefore, please do not have surgery if you continue to smoke.  We reserve the right to cancel your surgery if we suspect that you are smoking.  DO NOT use nicorette gum or other patches.  Please find an alternative method to quit smoking before your surgery.    Pre-Operative Information:  Surgery date and preoperative visits:  If you have  medical problems, such as an abnormal EKG, history of BLOOD CLOT, ANEURYSM, and any other heart condition, please inform us so that we can get your medical clearance several weeks before the surgery.  Please bring any important medical information, such as an EKG, chest x-ray, or echocardiogram, with you to ensure that your surgery will not be delayed.  If needed, you will receive your preoperative appointments in the mail or by phone from our scheduling office.  The location of the preoperative appointment will be given to you also.  You may not eat after midnight the night before surgery.  If you do, your surgery will be cancelled.   You will receive a phone call from your surgery center the day before your surgery (if your surgery is on a Monday, you will get a call the Friday before).   If you do not hear from someone by 4pm the day before your surgery, please call the Surgical coordinator (number above) to notify us.  Start taking Vitamin D3 4000 units per day and Calcium 1200mg per day immediately. You will continue this until your 3 month post-op visit. These are over the counter and available at all pharmacies and supermarkets.  FOR THOSE HAVING SURGERY AT [facility]- IF YOU WILL NEED CRUTCHES OR A ROLLING WALKER AFTER SURGERY, ASK FOR A PRESCRIPTION FOR THIS FROM OUR OFFICE TODAY.  THIS CANNOT BE HANDLED THE DAY OF SURGERY AS [facility] DOES NOT STOCK THESE.    Because bacterial can often enter any defect in the skin, it is important to avoid any cuts before surgery.  Any breaks in the skin on the leg will often result in your surgery being postponed.  Please avoid going on a very long walk the day prior to surgery, or doing other activities that could lead to irritation of the skin, including yard work, extra athletic activity, or shaving.   This could result in surgery cancellation.  You MUST be fasting the day of your surgery.  Therefore, please do not consume any foot or beverage  after midnight the night before surgery.  The morning of surgery you may take your usual medications with a sip of water.  It is important not to take anti-inflammatory medication like Ibuprofen, Motrin, Naproxen (Aleve), or Aspirin 7-10 days before surgery because they will make you bleed more than usual.  Vitamin, E, Plavix and Coumadin also have the same effect.  Stop Aspirin and Vitamin E two weeks before surgery.  YOUR MEDICAL DOCTOR SHOULD TELL YOU WHEN TO STOP COUMADIN OR PLAVIX.  If your surgery involves any bone healing, please do not take anti-inflammatories for at least 6 weeks after surgery.  This can impede bone healing (ibuprofen, Aleve, Relafen, iodine).  Tylenol is fine to take.    PREOPERATIVE BATHING INSTRUCTIONS:    Before your surgery, bathe with Hibiclens (4% Chlorhexidene) as instructed below.  This skin cleanser will help reduce the bacteria on your skin before surgery.  To avoid irritating your eyes, do not apply Hibiclens above the level of your neck.  On the evening before AND the morning of surgery, bathe your entire body except the face and scalp, then rinse freely.  DO NOT apply to your face or scalp, as Hibiclens can irritate your eyes.  Purchasing information:   Hibiclens is available without a prescription at most retail pharmacies.     ADDITIONAL INSTRUCTIONS:  PATIENTS HAVING FOOT/ANKLE SURGERY     In preparation for your upcoming surgery, we kindly request and advise the following:  Notify our office if you are taking any of the following:  Coumadin (warfarin):  Persantine (dipyridamole); Pletal (cilostazol); Plavix (clopidogrel); Ticlid (ticlopidine); Agrylin (anagrelide); Aggrenox (dipyridamole and aspirin) or other blood thinners,.  In addition, stop taking Vitamin E and herbal supplements.  Do not schedule any elective dental work for at least 6 months after surgery.  If you had an ankle replacement, you will need to take antibiotics before any future dental procedures. Your  dentist or our office can prescribe these for you.  1000mg of Amoxicillin 1 hour prior to any dental procedure is the recommended dosing.      THREE RULES:    After surgery you will most likely be given the instructions “KEEP YOUR TOES ABOVE YOUR NOSE.”  This means that you MUST have your feet elevated higher than your heart.  Keeping your toes above your nose helps to heal the muscles and skin (soft tissues) by reducing swelling in your leg.  This position also helps to prevent infection, and is very important in avoiding deep venous thrombosis (blood clots).    In order to keep the blood circulating in your legs and in order to avoid deep vein   thrombosis (blood clots), we ask patients to GET UP ONCE AN HOUR during the day.  This means you should at least cross the room and come back.  It does not mean you have to be up for long periods of time.  In most cases we will not have people immediately put any weight on their operated part.  This is important to prevent loosening of metal or other devices holding the bones together.  It also prevents irritation of the soft tissues which can lead to prolonged healing.  When we say get up once an hour, please walk, hop or move with an assisted device.  This is important!    Do not do any excessive walking during the first few days after surgery.  Recovering from surgery is a full-time task for the patient.  Postoperative care is important to avoid irritating the skin incision, which can lead to infection.  Please do not plan activities or go out of town for several weeks after surgery.  If you are unsure about your future activities, please schedule surgery only when you know it is acceptable for you.  Scheduling surgery and then canceling the date, prevents other people from having surgery on that date as it takes time to line everything up effectively.  If you cancel your surgery the week of your planned surgery, we reserve the right to cancel all future surgical  procedures.    THE DAY OF SURGERY:    Arrival to the hospital or outpatient surgical center on time is imperative.  If you arrive late, then your surgery will be cancelled.  You MUST have a family member/friend bring you, stay with you throughout the DURATION of your surgery, and drive you home.  You MUST be fasting the day of your surgery.  Therefore, do not consume any food or beverage after midnight the night before surgery.  At your pre-operative visit with the anesthesia staff, or during your phone screen, a nurse will instruct you what medications you will need to take the day of surgery.  MAKE SURE THAT THE PHARMACY LISTED IN THE ELECTRONIC MEDICAL RECORD (EPIC) IS YOUR PREFERRED PHARMACY. For example, if you are staying with family or a friend, and will not be near your preferred pharmacy, YOU MUST, tell the nurses checking you in the day of surgery so that this can be changed in the system.  If your prescriptions are sent to a pharmacy, this cannot be changed.      AFTER YOUR SURGERY:  Bleeding through the bandage almost always occurs.  Do not let this alarm you.  Simply add more gauze or a towel, call us, and come in for a dressing change.   If you think it is excessive, contact us immediately or go to the local emergency room.    Do not get the bandage wet.  Showering is possible with plastic protectors.   Be very careful, as the bathroom can be wet and slippery.  If you do get your dressing wet, it should be changed immediately.  Please contact us.      ONCE YOUR ARE OUT OF YOUR CAST AND/OR REMOVABLE BOOT, SWELLING MAY PERSIST FOR MANY MONTHS.  YOU MIGHT ALSO EXPERIENCE A BLUISH DISCOLORATION OF YOUR LEG.  THIS IS NORMAL AND PART OF THE USUAL POSTOPERATIVE EXPERIENCE.  WEARING COMPRESSION HOSE (ELASTIC STOCKINGS) CAN HELP AVOID SOME OF THIS SWELLING.      DRESSING:   The purpose of the surgical dressing is to keep your wound and the surgical site protected from the environment.  Most dressings contain  splints, which help to hold your foot and ankle in a corrected position, and also allow the surgical site to heal properly. Dressings will remain in place and undisturbed until the first postop visit.   If you have a drain in place, this will need to be removed in 1-3 days after surgery.  The time for the drain to be pulled will be written on your discharge instruction sheet.    CAST  INSTRUCTIONS:  You may or may not get a cast following surgery.  If you do, pay close attention to the following:    After application of a splint or cast, it is very important to elevate your leg for 24 to 72 hours.   The injured area should be elevated well above the heart.   Remember “Toes above your Nose”.  Rest and elevation greatly reduce pain and speed the healing process by minimizing early swelling.    CALL YOUR DOCTORS OFFICE OR VISIT LOCATION EMERGENCY ROOM IF YOU HAVE ANY OF THE FOLLOWING:    Significant increased pain, which may be caused by swelling, and the feeling that the splint or cast is too tight  Numbness and tingling in your hand or foot, which may be caused by too much pressure on the nerves  Burning and stinging, which may be caused by too much pressure on the skin  Excessive swelling below the cast, which may mean the cast is slowing your blood circulation  Loss of active movement of toes, which request an urgent evaluation  Loss of “capillary refill”.  Pinch the tip of toes and milan the skin.  Release pressure and if the skin does not return pink then call the office immediately.      DO NOT GET YOUR CAST WET.   Bacteria thrive in moist dark areas.  We do not want this.   If your cast becomes wet, return to the office and we will apply another one.    PAIN AFTER SURGERY:  Narcotic pain medication can and will depress your respiratory system if taken in excess.  The goal of pain management with narcotics is to be comfortable not pain free.  If you take enough narcotics to be pain free then you run the risk of  stopping breathing.  If this happens, call 911 immediately!  Pain in the heel is often  caused by pressure from the weight of your foot on the bed.  Make sure your heel is suspended off the bed by keeping a pillow underneath your calf not your knee.    Medications:  You will be given narcotic pain medication. Do NOT drive while taking narcotic medications. Medications such as Darvocet, Percocet, Vicoden or Tylenol #3, also contain acetaminophen (Tylenol). Do not take acetaminophen or Tylenol from home when taking theses medications. When you fill your prescription, you may ask the pharmacist if your pain medication has acetaminophen/Tylenol in it. It is okay to take Tylenol with Oxycontin/Oxycodone. Should you have pain after taking your prescription medication, ibuprophen (Motrin, Advil, and Alleve) is a common over the counter preparation and may often be taken with the prescription pain medication as long as you take them with food. These medications can irritate the stomach lining.   Unless you are allergic to aspirin or currently taking a blood thinner, Dr. Lachmans’ patients are requested to take one 81 mg aspirin every 12 hours until you are back to walking normally after surgery (This can be up to 6 weeks).  Narcotic medications commonly cause nausea. Taking them with food will decrease this side effect. If you are having extreme nausea, please contact us for an alternative medication or for something that can be taken with this medication to decrease the nausea.   Also, narcotic medications frequently cause constipation. An increase of fiber, fruits and vegetables in your diet may alleviate this problem, or if necessary, you may use an over-the-counter medication such as senekot, colace, or Fibercon for constipation problems.   You should resume all medications you were taking prior to the surgery unless otherwise specified.     Activity:   Because of your recent foot surgery, your activity level will  decrease. You will need to elevate your foot ABOVE the level of your heart for a minimum of four days. The length of time necessary for the swelling to go down, and for your wounds to heal properly depends greatly on your efforts here. Elevation is extremely important to avoid compromising the blood supply to your foot. Remember when your foot is down it will swell, which will increase pain and slow healing. Wiggle your toes frequently if possible.   If you go home with a regional block, (a type of anesthesia) the foot and leg will be numb. Think of ways to get into your house and around the house until the block wears off.   Keep in mind that it may be a legal issue if you drive while in a cast or splint, especially when the splint is on the right foot. You may call the Department of Motor Vehicles to schedule a road test if you have adaptive equipment applied to your car.   The amount of weight you are allowed to bear on your foot will be written on your discharge sheet filled out at the time of surgery. The following is an explanation of the possibilities:       COVID-19 Policies  [facility] has the following policies when it comes to ELECTIVE surgery  No elective surgery requiring anesthesia until 7 weeks after a patient tested positive for COVID-19   No elective surgery requiring anesthesia until 3 months after a patient was hospitalized for COVID-19      Weight bearing as tolerated (WBAT)   You may put your body weight on your foot as long as you tolerate the pain.

## 2025-06-24 NOTE — PROGRESS NOTES
James R Lachman, M.D.  Attending, Orthopaedic Surgery  Foot and Ankle  [facility]      ORTHOPAEDIC FOOT AND ANKLE CLINIC VISIT     Assessment and Plan     Assessment & Plan  Hammertoe of second toe of left foot  The patient verbalized understanding of exam findings and treatment plan. We engaged in the shared decision-making process and treatment options were discussed at length with the patient. Surgical and conservative management discussed today along with risks and benefits.   Patient presents for follow up of left 2nd hammertoe, interested in pursuing surgery  Discussed that toe will be surgically straight following surgery, patient expresses understanding.   Consent signed in clinic today   RTC 3 weeks post-op  Orders:    Case request operating room: REPAIR HAMMERTOE / MALLET TOE / CLAW TOE, Left 2nd toe; Standing        CONSENT FOR BONY PROCEDURES:   Patient understands that there is no guarantee that the surgery will relieve all of Her pain and also understands that there may be a prolonged course of protected weight-bearing status required which will restrict them from driving and other activities as discussed at today's visit. Patient recognizes that there are risks with surgery including bleeding, numbness, nerve irritation, wound complications, infection, continued pain, joint stiffness, malunion, nonunion, anesthetic complications, death, failure of procedure and possible need for further surgery. The patient understands that there is no guarantee that this surgery will relieve all of Her pain and symptoms.  Patient understands that there is no guarantee that they will return to full function after the procedure. Patient has provided informed consent for the procedure.     History of Present Illness:   Chief Complaint:   Chief Complaint   Patient presents with    Left Foot - Pain, Follow-up     Wants to discuss sx     Millie Moyer is a 55 y.o. female who is being seen  in follow-up for left second hammertoe. When we last saw she we recommended budin splint.  Pain has not improved. Residual pain is localized at 2nd MTP and PIP with minimal radiating and described as sharp and severe.      Pain/symptom timing:  Worse during the day when active  Pain/symptom context:  Worse with activites and work  Pain/symptom modifying factors:  Rest makes better, activities make worse  Pain/symptom associated signs/symptoms: none    Prior treatment   NSAIDsYes   Injections No   Bracing/Orthotics Yes    Physical Therapy No     Orthopedic Surgical History:   See below     Past Medical, Surgical and Social History:  Past Medical History:  has a past medical history of Allergic (most of my life, seasonal), Arthritis, BRCA negative, Breast cancer ([facility]) (2015), Cancer ([facility]) (), Colon polyp, COVID, Endometriosis, GERD (gastroesophageal reflux disease) (20), H/O bilateral breast implants, Headache, IBS (irritable bowel syndrome), Insomnia, Night sweats, Osteoarthritis (), Pain in left knee, PONV (postoperative nausea and vomiting) (2024), Psoriasis, Skin disorder (), Skin tag, Uses crutches, and Wears glasses.  Problem List: does not have any pertinent problems on file.  Past Surgical History:  has a past surgical history that includes  section; Mastectomy; pr nipple/areola reconstruction (Bilateral, 10/13/2016); pr revision of reconstructed breast (Bilateral, 2016); Lymphadenectomy (Right); [place] lymph node biopsy (Right); Breast biopsy (Right, [id number]); LAPAROSCOPY (); pr neuroplasty &/transposition ulnar nerve elbow (Right, 2024); pr tendon sheath incision (Right, 2024); pr ndsc wrst surg w/rls transvrs carpl ligm (Right, 2024); Colonoscopy; and pr arthrs kne surg w/meniscectomy med/lat w/shvg (Left, 2024).  Family History: family history includes Arthritis in her father; Asthma in her mother; Basal cell carcinoma in her father;  "Breast cancer in an other family member; Breast cancer (age of onset: 74) in her mother; Cancer in her mother; Depression in her paternal uncle and sister; Heart disease in an other family member; Irritable bowel syndrome in her mother; Lung cancer (age of onset: 50) in her paternal grandfather; Prostate cancer in an other family member; Psoriasis in her father; Thyroid cancer (age of onset: 40) in her mother.  Social History:  reports that she has never smoked. She has never been exposed to tobacco smoke. She has never used smokeless tobacco. She reports current alcohol use. She reports that she does not use drugs.  Current Medications: has a current medication list which includes the following prescription(s): amlodipine, ashwagandha, vitamin d3, cosentyx sensoready (300 mg), lisinopril, magnesium, milk thistle, and multivitamin.  Allergies: is allergic to other, dixon-seltzer plus cold & cough  [[id number]-oba-yc-apap], morphine, and prednisone.     Review of Systems:  General- denies fever/chills  HEENT- denies hearing loss or sore throat  Eyes- denies eye pain or visual disturbances, denies red eyes  Respiratory- denies cough or SOB  Cardio- denies chest pain or palpitations  GI- denies abdominal pain  Endocrine- denies urinary frequency  Urinary- denies pain with urination  Musculoskeletal- Negative except noted above  Skin- denies rashes or wounds  Neurological- denies dizziness or headache  Psychiatric- denies anxiety or difficulty concentrating    Physical Exam:   Ht 5' 9\" (1.753 m)   Wt 85.3 kg (188 lb)   BMI 27.76 kg/m²   General/Constitutional: No apparent distress: well-nourished and well developed.  Eyes: normal ocular motion  Lymphatic: No appreciable lymphadenopathy  Respiratory: Non-labored breathing  Vascular: No edema, swelling or tenderness, except as noted in detailed exam.  Integumentary: No impressive skin lesions present, except as noted in detailed exam.  Neuro: No ataxia or tremors " noted  Psych: Normal mood and affect, oriented to person, place and time. Appropriate affect.  Musculoskeletal: Normal, except as noted in detailed exam and in HPI.    Examination    Left    Gait Normal   Musculoskeletal Tender to palpation at 2nd MTP and PIP  Hammertoe deformity     Skin Normal.      Nails Normal    Range of Motion  20 degrees dorsiflexion, 30 degrees plantarflexion  Subtalar motion: normal    Stability Stable    Muscle Strength 5/5 tibialis anterior  5/5 gastrocnemius-soleus  5/5 posterior tibialis  5/5 peroneal/eversion strength  5/5 EHL  5/5 FHL    Neurologic Normal    Sensation  Intact to light touch throughout sural, saphenous, superficial peroneal, deep peroneal and medial/lateral plantar nerve distributions.  [place]is 5.07 filament (10g) testing deferred.    Cardiovascular Brisk capillary refill < 2 seconds,intact DP and PT pulses    Special Tests None      Imaging Studies:   No new imaging    Scribe Attestation      I,:  Zakia Trinidad PA-C am acting as a scribe while in the presence of the attending physician.:       I,:  James R Lachman, MD personally performed the services described in this documentation    as scribed in my presence.:             James R. Lachman, MD  Foot & Ankle Surgery   Department of Orthopaedic Surgery  [facility]      I personally performed the service.    James R. Lachman, MD

## 2025-06-26 ENCOUNTER — OFFICE VISIT (OUTPATIENT)
Dept: PHYSICAL THERAPY | Facility: REHABILITATION | Age: 55
End: 2025-06-26
Attending: OBSTETRICS & GYNECOLOGY
Payer: COMMERCIAL

## 2025-06-26 DIAGNOSIS — M62.89 PELVIC FLOOR DYSFUNCTION: ICD-10-CM

## 2025-06-26 DIAGNOSIS — R10.2 PELVIC PRESSURE IN FEMALE: Primary | ICD-10-CM

## 2025-06-26 PROCEDURE — 97110 THERAPEUTIC EXERCISES: CPT

## 2025-06-26 NOTE — PROGRESS NOTES
Daily Note     Today's date: 2025  Patient name: Millie Moyer  : 1970  MRN: [id number]  Referring provider: Mayra Garcia*  Dx:   Encounter Diagnosis     ICD-10-CM    1. Pelvic pressure in female  R10.2       2. Pelvic floor dysfunction  M62.89               Start Time: 0930  Stop Time: 1015  Total time in clinic (min): 45 minutes    Chief Complaint: Stress incontinence, lumbar back pain, nocturia (urgency).   HPI: Pt presents to PT w/reports of increased urinary symptoms and urgency. Patient also notes an increased onset of back pain.   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   Social History: Lives with her  and daughter  Preferred language/communication style:  English.   PMH: Patient was on antibiotics for sinus infections (3 different points in the past year). B/L mastectomy (10 years ago in ),  Incision (16 years ago).         Specific symptom history:      Subjective: Really bothered with knee pain which she feels may be triggered due to weather.  Scheduled foot surgery in July.  Tried Prelief but only one time, did not think it helped but also has GERD, will try again.    Objective: See treatment diary below      Assessment: Tolerated treatment well. Updatde her progress below with reached goals. Focussed on wall pilates today due to knee flare up and felt the reformer may be too much.  Challenged appropriately.   Patient would benefit from continued PT.Prognosis: good     Goals  Dysfunction:   In 4 weeks, patient will demonstrate improved PFM strength to at least 3/5.  In 6 weeks, patient will demonstrate improved PFM endurance to 8 sec of 3/5 strength or greater.   In 8 weeks, patient will demonstrate improved PFM relaxation to at least 100% or greater.      Bladder:  In 6 weeks, patient will report improvement in stress incontinence as measured by 25% reduction in leakage  leaks per week. Goal met  In 8 weeks, patient will report improvement in stress  incontinence as measured by decreased use of pads to 1 per day. 2 pads (smaller)  In 8 weeks, patient will demonstrate normalized daytime void interval of 2-4 hours with adequate fluid intake. Goal met  In 10 weeks, patient will reduce nocturia to 1 per night. Goal met     Function:  In 6 weeks, patient will report >13.5 improvement on PFDI  In 6 weeks, patient will lift #20 lb without urinary leakage  In 8 weeks, patient will report >70% improvement in symptoms             Plan: Continue per plan of care       Precautions: Standard   Patient Active Problem List   Diagnosis    Irritable bowel syndrome (IBS)    Malignant neoplasm of upper-inner quadrant of right breast in female, estrogen receptor positive (HCC)    Psoriasis    Seasonal allergies    BRCA negative    Use of tamoxifen (Nolvadex)    Seborrheic keratosis    Bilateral plantar fasciitis    Dyspepsia    Monoallelic mutation of MUTYH gene    COVID-19 vaccination not done    Abnormal MRI, breast    Carpal tunnel syndrome on right    Lateral epicondylitis of right elbow    Medial epicondylitis of elbow, right    PONV (postoperative nausea and vomiting)    Inability to bear weight    Acute pain of right knee       Chief Complaint    Patient Subjective includes:  urinary leakage with standing from sitting, urinary leakage with laughing, coughing, sneezing, urinary leakage with exercise, and urinary frequency   Patient Goals        Objective Impairments to address   Pelvic Floor Strength    Power 2+/5   Endurance  3   Fast Twitch  4   % relaxation 75   External Hip Strength        Lumbar Screen          PLAN OF CARE EXPIRATION: 07/14/2025    AUTHORIZATION EXPIRATION:     Date Authorization     Number of visits provided            Date of Service 05/08/2025 5/14/2025 5/21/2025 6/6 6/11 6/20 6/26   Visits Used 1 2 3 4 5 6 7   Visits Remaining          [facility] Created                    Neuro Re-Ed          Urge deferral          Defecation mechanics           Breathing Mechanics          PFM Coordination          PFM Down Training          Internal Cueing                     Ther Ex  Back measurements        PFM Strengthening  Sidelying clamshell with PFM engagement   1 x 8 B/L  Sidelying clamshell with PFM engagement   1 x 10 B/L     Hooklying PFM engaement with tra   1 x 10 feedfoward activation.     Hooklying with wedge   1 x 10 PFM concentric engagement.  Standing CS x8     STS x10   STS x10  OTB  STS x10 OTB + 10#      Seated PFMC w/ forced exhale x15  Reformer: 1' ea    Lat pull down   Tricep ex   SL IR/ER/neutral leg press   Supine ball w/ UE press    Reformer:   Leg press  SL Press   Bridge  Lat pull down   Tricep ex   SL IR/ER/neutral leg press   Supine ball w/ UE press   Seated resisted trunk twists x1'  Wall pilates b/l bridges, single bridge  Straight legs bridge 2x10  Chest press 7# 2x10  90/90 w/ ball 2x10 (leg extension arm flexion)  Boat 10x  Boat w/ arm movement 10x   Hip strengthening  Sidelying hip abduction   1 x 10 B/L  Single leg bridge   2 x 10 B/L            Thoracic mobility   Open Book   1 x 10 B/L        Functional Strengthening   Sit to stand wih PFM engagement #7 lb weight   1 x 12    Australian pull up 2x10    Chest press Y spring 2x10       Australian pull up 2x10 Australian pull ups 2x10     Abdominal Strengthening  Hooklying tra w/shoulder extension   1 x 10     Hooklying with hip abduction isometric   1 x 10       Hooklying marching   1 x 10 #1 lb  Hooklying tra with shoulder extension   2 x 10     90-90 heel taps   1 x 15 with #5 lb          Dilator Training          Aerobic          Therapeutic Rest Breaks          Mobility     DB for PFM relaxation pror to bed 5'   HEP  DB w/ PFM 5'      High Impact          UE Strengthening                     Ther Activity          Voiding Diaries          Review of sxs          Fluid Intake          Education Trini vera Pelvic floor PT pelvic floor model            Manual Ther          PFM  exam          Ortho exam          Dynamometer Testing               KT Taping supra pubic 10'      Fascial Decompression           Scar Mobility  Performed ELISA         Modalities                              Outcome Measure

## 2025-07-03 ENCOUNTER — OFFICE VISIT (OUTPATIENT)
Dept: PHYSICAL THERAPY | Facility: REHABILITATION | Age: 55
End: 2025-07-03
Attending: OBSTETRICS & GYNECOLOGY
Payer: COMMERCIAL

## 2025-07-03 DIAGNOSIS — R10.2 PELVIC PRESSURE IN FEMALE: Primary | ICD-10-CM

## 2025-07-03 DIAGNOSIS — N39.3 STRESS INCONTINENCE: ICD-10-CM

## 2025-07-03 DIAGNOSIS — M62.89 PELVIC FLOOR DYSFUNCTION: ICD-10-CM

## 2025-07-03 PROCEDURE — 97110 THERAPEUTIC EXERCISES: CPT

## 2025-07-03 NOTE — HOME EXERCISE EDUCATION
Program_ID:[id number]   Access Code: [email or web address]  URL: [email or web address]  Date: 07-  Prepared By: Olivia Mccall    Program Notes    clamshell: Exhale and perform kegal at the topSit to stand: Exhale and kegal prior to standingDying bug: heel taps. Legs at 90 degrees and taping downStrengthening the Pelvic Floor: Lay on back, pillows under your hips. Let gravity help! Exhale and pull toward you the pelvic floor. Try the wall pilates :)  Exercises      - Sidelying Hip Abduction - 1 x daily - 7 x weekly - 2 sets - 10 reps      - Clamshell with Resistance - 1 x daily - 7 x weekly - 1 sets - 8 reps      - Hooklying Isometric Hip Abduction with Belt - 1 x daily - 7 x weekly - 2 sets - 10 reps      - Straight Leg Raise - 1 x daily - 7 x weekly - 3 sets - 10 reps      - Seated Thoracic Lumbar Extension with Pectoralis Stretch - 1 x daily - 7 x weekly - 2 sets - 10 reps      - Dead Bug - 1 x daily - 7 x weekly - 3 sets - 10 reps      - Sidelying Thoracic Rotation with Open Book - 1 x daily - 7 x weekly - 1 sets - 10 reps      - Sit to Stand - 1 x daily - 7 x weekly - 3 sets - 10 reps      - Bird Dog - 1 x daily - 7 x weekly - 1 sets - 10 reps      - Supine Diaphragmatic Breathing - 1 x daily - 7 x weekly -  sets -  reps      - Seated Throat Clear with Pelvic Floor Contraction - 1 x daily - 7 x weekly - 3 sets - 10 reps      - boat pose - keep your feet planted on the ground - 1 x daily - 7 x weekly - 2 sets - 10 reps - 5 hold      - Full Plank - 1 x daily - 7 x weekly - 1 sets - 10 reps - 5 hold      - Bridge - 1 x daily - 7 x weekly - 2 sets - 15 reps      - Standing Tricep Extensions with Resistance - 1 x daily - 7 x weekly - 2 sets - 10 reps      - Dead Bug with Swiss Ball - 1 x daily - 7 x weekly - 2 sets - 10 reps

## 2025-07-03 NOTE — PROGRESS NOTES
Daily Note     Today's date: 7/3/2025  Patient name: Millie Moyer  : 1970  MRN: [id number]  Referring provider: Mayra Garcia*  Dx:   Encounter Diagnosis     ICD-10-CM    1. Pelvic pressure in female  R10.2       2. Pelvic floor dysfunction  M62.89       3. Stress incontinence  N39.3                          Chief Complaint: Stress incontinence, lumbar back pain, nocturia (urgency).   HPI: Pt presents to PT w/reports of increased urinary symptoms and urgency. Patient also notes an increased onset of back pain.   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   Social History: Lives with her  and daughter  Preferred language/communication style:  English.   PMH: Patient was on antibiotics for sinus infections (3 different points in the past year). B/L mastectomy (10 years ago in ),  Incision (16 years ago).         Specific symptom history:      Subjective: Really bothered with foot pain, surgery in 3 weeks, will be wearing boot for 3 weeks following surgery.     Objective: See treatment diary below      Assessment: Tolerated treatment well. Continued wall Pilates vs reformer due to knee and foot pain, pt was able to tolerate well. Progressed core strengthening and added UE exercises for pt to perform at home, provided with updated HEP. Continue to progress core strengthening as pt is able to tolerate.     Patient would benefit from continued PT.Prognosis: good       Goals  Dysfunction:   In 4 weeks, patient will demonstrate improved PFM strength to at least 3/5.  In 6 weeks, patient will demonstrate improved PFM endurance to 8 sec of 3/5 strength or greater.   In 8 weeks, patient will demonstrate improved PFM relaxation to at least 100% or greater.      Bladder:  In 6 weeks, patient will report improvement in stress incontinence as measured by 25% reduction in leakage  leaks per week. Goal met  In 8 weeks, patient will report improvement in stress incontinence as measured by  decreased use of pads to 1 per day. 2 pads (smaller)  In 8 weeks, patient will demonstrate normalized daytime void interval of 2-4 hours with adequate fluid intake. Goal met  In 10 weeks, patient will reduce nocturia to 1 per night. Goal met     Function:  In 6 weeks, patient will report >13.5 improvement on PFDI  In 6 weeks, patient will lift #20 lb without urinary leakage  In 8 weeks, patient will report >70% improvement in symptoms             Plan: Continue per plan of care       Precautions: Standard   Patient Active Problem List   Diagnosis    Irritable bowel syndrome (IBS)    Malignant neoplasm of upper-inner quadrant of right breast in female, estrogen receptor positive (HCC)    Psoriasis    Seasonal allergies    BRCA negative    Use of tamoxifen (Nolvadex)    Seborrheic keratosis    Bilateral plantar fasciitis    Dyspepsia    Monoallelic mutation of MUTYH gene    COVID-19 vaccination not done    Abnormal MRI, breast    Carpal tunnel syndrome on right    Lateral epicondylitis of right elbow    Medial epicondylitis of elbow, right    PONV (postoperative nausea and vomiting)    Inability to bear weight    Acute pain of right knee       Chief Complaint    Patient Subjective includes:  urinary leakage with standing from sitting, urinary leakage with laughing, coughing, sneezing, urinary leakage with exercise, and urinary frequency   Patient Goals        Objective Impairments to address   Pelvic Floor Strength    Power 2+/5   Endurance  3   Fast Twitch  4   % relaxation 75   External Hip Strength        Lumbar Screen          PLAN OF CARE EXPIRATION: 07/14/2025    AUTHORIZATION EXPIRATION:     Date Authorization     Number of visits provided            Date of Service 05/08/2025 5/14/2025 5/21/2025 6/6 6/11 6/20 6/26 7/3   Visits Used 1 2 3 4 5 6 7 8   Visits Remaining           [facility] Created                      Neuro Re-Ed           Urge deferral           Defecation mechanics           Breathing Mechanics   "         PFM Coordination           PFM Down Training           Internal Cueing                       Ther Ex  Back measurements         PFM Strengthening  Sidelying clamshell with PFM engagement   1 x 8 B/L  Sidelying clamshell with PFM engagement   1 x 10 B/L     Hooklying PFM engaement with tra   1 x 10 feedfoward activation.     Hooklying with wedge   1 x 10 PFM concentric engagement.  Standing CS x8     STS x10   STS x10  OTB  STS x10 OTB + 10#      Seated PFMC w/ forced exhale x15  Reformer: 1' ea    Lat pull down   Tricep ex   SL IR/ER/neutral leg press   Supine ball w/ UE press    Reformer:   Leg press  SL Press   Bridge  Lat pull down   Tricep ex   SL IR/ER/neutral leg press   Supine ball w/ UE press   Seated resisted trunk twists x1'  Wall pilates b/l bridges, single bridge  Straight legs bridge 2x10  Chest press 7# 2x10  90/90 w/ ball 2x10 (leg extension arm flexion)  Boat 10x  Boat w/ arm movement 10x Wall pilates b/l bridges, single leg bridge 10x each, hip abd/add 10x2, chest press 7# 10x2, UE ext w/ GTB 10x2    Boat 10x   Boat w/ arm movement 10x    90/90 w/ ball w/ GTB UE ext 10x ea        Hip strengthening  Sidelying hip abduction   1 x 10 B/L  Single leg bridge   2 x 10 B/L             Thoracic mobility   Open Book   1 x 10 B/L         Functional Strengthening   Sit to stand wih PFM engagement #7 lb weight   1 x 12    Australian pull up 2x10    Chest press Y spring 2x10       Australian pull up 2x10 Australian pull ups 2x10      Abdominal Strengthening  Hooklying tra w/shoulder extension   1 x 10     Hooklying with hip abduction isometric   1 x 10       Hooklying marching   1 x 10 #1 lb  Hooklying tra with shoulder extension   2 x 10     90-90 heel taps   1 x 15 with #5 lb        Bosu plank 5\" x8    Dilator Training           Aerobic        Bike 5'   Therapeutic Rest Breaks           Mobility     DB for PFM relaxation pror to bed 5'   HEP  DB w/ PFM 5'       High Impact           UE Strengthening   "       Inverted triceps push-up 10x2    Wall push-up w/ ball 10x              Ther Activity           Voiding Diaries           Review of sxs           Fluid Intake           Education Squattmaria isabel vera, Pelvic floor PT pelvic floor model             Manual Ther           PFM exam           Ortho exam           Dynamometer Testing                KT Taping supra pubic 10'       Fascial Decompression            Scar Mobility  Performed ELISA          Modalities                                 Outcome Measure

## 2025-07-03 NOTE — PROGRESS NOTES
Daily Note     Today's date: 7/3/2025  Patient name: Millie Moyer  : 1970  MRN: [id number]  Referring provider: Mayra Garcia*  Dx:   Encounter Diagnosis     ICD-10-CM    1. Pelvic pressure in female  R10.2       2. Pelvic floor dysfunction  M62.89       3. Stress incontinence  N39.3                            Chief Complaint: Stress incontinence, lumbar back pain, nocturia (urgency).   HPI: Pt presents to PT w/reports of increased urinary symptoms and urgency. Patient also notes an increased onset of back pain.   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   Social History: Lives with her  and daughter  Preferred language/communication style:  English.   PMH: Patient was on antibiotics for sinus infections (3 different points in the past year). B/L mastectomy (10 years ago in ),  Incision (16 years ago).         Specific symptom history:      Subjective: Foot pain exacerbated today. Continues to wake x1 a night, but overall pleased w/ progress.     Objective: See treatment diary below      Assessment: Tolerated treatment well. Updatde her progress below with reached goals. Focussed on wall pilates today due to knee flare up and felt the reformer may be too much.  Challenged appropriately.     Patient would benefit from continued PT.Prognosis: good     Goals  Dysfunction:   In 4 weeks, patient will demonstrate improved PFM strength to at least 3/5.  In 6 weeks, patient will demonstrate improved PFM endurance to 8 sec of 3/5 strength or greater.   In 8 weeks, patient will demonstrate improved PFM relaxation to at least 100% or greater.      Bladder:  In 6 weeks, patient will report improvement in stress incontinence as measured by 25% reduction in leakage  leaks per week. Goal met  In 8 weeks, patient will report improvement in stress incontinence as measured by decreased use of pads to 1 per day. 2 pads (smaller)  In 8 weeks, patient will demonstrate normalized daytime void  interval of 2-4 hours with adequate fluid intake. Goal met  In 10 weeks, patient will reduce nocturia to 1 per night. Goal met     Function:  In 6 weeks, patient will report >13.5 improvement on PFDI  In 6 weeks, patient will lift #20 lb without urinary leakage  In 8 weeks, patient will report >70% improvement in symptoms             Plan: Continue per plan of care       Precautions: Standard   Patient Active Problem List   Diagnosis    Irritable bowel syndrome (IBS)    Malignant neoplasm of upper-inner quadrant of right breast in female, estrogen receptor positive (HCC)    Psoriasis    Seasonal allergies    BRCA negative    Use of tamoxifen (Nolvadex)    Seborrheic keratosis    Bilateral plantar fasciitis    Dyspepsia    Monoallelic mutation of MUTYH gene    COVID-19 vaccination not done    Abnormal MRI, breast    Carpal tunnel syndrome on right    Lateral epicondylitis of right elbow    Medial epicondylitis of elbow, right    PONV (postoperative nausea and vomiting)    Inability to bear weight    Acute pain of right knee       Chief Complaint    Patient Subjective includes:  urinary leakage with standing from sitting, urinary leakage with laughing, coughing, sneezing, urinary leakage with exercise, and urinary frequency   Patient Goals        Objective Impairments to address   Pelvic Floor Strength    Power 2+/5   Endurance  3   Fast Twitch  4   % relaxation 75   External Hip Strength        Lumbar Screen          PLAN OF CARE EXPIRATION: 07/14/2025    AUTHORIZATION EXPIRATION:     Date Authorization     Number of visits provided            Date of Service 6/11 6/20 6/26 7/3   Visits Used 5 6 7 8   Visits Remaining       [facility] Created              Neuro Re-Ed       Urge deferral       Defecation mechanics       Breathing Mechanics       PFM Coordination       PFM Down Training       Internal Cueing               Ther Ex       PFM Strengthening Reformer: 1' ea    Lat pull down   Tricep ex   SL IR/ER/neutral leg  press   Supine ball w/ UE press    Reformer:   Leg press  SL Press   Bridge  Lat pull down   Tricep ex   SL IR/ER/neutral leg press   Supine ball w/ UE press   Seated resisted trunk twists x1'  Wall pilates b/l bridges, single bridge  Straight legs bridge 2x10  Chest press 7# 2x10  90/90 w/ ball 2x10 (leg extension arm flexion)  Boat 10x  Boat w/ arm movement 10x    Hip strengthening       Thoracic mobility       Functional Strengthening Australian pull up 2x10 Australian pull ups 2x10      Abdominal Strengthening       Dilator Training       Aerobic       Therapeutic Rest Breaks       Mobility  DB w/ PFM 5'       High Impact       UE Strengthening               Ther Activity       Voiding Diaries       Review of sxs       Fluid Intake       Education       Manual Ther       PFM exam       Ortho exam       Dynamometer Testing        KT Taping supra pubic 10'       Fascial Decompression        Scar Mobility       Modalities                     Outcome Measure

## 2025-07-09 ENCOUNTER — ANESTHESIA (OUTPATIENT)
Dept: ANESTHESIOLOGY | Facility: HOSPITAL | Age: 55
End: 2025-07-09

## 2025-07-09 ENCOUNTER — ANESTHESIA EVENT (OUTPATIENT)
Dept: ANESTHESIOLOGY | Facility: HOSPITAL | Age: 55
End: 2025-07-09

## 2025-07-16 ENCOUNTER — OFFICE VISIT (OUTPATIENT)
Dept: PHYSICAL THERAPY | Facility: REHABILITATION | Age: 55
End: 2025-07-16
Attending: OBSTETRICS & GYNECOLOGY
Payer: COMMERCIAL

## 2025-07-16 DIAGNOSIS — N39.3 STRESS INCONTINENCE: ICD-10-CM

## 2025-07-16 DIAGNOSIS — M62.89 PELVIC FLOOR DYSFUNCTION: ICD-10-CM

## 2025-07-16 DIAGNOSIS — R10.2 PELVIC PRESSURE IN FEMALE: Primary | ICD-10-CM

## 2025-07-16 PROCEDURE — 97110 THERAPEUTIC EXERCISES: CPT

## 2025-07-16 NOTE — PRE-PROCEDURE INSTRUCTIONS
Pre-Surgery Instructions:   Medication Instructions    amLODIPine (NORVASC) 5 mg tablet Take day of surgery.    ASHWAGANDHA PO Stop taking 7 days prior to surgery.    Cholecalciferol (Vitamin D3) 125 MCG (5000 UT) TBDP Stop taking 7 days prior to surgery.    Cosentyx Sensoready, 300 MG, 150 MG/ML SOAJ injection Monthly injection. Take as scheduled    lisinopril (ZESTRIL) 5 mg tablet Hold day of surgery.    Magnesium 400 MG CAPS Stop taking 7 days prior to surgery.    milk thistle 175 MG tablet Stop taking 7 days prior to surgery.    Multiple Vitamin (multivitamin) capsule Stop taking 7 days prior to surgery.        Medication instructions for day of surgery reviewed. Patient verbalized understanding and agrees with the plan.  Please take all instructed medications with only a sip of water. Please do not take any over the counter (non-prescribed) vitamins or supplements for one week prior to date of surgery.      You will receive a call one business day prior to surgery with an arrival time and hospital directions. If your surgery is scheduled on a Monday, the hospital will be calling you on the Friday prior to your surgery. If you have not heard from anyone by 8pm, please call the hospital supervisor through the hospital  at [phone]. ([place] [phone] or [place] [phone]).    Do not eat or drink anything after midnight the night before your surgery, including candy, mints, lifesavers, or chewing gum. Do not drink alcohol 24hrs before your surgery. Try not to smoke at least 24hrs before your surgery.       Follow the pre surgery showering instructions as listed in the “My Surgical Experience Booklet” or otherwise provided by your surgeon's office. Do not use a blade to shave the surgical area 1 week before surgery. It is okay to use a clean electric clippers up to 24 hours before surgery. Do not apply any lotions, creams, including makeup, cologne, deodorant, or perfumes after showering on the  day of your surgery. Do not use dry shampoo, hair spray, hair gel, or any type of hair products.     No contact lenses, eye make-up, or artificial eyelashes. Remove nail polish, including gel polish, and any artificial, gel, or acrylic nails if possible. Remove all jewelry including rings and body piercing jewelry.     Wear causal clothing that is easy to take on and off. Consider your type of surgery.    Keep any valuables, jewelry, piercings at home. Please bring any specially ordered equipment (sling, braces) if indicated.    Arrange for a responsible person to drive you to and from the hospital on the day of your surgery. Please confirm the visitor policy for the day of your procedure when you receive your phone call with an arrival time.     Call the surgeon's office with any new illnesses, exposures, or additional questions prior to surgery.    Please reference your “My Surgical Experience Booklet” for additional information to prepare for your upcoming surgery.

## 2025-07-16 NOTE — PROGRESS NOTES
Daily Note     Today's date: 2025  Patient name: Millie Moyer  : 1970  MRN: [id number]  Referring provider: Mayra Garcia*  Dx:   Encounter Diagnosis     ICD-10-CM    1. Pelvic pressure in female  R10.2       2. Pelvic floor dysfunction  M62.89       3. Stress incontinence  N39.3                 Start Time: 0730  Stop Time: 0815  Total time in clinic (min): 45 minutes    Chief Complaint: Stress incontinence, lumbar back pain, nocturia (urgency).   HPI: Pt presents to PT w/reports of increased urinary symptoms and urgency. Patient also notes an increased onset of back pain.   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   Social History: Lives with her  and daughter  Preferred language/communication style:  English.   PMH: Patient was on antibiotics for sinus infections (3 different points in the past year). B/L mastectomy (10 years ago in ),  Incision (16 years ago).         Specific symptom history:      Subjective: Continues to be limited due to her foot, upcoming surgery next week.     Objective: See treatment diary below      Assessment: Tolerated treatment well. In lieu of doing wall pilates we focussed entire session on upper body strengthening w/ PFM & breath coordination.  Also shared some hip stretches as we will be wearing a boot for several weeks 24 hours a day.     Patient would benefit from continued PT.Prognosis: good       Goals  Dysfunction:   In 4 weeks, patient will demonstrate improved PFM strength to at least 3/5.  In 6 weeks, patient will demonstrate improved PFM endurance to 8 sec of 3/5 strength or greater.   In 8 weeks, patient will demonstrate improved PFM relaxation to at least 100% or greater.      Bladder:  In 6 weeks, patient will report improvement in stress incontinence as measured by 25% reduction in leakage  leaks per week. Goal met  In 8 weeks, patient will report improvement in stress incontinence as measured by decreased use of pads to 1 per  day. 2 pads (smaller)  In 8 weeks, patient will demonstrate normalized daytime void interval of 2-4 hours with adequate fluid intake. Goal met  In 10 weeks, patient will reduce nocturia to 1 per night. Goal met     Function:  In 6 weeks, patient will report >13.5 improvement on PFDI  In 6 weeks, patient will lift #20 lb without urinary leakage  In 8 weeks, patient will report >70% improvement in symptoms             Plan: Continue per plan of care       Precautions: Standard   Patient Active Problem List   Diagnosis    Irritable bowel syndrome (IBS)    Malignant neoplasm of upper-inner quadrant of right breast in female, estrogen receptor positive (HCC)    Psoriasis    Seasonal allergies    BRCA negative    Use of tamoxifen (Nolvadex)    Seborrheic keratosis    Bilateral plantar fasciitis    Dyspepsia    Monoallelic mutation of MUTYH gene    COVID-19 vaccination not done    Abnormal MRI, breast    Carpal tunnel syndrome on right    Lateral epicondylitis of right elbow    Medial epicondylitis of elbow, right    PONV (postoperative nausea and vomiting)    Inability to bear weight    Acute pain of right knee       Chief Complaint    Patient Subjective includes:  urinary leakage with standing from sitting, urinary leakage with laughing, coughing, sneezing, urinary leakage with exercise, and urinary frequency   Patient Goals        Objective Impairments to address   Pelvic Floor Strength    Power 2+/5   Endurance  3   Fast Twitch  4   % relaxation 75   External Hip Strength        Lumbar Screen          PLAN OF CARE EXPIRATION: 07/14/2025    AUTHORIZATION EXPIRATION:     Date Authorization     Number of visits provided            Date of Service 05/08/2025 5/14/2025 5/21/2025 6/6 6/11 6/20 6/26 7/3 7/16   Visits Used 1 2 3 4 5 6 7 8 9   Visits Remaining            [facility] Created                        Neuro Re-Ed            Urge deferral            Defecation mechanics            Breathing Mechanics            PFM  "Coordination            PFM Down Training            Internal Cueing                         Ther Ex  Back measurements          PFM Strengthening  Sidelying clamshell with PFM engagement   1 x 8 B/L  Sidelying clamshell with PFM engagement   1 x 10 B/L     Hooklying PFM engaement with tra   1 x 10 feedfoward activation.     Hooklying with wedge   1 x 10 PFM concentric engagement.  Standing CS x8     STS x10   STS x10  OTB  STS x10 OTB + 10#      Seated PFMC w/ forced exhale x15  Reformer: 1' ea    Lat pull down   Tricep ex   SL IR/ER/neutral leg press   Supine ball w/ UE press    Reformer:   Leg press  SL Press   Bridge  Lat pull down   Tricep ex   SL IR/ER/neutral leg press   Supine ball w/ UE press   Seated resisted trunk twists x1'  Wall pilates b/l bridges, single bridge  Straight legs bridge 2x10  Chest press 7# 2x10  90/90 w/ ball 2x10 (leg extension arm flexion)  Boat 10x  Boat w/ arm movement 10x Wall pilates b/l bridges, single leg bridge 10x each, hip abd/add 10x2, chest press 7# 10x2, UE ext w/ GTB 10x2    Boat 10x   Boat w/ arm movement 10x    90/90 w/ ball w/ GTB UE ext 10x ea         Hip strengthening  Sidelying hip abduction   1 x 10 B/L  Single leg bridge   2 x 10 B/L              Thoracic mobility   Open Book   1 x 10 B/L          Functional Strengthening   Sit to stand wih PFM engagement #7 lb weight   1 x 12    Australian pull up 2x10    Chest press Y spring 2x10       Australian pull up 2x10 Australian pull ups 2x10       Abdominal Strengthening  Hooklying tra w/shoulder extension   1 x 10     Hooklying with hip abduction isometric   1 x 10       Hooklying marching   1 x 10 #1 lb  Hooklying tra with shoulder extension   2 x 10     90-90 heel taps   1 x 15 with #5 lb        Bosu plank 5\" x8     Dilator Training            Aerobic        Bike 5' 5'   Therapeutic Rest Breaks            Mobility     DB for PFM relaxation pror to bed 5'   HEP  DB w/ PFM 5'        High Impact            UE " Strengthening         Inverted triceps push-up 10x2    Wall push-up w/ ball 10x Inverted triceps 2x10  Wall push up w/ ball 2x10  Chest press 6# 2x10  Shoulder rows/extension YCO 2x10  Scaption /mid deltoid 3# 2x10               Ther Activity            Voiding Diaries            Review of sxs            Fluid Intake            Education Squatty potty, Pelvic floor PT pelvic floor model              Manual Ther            PFM exam            Ortho exam            Dynamometer Testing                 KT Taping supra pubic 10'        Fascial Decompression             Scar Mobility  Performed ELISA           Modalities                                    Outcome Measure

## 2025-07-22 ENCOUNTER — OFFICE VISIT (OUTPATIENT)
Dept: PHYSICAL THERAPY | Facility: REHABILITATION | Age: 55
End: 2025-07-22
Attending: OBSTETRICS & GYNECOLOGY
Payer: COMMERCIAL

## 2025-07-22 DIAGNOSIS — R10.2 PELVIC PRESSURE IN FEMALE: Primary | ICD-10-CM

## 2025-07-22 DIAGNOSIS — M62.89 PELVIC FLOOR DYSFUNCTION: ICD-10-CM

## 2025-07-22 DIAGNOSIS — N39.3 STRESS INCONTINENCE: ICD-10-CM

## 2025-07-22 PROCEDURE — 97110 THERAPEUTIC EXERCISES: CPT

## 2025-07-22 NOTE — PROGRESS NOTES
Daily Note     Today's date: 2025  Patient name: Millie Moyer  : 1970  MRN: [id number]  Referring provider: Mayra Garcia*  Dx:   Encounter Diagnosis     ICD-10-CM    1. Pelvic pressure in female  R10.2       2. Pelvic floor dysfunction  M62.89       3. Stress incontinence  N39.3                            Chief Complaint: Stress incontinence, lumbar back pain, nocturia (urgency).   HPI: Pt presents to PT w/reports of increased urinary symptoms and urgency. Patient also notes an increased onset of back pain.   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   Social History: Lives with her  and daughter  Preferred language/communication style:  English.   PMH: Patient was on antibiotics for sinus infections (3 different points in the past year). B/L mastectomy (10 years ago in ),  Incision (16 years ago).         Specific symptom history:      Subjective: Scheduled for surgery this upcoming Thursday.    Objective: See treatment diary below      Assessment: Combo of wall pilates and upper body exercises. Pt would benefit from continued PT to address goals. PFDI showed improvements with control of gas and being able to manage strong urges for bowel movements and nocturia.  Continues to struggle lower abdomen pressure.    Patient would benefit from continued PT.Prognosis: good       Goals  Dysfunction:   In 4 weeks, patient will demonstrate improved PFM strength to at least 3/5.  In 6 weeks, patient will demonstrate improved PFM endurance to 8 sec of 3/5 strength or greater.   In 8 weeks, patient will demonstrate improved PFM relaxation to at least 100% or greater.      Bladder:  In 6 weeks, patient will report improvement in stress incontinence as measured by 25% reduction in leakage  leaks per week. Goal met  In 8 weeks, patient will report improvement in stress incontinence as measured by decreased use of pads to 1 per day. 2 pads (smaller)  In 8 weeks, patient will demonstrate  normalized daytime void interval of 2-4 hours with adequate fluid intake. Goal met  In 10 weeks, patient will reduce nocturia to 1 per night. Goal met     Function:  In 6 weeks, patient will report >13.5 improvement on PFDI PFDI as of 07/22 78.03  In 6 weeks, patient will lift #20 lb without urinary leakage  In 8 weeks, patient will report >70% improvement in symptoms             Plan: Continue per plan of care       Precautions: Standard   Patient Active Problem List   Diagnosis    Irritable bowel syndrome (IBS)    Malignant neoplasm of upper-inner quadrant of right breast in female, estrogen receptor positive (HCC)    Psoriasis    Seasonal allergies    BRCA negative    Use of tamoxifen (Nolvadex)    Seborrheic keratosis    Bilateral plantar fasciitis    Dyspepsia    Monoallelic mutation of MUTYH gene    COVID-19 vaccination not done    Abnormal MRI, breast    Carpal tunnel syndrome on right    Lateral epicondylitis of right elbow    Medial epicondylitis of elbow, right    PONV (postoperative nausea and vomiting)    Inability to bear weight    Acute pain of right knee       Chief Complaint    Patient Subjective includes:  urinary leakage with standing from sitting, urinary leakage with laughing, coughing, sneezing, urinary leakage with exercise, and urinary frequency   Patient Goals        Objective Impairments to address   Pelvic Floor Strength    Power 2+/5   Endurance  3   Fast Twitch  4   % relaxation 75   External Hip Strength        Lumbar Screen          PLAN OF CARE EXPIRATION: 07/14/2025    AUTHORIZATION EXPIRATION:     Date Authorization     Number of visits provided            Date of Service 05/08/2025 5/14/2025 5/21/2025 6/6 6/11 6/20 6/26 7/3 7/16 7/22   Visits Used 1 2 3 4 5 6 7 8 9 10   Visits Remaining             [facility] Created                          Neuro Re-Ed             Urge deferral             Defecation mechanics             Breathing Mechanics             PFM Coordination            "  PFM Down Training             Internal Cueing                           Ther Ex  Back measurements           PFM Strengthening  Sidelying clamshell with PFM engagement   1 x 8 B/L  Sidelying clamshell with PFM engagement   1 x 10 B/L     Hooklying PFM engaement with tra   1 x 10 feedfoward activation.     Hooklying with wedge   1 x 10 PFM concentric engagement.  Standing CS x8     STS x10   STS x10  OTB  STS x10 OTB + 10#      Seated PFMC w/ forced exhale x15  Reformer: 1' ea    Lat pull down   Tricep ex   SL IR/ER/neutral leg press   Supine ball w/ UE press    Reformer:   Leg press  SL Press   Bridge  Lat pull down   Tricep ex   SL IR/ER/neutral leg press   Supine ball w/ UE press   Seated resisted trunk twists x1'  Wall pilates b/l bridges, single bridge  Straight legs bridge 2x10  Chest press 7# 2x10  90/90 w/ ball 2x10 (leg extension arm flexion)  Boat 10x  Boat w/ arm movement 10x Wall pilates b/l bridges, single leg bridge 10x each, hip abd/add 10x2, chest press 7# 10x2, UE ext w/ GTB 10x2    Boat 10x   Boat w/ arm movement 10x    90/90 w/ ball w/ GTB UE ext 10x ea       Wall pilates bridges, single leg bridge 2x10    Chest press 6# 3x10    Chest fly 2# 2x10   Hip strengthening  Sidelying hip abduction   1 x 10 B/L  Single leg bridge   2 x 10 B/L               Thoracic mobility   Open Book   1 x 10 B/L           Functional Strengthening   Sit to stand wih PFM engagement #7 lb weight   1 x 12    Australian pull up 2x10    Chest press Y spring 2x10       Australian pull up 2x10 Australian pull ups 2x10        Abdominal Strengthening  Hooklying tra w/shoulder extension   1 x 10     Hooklying with hip abduction isometric   1 x 10       Hooklying marching   1 x 10 #1 lb  Hooklying tra with shoulder extension   2 x 10     90-90 heel taps   1 x 15 with #5 lb        Bosu plank 5\" x8      Dilator Training             Aerobic        Bike 5' 5' Nustp L5 8'   Therapeutic Rest Breaks             Mobility     DB for PFM " relaxation pror to bed 5'   HEP  DB w/ PFM 5'         High Impact             UE Strengthening         Inverted triceps push-up 10x2    Wall push-up w/ ball 10x Inverted triceps 2x10  Wall push up w/ ball 2x10  Chest press 6# 2x10  Shoulder rows/extension YCO 2x10  Scaption /mid deltoid 3# 2x10 Should rows/extension 2x10 YCO    Wall push ups 2x10                 Ther Activity             Voiding Diaries             Review of sxs             Fluid Intake             Education Squatty potty, Pelvic floor PT pelvic floor model               Manual Ther             PFM exam             Ortho exam             Dynamometer Testing                  KT Taping supra pubic 10'         Fascial Decompression              Scar Mobility  Performed ELISA            Modalities                                       Outcome Measure

## 2025-07-23 ENCOUNTER — ANESTHESIA EVENT (OUTPATIENT)
Dept: PERIOP | Facility: AMBULARY SURGERY CENTER | Age: 55
End: 2025-07-23
Payer: COMMERCIAL

## 2025-07-23 NOTE — DISCHARGE INSTR - AVS FIRST PAGE
James R Lachman, M.D.  Attending, Orthopaedic Surgery  [facility]  [place] Office Phone: [phone] ? Fax: [phone]  [place] Office Phone: [phone] ? Fax:[phone]    : Yennifer Shannon MA    Surgery Coordinators [place]: Greta Cortez, [phone]  Esther Mehrdad, [phone]  Surgery Coordinator [place]:  Zakia Tomas, [phone]                                                        Nuzhat Mendoza, [phone]                                                                                                                      [email or web address]   POST-OPERATIVE INSTRUCTIONS    General Information:  Typical post operative visits are at the following intervals:  2-3 weeks post surgery, 6 weeks post surgery, 3 months post surgery, 6 months post surgery, and then on a yearly basis.  However, this may change based on Dr. Lachmans’ recommendation.  #1 post-operative rule for foot/ankle surgery:  ONCE YOU ARE OUT OF YOUR CAST AND/OR REMOVABLE BOOT, SWELLING MAY PERSIST FOR MANY MONTHS.  YOU MIGHT ALSO EXPERIENCE A BLUISH DISCOLORATION OF YOUR LEG.  THIS IS NORMAL AND PART OF THE USUAL POSTOPERATIVE EXPERIENCE.  DO NOT WAIT UNTIL YOUR BLOCK WEARS OFF TO TAKE YOUR PAIN MEDICATION.  IT TAKES A FEW DOSES OF THE PAIN MEDICATION TO REACH A THERAPEUTIC LEVEL.  TAKE A TABLET PROACTIVELY BEFORE YOU HAVE ANY PAIN AND AGAIN 4 HOURS LATER SO WHEN THE BLOCK WEARS OFF, YOU ARE NOT CAUGHT OFF GUARD.    SMOKING:  Smoking results in incomplete healing of fractures (broken bones) and joints that my have been fused.  Smoking and nicotine also prevents the growth of bone into ankle replacements and bone healing.  It also slows the healing of muscles and skin (soft tissue).  Therefore, please do not have surgery if you continue to smoke.  We reserve the right to cancel your surgery if we suspect that you are smoking.  DO NOT use  nicorette gum or other patches.  Please find an alternative method to quit smoking before your surgery and do not restart after surgery to allow for healing.      THREE RULES:    After surgery you will most likely be given the instructions “KEEP YOUR TOES ABOVE YOUR NOSE.”  This means that you MUST have your feet elevated higher than your heart.  Keeping your toes above your nose helps to heal the muscles and skin (soft tissues) by reducing swelling in your leg.  This position also helps to prevent infection, and is very important in avoiding deep venous thrombosis (blood clots).    In order to keep the blood circulating in your legs and in order to avoid deep vein   thrombosis (blood clots), we ask patients to GET UP ONCE AN HOUR during the day.  This means you should at least cross the room and come back.  It does not mean you have to be up for long periods of time.  In most cases we will not have people immediately put any weight on their operated part.  This is important to prevent loosening of metal or other devices holding the bones together.  It also prevents irritation of the soft tissues which can lead to prolonged healing.  When we say get up once an hour, please walk, hop or move with an assisted device.  This is important!    Do not do any excessive walking during the first few days after surgery.  Recovering from surgery is a full-time task for the patient.  Postoperative care is important to avoid irritating the skin incision, which can lead to infection.  Please do not plan activities or go out of town for several weeks after surgery.        AFTER YOUR SURGERY:  Bleeding through the bandage almost always occurs.  Do not let this alarm you.  Simply overwrap with an ABD pad and Ace bandage (The nurses discharging your from the day of surgery will provide this.)   If you think it is excessive, you can come in early for a dressing change (at around 1 week instead of 3 weeks postop.)    Do not get the  bandage wet.  Showering is possible with plastic protectors.   Be very careful, as the bathroom can be wet and slippery.  If you do get your dressing wet, it should be changed immediately.  Please contact us.      ONCE YOUR ARE OUT OF YOUR CAST AND/OR REMOVABLE BOOT, SWELLING MAY PERSIST FOR MANY MONTHS.  THERE WILL ALSO BE A BLUISH DISCOLORATION OF YOUR LEG FOR MONTHS.  THIS IS NORMAL AND PART OF THE USUAL POSTOPERATIVE EXPERIENCE.  WEARING COMPRESSION HOSE (ELASTIC STOCKINGS) CAN HELP AVOID SOME OF THIS SWELLING.    Ice the area 20 minutes every hour once the nerve block wears off. If you are in a cast or a splint, you may need to leave the ice on longer than 20 minutes in order to feel any benefits.       DRESSING:   The purpose of the surgical dressing is to keep your wound and the surgical site protected from the environment.  Most dressings contain splints, which help to hold your foot and ankle in a corrected position, and also allow the surgical site to heal properly. IF YOU GO TO THE EMERGENCY ROOM FOR ANY REASON, AND THEY REMOVE YOUR DRESSING OR SPLINT, YOU MUST BE SEEN IN DR. LACHMANS [facility] TO HAVE IT REPLACED) AS SOON AS POSSIBLE (IDEALLY THE NEXT DAY).   If you have a drain in place, this will need to be removed in 1 day after surgery.  The time for the drain to be pulled will be written on your discharge instruction sheet.    CAST  INSTRUCTIONS:  You may or may not get a cast following surgery.  If you do, pay close attention to the following:    After application of a splint or cast, it is very important to elevate your leg for 24 to 72 hours.   The injured area should be elevated well above the heart.   Remember “Toes above your Nose”.  Rest and elevation greatly reduce pain and speed the healing process by minimizing early swelling.    CALL YOUR DOCTORS OFFICE OR VISIT LOCATION EMERGENCY ROOM IF YOU HAVE ANY OF THE FOLLOWING:    Significant increased pain, which may be caused by swelling (Strict  elevation will alleviate this)  Numbness and tingling in your hand or foot, which may be caused by too much pressure on the nerves (There is always numbness after surgery due to nerve blocks)  Burning and stinging, which may be caused by too much pressure on the skin  Excessive swelling below the cast, which may mean the cast is slowing your blood circulation  Loss of active movement of toes, which request an urgent evaluation (if you have had a nerve block, this is an expected thing and totally normal)  Loss of “capillary refill”.  Pinch the tip of toes and milan the skin.  Release pressure and if the skin does not return pink then call the office immediately.      DO NOT GET YOUR CAST WET.   Bacteria thrive in moist dark areas.  We do not want this.   If your cast becomes wet, return to the office and we will apply another one.    PAIN AFTER SURGERY:  Narcotic pain medication can and will depress your respiratory system if taken in excess.  The goal of pain management with narcotics is to be comfortable not pain free.  If you take enough narcotics to be pain free then you run the risk of stopping breathing.  If this happens, call 911 immediately!  Pain in the heel is often  caused by pressure from the weight of your foot on the bed.  Make sure your heel is suspended off the bed by keeping a pillow underneath your calf not your knee.    Medications:  You will be given narcotic pain medication. Do NOT drive while taking narcotic medications. Medications such as Darvocet, Percocet, Vicoden or Tylenol #3, also contain acetaminophen (Tylenol). Do not take acetaminophen or Tylenol from home when taking theses medications. When you fill your prescription, you may ask the pharmacist if your pain medication has acetaminophen/Tylenol in it. It is okay to take Tylenol with Oxycontin/Oxycodone.   Unless you are allergic to aspirin or currently taking a blood thinner, Dr. Lachmans’ patients are requested to take one 81 mg  aspirin every 12 hours until you are back to walking normally after surgery (This can be up to 6 weeks). Ecotrin (Enteric-coated aspirin) is more sensitive to the stomach and we recommend purchasing this instead of regular aspirin to minimize the risk of stomach irritation.  Narcotic medications commonly cause nausea. Taking them with food will decrease this side effect. If you are having extreme nausea, please contact us for an alternative medication or for something that can be taken with this medication to decrease the nausea.   Also, narcotic medications frequently cause constipation. An increase of fiber, fruits and vegetables in your diet may alleviate this problem, or if necessary, you may use an over-the-counter medication such as senekot, colace, or Fibercon for constipation problems.   You should resume all medications you were taking prior to the surgery unless otherwise specified.   If you had fracture surgery, bony surgery like an osteotomy or fusion, or a surgery that requires bone healing, you are advised to take Vitamin D and Calcium to improve healing potential.  Vitamin D3 4000 units/day and Calcium 1200mg/day. These are over the counter medications so please pick them up at the pharmacy when you are picking up your prescriptions.    Activity:   Because of your recent foot surgery, your activity level will decrease. You will need to elevate your foot ABOVE the level of your heart for a minimum of four days. The length of time necessary for the swelling to go down, and for your wounds to heal properly depends greatly on your efforts here. Elevation is extremely important to avoid compromising the blood supply to your foot. Remember when your foot is down it will swell, which will increase pain and slow healing. Wiggle your toes frequently if possible.   If you go home with a regional block, (a type of anesthesia) the foot and leg will be numb. Think of ways to get into your house and around the  house until the block wears off.   Keep in mind that it may be a legal issue if you drive while in a cast or splint, especially when the splint is on the right foot. You may call the Department of Motor Vehicles to schedule a road test if you have adaptive equipment applied to your car.   The amount of weight you are allowed to bear on your foot will be written on your discharge sheet filled out at the time of surgery. The following is an explanation of the possibilities:       Weight bearing as tolerated (WBAT)   You may put your body weight on your foot as long as you tolerate the pain.

## 2025-07-24 ENCOUNTER — APPOINTMENT (OUTPATIENT)
Dept: RADIOLOGY | Facility: AMBULARY SURGERY CENTER | Age: 55
End: 2025-07-24
Payer: COMMERCIAL

## 2025-07-24 ENCOUNTER — ANESTHESIA (OUTPATIENT)
Dept: PERIOP | Facility: AMBULARY SURGERY CENTER | Age: 55
End: 2025-07-24
Payer: COMMERCIAL

## 2025-07-24 ENCOUNTER — HOSPITAL ENCOUNTER (OUTPATIENT)
Facility: AMBULARY SURGERY CENTER | Age: 55
Setting detail: OUTPATIENT SURGERY
Discharge: HOME | End: 2025-07-24
Attending: ORTHOPAEDIC SURGERY | Admitting: ORTHOPAEDIC SURGERY
Payer: COMMERCIAL

## 2025-07-24 VITALS
HEIGHT: 68 IN | HEART RATE: 55 BPM | RESPIRATION RATE: 16 BRPM | TEMPERATURE: 97.3 F | WEIGHT: 191 LBS | OXYGEN SATURATION: 100 % | SYSTOLIC BLOOD PRESSURE: 170 MMHG | DIASTOLIC BLOOD PRESSURE: 70 MMHG | BODY MASS INDEX: 28.95 KG/M2

## 2025-07-24 DIAGNOSIS — M20.42 HAMMERTOE OF SECOND TOE OF LEFT FOOT: Primary | ICD-10-CM

## 2025-07-24 LAB
EXT PREGNANCY TEST URINE: NEGATIVE
EXT. CONTROL: NORMAL

## 2025-07-24 PROCEDURE — NC001 PR NO CHARGE: Performed by: ORTHOPAEDIC SURGERY

## 2025-07-24 PROCEDURE — C1713 ANCHOR/SCREW BN/BN,TIS/BN: HCPCS | Performed by: ORTHOPAEDIC SURGERY

## 2025-07-24 PROCEDURE — 28285 REPAIR OF HAMMERTOE: CPT | Performed by: ORTHOPAEDIC SURGERY

## 2025-07-24 PROCEDURE — 28288 PARTIAL REMOVAL OF FOOT BONE: CPT | Performed by: ORTHOPAEDIC SURGERY

## 2025-07-24 PROCEDURE — 81025 URINE PREGNANCY TEST: CPT | Performed by: ORTHOPAEDIC SURGERY

## 2025-07-24 RX ORDER — CHLORHEXIDINE GLUCONATE ORAL RINSE 1.2 MG/ML
15 SOLUTION DENTAL ONCE
Status: COMPLETED | OUTPATIENT
Start: 2025-07-24 | End: 2025-07-24

## 2025-07-24 RX ORDER — SODIUM CHLORIDE, SODIUM LACTATE, POTASSIUM CHLORIDE, CALCIUM CHLORIDE 600; 310; 30; 20 MG/100ML; MG/100ML; MG/100ML; MG/100ML
INJECTION, SOLUTION INTRAVENOUS CONTINUOUS PRN
Status: DISCONTINUED | OUTPATIENT
Start: 2025-07-24 | End: 2025-07-24

## 2025-07-24 RX ORDER — PROPOFOL 10 MG/ML
INJECTION, EMULSION INTRAVENOUS CONTINUOUS PRN
Status: DISCONTINUED | OUTPATIENT
Start: 2025-07-24 | End: 2025-07-24

## 2025-07-24 RX ORDER — SODIUM CHLORIDE, SODIUM LACTATE, POTASSIUM CHLORIDE, CALCIUM CHLORIDE 600; 310; 30; 20 MG/100ML; MG/100ML; MG/100ML; MG/100ML
50 INJECTION, SOLUTION INTRAVENOUS CONTINUOUS
Status: DISCONTINUED | OUTPATIENT
Start: 2025-07-24 | End: 2025-07-24 | Stop reason: HOSPADM

## 2025-07-24 RX ORDER — MIDAZOLAM HYDROCHLORIDE 2 MG/2ML
INJECTION, SOLUTION INTRAMUSCULAR; INTRAVENOUS AS NEEDED
Status: DISCONTINUED | OUTPATIENT
Start: 2025-07-24 | End: 2025-07-24

## 2025-07-24 RX ORDER — KETOROLAC TROMETHAMINE 30 MG/ML
INJECTION, SOLUTION INTRAMUSCULAR; INTRAVENOUS AS NEEDED
Status: DISCONTINUED | OUTPATIENT
Start: 2025-07-24 | End: 2025-07-24

## 2025-07-24 RX ORDER — ACETAMINOPHEN 10 MG/ML
INJECTION, SOLUTION INTRAVENOUS AS NEEDED
Status: DISCONTINUED | OUTPATIENT
Start: 2025-07-24 | End: 2025-07-24

## 2025-07-24 RX ORDER — FENTANYL CITRATE 50 UG/ML
INJECTION, SOLUTION INTRAMUSCULAR; INTRAVENOUS AS NEEDED
Status: DISCONTINUED | OUTPATIENT
Start: 2025-07-24 | End: 2025-07-24

## 2025-07-24 RX ORDER — OXYCODONE HYDROCHLORIDE 5 MG/1
5 TABLET ORAL EVERY 4 HOURS PRN
Qty: 20 TABLET | Refills: 0 | Status: SHIPPED | OUTPATIENT
Start: 2025-07-24

## 2025-07-24 RX ORDER — SODIUM CHLORIDE, SODIUM LACTATE, POTASSIUM CHLORIDE, CALCIUM CHLORIDE 600; 310; 30; 20 MG/100ML; MG/100ML; MG/100ML; MG/100ML
20 INJECTION, SOLUTION INTRAVENOUS CONTINUOUS
Status: DISCONTINUED | OUTPATIENT
Start: 2025-07-24 | End: 2025-07-24 | Stop reason: HOSPADM

## 2025-07-24 RX ORDER — OXYCODONE HYDROCHLORIDE 5 MG/1
5 TABLET ORAL ONCE AS NEEDED
Refills: 0 | Status: DISCONTINUED | OUTPATIENT
Start: 2025-07-24 | End: 2025-07-24 | Stop reason: HOSPADM

## 2025-07-24 RX ORDER — ONDANSETRON 2 MG/ML
INJECTION INTRAMUSCULAR; INTRAVENOUS AS NEEDED
Status: DISCONTINUED | OUTPATIENT
Start: 2025-07-24 | End: 2025-07-24

## 2025-07-24 RX ORDER — ONDANSETRON 4 MG/1
4 TABLET, FILM COATED ORAL EVERY 8 HOURS PRN
Qty: 10 TABLET | Refills: 0 | Status: SHIPPED | OUTPATIENT
Start: 2025-07-24

## 2025-07-24 RX ORDER — FENTANYL CITRATE/PF 50 MCG/ML
50 SYRINGE (ML) INJECTION
Status: DISCONTINUED | OUTPATIENT
Start: 2025-07-24 | End: 2025-07-24 | Stop reason: HOSPADM

## 2025-07-24 RX ORDER — CEFAZOLIN SODIUM 2 G/50ML
2000 SOLUTION INTRAVENOUS ONCE
Status: COMPLETED | OUTPATIENT
Start: 2025-07-24 | End: 2025-07-24

## 2025-07-24 RX ORDER — PHENYLEPHRINE HCL IN 0.9% NACL 1 MG/10 ML
SYRINGE (ML) INTRAVENOUS AS NEEDED
Status: DISCONTINUED | OUTPATIENT
Start: 2025-07-24 | End: 2025-07-24

## 2025-07-24 RX ORDER — MAGNESIUM HYDROXIDE 1200 MG/15ML
LIQUID ORAL AS NEEDED
Status: DISCONTINUED | OUTPATIENT
Start: 2025-07-24 | End: 2025-07-24 | Stop reason: HOSPADM

## 2025-07-24 RX ORDER — CHLORHEXIDINE GLUCONATE 40 MG/ML
SOLUTION TOPICAL DAILY PRN
Status: DISCONTINUED | OUTPATIENT
Start: 2025-07-24 | End: 2025-07-24 | Stop reason: HOSPADM

## 2025-07-24 RX ORDER — ASPIRIN 81 MG/1
81 TABLET ORAL 2 TIMES DAILY
Qty: 84 TABLET | Refills: 0 | Status: SHIPPED | OUTPATIENT
Start: 2025-07-24 | End: 2025-09-04

## 2025-07-24 RX ORDER — ONDANSETRON 2 MG/ML
4 INJECTION INTRAMUSCULAR; INTRAVENOUS ONCE AS NEEDED
Status: DISCONTINUED | OUTPATIENT
Start: 2025-07-24 | End: 2025-07-24 | Stop reason: HOSPADM

## 2025-07-24 RX ADMIN — Medication 100 MCG: at 08:54

## 2025-07-24 RX ADMIN — Medication 100 MCG: at 08:59

## 2025-07-24 RX ADMIN — ACETAMINOPHEN 1000 MG: 10 INJECTION, SOLUTION INTRAVENOUS at 08:24

## 2025-07-24 RX ADMIN — CHLORHEXIDINE GLUCONATE 15 ML: 1.2 SOLUTION ORAL at 07:42

## 2025-07-24 RX ADMIN — FENTANYL CITRATE 50 MCG: 50 INJECTION INTRAMUSCULAR; INTRAVENOUS at 08:20

## 2025-07-24 RX ADMIN — KETOROLAC TROMETHAMINE 15 MG: 30 INJECTION, SOLUTION INTRAMUSCULAR; INTRAVENOUS at 08:55

## 2025-07-24 RX ADMIN — SODIUM CHLORIDE, SODIUM LACTATE, POTASSIUM CHLORIDE, AND CALCIUM CHLORIDE: .6; .31; .03; .02 INJECTION, SOLUTION INTRAVENOUS at 07:58

## 2025-07-24 RX ADMIN — ONDANSETRON 4 MG: 2 INJECTION INTRAMUSCULAR; INTRAVENOUS at 08:17

## 2025-07-24 RX ADMIN — PROPOFOL 100 MCG/KG/MIN: 10 INJECTION, EMULSION INTRAVENOUS at 08:20

## 2025-07-24 RX ADMIN — DEXMEDETOMIDINE 8 MCG: 100 INJECTION, SOLUTION INTRAVENOUS at 08:23

## 2025-07-24 RX ADMIN — MIDAZOLAM 2 MG: 1 INJECTION INTRAMUSCULAR; INTRAVENOUS at 08:17

## 2025-07-24 RX ADMIN — CEFAZOLIN SODIUM 2000 MG: 2 SOLUTION INTRAVENOUS at 08:17

## 2025-07-24 RX ADMIN — DEXMEDETOMIDINE 8 MCG: 100 INJECTION, SOLUTION INTRAVENOUS at 08:35

## 2025-07-24 NOTE — H&P
James R Lachman, M.D.  Attending, Orthopaedic Surgery  Foot and Ankle  [facility]        ORTHOPAEDIC FOOT AND ANKLE H+P     Assessment:   Left 2nd clawtoe and loose body MTP joint           Plan:   The patient verbalized understanding of exam findings and treatment plan. We engaged in the shared decision-making process and treatment options were discussed at length with the patient. Surgical and conservative management discussed today along with risks and benefits.  Plan for left 2nd clawtoe correction  Consent in chart  CONSENT FOR BONY PROCEDURES:   Patient understands that there is no guarantee that the surgery will relieve all of Her pain and also understands that there may be a prolonged course of protected weight-bearing status required which will restrict them from driving and other activities as discussed at today's visit. Patient recognizes that there are risks with surgery including bleeding, numbness, nerve irritation, wound complications, infection, continued pain, joint stiffness, malunion, nonunion, anesthetic complications, death, failure of procedure and possible need for further surgery. The patient understands that there is no guarantee that this surgery will relieve all of Her pain and symptoms.  Patient understands that there is no guarantee that they will return to full function after the procedure. Patient has provided informed consent for the procedure.                History of Present Illness:   Chief Complaint: Left 2nd clawtoe  Millie Moyer is a 55 y.o. female who is being seen for left 2nd toe pain. Patient has failed all conservative treatment measures.  Pain is localized at 2nd toe MTP joint and PIP joint with minimal radiating and described as sharp and severe. Patient denies numbness, tingling or radicular pain.  Denies history of neuropathy.  Patient does not smoke, does not have diabetes and does not take blood thinners.  Patient denies family  history of anesthesia complications and has not had any complications with anesthesia.     Pain/symptom timing:  Worse during the day when active  Pain/symptom context:  Worse with activites and work  Pain/symptom modifying factors:  Rest makes better, activities make worse  Pain/symptom associated signs/symptoms: none    Prior treatment   NSAIDs Yes    Injections No   Bracing/Orthotics Yes   Physical Therapy No     Orthopedic Surgical History:   See below    Past Medical, Surgical and Social History:  Past Medical History:  has a past medical history of Allergic (most of my life, seasonal), Arthritis, BRCA negative, Breast cancer ([facility]) (2015), Cancer ([facility]) (), Colon polyp, COVID, Endometriosis, GERD (gastroesophageal reflux disease) (20), H/O bilateral breast implants, Headache, IBS (irritable bowel syndrome), Insomnia, Night sweats, Osteoarthritis (), Pain in left knee, PONV (postoperative nausea and vomiting) (2024), Psoriasis, Skin disorder (), Skin tag, Uses crutches, and Wears glasses.  Problem List: does not have any pertinent problems on file.  Past Surgical History:  has a past surgical history that includes  section; Mastectomy; pr nipple/areola reconstruction (Bilateral, 10/13/2016); pr revision of reconstructed breast (Bilateral, 2016); Lymphadenectomy (Right); [place] lymph node biopsy (Right); Breast biopsy (Right, 015); LAPAROSCOPY (); pr neuroplasty &/transposition ulnar nerve elbow (Right, 2024); pr tendon sheath incision (Right, 2024); pr ndsc wrst surg w/rls transvrs carpl ligm (Right, 2024); Colonoscopy; and pr arthrs kne surg w/meniscectomy med/lat w/shvg (Left, 2024).  Family History: family history includes Arthritis in her father; Asthma in her mother; Basal cell carcinoma in her father; Breast cancer in an other family member; Breast cancer (age of onset: 74) in her mother; Cancer in her mother; Depression in her  "paternal uncle and sister; Heart disease in an other family member; Irritable bowel syndrome in her mother; Lung cancer (age of onset: 50) in her paternal grandfather; Prostate cancer in an other family member; Psoriasis in her father; Thyroid cancer (age of onset: 40) in her mother.  Social History:  reports that she has never smoked. She has never been exposed to tobacco smoke. She has never used smokeless tobacco. She reports current alcohol use. She reports that she does not use drugs.  Current Medications: Scheduled Meds:  Current Facility-Administered Medications   Medication Dose Route Frequency Provider Last Rate    ceFAZolin  2,000 mg Intravenous Once James R Lachman, MD      chlorhexidine gluconate   Topical Daily PRN James R Lachman, MD      lactated ringers  20 mL/hr Intravenous Continuous James R Lachman, MD       Facility-Administered Medications Ordered in Other Encounters   Medication Dose Route Frequency Provider Last Rate    lactated ringers   Intravenous Continuous PRN Mellissa Seay CRNA       Continuous Infusions:lactated ringers, 20 mL/hr      PRN Meds:.  chlorhexidine gluconate    Allergies: is allergic to other, dixon-seltzer plus cold & cough  [tonokmwry-[id number]], morphine, and prednisone.     Review of Systems:  General- denies fever/chills  HEENT- denies hearing loss or sore throat  Eyes- denies eye pain or visual disturbances, denies red eyes  Respiratory- denies cough or SOB  Cardio- denies chest pain or palpitations  GI- denies abdominal pain  Endocrine- denies urinary frequency  Urinary- denies pain with urination  Musculoskeletal- Negative except noted above  Skin- denies rashes or wounds  Neurological- denies dizziness or headache  Psychiatric- denies anxiety or difficulty concentrating    Physical Exam:   /91   Pulse 77   Temp 97.5 °F (36.4 °C)   Resp 20   Ht 5' 8\" (1.727 m)   Wt 86.6 kg (191 lb)   LMP 03/01/2025   SpO2 99%   BMI 29.04 kg/m² "   General/Constitutional: No apparent distress: well-nourished and well developed.  Eyes: normal ocular motion  Cardio: RRR, Normal S1S2, No m/r/g  Lymphatic: No appreciable lymphadenopathy  Respiratory: Non-labored breathing, CTA b/l no w/c/r  Abdominal: Soft and nontender  Vascular: No edema, swelling or tenderness, except as noted in detailed exam.  Integumentary: No impressive skin lesions present, except as noted in detailed exam.  Neuro: No ataxia or tremors noted  Psych: Normal mood and affect, oriented to person, place and time. Appropriate affect.  Musculoskeletal: Normal, except as noted in detailed exam and in HPI.    Examination    Left    Gait Antalgic   Musculoskeletal Tender to palpation at MTP and PIP 2nd toe    Skin Normal.      Nails Normal    Range of Motion  20 degrees dorsiflexion, 30 degrees plantarflexion  Subtalar motion: normal    Stability Stable    Muscle Strength 5/5 tibialis anterior  5/5 gastrocnemius-soleus  5/5 posterior tibialis  5/5 peroneal/eversion strength  5/5 EHL  5/5 FHL    Neurologic Normal    Sensation Intact to light touch throughout sural, saphenous, superficial peroneal, deep peroneal and medial/lateral plantar nerve distributions.  Dulac-Noris 5.07 filament (10g) testing deferred.    Cardiovascular Brisk capillary refill < 2 seconds,intact DP and PT pulses    Special Tests None      Imaging Studies:   None New        James R. Lachman, MD  Foot & Ankle Surgery   Department of Orthopaedic Surgery  [facility]      I personally performed the service.    James R. Lachman, MD

## 2025-07-24 NOTE — OP NOTE
OPERATIVE REPORT  PATIENT NAME: Millie Moyer    :  1970  MRN: [id number]  Pt Location: [facility] OR ROOM 06    SURGERY DATE: 2025    Surgeons and Role:     * James R Lachman, MD - Primary     * Zakia Trinidad PA-C - Assisting     * Ranjit Jessica MD - Assisting    Preop Diagnosis:  Hammertoe of second toe of left foot [M20.42]    Post-Op Diagnosis Codes:     * Hammertoe of second toe of left foot [M20.42]    Procedure(s):  Left - REPAIR HAMMERTOE / MALLET TOE / CLAW TOE. Left 2nd toe    Specimen(s):  * No specimens in log *    Estimated Blood Loss:   Minimal    Drains:  * No LDAs found *    Anesthesia Type:   Choice    Operative Indications:  Hammertoe of second toe of left foot [M20.42]      Operative Findings:  OCD 2nd MT head       Complications:   None    Procedure and Technique:  Complete release of MTP joint capsule  Excision of large loose body in the 2nd MTP joint  Cheilectomy, 2nd metatarsal head and proximal phalanx at the MTP joint  Microfracture, osteochondral lesion 2nd metatarsal head (4mm x 3mm)  Proximal interphalangeal joint arthrodesis 2nd toe  Z lengthening EDL tendon to the second toe  Fluoroscopy without benefit of radiologist, <1 hour    On the day of surgery, the patient presented to the preoperative holding area where they identified the operative site, which was marked by Dr. Lachman. The patient was transferred to the OR in stable condition. Following smooth induction of anesthesia, the patient was positioned supine with a bump under the ipsilateral hip. A non-sterile tourniquet was applied to the thigh and the leg was prepped and draped in standard sterile fashion.    Preoperative antibiotics were administered by anesthesia and a standard preoperative timeout was performed. The foot was exsanguinated with a sterile esmarch which was used as a tourniquet.       Attention was turned to the second hammertoe, it was rigid.  An linear incision was made from the MTP joint  distally to the PIP joint.  The EDL tendon was Z lengthening and the capsule of the MTP joint was completely released including the collaterals leaving the plantar plate intact.      We encountered a large loose body in the 2nd MTP joint seen prior to surgery on the lateral xray. We removed it and completed our release    There was hypertrophied bone dorsally at the MTP joint on the Metatarsal head and proximal phalanx and so we performed a cheilectomy to alleviate this impingement.    We noted a 4mm x 3mm osteochondral lesion in the metatarsal head dorsolaterally. We used a currette to remove the damaged cartilage and then performed a microfracture of this area with a small wire. Bleeding was seen from the holes.       Next, we entered the PIP joint and we debrided with a saw and rongeur until bleeding cancellous bone was seen on either side of the joint.   The toe was then held into good position and a guidewire for the 2.5mm cannulated screw was placed across the toe.  We confirmed the position of this toe and wire on fluoroscopy. Excellent position was obtained.  We then measured and placed this screw into the toe to maintain the reduction.         Prior to closure, all wounds were thoroughly irrigated. 2-0 vicryl for the extensor tendon and then 4-0 vicryl sutures were used subcutaneously to reapproximate the skin, followed by interrupted 4-0 nylon sutures. A sterile dressing consisting of antimicrobial dressings followed by gauze was applied. The tourniquet was let down after the dressing was applied after establishing hemostasis.    At the conclusion of the case, all needle, instrument, and lap counts were correct. Dr. Lachman was present for all essential portions of the procedure.     I was present for the entire procedure.    Patient Disposition:  PACU          SIGNATURE: James R Lachman, MD  DATE: July 24, 2025  TIME: 9:07 AM

## 2025-07-24 NOTE — ANESTHESIA PREPROCEDURE EVALUATION
Procedure:  REPAIR HAMMERTOE / MALLET TOE / CLAW TOE, Left 2nd toe (Left: Foot)    Relevant Problems   ANESTHESIA   (+) PONV (postoperative nausea and vomiting)      CARDIO   (+) Essential hypertension      GI/HEPATIC (within normal limits)      /RENAL (within normal limits)      GYN   (+) Malignant neoplasm of upper-inner quadrant of right breast in female, estrogen receptor positive (HCC)      NEURO/PSYCH (within normal limits)        Physical Exam    Airway     Mallampati score: II          Cardiovascular  Cardiovascular exam normal    Dental   No notable dental hx     Pulmonary  Pulmonary exam normal     Neurological      Other Findings  post-pubertal.      Anesthesia Plan  ASA Score- 2     Anesthesia Type- IV sedation with anesthesia with ASA Monitors.         Additional Monitors:     Airway Plan: LMA and LMA.           Plan Factors-    Chart reviewed.   Existing labs reviewed. Patient summary reviewed.    Patient is not a current smoker.              Induction- intravenous.    Postoperative Plan-         Informed Consent- Anesthetic plan and risks discussed with patient.

## 2025-08-06 ENCOUNTER — OFFICE VISIT (OUTPATIENT)
Dept: OBGYN CLINIC | Facility: CLINIC | Age: 55
End: 2025-08-06

## 2025-08-06 VITALS — WEIGHT: 191 LBS | HEIGHT: 68 IN | BODY MASS INDEX: 28.95 KG/M2

## 2025-08-06 DIAGNOSIS — M20.42 HAMMERTOE OF LEFT FOOT: ICD-10-CM

## 2025-08-06 DIAGNOSIS — M20.42 HAMMERTOE OF SECOND TOE OF LEFT FOOT: Primary | ICD-10-CM

## 2025-08-06 PROCEDURE — 99024 POSTOP FOLLOW-UP VISIT: CPT | Performed by: ORTHOPAEDIC SURGERY

## 2025-08-08 ENCOUNTER — OFFICE VISIT (OUTPATIENT)
Dept: PHYSICAL THERAPY | Facility: REHABILITATION | Age: 55
End: 2025-08-08
Attending: OBSTETRICS & GYNECOLOGY
Payer: COMMERCIAL

## 2025-08-08 DIAGNOSIS — M62.89 PELVIC FLOOR DYSFUNCTION: ICD-10-CM

## 2025-08-08 DIAGNOSIS — N39.3 STRESS INCONTINENCE: ICD-10-CM

## 2025-08-08 DIAGNOSIS — R10.2 PELVIC PRESSURE IN FEMALE: Primary | ICD-10-CM

## 2025-08-08 PROCEDURE — 97110 THERAPEUTIC EXERCISES: CPT

## 2025-08-11 ENCOUNTER — VBI (OUTPATIENT)
Dept: ADMINISTRATIVE | Facility: OTHER | Age: 55
End: 2025-08-11

## 2025-08-15 ENCOUNTER — OFFICE VISIT (OUTPATIENT)
Dept: PHYSICAL THERAPY | Facility: REHABILITATION | Age: 55
End: 2025-08-15
Attending: OBSTETRICS & GYNECOLOGY
Payer: COMMERCIAL

## 2025-08-22 ENCOUNTER — OFFICE VISIT (OUTPATIENT)
Dept: PHYSICAL THERAPY | Facility: REHABILITATION | Age: 55
End: 2025-08-22
Attending: OBSTETRICS & GYNECOLOGY
Payer: COMMERCIAL

## 2025-08-22 DIAGNOSIS — N39.3 STRESS INCONTINENCE: ICD-10-CM

## 2025-08-22 DIAGNOSIS — R10.2 PELVIC PRESSURE IN FEMALE: ICD-10-CM

## 2025-08-22 DIAGNOSIS — M62.89 PELVIC FLOOR DYSFUNCTION: Primary | ICD-10-CM

## 2025-08-22 PROCEDURE — 97110 THERAPEUTIC EXERCISES: CPT

## (undated) DEVICE — ACE WRAP 3 IN UNSTERILE

## (undated) DEVICE — SUT ETHILON 3-0 PS-1 18 IN 1663G

## (undated) DEVICE — SUT CHROMIC 4-0 P-3 18 MM 1654G

## (undated) DEVICE — SCD SEQUENTIAL COMPRESSION COMFORT SLEEVE MEDIUM KNEE LENGTH: Brand: KENDALL SCD

## (undated) DEVICE — ABDOMINAL PAD: Brand: DERMACEA

## (undated) DEVICE — NEEDLE 25G X 1 1/2

## (undated) DEVICE — PADDING CAST 6IN COTTON STRL

## (undated) DEVICE — GLOVE INDICATOR PI UNDERGLOVE SZ 8 BLUE

## (undated) DEVICE — BLADE SHAVER DISSECTOR 4MM 13CM COOLCUT

## (undated) DEVICE — DRAPE SHEET THREE QUARTER

## (undated) DEVICE — GLOVE SRG BIOGEL 7.5

## (undated) DEVICE — GLOVE SRG BIOGEL 6.5

## (undated) DEVICE — PAD CAST 4 IN COTTON NON STERILE

## (undated) DEVICE — DISPOSABLE OR TOWEL: Brand: CARDINAL HEALTH

## (undated) DEVICE — GLOVE SRG BIOGEL ECLIPSE 7

## (undated) DEVICE — GAUZE SPONGES,16 PLY: Brand: CURITY

## (undated) DEVICE — INTENDED FOR TISSUE SEPARATION, AND OTHER PROCEDURES THAT REQUIRE A SHARP SURGICAL BLADE TO PUNCTURE OR CUT.: Brand: BARD-PARKER ® CARBON RIB-BACK BLADES

## (undated) DEVICE — SUT MONOCRYL 4-0 PS-2 27 IN Y426H

## (undated) DEVICE — COBAN 6 IN STERILE

## (undated) DEVICE — OCCLUSIVE GAUZE STRIP,3% BISMUTH TRIBROMOPHENATE IN PETROLATUM BLEND: Brand: XEROFORM

## (undated) DEVICE — ARTHROSCOPY FLOOR MAT

## (undated) DEVICE — SUT VICRYL 2-0 SH 27 IN UNDYED J417H

## (undated) DEVICE — LIGHT GLOVE GREEN

## (undated) DEVICE — WEBRIL 6 IN UNSTERILE

## (undated) DEVICE — CHLORAPREP HI-LITE 26ML ORANGE

## (undated) DEVICE — ADHESIVE SKIN HIGH VISCOSITY EXOFIN 1ML

## (undated) DEVICE — GLOVE INDICATOR PI UNDERGLOVE SZ 7 BLUE

## (undated) DEVICE — U-DRAPE: Brand: CONVERTORS

## (undated) DEVICE — BETHLEHEM UNIVERSAL  ARTHRO PK: Brand: CARDINAL HEALTH

## (undated) DEVICE — ACE WRAP 4 IN STERILE

## (undated) DEVICE — TUBING ARTHROSCOPIC WAVE  MAIN PUMP

## (undated) DEVICE — ACE WRAP 6 IN UNSTERILE

## (undated) DEVICE — 3M™ STERI-DRAPE™ U-DRAPE 1015: Brand: STERI-DRAPE™

## (undated) DEVICE — SUT VICRYL 3-0 SH 27 IN J416H

## (undated) DEVICE — DISPOSABLE EQUIPMENT COVER: Brand: SMALL TOWEL DRAPE

## (undated) DEVICE — IMPERVIOUS STOCKINETTE: Brand: DEROYAL

## (undated) DEVICE — TIBURON EXTREMITY SHEET: Brand: CONVERTORS

## (undated) DEVICE — PADDING CAST 4 IN  COTTON STRL

## (undated) DEVICE — NEEDLE 25GA X 1 IN SAFETY GLIDE

## (undated) DEVICE — SPONGE SCRUB 4 PCT CHLORHEXIDINE

## (undated) DEVICE — STERILE BETHLEHEM PLASTIC HAND: Brand: CARDINAL HEALTH

## (undated) DEVICE — SURGICAL GOWN, XL SMARTSLEEVE: Brand: CONVERTORS

## (undated) DEVICE — ANTI-FOG SOLUTION WITH FOAM PAD: Brand: DEVON

## (undated) DEVICE — 4-PORT MANIFOLD: Brand: NEPTUNE 2

## (undated) DEVICE — SYSTEM ENDOSCOPIC CARPAL TUNNEL RELEASE DISP

## (undated) DEVICE — CUFF TOURNIQUET 18 X 4 IN QUICK CONNECT DISP 1 BLADDER